# Patient Record
Sex: MALE | Race: WHITE | NOT HISPANIC OR LATINO | Employment: OTHER | URBAN - NONMETROPOLITAN AREA
[De-identification: names, ages, dates, MRNs, and addresses within clinical notes are randomized per-mention and may not be internally consistent; named-entity substitution may affect disease eponyms.]

---

## 2017-05-25 ENCOUNTER — HOSPITAL ENCOUNTER (OUTPATIENT)
Dept: FAMILY MEDICINE CLINIC | Facility: CLINIC | Age: 82
Setting detail: SPECIMEN
Discharge: HOME | End: 2017-05-25
Attending: INTERNAL MEDICINE | Admitting: INTERNAL MEDICINE

## 2017-05-25 LAB
ANION GAP SERPL CALC-SCNC: 11.8 MMOL/L (ref 10–20)
BASOPHILS # BLD AUTO: 0.1 10*3/UL (ref 0–0.2)
BASOPHILS NFR BLD AUTO: 1 % (ref 0–2)
BUN SERPL-MCNC: 16 MG/DL (ref 8–20)
BUN/CREAT SERPL: 16 (ref 6.2–20.3)
CALCIUM SERPL-MCNC: 9.3 MG/DL (ref 8.9–10.3)
CHLORIDE SERPL-SCNC: 104 MMOL/L (ref 101–111)
CONV CO2: 26 MMOL/L (ref 22–32)
CREAT UR-MCNC: 1 MG/DL (ref 0.7–1.2)
DIFFERENTIAL METHOD BLD: (no result)
EOSINOPHIL # BLD AUTO: 0.2 10*3/UL (ref 0–0.3)
EOSINOPHIL # BLD AUTO: 3 % (ref 0–3)
ERYTHROCYTE [DISTWIDTH] IN BLOOD BY AUTOMATED COUNT: 15.2 % (ref 11.5–14.5)
GLUCOSE SERPL-MCNC: 124 MG/DL (ref 65–99)
HCT VFR BLD AUTO: 41.7 % (ref 40–54)
HGB BLD-MCNC: 14.1 G/DL (ref 14–18)
LYMPHOCYTES # BLD AUTO: 1 10*3/UL (ref 0.8–4.8)
LYMPHOCYTES NFR BLD AUTO: 17 % (ref 18–42)
MCH RBC QN AUTO: 31.8 PG (ref 26–32)
MCHC RBC AUTO-ENTMCNC: 33.8 G/DL (ref 32–36)
MCV RBC AUTO: 94.2 FL (ref 80–94)
MONOCYTES # BLD AUTO: 0.6 10*3/UL (ref 0.1–1.3)
MONOCYTES NFR BLD AUTO: 10 % (ref 2–11)
NEUTROPHILS # BLD AUTO: 4.1 10*3/UL (ref 2.3–8.6)
NEUTROPHILS NFR BLD AUTO: 69 % (ref 50–75)
NRBC BLD AUTO-RTO: 0 /100{WBCS}
NRBC/RBC NFR BLD MANUAL: 0 10*3/UL
PLATELET # BLD AUTO: 192 10*3/UL (ref 150–450)
PMV BLD AUTO: 8.3 FL (ref 7.4–10.4)
POTASSIUM SERPL-SCNC: 3.8 MMOL/L (ref 3.6–5.1)
RBC # BLD AUTO: 4.42 10*6/UL (ref 4.6–6)
SODIUM SERPL-SCNC: 138 MMOL/L (ref 136–144)
WBC # BLD AUTO: 5.9 10*3/UL (ref 4.5–11.5)

## 2019-08-20 RX ORDER — WARFARIN SODIUM 2 MG/1
TABLET ORAL TAKE AS DIRECTED
Status: ON HOLD | COMMUNITY
Start: 2019-07-09 | End: 2020-08-17 | Stop reason: SDUPTHER

## 2019-08-20 RX ORDER — ROSUVASTATIN CALCIUM 10 MG/1
1 TABLET, COATED ORAL
COMMUNITY
Start: 2019-06-27

## 2019-08-20 RX ORDER — GABAPENTIN 300 MG/1
600 CAPSULE ORAL
COMMUNITY
Start: 2019-06-26 | End: 2021-06-15 | Stop reason: HOSPADM

## 2019-08-20 RX ORDER — METOLAZONE 2.5 MG/1
1 TABLET ORAL EVERY OTHER DAY
COMMUNITY
Start: 2019-01-04 | End: 2020-05-29 | Stop reason: HOSPADM

## 2019-08-20 RX ORDER — CITALOPRAM 10 MG/1
1 TABLET ORAL EVERY 24 HOURS
COMMUNITY
Start: 2019-01-04 | End: 2020-01-20

## 2019-08-20 RX ORDER — CLONAZEPAM 0.5 MG/1
1 TABLET ORAL NIGHTLY PRN
COMMUNITY
Start: 2019-07-05 | End: 2020-08-17 | Stop reason: HOSPADM

## 2019-08-20 RX ORDER — FERROUS SULFATE 325(65) MG
1 TABLET ORAL
COMMUNITY
Start: 2019-01-04

## 2019-08-20 RX ORDER — LORATADINE 10 MG/1
1 TABLET ORAL DAILY
COMMUNITY
End: 2020-01-20

## 2019-08-20 RX ORDER — ROPINIROLE 0.25 MG/1
1 TABLET, FILM COATED ORAL EVERY 12 HOURS
COMMUNITY
Start: 2019-01-04 | End: 2020-01-20

## 2019-08-20 RX ORDER — OMEPRAZOLE 20 MG/1
1 CAPSULE, DELAYED RELEASE ORAL DAILY
COMMUNITY

## 2019-08-20 RX ORDER — POTASSIUM CHLORIDE 20 MEQ/1
1 TABLET, EXTENDED RELEASE ORAL 3 TIMES DAILY
COMMUNITY
Start: 2017-05-22 | End: 2020-03-09 | Stop reason: SDUPTHER

## 2019-08-20 RX ORDER — PRAMIPEXOLE DIHYDROCHLORIDE 0.25 MG/1
1 TABLET ORAL EVERY 24 HOURS
COMMUNITY
Start: 2019-01-04 | End: 2021-06-11

## 2019-08-20 RX ORDER — FUROSEMIDE 40 MG/1
40 TABLET ORAL DAILY
Status: ON HOLD | COMMUNITY
Start: 2019-07-12 | End: 2020-06-16 | Stop reason: SDUPTHER

## 2019-08-20 NOTE — PROGRESS NOTES
Subjective:     Encounter Date:08/21/2019      Patient ID: Rafael Ivey is a 83 y.o. male.    Chief Complaint:  History of Present Illness  83-year-old white male patient with known history of Parkinson's disease symptomatic bradycardia status post single-chamber permanent pacemaker placement chronic atrial fibrillation on long-term anti coagulation hypertension and dyslipidemia comes back for   follow-up      patient had problems with recurrent falls previously and was given the option of Watchman device but patient refused   patient is currently on anti coagulation   his last echocardiogram 2018 normal LV systolic function mild mitral regurgitation moderate tricuspid insufficiency moderate to severe pulmonary hypertension      will continue conservative treatment patient will get device checks regularly and also he gets PT INR checked with the PCP regularly  EKG today showed atrial fibrillation ventricular pacing noted       Past Medical History:   Diagnosis Date   • Atrial fibrillation (CMS/HCC)    • Cardiomegaly    • Hyperlipidemia    • Hypertension    • Mitral regurgitation    • Parkinson disease (CMS/HCC)    • Sick sinus syndrome (CMS/HCC)    • Valvular heart disease      Past Surgical History:   Procedure Laterality Date   • CHOLECYSTECTOMY     • PACEMAKER IMPLANTATION  06/22/2017    Levin's ([employer])   • TOTAL HIP ARTHROPLASTY Left 05/20/2014     /78 (BP Location: Left arm, Patient Position: Sitting, Cuff Size: Large Adult)   Pulse 63   Wt 131 kg (288 lb)   SpO2 99%   BMI 39.06 kg/m²   Family History   Problem Relation Age of Onset   • Heart failure Mother    • Stroke Maternal Grandfather        Current Outpatient Medications:   •  carbidopa-levodopa (SINEMET)  MG per tablet, Take 1 tablet by mouth Every 8 (Eight) Hours., Disp: , Rfl:   •  citalopram (CeleXA) 10 MG tablet, Take 1 tablet by mouth Daily., Disp: , Rfl:   •  escitalopram (LEXAPRO) 10 MG tablet, Take 10 mg by mouth Daily.,  Disp: , Rfl:   •  famotidine (PEPCID) 20 MG tablet, Take 20 mg by mouth 2 (Two) Times a Day., Disp: , Rfl:   •  ferrous sulfate 325 (65 FE) MG tablet, Take 1 tablet by mouth Every 12 (Twelve) Hours., Disp: , Rfl:   •  furosemide (LASIX) 40 MG tablet, Take 1 tablet by mouth Daily. Alternate every other day 2 tablets, Disp: , Rfl:   •  gabapentin (NEURONTIN) 300 MG capsule, Take 1 capsule by mouth every night at bedtime., Disp: , Rfl:   •  metOLazone (ZAROXOLYN) 2.5 MG tablet, Take 1 tablet by mouth Daily., Disp: , Rfl:   •  MULTIPLE VITAMIN-FOLIC ACID PO, Take 1 tablet by mouth Daily., Disp: , Rfl:   •  Omega-3 Fatty Acids (FISH OIL PO), Take 2 capsules by mouth Daily., Disp: , Rfl:   •  potassium chloride (K-DUR,KLOR-CON) 20 MEQ CR tablet, Take 1 tablet by mouth Every 12 (Twelve) Hours., Disp: , Rfl:   •  pramipexole (MIRAPEX) 0.25 MG tablet, Take 1 tablet by mouth Daily., Disp: , Rfl:   •  rOPINIRole (REQUIP) 0.25 MG tablet, Take 1 tablet by mouth Every 12 (Twelve) Hours., Disp: , Rfl:   •  warfarin (COUMADIN) 2 MG tablet, Take As Directed., Disp: , Rfl:   •  clonazePAM (KlonoPIN) 0.5 MG tablet, Take 1 tablet by mouth 3 (Three) Times a Day As Needed., Disp: , Rfl:   •  loratadine (CLARITIN) 10 MG tablet, Take 1 tablet by mouth Daily., Disp: , Rfl:   •  omeprazole (PRILOSEC) 20 MG capsule, Take 1 capsule by mouth Daily., Disp: , Rfl:   •  rosuvastatin (CRESTOR) 10 MG tablet, Take 1 tablet by mouth every night at bedtime., Disp: , Rfl:   Social History     Socioeconomic History   • Marital status:      Spouse name: Not on file   • Number of children: Not on file   • Years of education: Not on file   • Highest education level: Not on file   Tobacco Use   • Smoking status: Former Smoker     Years: 40.00     Types: Cigarettes   • Smokeless tobacco: Never Used     Allergies   Allergen Reactions   • Amoxicillin Diarrhea   • Cephalexin Diarrhea and Nausea And Vomiting   • Penicillins Other (See Comments)   •  Amoxicillin-Pot Clavulanate Nausea And Vomiting   • Clarithromycin Nausea And Vomiting     Review of Systems   Constitution: Positive for malaise/fatigue. Negative for fever.   HENT: Negative for congestion and hearing loss.    Eyes: Negative for double vision and visual disturbance.   Cardiovascular: Positive for leg swelling. Negative for chest pain, claudication, dyspnea on exertion, palpitations and syncope.   Respiratory: Negative for cough and shortness of breath.    Endocrine: Negative for cold intolerance.   Skin: Positive for rash. Negative for color change.   Musculoskeletal: Negative for arthritis and joint pain.   Gastrointestinal: Negative for abdominal pain, heartburn, nausea and vomiting.   Genitourinary: Negative for hematuria.   Neurological: Positive for dizziness, light-headedness and numbness. Negative for excessive daytime sleepiness.   Psychiatric/Behavioral: Negative for depression. The patient is not nervous/anxious.    All other systems reviewed and are negative.             Objective:     Physical Exam   Constitutional: He is oriented to person, place, and time. He appears well-developed and well-nourished. He is cooperative.   HENT:   Head: Normocephalic and atraumatic.   Mouth/Throat: Uvula is midline and oropharynx is clear and moist. No oral lesions.   Eyes: Conjunctivae are normal. No scleral icterus.   Neck: Trachea normal. Neck supple. No JVD present. Carotid bruit is not present. No thyromegaly present.   Cardiovascular: Normal rate, regular rhythm, S1 normal, S2 normal, normal heart sounds, intact distal pulses and normal pulses. PMI is not displaced. Exam reveals no gallop and no friction rub.   No murmur heard.  Pulmonary/Chest: Effort normal and breath sounds normal.   Abdominal: Soft. Bowel sounds are normal.   Musculoskeletal: Normal range of motion.   Neurological: He is alert and oriented to person, place, and time. He has normal strength.   No focal deficits   Skin: Skin is  warm. No cyanosis.   Psychiatric: He has a normal mood and affect.         ECG 12 Lead  Date/Time: 8/21/2019 4:56 PM  Performed by: Zach Segal MD  Authorized by: Zach Segal MD   Comments: EKG done today showed atrial fibrillation with ventricular pacing noted compared to the old EKG ventricular pacing is now present atrial fibrillation unchanged            Lab Review:       Assessment:          Diagnosis Plan   1. Sick sinus syndrome (CMS/HCC)     2. Atrial fibrillation, chronic (CMS/HCC)     3. Pacemaker            Plan:     Status post permanent pacemaker placement stable  Continue anticoagulation fall precautions

## 2019-08-21 ENCOUNTER — CLINICAL SUPPORT NO REQUIREMENTS (OUTPATIENT)
Dept: CARDIOLOGY | Facility: CLINIC | Age: 84
End: 2019-08-21

## 2019-08-21 ENCOUNTER — OFFICE VISIT (OUTPATIENT)
Dept: CARDIOLOGY | Facility: CLINIC | Age: 84
End: 2019-08-21

## 2019-08-21 DIAGNOSIS — Z95.0 PACEMAKER: ICD-10-CM

## 2019-08-21 DIAGNOSIS — I49.5 SICK SINUS SYNDROME (HCC): Primary | ICD-10-CM

## 2019-08-21 DIAGNOSIS — I48.20 ATRIAL FIBRILLATION, CHRONIC (HCC): ICD-10-CM

## 2019-08-21 PROCEDURE — 93000 ELECTROCARDIOGRAM COMPLETE: CPT | Performed by: INTERNAL MEDICINE

## 2019-08-21 PROCEDURE — 93279 PRGRMG DEV EVAL PM/LDLS PM: CPT | Performed by: INTERNAL MEDICINE

## 2019-08-21 PROCEDURE — 99213 OFFICE O/P EST LOW 20 MIN: CPT | Performed by: INTERNAL MEDICINE

## 2019-08-21 RX ORDER — ESCITALOPRAM OXALATE 10 MG/1
10 TABLET ORAL DAILY
COMMUNITY

## 2019-08-21 RX ORDER — FAMOTIDINE 20 MG/1
20 TABLET, FILM COATED ORAL 2 TIMES DAILY
COMMUNITY
End: 2020-01-20

## 2019-08-22 VITALS
BODY MASS INDEX: 39.06 KG/M2 | OXYGEN SATURATION: 99 % | HEART RATE: 63 BPM | DIASTOLIC BLOOD PRESSURE: 78 MMHG | SYSTOLIC BLOOD PRESSURE: 125 MMHG | WEIGHT: 288 LBS

## 2020-01-18 ENCOUNTER — LAB REQUISITION (OUTPATIENT)
Dept: LAB | Facility: HOSPITAL | Age: 85
End: 2020-01-18

## 2020-01-18 DIAGNOSIS — Z00.00 ROUTINE GENERAL MEDICAL EXAMINATION AT A HEALTH CARE FACILITY: ICD-10-CM

## 2020-01-18 LAB
BASOPHILS # BLD AUTO: 0.06 10*3/MM3 (ref 0–0.2)
BASOPHILS NFR BLD AUTO: 1.5 % (ref 0–1.5)
DEPRECATED RDW RBC AUTO: 58.1 FL (ref 37–54)
EOSINOPHIL # BLD AUTO: 0.19 10*3/MM3 (ref 0–0.4)
EOSINOPHIL NFR BLD AUTO: 4.7 % (ref 0.3–6.2)
ERYTHROCYTE [DISTWIDTH] IN BLOOD BY AUTOMATED COUNT: 18.4 % (ref 12.3–15.4)
HCT VFR BLD AUTO: 28 % (ref 37.5–51)
HGB BLD-MCNC: 8.8 G/DL (ref 13–17.7)
IMM GRANULOCYTES # BLD AUTO: 0.01 10*3/MM3 (ref 0–0.05)
IMM GRANULOCYTES NFR BLD AUTO: 0.2 % (ref 0–0.5)
LYMPHOCYTES # BLD AUTO: 1.09 10*3/MM3 (ref 0.7–3.1)
LYMPHOCYTES NFR BLD AUTO: 27 % (ref 19.6–45.3)
MCH RBC QN AUTO: 27.2 PG (ref 26.6–33)
MCHC RBC AUTO-ENTMCNC: 31.4 G/DL (ref 31.5–35.7)
MCV RBC AUTO: 86.7 FL (ref 79–97)
MONOCYTES # BLD AUTO: 0.56 10*3/MM3 (ref 0.1–0.9)
MONOCYTES NFR BLD AUTO: 13.9 % (ref 5–12)
NEUTROPHILS # BLD AUTO: 2.13 10*3/MM3 (ref 1.7–7)
NEUTROPHILS NFR BLD AUTO: 52.7 % (ref 42.7–76)
NRBC BLD AUTO-RTO: 0 /100 WBC (ref 0–0.2)
PLATELET # BLD AUTO: 202 10*3/MM3 (ref 140–450)
PMV BLD AUTO: 8.9 FL (ref 6–12)
PSA SERPL-MCNC: 0.7 NG/ML (ref 0–4)
RBC # BLD AUTO: 3.23 10*6/MM3 (ref 4.14–5.8)
WBC NRBC COR # BLD: 4.04 10*3/MM3 (ref 3.4–10.8)

## 2020-01-18 PROCEDURE — 84153 ASSAY OF PSA TOTAL: CPT

## 2020-01-18 PROCEDURE — 85025 COMPLETE CBC W/AUTO DIFF WBC: CPT

## 2020-01-20 ENCOUNTER — OFFICE VISIT (OUTPATIENT)
Dept: CARDIOLOGY | Facility: CLINIC | Age: 85
End: 2020-01-20

## 2020-01-20 VITALS
SYSTOLIC BLOOD PRESSURE: 103 MMHG | BODY MASS INDEX: 30.92 KG/M2 | DIASTOLIC BLOOD PRESSURE: 73 MMHG | WEIGHT: 228 LBS | HEART RATE: 62 BPM | RESPIRATION RATE: 18 BRPM

## 2020-01-20 DIAGNOSIS — R60.0 BILATERAL LEG EDEMA: ICD-10-CM

## 2020-01-20 DIAGNOSIS — I49.5 SICK SINUS SYNDROME (HCC): ICD-10-CM

## 2020-01-20 DIAGNOSIS — T45.515A WARFARIN-INDUCED COAGULOPATHY (HCC): ICD-10-CM

## 2020-01-20 DIAGNOSIS — D50.0 IRON DEFICIENCY ANEMIA DUE TO CHRONIC BLOOD LOSS: ICD-10-CM

## 2020-01-20 DIAGNOSIS — I48.20 ATRIAL FIBRILLATION, CHRONIC (HCC): Primary | ICD-10-CM

## 2020-01-20 DIAGNOSIS — G20 PARKINSON'S DISEASE (HCC): ICD-10-CM

## 2020-01-20 DIAGNOSIS — D68.32 WARFARIN-INDUCED COAGULOPATHY (HCC): ICD-10-CM

## 2020-01-20 DIAGNOSIS — Z95.0 PACEMAKER: ICD-10-CM

## 2020-01-20 PROCEDURE — 99214 OFFICE O/P EST MOD 30 MIN: CPT | Performed by: INTERNAL MEDICINE

## 2020-01-20 PROCEDURE — 93000 ELECTROCARDIOGRAM COMPLETE: CPT | Performed by: INTERNAL MEDICINE

## 2020-01-20 RX ORDER — BACLOFEN 10 MG/1
15 TABLET ORAL NIGHTLY
COMMUNITY
End: 2021-06-15 | Stop reason: HOSPADM

## 2020-01-20 RX ORDER — ACETAMINOPHEN 325 MG/1
650 TABLET ORAL EVERY 6 HOURS PRN
COMMUNITY
End: 2021-06-15 | Stop reason: HOSPADM

## 2020-01-20 NOTE — PATIENT INSTRUCTIONS
Get echo and hospitalization reports from [facility]   Continue diuresis as renal function allows  EP Evaluation for watchman  F/U 3 months

## 2020-01-20 NOTE — PROGRESS NOTES
Cardiology Office Visit      Encounter Date:  01/20/2020    Patient ID:   Rafael Ivey is a 84 y.o. male.    Reason For Followup:  Edema  Coagulopathy secondary to warfarin  Anemia  Hyperlipidemia    Brief Clinical History:  Dear Dr. Ivey, Blake Doherty MD    I had the pleasure of seeing Rafael Ivey today. As you are well aware, this is a 84 y.o. male with no established history of ischemic heart disease.  He does however have a history of sick sinus syndrome and is status post permanent pacemaker placement, chronic atrial fibrillation, coagulopathy secondary to warfarin, Parkinson's disease, hypertension, hyperlipidemia chronic pain, and profound lower extremity edema.  He presents today to establish care with another cardiologist.    Interval History:  He denies any chest pain pressure heaviness or tightness.  He does report shortness of breath with activity.  He denies any PND orthopnea.  He does report syncopal episodes and falling episodes secondary to his Parkinson's disease.  He additionally reports a profound amount of lower extremity edema.    He additionally was recently hospitalized at [facility] following a syncopal episode.  Although I do not have the records of the admission, during the course of his work-up he was found to be profoundly anemic.  He was reportedly transfused 5 units of blood and according to his daughter who accompanies them today, his most recent hemoglobin was only 8 something.  They were unable to find a source for the bleeding.  He was hypotensive during the admission and he was taken off all of his antihypertensives.  He continues on warfarin.  He has a profound amount of lower extremity edema as well.  He was placed on metolazone in addition to his regular diuretics during his rehab.  He currently remains in rehab.  He has lost about 25 pounds with the addition of metolazone and has quite a bit more fluid to remove.  They are obtaining periodic basic metabolic  panels to evaluate renal function and electrolytes.  The patient does report some lower extremity cramping.  He is reporting a significant issue with urinary incontinence and does not want to increase his diuretic dosage.    We had an extensive conversation regarding the watchman device as the patient is at extremely high risk for bleeding complications particularly with his most recent bout with anemia requiring massive transfusion.  He also has a significant risk of stroke as his chads score is markedly elevated.  We discussed options and he will see cardiac electrophysiology for evaluation for watchman device.    Assessment & Plan    Impressions:  Chronic atrial fibrillation with elevated chads score  Coagulopathy secondary to warfarin  Anemia requiring massive blood transfusion likely GI etiology  Profound lower extremity edema  History of hypertension currently normotensive  Parkinson's disease  Hyperlipidemia  Sick sinus syndrome status post permanent pacemaker  Chronic pain    Recommendations:  Continuation of his current cardiovascular regimen at the present time.  Continue diuretic dosing at current levels and closely monitor renal function and electrolyte levels  Cardiac electrophysiology evaluation for appropriateness for watchman device  Obtain echo and hospitalization records from [facility]  Follow-up in 3 months time sooner should there be difficulties.    Objective:    Vitals:  Vitals:    01/20/20 1330   BP: 103/73   BP Location: Left arm   Pulse: 62   Resp: 18   Weight: 103 kg (228 lb)       Physical Exam:    General: Alert, cooperative, no distress, appears stated age  Head:  Normocephalic, atraumatic, mucous membranes moist  Eyes:  Conjunctiva/corneas clear, EOM's intact     Neck:  Supple,  no bruit  Lungs: Coarse and diminished at the bases.  No rhonchi  Chest wall: No tenderness  Heart::  Regular rate and rhythm, S1 and S2 normal, 2/6 holosystolic murmur.  No rub or gallop  Abdomen: Soft,  non-tender, nondistended bowel sounds active  Extremities: No cyanosis, clubbing, profound lower extremity edema  Pulses: 2+ and symmetric all extremities  Skin:  Bandages noted to lower extremities at areas of tense fluid retention.  Compression hose are in place.  Neuro/psych: A&O x3. CN II through XII are grossly intact with appropriate affect      Allergies:  Allergies   Allergen Reactions   • Amoxicillin Diarrhea   • Cephalexin Diarrhea and Nausea And Vomiting   • Penicillins Other (See Comments)   • Amoxicillin-Pot Clavulanate Nausea And Vomiting   • Clarithromycin Nausea And Vomiting       Medication Review:     Current Outpatient Medications:   •  acetaminophen (TYLENOL) 325 MG tablet, Take 650 mg by mouth Every 6 (Six) Hours As Needed for Mild Pain ., Disp: , Rfl:   •  baclofen (LIORESAL) 10 MG tablet, Take 10 mg by mouth Daily., Disp: , Rfl:   •  carbidopa-levodopa (SINEMET)  MG per tablet, Take 1 tablet by mouth Every 8 (Eight) Hours., Disp: , Rfl:   •  clonazePAM (KlonoPIN) 0.5 MG tablet, Take 1 tablet by mouth At Night As Needed., Disp: , Rfl:   •  escitalopram (LEXAPRO) 10 MG tablet, Take 10 mg by mouth Daily., Disp: , Rfl:   •  ferrous sulfate 325 (65 FE) MG tablet, Take 1 tablet by mouth Every 12 (Twelve) Hours., Disp: , Rfl:   •  furosemide (LASIX) 40 MG tablet, Take 40 mg by mouth Daily. Once a day on Sun, Mon, Wed, and Fri- 40mg  Once a day Tue, Thur, and Sat- 80mg, Disp: , Rfl:   •  gabapentin (NEURONTIN) 300 MG capsule, Take 600 mg by mouth every night at bedtime., Disp: , Rfl:   •  metOLazone (ZAROXOLYN) 2.5 MG tablet, Take 1 tablet by mouth Daily., Disp: , Rfl:   •  multivitamin-minerals (CENTRUM) tablet, Take 1 tablet by mouth Daily., Disp: , Rfl:   •  Omega-3 Fatty Acids (FISH OIL PO), Take 2 capsules by mouth Daily., Disp: , Rfl:   •  omeprazole (PRILOSEC) 20 MG capsule, Take 1 capsule by mouth Daily., Disp: , Rfl:   •  potassium chloride (K-DUR,KLOR-CON) 20 MEQ CR tablet, Take 1  tablet by mouth 3 (Three) Times a Day., Disp: , Rfl:   •  pramipexole (MIRAPEX) 0.25 MG tablet, Take 1 tablet by mouth Daily., Disp: , Rfl:   •  rosuvastatin (CRESTOR) 10 MG tablet, Take 1 tablet by mouth every night at bedtime., Disp: , Rfl:   •  warfarin (COUMADIN) 2 MG tablet, Take As Directed., Disp: , Rfl:     Family History:  Family History   Problem Relation Age of Onset   • Heart failure Mother    • Stroke Maternal Grandfather        Past Medical History:  Past Medical History:   Diagnosis Date   • Anemia    • Arthritis     left hip    • Atrial fibrillation (CMS/HCC)    • Atrial fibrillation (CMS/HCC)     chronic   • Cardiomegaly     mild   • Chronic diastolic heart failure (CMS/HCC)    • GERD (gastroesophageal reflux disease)    • Hyperlipidemia    • Hypertension    • Hypotension    • Lower extremity edema    • Mitral regurgitation    • Obesity    • Pacemaker    • Parkinson disease (CMS/HCC)    • Recurrent falls     with injury    • Sick sinus syndrome (CMS/HCC)     s/p pacemaker implant    • Valvular heart disease     MR       Past surgical History:  Past Surgical History:   Procedure Laterality Date   • APPENDECTOMY     • CATARACT EXTRACTION     • CHOLECYSTECTOMY     • HIP SURGERY Right 08/2017   • OTHER SURGICAL HISTORY      skin mole excisions    • PACEMAKER IMPLANTATION  06/22/2017    Levin's ([employer])   • TONSILLECTOMY     • TOTAL HIP ARTHROPLASTY Left 05/20/2014       Social History:  Social History     Socioeconomic History   • Marital status:      Spouse name: Not on file   • Number of children: Not on file   • Years of education: Not on file   • Highest education level: Not on file   Tobacco Use   • Smoking status: Former Smoker     Packs/day: 1.00     Years: 40.00     Pack years: 40.00     Types: Cigarettes   • Smokeless tobacco: Never Used   • Tobacco comment: quit 1970's    Substance and Sexual Activity   • Alcohol use: Never     Frequency: Never   • Drug use: Never   • Sexual  activity: Defer       Review of Systems:  The following systems were reviewed as they relate to the cardiovascular system: Constitutional, Eyes, ENT, Cardiovascular, Respiratory, Gastrointestinal, Integumentary, Neurological, Psychiatric, Hematologic, Endocrine, Musculoskeletal, and Genitourinary. The pertinent cardiovascular findings are reported above with all other cardiovascular points within those systems being negative.    Diagnostic Study Review:     Current Electrocardiogram:    ECG 12 Lead  Date/Time: 1/20/2020 3:36 PM  Performed by: Jose Gonzalez DO  Authorized by: Jose Gonzalez DO   Comparison: not compared with previous ECG   Previous ECG: no previous ECG available  Comments: Ventricular paced complexes with a rate of 62 bpm.  Atrial fibrillation.  No further analysis attempted.              NOTE: The following portions of the patient's history were reviewed and updated this visit as appropriate: allergies, current medications, past family history, past medical history, past social history, past surgical history and problem list.

## 2020-01-24 DIAGNOSIS — R60.0 BILATERAL LEG EDEMA: Primary | ICD-10-CM

## 2020-03-09 RX ORDER — POTASSIUM CHLORIDE 20 MEQ/1
20 TABLET, EXTENDED RELEASE ORAL 3 TIMES DAILY
Qty: 60 TABLET | Refills: 2 | Status: SHIPPED | OUTPATIENT
Start: 2020-03-09 | End: 2021-06-11

## 2020-03-13 ENCOUNTER — PREP FOR SURGERY (OUTPATIENT)
Dept: OTHER | Facility: HOSPITAL | Age: 85
End: 2020-03-13

## 2020-03-13 ENCOUNTER — CLINICAL SUPPORT NO REQUIREMENTS (OUTPATIENT)
Dept: CARDIOLOGY | Facility: CLINIC | Age: 85
End: 2020-03-13

## 2020-03-13 ENCOUNTER — OFFICE VISIT (OUTPATIENT)
Dept: CARDIOLOGY | Facility: CLINIC | Age: 85
End: 2020-03-13

## 2020-03-13 VITALS
WEIGHT: 224 LBS | DIASTOLIC BLOOD PRESSURE: 64 MMHG | SYSTOLIC BLOOD PRESSURE: 136 MMHG | BODY MASS INDEX: 30.38 KG/M2 | HEART RATE: 59 BPM | OXYGEN SATURATION: 94 %

## 2020-03-13 DIAGNOSIS — T45.515A WARFARIN-INDUCED COAGULOPATHY (HCC): ICD-10-CM

## 2020-03-13 DIAGNOSIS — D68.32 WARFARIN-INDUCED COAGULOPATHY (HCC): ICD-10-CM

## 2020-03-13 DIAGNOSIS — G20 PARKINSON'S DISEASE (HCC): Primary | ICD-10-CM

## 2020-03-13 DIAGNOSIS — D50.0 IRON DEFICIENCY ANEMIA DUE TO CHRONIC BLOOD LOSS: Primary | ICD-10-CM

## 2020-03-13 DIAGNOSIS — I49.5 SICK SINUS SYNDROME (HCC): Primary | ICD-10-CM

## 2020-03-13 DIAGNOSIS — D50.0 IRON DEFICIENCY ANEMIA DUE TO CHRONIC BLOOD LOSS: ICD-10-CM

## 2020-03-13 DIAGNOSIS — Z95.0 PACEMAKER: ICD-10-CM

## 2020-03-13 DIAGNOSIS — I49.5 SICK SINUS SYNDROME (HCC): ICD-10-CM

## 2020-03-13 DIAGNOSIS — I48.20 ATRIAL FIBRILLATION, CHRONIC (HCC): ICD-10-CM

## 2020-03-13 DIAGNOSIS — R60.0 BILATERAL LEG EDEMA: ICD-10-CM

## 2020-03-13 PROCEDURE — 93279 PRGRMG DEV EVAL PM/LDLS PM: CPT | Performed by: INTERNAL MEDICINE

## 2020-03-13 PROCEDURE — 99214 OFFICE O/P EST MOD 30 MIN: CPT | Performed by: INTERNAL MEDICINE

## 2020-03-13 RX ORDER — SODIUM CHLORIDE 9 MG/ML
80 INJECTION, SOLUTION INTRAVENOUS CONTINUOUS
Status: CANCELLED | OUTPATIENT
Start: 2020-03-13

## 2020-03-13 NOTE — PROGRESS NOTES
CC--chronic atrial fibrillation, chronic leg edema, Parkinson's disease with multiple falls, anemia needing multiple transfusions    Subject-- 84-year-old male patient of symptomatic bradycardia needing a single-chamber pacemaker manufactured by [employer] placed in [facility] on comes in for evaluation.   past medical history of  chronic atrial fibrillation, hypertension, dyslipidemia, and use of long-term anticoagulation to prevent cardioembolic episodes. His last echocardiogram showed normal LV systolic function,  mild mitral regurgitation,  moderate tricuspid regurgitation and pulmonary hypertension with RVSP of 58 mm.   chronic medical problems include persistent atrial fibrillation, hypertension, hyperlipidemia and prior history of symptomatic bradycardia needing pacemaker implantation   patient denies any stroke, or any active angina or dyspnea  Does have chronic edema of his feet   he also has worsening Parkinson's with recurrent falls   Recently also had profound anemia needing multiple transfusions and came back for evaluation of watchman device implantation to avoid chronic anticoagulation  He is compensated and class III     assessment plan     single-chamber pacemaker in situ  [employer] company appropriately functioning   hypertension controlled   hyperlipidemia   permanent atrial fibrillation   recurrent falls with injury  Recent profound anemia needing transfusions and negative work-up   chads Vasc score--4  HASBLED--3   Parkinson's   chronic diastolic heart failure   patient educated about options for left atrial appendage occlusion device to avoid long-term anticoagulation because of recurrent falls--schedule for pulmonary PRESTON           Vital Signs: Blood pressure 136/64 pulse rate 59 patient afebrile respiration 12 times a minute    Current Allergies (reviewed today):  PCN (Critical)  KEFLEX (Critical)  AMOXICILLIN (Critical)      Past Medical History:     Reviewed  history from 05/31/2018 and no changes required:        Atrial Fibrillation        Mitral Regurgitation        Mild cardiomegaly        Hyperlipidemia        Hypertension        Low extremity edema        Arthritis of left hip         Parkinson's Disease         Valvular Heart Disease        Hx: Anemia         SSS: s/p pacemaker implant     Past Surgical History:     Reviewed history from 11/30/2017 and no changes required:        Appendectomy        Cholecystectomy        Skin moles removed        Tonsillectomy        Total Hip Arthroplasty: Left - 5-        Cataract Extraction        Pacemaker implant : Levin's June 22, 2017         Right hip surgery : August 2017     Review of Systems   General: No fatigue or tiredness, No change in weight   Eyes: No redness  Cardiovascular: No chest pain, no palpitations  Respiratory: No shortness of breath  Gastrointestinal: No nausea or vomiting, bleeding  Genitourinary: no hematuria or dysuria  Musculoskeletal: No arthralgia or myalgia  Skin: No rash  Neurologic: No numbness, tingling, syncope  Hematologic/Lymphatic:  positive for skin bruising      Physical Exam    General:      well developed, well nourished, in no acute distress.    Head:      normocephalic and atraumatic.    Eyes:      PERRL/EOM intact, conjunctiva and sclera clear with out nystagmus.    Neck:      no masses, thyromegaly,  trachea central with normal respiratory effort  Lungs:      clear bilaterally to auscultation.    Heart:      non-displaced PMI, chest non-tender; irregular rate and rhythm, S1, S2 without murmurs, rubs, or gallops  Msk:      kyphosis and unsteady gait.    Pulses:       distal pulses both lower extremities are not palpable because of significant edema  Extremities:       2+ bilateral lower leg and ankle/ pedal edema   no cyanosis or clubbing  Neurologic:      no focal deficits,  alert and oriented x3  Skin:       positive for skin bruising  Psych:      alert and cooperative;  normal mood and affect; normal attention span and concentration.      Electronically signed by iJm Walker MD, 03/13/20, 4:12 PM.

## 2020-03-18 ENCOUNTER — APPOINTMENT (OUTPATIENT)
Dept: CARDIOLOGY | Facility: HOSPITAL | Age: 85
End: 2020-03-18

## 2020-04-10 ENCOUNTER — OFFICE VISIT (OUTPATIENT)
Dept: CARDIOLOGY | Facility: CLINIC | Age: 85
End: 2020-04-10

## 2020-04-10 VITALS — WEIGHT: 228 LBS | BODY MASS INDEX: 30.92 KG/M2

## 2020-04-10 DIAGNOSIS — I48.20 ATRIAL FIBRILLATION, CHRONIC (HCC): Primary | ICD-10-CM

## 2020-04-10 DIAGNOSIS — D50.0 IRON DEFICIENCY ANEMIA DUE TO CHRONIC BLOOD LOSS: ICD-10-CM

## 2020-04-10 DIAGNOSIS — Z95.0 PACEMAKER: ICD-10-CM

## 2020-04-10 DIAGNOSIS — G20 PARKINSON'S DISEASE (HCC): ICD-10-CM

## 2020-04-10 PROCEDURE — 99422 OL DIG E/M SVC 11-20 MIN: CPT | Performed by: INTERNAL MEDICINE

## 2020-04-10 RX ORDER — OXYBUTYNIN CHLORIDE 5 MG/1
5 TABLET ORAL NIGHTLY
COMMUNITY
End: 2022-01-01

## 2020-04-10 NOTE — PROGRESS NOTES
CC--chronic atrial fibrillation, chronic leg edema, Parkinson's disease with multiple falls, anemia needing multiple transfusions    This is an electronic telephone visit--consent obtained for the telephone visit and time spent on telephone visit is 11 minutes      Subject-- 84-year-old male patient of symptomatic bradycardia needing a single-chamber pacemaker manufactured by [employer] placed in [facility]    past medical history of  chronic atrial fibrillation, hypertension, dyslipidemia, and use of long-term anticoagulation to prevent cardioembolic episodes. His last echocardiogram showed normal LV systolic function,  mild mitral regurgitation,  moderate tricuspid regurgitation and pulmonary hypertension with RVSP of 58 mm.   chronic medical problems include persistent atrial fibrillation, hypertension, hyperlipidemia and prior history of symptomatic bradycardia needing pacemaker implantation   patient denies any stroke, or any active angina or dyspnea  Does have chronic edema of his feet--his edema was worse 2 days ago which is currently better and currently takes 2 diuretics including Lasix and metolazone--he denies any shortness of breath and did not have any falls last 2 weeks--no injury in the last 2 weeks--he had multiple falls with injuries in the past   he also has worsening Parkinson's with recurrent falls   Recently also had profound anemia needing multiple transfusions and came back for evaluation of watchman device implantation to avoid chronic anticoagulation  He is compensated and class III  Patient wanted to avoid long-term anticoagulation and was scheduled for PRETSON to evaluate left atrial appendage anatomy which could not be performed because of ongoing pandemic       assessment plan     single-chamber pacemaker in situ  [employer]  hypertension controlled   hyperlipidemia   permanent atrial fibrillation   recurrent falls with injury  Recent profound anemia  needing transfusions and negative work-up   chads Vasc score--4  HASBLED--3   Parkinson's   chronic diastolic heart failure   patient educated about options for left atrial appendage occlusion device to avoid long-term anticoagulation because of recurrent falls--PRESTON to be scheduled once the pandemic is controlled and elective procedures can be done and patient was educated  Medications reviewed  Patient was educated about the correct usage of diuretics for optimization of therapy of leg edema           Vital Signs: Blood pressure monitored by visiting nurse twice a week which is well controlled as per the patient     Current Allergies (reviewed today):  PCN (Critical)  KEFLEX (Critical)  AMOXICILLIN (Critical)      Past Medical History:     Reviewed history from 05/31/2018 and no changes required:        Atrial Fibrillation        Mitral Regurgitation        Mild cardiomegaly        Hyperlipidemia        Hypertension        Low extremity edema        Arthritis of left hip         Parkinson's Disease         Valvular Heart Disease        Hx: Anemia         SSS: s/p pacemaker implant     Past Surgical History:     Reviewed history from 11/30/2017 and no changes required:        Appendectomy        Cholecystectomy        Skin moles removed        Tonsillectomy        Total Hip Arthroplasty: Left - 5-        Cataract Extraction        Pacemaker implant : Levin's June 22, 2017         Right hip surgery : August 2017     Review of Systems   General: No fatigue or tiredness, No change in weight   Eyes: No redness  Cardiovascular: No chest pain, no palpitations  Respiratory: No shortness of breath  Gastrointestinal: No nausea or vomiting, bleeding  Genitourinary: no hematuria or dysuria  Musculoskeletal: No arthralgia or myalgia  Skin: No rash  Neurologic: No numbness, tingling, syncope  Hematologic/Lymphatic:  positive for skin bruising      Electronically signed by Jim Walker MD, 04/10/20, 10:20 AM.

## 2020-05-06 DIAGNOSIS — Z01.818 PREOP TESTING: Primary | ICD-10-CM

## 2020-05-18 ENCOUNTER — LAB (OUTPATIENT)
Dept: LAB | Facility: HOSPITAL | Age: 85
End: 2020-05-18

## 2020-05-18 DIAGNOSIS — Z01.818 PREOP TESTING: ICD-10-CM

## 2020-05-18 PROCEDURE — U0004 COV-19 TEST NON-CDC HGH THRU: HCPCS

## 2020-05-19 ENCOUNTER — ANESTHESIA EVENT (OUTPATIENT)
Dept: GASTROENTEROLOGY | Facility: HOSPITAL | Age: 85
End: 2020-05-19

## 2020-05-19 LAB
REF LAB TEST METHOD: NORMAL
SARS-COV-2 RNA RESP QL NAA+PROBE: NOT DETECTED

## 2020-05-20 ENCOUNTER — HOSPITAL ENCOUNTER (INPATIENT)
Dept: CARDIOLOGY | Facility: HOSPITAL | Age: 85
LOS: 5 days | Discharge: NURSING, REHAB OR LONG-TERM CARE | End: 2020-05-29
Attending: INTERNAL MEDICINE | Admitting: INTERNAL MEDICINE

## 2020-05-20 ENCOUNTER — ANESTHESIA (OUTPATIENT)
Dept: GASTROENTEROLOGY | Facility: HOSPITAL | Age: 85
End: 2020-05-20

## 2020-05-20 ENCOUNTER — INPATIENT HOSPITAL (OUTPATIENT)
Dept: URBAN - METROPOLITAN AREA HOSPITAL 84 | Facility: HOSPITAL | Age: 85
End: 2020-05-20
Payer: COMMERCIAL

## 2020-05-20 DIAGNOSIS — I48.20 ATRIAL FIBRILLATION, CHRONIC (HCC): Primary | ICD-10-CM

## 2020-05-20 DIAGNOSIS — I50.9 HEART FAILURE, UNSPECIFIED: ICD-10-CM

## 2020-05-20 DIAGNOSIS — R22.43 LOCALIZED SWELLING, MASS AND LUMP, LOWER LIMB, BILATERAL: ICD-10-CM

## 2020-05-20 DIAGNOSIS — G20 PARKINSON'S DISEASE: ICD-10-CM

## 2020-05-20 DIAGNOSIS — I48.20 CHRONIC ATRIAL FIBRILLATION, UNSPECIFIED: ICD-10-CM

## 2020-05-20 DIAGNOSIS — D50.9 IRON DEFICIENCY ANEMIA, UNSPECIFIED: ICD-10-CM

## 2020-05-20 DIAGNOSIS — R29.6 REPEATED FALLS: ICD-10-CM

## 2020-05-20 DIAGNOSIS — D50.0 IRON DEFICIENCY ANEMIA DUE TO CHRONIC BLOOD LOSS: ICD-10-CM

## 2020-05-20 LAB
ABO GROUP BLD: NORMAL
ALBUMIN SERPL-MCNC: 4 G/DL (ref 3.5–5.2)
ALBUMIN/GLOB SERPL: 1.4 G/DL
ALP SERPL-CCNC: 154 U/L (ref 39–117)
ALT SERPL W P-5'-P-CCNC: 12 U/L (ref 1–41)
ANION GAP SERPL CALCULATED.3IONS-SCNC: 14 MMOL/L (ref 5–15)
ANISOCYTOSIS BLD QL: NORMAL
AST SERPL-CCNC: 30 U/L (ref 1–40)
BASOPHILS # BLD AUTO: 0.1 10*3/MM3 (ref 0–0.2)
BASOPHILS NFR BLD AUTO: 1.6 % (ref 0–1.5)
BILIRUB SERPL-MCNC: 1.5 MG/DL (ref 0.2–1.2)
BLD GP AB SCN SERPL QL: NEGATIVE
BUN BLD-MCNC: 29 MG/DL (ref 8–23)
BUN/CREAT SERPL: 25 (ref 7–25)
CALCIUM SPEC-SCNC: 9.2 MG/DL (ref 8.6–10.5)
CHLORIDE SERPL-SCNC: 101 MMOL/L (ref 98–107)
CO2 SERPL-SCNC: 27 MMOL/L (ref 22–29)
CREAT BLD-MCNC: 1.16 MG/DL (ref 0.76–1.27)
DEPRECATED RDW RBC AUTO: 46.8 FL (ref 37–54)
EOSINOPHIL # BLD AUTO: 0.1 10*3/MM3 (ref 0–0.4)
EOSINOPHIL NFR BLD AUTO: 1.5 % (ref 0.3–6.2)
ERYTHROCYTE [DISTWIDTH] IN BLOOD BY AUTOMATED COUNT: 20.4 % (ref 12.3–15.4)
FERRITIN SERPL-MCNC: 27.35 NG/ML (ref 30–400)
FOLATE SERPL-MCNC: >20 NG/ML (ref 4.78–24.2)
GFR SERPL CREATININE-BSD FRML MDRD: 60 ML/MIN/1.73
GLOBULIN UR ELPH-MCNC: 2.8 GM/DL
GLUCOSE BLD-MCNC: 88 MG/DL (ref 65–99)
HCT VFR BLD AUTO: 22.3 % (ref 37.5–51)
HCT VFR BLD AUTO: 25.5 % (ref 37.5–51)
HGB BLD-MCNC: 6.3 G/DL (ref 13–17.7)
HGB BLD-MCNC: 7.9 G/DL (ref 13–17.7)
HYPOCHROMIA BLD QL: NORMAL
INR PPP: 2 (ref 2–3)
IRON 24H UR-MRATE: 18 MCG/DL (ref 59–158)
IRON 24H UR-MRATE: 19 MCG/DL (ref 59–158)
IRON SATN MFR SERPL: 4 % (ref 20–50)
LDH SERPL-CCNC: 326 U/L (ref 135–225)
LYMPHOCYTES # BLD AUTO: 0.6 10*3/MM3 (ref 0.7–3.1)
LYMPHOCYTES NFR BLD AUTO: 14.7 % (ref 19.6–45.3)
MCH RBC QN AUTO: 18.4 PG (ref 26.6–33)
MCHC RBC AUTO-ENTMCNC: 28.5 G/DL (ref 31.5–35.7)
MCV RBC AUTO: 64.5 FL (ref 79–97)
MICROCYTES BLD QL: NORMAL
MONOCYTES # BLD AUTO: 0.5 10*3/MM3 (ref 0.1–0.9)
MONOCYTES NFR BLD AUTO: 13.1 % (ref 5–12)
NEUTROPHILS # BLD AUTO: 2.8 10*3/MM3 (ref 1.7–7)
NEUTROPHILS NFR BLD AUTO: 69.1 % (ref 42.7–76)
NRBC BLD AUTO-RTO: 0.2 /100 WBC (ref 0–0.2)
PLAT MORPH BLD: NORMAL
PLATELET # BLD AUTO: 218 10*3/MM3 (ref 140–450)
PMV BLD AUTO: 6.5 FL (ref 6–12)
POIKILOCYTOSIS BLD QL SMEAR: NORMAL
POTASSIUM BLD-SCNC: 4.1 MMOL/L (ref 3.5–5.2)
PROT SERPL-MCNC: 6.8 G/DL (ref 6–8.5)
PROTHROMBIN TIME: 19.2 SECONDS (ref 19.4–28.5)
RBC # BLD AUTO: 3.45 10*6/MM3 (ref 4.14–5.8)
RETICS # AUTO: 0.07 10*6/MM3 (ref 0.02–0.13)
RETICS/RBC NFR AUTO: 2.14 % (ref 0.7–1.9)
RH BLD: POSITIVE
SODIUM BLD-SCNC: 142 MMOL/L (ref 136–145)
T&S EXPIRATION DATE: NORMAL
TIBC SERPL-MCNC: 490 MCG/DL (ref 298–536)
TRANSFERRIN SERPL-MCNC: 329 MG/DL (ref 200–360)
VIT B12 BLD-MCNC: 919 PG/ML (ref 211–946)
WBC MORPH BLD: NORMAL
WBC NRBC COR # BLD: 4 10*3/MM3 (ref 3.4–10.8)

## 2020-05-20 PROCEDURE — 83540 ASSAY OF IRON: CPT | Performed by: NURSE PRACTITIONER

## 2020-05-20 PROCEDURE — 86900 BLOOD TYPING SEROLOGIC ABO: CPT

## 2020-05-20 PROCEDURE — 99222 1ST HOSP IP/OBS MODERATE 55: CPT | Performed by: NURSE PRACTITIONER

## 2020-05-20 PROCEDURE — 85014 HEMATOCRIT: CPT | Performed by: INTERNAL MEDICINE

## 2020-05-20 PROCEDURE — 99220 PR INITIAL OBSERVATION CARE/DAY 70 MINUTES: CPT | Performed by: INTERNAL MEDICINE

## 2020-05-20 PROCEDURE — 86901 BLOOD TYPING SEROLOGIC RH(D): CPT | Performed by: INTERNAL MEDICINE

## 2020-05-20 PROCEDURE — 25010000002 IRON SUCROSE PER 1 MG: Performed by: NURSE PRACTITIONER

## 2020-05-20 PROCEDURE — 86923 COMPATIBILITY TEST ELECTRIC: CPT

## 2020-05-20 PROCEDURE — 80053 COMPREHEN METABOLIC PANEL: CPT | Performed by: INTERNAL MEDICINE

## 2020-05-20 PROCEDURE — 84466 ASSAY OF TRANSFERRIN: CPT | Performed by: NURSE PRACTITIONER

## 2020-05-20 PROCEDURE — 85018 HEMOGLOBIN: CPT | Performed by: INTERNAL MEDICINE

## 2020-05-20 PROCEDURE — 82607 VITAMIN B-12: CPT | Performed by: INTERNAL MEDICINE

## 2020-05-20 PROCEDURE — 82728 ASSAY OF FERRITIN: CPT | Performed by: INTERNAL MEDICINE

## 2020-05-20 PROCEDURE — G0378 HOSPITAL OBSERVATION PER HR: HCPCS

## 2020-05-20 PROCEDURE — 83540 ASSAY OF IRON: CPT | Performed by: INTERNAL MEDICINE

## 2020-05-20 PROCEDURE — 36430 TRANSFUSION BLD/BLD COMPNT: CPT

## 2020-05-20 PROCEDURE — 85025 COMPLETE CBC W/AUTO DIFF WBC: CPT | Performed by: INTERNAL MEDICINE

## 2020-05-20 PROCEDURE — P9016 RBC LEUKOCYTES REDUCED: HCPCS

## 2020-05-20 PROCEDURE — 85610 PROTHROMBIN TIME: CPT | Performed by: NURSE PRACTITIONER

## 2020-05-20 PROCEDURE — 85007 BL SMEAR W/DIFF WBC COUNT: CPT | Performed by: INTERNAL MEDICINE

## 2020-05-20 PROCEDURE — 99213 OFFICE O/P EST LOW 20 MIN: CPT | Performed by: INTERNAL MEDICINE

## 2020-05-20 PROCEDURE — 83615 LACTATE (LD) (LDH) ENZYME: CPT | Performed by: NURSE PRACTITIONER

## 2020-05-20 PROCEDURE — 86901 BLOOD TYPING SEROLOGIC RH(D): CPT

## 2020-05-20 PROCEDURE — 85045 AUTOMATED RETICULOCYTE COUNT: CPT | Performed by: NURSE PRACTITIONER

## 2020-05-20 PROCEDURE — 86900 BLOOD TYPING SEROLOGIC ABO: CPT | Performed by: INTERNAL MEDICINE

## 2020-05-20 PROCEDURE — 82746 ASSAY OF FOLIC ACID SERUM: CPT | Performed by: INTERNAL MEDICINE

## 2020-05-20 PROCEDURE — 25010000002 FUROSEMIDE PER 20 MG: Performed by: INTERNAL MEDICINE

## 2020-05-20 PROCEDURE — 86850 RBC ANTIBODY SCREEN: CPT | Performed by: INTERNAL MEDICINE

## 2020-05-20 PROCEDURE — 83010 ASSAY OF HAPTOGLOBIN QUANT: CPT | Performed by: NURSE PRACTITIONER

## 2020-05-20 PROCEDURE — 99222 1ST HOSP IP/OBS MODERATE 55: CPT | Performed by: INTERNAL MEDICINE

## 2020-05-20 PROCEDURE — 84165 PROTEIN E-PHORESIS SERUM: CPT | Performed by: NURSE PRACTITIONER

## 2020-05-20 RX ORDER — BACLOFEN 10 MG/1
10 TABLET ORAL DAILY
Status: DISCONTINUED | OUTPATIENT
Start: 2020-05-21 | End: 2020-05-29 | Stop reason: HOSPADM

## 2020-05-20 RX ORDER — FUROSEMIDE 10 MG/ML
40 INJECTION INTRAMUSCULAR; INTRAVENOUS EVERY 12 HOURS
Status: COMPLETED | OUTPATIENT
Start: 2020-05-20 | End: 2020-05-21

## 2020-05-20 RX ORDER — ACETAMINOPHEN 325 MG/1
650 TABLET ORAL EVERY 6 HOURS PRN
Status: DISCONTINUED | OUTPATIENT
Start: 2020-05-20 | End: 2020-05-29 | Stop reason: HOSPADM

## 2020-05-20 RX ORDER — FERROUS SULFATE TAB EC 324 MG (65 MG FE EQUIVALENT) 324 (65 FE) MG
324 TABLET DELAYED RESPONSE ORAL 2 TIMES DAILY WITH MEALS
Status: DISCONTINUED | OUTPATIENT
Start: 2020-05-20 | End: 2020-05-20

## 2020-05-20 RX ORDER — ESCITALOPRAM OXALATE 10 MG/1
10 TABLET ORAL DAILY
Status: DISCONTINUED | OUTPATIENT
Start: 2020-05-20 | End: 2020-05-29 | Stop reason: HOSPADM

## 2020-05-20 RX ORDER — OXYBUTYNIN CHLORIDE 5 MG/1
5 TABLET ORAL DAILY
Status: DISCONTINUED | OUTPATIENT
Start: 2020-05-21 | End: 2020-05-29 | Stop reason: HOSPADM

## 2020-05-20 RX ORDER — SODIUM CHLORIDE 9 MG/ML
80 INJECTION, SOLUTION INTRAVENOUS CONTINUOUS
Status: DISCONTINUED | OUTPATIENT
Start: 2020-05-20 | End: 2020-05-22

## 2020-05-20 RX ORDER — SODIUM CHLORIDE 0.9 % (FLUSH) 0.9 %
10 SYRINGE (ML) INJECTION EVERY 12 HOURS SCHEDULED
Status: DISCONTINUED | OUTPATIENT
Start: 2020-05-20 | End: 2020-05-29 | Stop reason: HOSPADM

## 2020-05-20 RX ORDER — ROSUVASTATIN CALCIUM 10 MG/1
10 TABLET, COATED ORAL NIGHTLY
Status: DISCONTINUED | OUTPATIENT
Start: 2020-05-20 | End: 2020-05-29 | Stop reason: HOSPADM

## 2020-05-20 RX ORDER — PEG-3350, SODIUM SULFATE, SODIUM CHLORIDE, POTASSIUM CHLORIDE, SODIUM ASCORBATE AND ASCORBIC ACID 7.5-2.691G
1000 KIT ORAL 2 TIMES DAILY
Status: COMPLETED | OUTPATIENT
Start: 2020-05-20 | End: 2020-05-21

## 2020-05-20 RX ORDER — CLONAZEPAM 0.5 MG/1
0.5 TABLET ORAL NIGHTLY PRN
Status: DISCONTINUED | OUTPATIENT
Start: 2020-05-20 | End: 2020-05-29 | Stop reason: HOSPADM

## 2020-05-20 RX ORDER — POTASSIUM CHLORIDE 20 MEQ/1
20 TABLET, EXTENDED RELEASE ORAL 3 TIMES DAILY
Status: DISCONTINUED | OUTPATIENT
Start: 2020-05-20 | End: 2020-05-29 | Stop reason: HOSPADM

## 2020-05-20 RX ORDER — PRAMIPEXOLE DIHYDROCHLORIDE 0.25 MG/1
0.25 TABLET ORAL DAILY
Status: DISCONTINUED | OUTPATIENT
Start: 2020-05-21 | End: 2020-05-29 | Stop reason: HOSPADM

## 2020-05-20 RX ORDER — GABAPENTIN 300 MG/1
300 CAPSULE ORAL NIGHTLY
Status: DISCONTINUED | OUTPATIENT
Start: 2020-05-20 | End: 2020-05-29 | Stop reason: HOSPADM

## 2020-05-20 RX ORDER — SODIUM CHLORIDE 0.9 % (FLUSH) 0.9 %
10 SYRINGE (ML) INJECTION AS NEEDED
Status: DISCONTINUED | OUTPATIENT
Start: 2020-05-20 | End: 2020-05-29 | Stop reason: HOSPADM

## 2020-05-20 RX ORDER — SODIUM CHLORIDE 9 MG/ML
9 INJECTION, SOLUTION INTRAVENOUS CONTINUOUS PRN
Status: DISCONTINUED | OUTPATIENT
Start: 2020-05-20 | End: 2020-05-22

## 2020-05-20 RX ADMIN — ESCITALOPRAM OXALATE 10 MG: 10 TABLET ORAL at 21:06

## 2020-05-20 RX ADMIN — ROSUVASTATIN CALCIUM 10 MG: 10 TABLET, FILM COATED ORAL at 21:01

## 2020-05-20 RX ADMIN — GABAPENTIN 300 MG: 300 CAPSULE ORAL at 21:01

## 2020-05-20 RX ADMIN — Medication 10 ML: at 21:08

## 2020-05-20 RX ADMIN — SODIUM CHLORIDE 80 ML/HR: 0.9 INJECTION, SOLUTION INTRAVENOUS at 13:00

## 2020-05-20 RX ADMIN — POTASSIUM CHLORIDE 20 MEQ: 1500 TABLET, EXTENDED RELEASE ORAL at 21:01

## 2020-05-20 RX ADMIN — POLYETHYLENE GLYCOL 3350, SODIUM SULFATE, SODIUM CHLORIDE, POTASSIUM CHLORIDE, ASCORBIC ACID, SODIUM ASCORBATE 1000 ML: KIT at 20:59

## 2020-05-20 RX ADMIN — CARBIDOPA AND LEVODOPA 1.5 TABLET: 25; 100 TABLET ORAL at 21:02

## 2020-05-20 RX ADMIN — FUROSEMIDE 40 MG: 10 INJECTION, SOLUTION INTRAMUSCULAR; INTRAVENOUS at 14:17

## 2020-05-20 RX ADMIN — SODIUM CHLORIDE 300 MG: 9 INJECTION, SOLUTION INTRAVENOUS at 21:15

## 2020-05-21 ENCOUNTER — INPATIENT HOSPITAL (OUTPATIENT)
Dept: URBAN - METROPOLITAN AREA HOSPITAL 84 | Facility: HOSPITAL | Age: 85
End: 2020-05-21

## 2020-05-21 ENCOUNTER — HOSPITAL ENCOUNTER (OUTPATIENT)
Dept: CARDIOLOGY | Facility: HOSPITAL | Age: 85
Discharge: HOME | End: 2020-05-21
Attending: INTERNAL MEDICINE

## 2020-05-21 ENCOUNTER — ANESTHESIA (OUTPATIENT)
Dept: CARDIOLOGY | Facility: HOSPITAL | Age: 85
End: 2020-05-21

## 2020-05-21 ENCOUNTER — ANESTHESIA EVENT (OUTPATIENT)
Dept: CARDIOLOGY | Facility: HOSPITAL | Age: 85
End: 2020-05-21

## 2020-05-21 VITALS — DIASTOLIC BLOOD PRESSURE: 67 MMHG | OXYGEN SATURATION: 92 % | SYSTOLIC BLOOD PRESSURE: 100 MMHG

## 2020-05-21 VITALS
OXYGEN SATURATION: 94 % | RESPIRATION RATE: 27 BRPM | HEART RATE: 109 BPM | DIASTOLIC BLOOD PRESSURE: 73 MMHG | SYSTOLIC BLOOD PRESSURE: 137 MMHG

## 2020-05-21 DIAGNOSIS — D50.0 IRON DEFICIENCY ANEMIA SECONDARY TO BLOOD LOSS (CHRONIC): ICD-10-CM

## 2020-05-21 DIAGNOSIS — K92.1 MELENA: ICD-10-CM

## 2020-05-21 DIAGNOSIS — K57.30 DIVERTICULOSIS OF LARGE INTESTINE WITHOUT PERFORATION OR ABS: ICD-10-CM

## 2020-05-21 DIAGNOSIS — K25.4 CHRONIC OR UNSPECIFIED GASTRIC ULCER WITH HEMORRHAGE: ICD-10-CM

## 2020-05-21 DIAGNOSIS — K64.1 SECOND DEGREE HEMORRHOIDS: ICD-10-CM

## 2020-05-21 DIAGNOSIS — Q27.33 ARTERIOVENOUS MALFORMATION OF DIGESTIVE SYSTEM VESSEL: ICD-10-CM

## 2020-05-21 LAB
ALBUMIN SERPL-MCNC: 3.5 G/DL (ref 3.5–5.2)
ALBUMIN/GLOB SERPL: 1.3 G/DL
ALP SERPL-CCNC: 139 U/L (ref 39–117)
ALT SERPL W P-5'-P-CCNC: 5 U/L (ref 1–41)
ANION GAP SERPL CALCULATED.3IONS-SCNC: 14 MMOL/L (ref 5–15)
AST SERPL-CCNC: 33 U/L (ref 1–40)
BACTERIA UR QL AUTO: ABNORMAL /HPF
BASOPHILS # BLD AUTO: 0.1 10*3/MM3 (ref 0–0.2)
BASOPHILS NFR BLD AUTO: 2 % (ref 0–1.5)
BH CV ECHO MEAS - AVA(I,D): 1.4 CM2
BH CV ECHO MEAS - EF(MOD-BP): 60 %
BILIRUB SERPL-MCNC: 2.2 MG/DL (ref 0.2–1.2)
BILIRUB UR QL STRIP: NEGATIVE
BUN BLD-MCNC: 22 MG/DL (ref 8–23)
BUN/CREAT SERPL: 20.6 (ref 7–25)
CALCIUM SPEC-SCNC: 8.7 MG/DL (ref 8.6–10.5)
CHLORIDE SERPL-SCNC: 102 MMOL/L (ref 98–107)
CLARITY UR: ABNORMAL
CO2 SERPL-SCNC: 27 MMOL/L (ref 22–29)
COLOR UR: ABNORMAL
CREAT BLD-MCNC: 1.07 MG/DL (ref 0.76–1.27)
DEPRECATED RDW RBC AUTO: 55.1 FL (ref 37–54)
EOSINOPHIL # BLD AUTO: 0.1 10*3/MM3 (ref 0–0.4)
EOSINOPHIL NFR BLD AUTO: 3.2 % (ref 0.3–6.2)
ERYTHROCYTE [DISTWIDTH] IN BLOOD BY AUTOMATED COUNT: 23.6 % (ref 12.3–15.4)
GFR SERPL CREATININE-BSD FRML MDRD: 66 ML/MIN/1.73
GLOBULIN UR ELPH-MCNC: 2.7 GM/DL
GLUCOSE BLD-MCNC: 98 MG/DL (ref 65–99)
GLUCOSE UR STRIP-MCNC: NEGATIVE MG/DL
HAPTOGLOB SERPL-MCNC: 155 MG/DL (ref 30–200)
HCT VFR BLD AUTO: 23.9 % (ref 37.5–51)
HCT VFR BLD AUTO: 26.6 % (ref 37.5–51)
HGB BLD-MCNC: 7.2 G/DL (ref 13–17.7)
HGB BLD-MCNC: 8.4 G/DL (ref 13–17.7)
HGB UR QL STRIP.AUTO: ABNORMAL
HYALINE CASTS UR QL AUTO: ABNORMAL /LPF
INR PPP: 1.93 (ref 2–3)
KETONES UR QL STRIP: NEGATIVE
LEUKOCYTE ESTERASE UR QL STRIP.AUTO: ABNORMAL
LV EF 2D ECHO EST: 60 %
LYMPHOCYTES # BLD AUTO: 0.6 10*3/MM3 (ref 0.7–3.1)
LYMPHOCYTES NFR BLD AUTO: 16.6 % (ref 19.6–45.3)
MCH RBC QN AUTO: 20.2 PG (ref 26.6–33)
MCHC RBC AUTO-ENTMCNC: 30.1 G/DL (ref 31.5–35.7)
MCV RBC AUTO: 67.2 FL (ref 79–97)
MONOCYTES # BLD AUTO: 0.5 10*3/MM3 (ref 0.1–0.9)
MONOCYTES NFR BLD AUTO: 14.9 % (ref 5–12)
NEUTROPHILS # BLD AUTO: 2.3 10*3/MM3 (ref 1.7–7)
NEUTROPHILS NFR BLD AUTO: 63.3 % (ref 42.7–76)
NITRITE UR QL STRIP: NEGATIVE
NRBC BLD AUTO-RTO: 0.3 /100 WBC (ref 0–0.2)
PH UR STRIP.AUTO: 6 [PH] (ref 5–8)
PLATELET # BLD AUTO: 198 10*3/MM3 (ref 140–450)
PMV BLD AUTO: 6.6 FL (ref 6–12)
POTASSIUM BLD-SCNC: 3.3 MMOL/L (ref 3.5–5.2)
PROT SERPL-MCNC: 6.2 G/DL (ref 6–8.5)
PROT UR QL STRIP: NEGATIVE
PROTHROMBIN TIME: 18.5 SECONDS (ref 19.4–28.5)
RBC # BLD AUTO: 3.56 10*6/MM3 (ref 4.14–5.8)
RBC # UR: ABNORMAL /HPF
REF LAB TEST METHOD: ABNORMAL
SODIUM BLD-SCNC: 143 MMOL/L (ref 136–145)
SP GR UR STRIP: 1.02 (ref 1–1.03)
SQUAMOUS #/AREA URNS HPF: ABNORMAL /HPF
UROBILINOGEN UR QL STRIP: ABNORMAL
WBC NRBC COR # BLD: 3.6 10*3/MM3 (ref 3.4–10.8)
WBC UR QL AUTO: ABNORMAL /HPF

## 2020-05-21 PROCEDURE — 80053 COMPREHEN METABOLIC PANEL: CPT | Performed by: INTERNAL MEDICINE

## 2020-05-21 PROCEDURE — 85610 PROTHROMBIN TIME: CPT | Performed by: NURSE PRACTITIONER

## 2020-05-21 PROCEDURE — 81001 URINALYSIS AUTO W/SCOPE: CPT | Performed by: INTERNAL MEDICINE

## 2020-05-21 PROCEDURE — 86900 BLOOD TYPING SEROLOGIC ABO: CPT

## 2020-05-21 PROCEDURE — 87086 URINE CULTURE/COLONY COUNT: CPT | Performed by: INTERNAL MEDICINE

## 2020-05-21 PROCEDURE — 25010000002 PROPOFOL 10 MG/ML EMULSION: Performed by: ANESTHESIOLOGY

## 2020-05-21 PROCEDURE — P9016 RBC LEUKOCYTES REDUCED: HCPCS

## 2020-05-21 PROCEDURE — G0378 HOSPITAL OBSERVATION PER HR: HCPCS

## 2020-05-21 PROCEDURE — 85014 HEMATOCRIT: CPT | Performed by: INTERNAL MEDICINE

## 2020-05-21 PROCEDURE — 93320 DOPPLER ECHO COMPLETE: CPT | Performed by: INTERNAL MEDICINE

## 2020-05-21 PROCEDURE — 45380 COLONOSCOPY AND BIOPSY: CPT | Performed by: INTERNAL MEDICINE

## 2020-05-21 PROCEDURE — 93325 DOPPLER ECHO COLOR FLOW MAPG: CPT

## 2020-05-21 PROCEDURE — 25010000002 FUROSEMIDE PER 20 MG: Performed by: INTERNAL MEDICINE

## 2020-05-21 PROCEDURE — 43255 EGD CONTROL BLEEDING ANY: CPT | Performed by: INTERNAL MEDICINE

## 2020-05-21 PROCEDURE — 99226 PR SBSQ OBSERVATION CARE/DAY 35 MINUTES: CPT | Performed by: INTERNAL MEDICINE

## 2020-05-21 PROCEDURE — 93312 ECHO TRANSESOPHAGEAL: CPT | Performed by: INTERNAL MEDICINE

## 2020-05-21 PROCEDURE — 87077 CULTURE AEROBIC IDENTIFY: CPT | Performed by: INTERNAL MEDICINE

## 2020-05-21 PROCEDURE — 88305 TISSUE EXAM BY PATHOLOGIST: CPT | Performed by: INTERNAL MEDICINE

## 2020-05-21 PROCEDURE — 0DDP8ZX EXTRACTION OF RECTUM, VIA NATURAL OR ARTIFICIAL OPENING ENDOSCOPIC, DIAGNOSTIC: ICD-10-PCS | Performed by: INTERNAL MEDICINE

## 2020-05-21 PROCEDURE — 94640 AIRWAY INHALATION TREATMENT: CPT

## 2020-05-21 PROCEDURE — 85025 COMPLETE CBC W/AUTO DIFF WBC: CPT | Performed by: NURSE PRACTITIONER

## 2020-05-21 PROCEDURE — 0W3P8ZZ CONTROL BLEEDING IN GASTROINTESTINAL TRACT, VIA NATURAL OR ARTIFICIAL OPENING ENDOSCOPIC: ICD-10-PCS | Performed by: INTERNAL MEDICINE

## 2020-05-21 PROCEDURE — 36430 TRANSFUSION BLD/BLD COMPNT: CPT

## 2020-05-21 PROCEDURE — 25010000002 IRON SUCROSE PER 1 MG: Performed by: NURSE PRACTITIONER

## 2020-05-21 PROCEDURE — 93320 DOPPLER ECHO COMPLETE: CPT

## 2020-05-21 PROCEDURE — 94799 UNLISTED PULMONARY SVC/PX: CPT

## 2020-05-21 PROCEDURE — 93325 DOPPLER ECHO COLOR FLOW MAPG: CPT | Performed by: INTERNAL MEDICINE

## 2020-05-21 PROCEDURE — 85018 HEMOGLOBIN: CPT | Performed by: INTERNAL MEDICINE

## 2020-05-21 PROCEDURE — 87186 SC STD MICRODIL/AGAR DIL: CPT | Performed by: INTERNAL MEDICINE

## 2020-05-21 PROCEDURE — 99233 SBSQ HOSP IP/OBS HIGH 50: CPT | Performed by: INTERNAL MEDICINE

## 2020-05-21 PROCEDURE — 93312 ECHO TRANSESOPHAGEAL: CPT

## 2020-05-21 DEVICE — DEV CLIP HEMO DURACLIP REPOSTNG COLONOSCOPE 16MM 235CM: Type: IMPLANTABLE DEVICE | Status: FUNCTIONAL

## 2020-05-21 RX ORDER — ONDANSETRON 2 MG/ML
4 INJECTION INTRAMUSCULAR; INTRAVENOUS EVERY 6 HOURS PRN
Status: DISCONTINUED | OUTPATIENT
Start: 2020-05-21 | End: 2020-05-29 | Stop reason: HOSPADM

## 2020-05-21 RX ORDER — IPRATROPIUM BROMIDE AND ALBUTEROL SULFATE 2.5; .5 MG/3ML; MG/3ML
SOLUTION RESPIRATORY (INHALATION)
Status: COMPLETED
Start: 2020-05-21 | End: 2020-05-21

## 2020-05-21 RX ORDER — POTASSIUM CHLORIDE 20 MEQ/1
60 TABLET, EXTENDED RELEASE ORAL ONCE
Status: COMPLETED | OUTPATIENT
Start: 2020-05-21 | End: 2020-05-21

## 2020-05-21 RX ORDER — FUROSEMIDE 10 MG/ML
40 INJECTION INTRAMUSCULAR; INTRAVENOUS ONCE
Status: COMPLETED | OUTPATIENT
Start: 2020-05-21 | End: 2020-05-21

## 2020-05-21 RX ORDER — SODIUM CHLORIDE 0.9 % (FLUSH) 0.9 %
3 SYRINGE (ML) INJECTION EVERY 12 HOURS SCHEDULED
Status: DISCONTINUED | OUTPATIENT
Start: 2020-05-21 | End: 2020-05-21

## 2020-05-21 RX ORDER — ONDANSETRON 4 MG/1
4 TABLET, FILM COATED ORAL EVERY 6 HOURS PRN
Status: DISCONTINUED | OUTPATIENT
Start: 2020-05-21 | End: 2020-05-29 | Stop reason: HOSPADM

## 2020-05-21 RX ORDER — PROPOFOL 10 MG/ML
VIAL (ML) INTRAVENOUS AS NEEDED
Status: DISCONTINUED | OUTPATIENT
Start: 2020-05-21 | End: 2020-05-21 | Stop reason: SURG

## 2020-05-21 RX ORDER — POLYETHYLENE GLYCOL 3350 17 G/17G
17 POWDER, FOR SOLUTION ORAL DAILY
Status: DISCONTINUED | OUTPATIENT
Start: 2020-05-21 | End: 2020-05-29 | Stop reason: HOSPADM

## 2020-05-21 RX ORDER — LIDOCAINE HYDROCHLORIDE 10 MG/ML
INJECTION, SOLUTION EPIDURAL; INFILTRATION; INTRACAUDAL; PERINEURAL AS NEEDED
Status: DISCONTINUED | OUTPATIENT
Start: 2020-05-21 | End: 2020-05-21 | Stop reason: SURG

## 2020-05-21 RX ORDER — GUAR GUM
1 PACKET (EA) ORAL DAILY
Status: DISCONTINUED | OUTPATIENT
Start: 2020-05-21 | End: 2020-05-29 | Stop reason: HOSPADM

## 2020-05-21 RX ORDER — PANTOPRAZOLE SODIUM 40 MG/1
40 TABLET, DELAYED RELEASE ORAL
Status: DISCONTINUED | OUTPATIENT
Start: 2020-05-22 | End: 2020-05-29 | Stop reason: HOSPADM

## 2020-05-21 RX ORDER — SODIUM CHLORIDE 0.9 % (FLUSH) 0.9 %
10 SYRINGE (ML) INJECTION AS NEEDED
Status: DISCONTINUED | OUTPATIENT
Start: 2020-05-21 | End: 2020-05-21

## 2020-05-21 RX ADMIN — POTASSIUM CHLORIDE 60 MEQ: 1500 TABLET, EXTENDED RELEASE ORAL at 08:03

## 2020-05-21 RX ADMIN — SODIUM CHLORIDE 9 ML/HR: 900 INJECTION, SOLUTION INTRAVENOUS at 12:58

## 2020-05-21 RX ADMIN — LIDOCAINE HYDROCHLORIDE 50 MG: 10 INJECTION, SOLUTION EPIDURAL; INFILTRATION; INTRACAUDAL; PERINEURAL at 14:01

## 2020-05-21 RX ADMIN — PROPOFOL 100 MG: 10 INJECTION, EMULSION INTRAVENOUS at 14:02

## 2020-05-21 RX ADMIN — POTASSIUM CHLORIDE 20 MEQ: 1500 TABLET, EXTENDED RELEASE ORAL at 21:28

## 2020-05-21 RX ADMIN — CARBIDOPA AND LEVODOPA 1.5 TABLET: 25; 100 TABLET ORAL at 07:58

## 2020-05-21 RX ADMIN — PROPOFOL 50 MG: 10 INJECTION, EMULSION INTRAVENOUS at 14:06

## 2020-05-21 RX ADMIN — GABAPENTIN 300 MG: 300 CAPSULE ORAL at 21:28

## 2020-05-21 RX ADMIN — SODIUM CHLORIDE 300 MG: 9 INJECTION, SOLUTION INTRAVENOUS at 08:04

## 2020-05-21 RX ADMIN — IPRATROPIUM BROMIDE AND ALBUTEROL SULFATE 3 ML: .5; 3 SOLUTION RESPIRATORY (INHALATION) at 15:11

## 2020-05-21 RX ADMIN — PROPOFOL 20 MG: 10 INJECTION, EMULSION INTRAVENOUS at 14:17

## 2020-05-21 RX ADMIN — CARBIDOPA AND LEVODOPA 1.5 TABLET: 25; 100 TABLET ORAL at 21:28

## 2020-05-21 RX ADMIN — FUROSEMIDE 40 MG: 10 INJECTION, SOLUTION INTRAMUSCULAR; INTRAVENOUS at 06:12

## 2020-05-21 RX ADMIN — PROPOFOL 130 MG: 10 INJECTION, EMULSION INTRAVENOUS at 15:59

## 2020-05-21 RX ADMIN — PROPOFOL 20 MG: 10 INJECTION, EMULSION INTRAVENOUS at 14:12

## 2020-05-21 RX ADMIN — POLYETHYLENE GLYCOL 3350, SODIUM SULFATE, SODIUM CHLORIDE, POTASSIUM CHLORIDE, ASCORBIC ACID, SODIUM ASCORBATE 1000 ML: KIT at 06:57

## 2020-05-21 RX ADMIN — Medication 10 ML: at 21:28

## 2020-05-21 RX ADMIN — PROPOFOL 20 MG: 10 INJECTION, EMULSION INTRAVENOUS at 14:26

## 2020-05-21 RX ADMIN — FUROSEMIDE 40 MG: 10 INJECTION, SOLUTION INTRAMUSCULAR; INTRAVENOUS at 16:15

## 2020-05-21 RX ADMIN — ROSUVASTATIN CALCIUM 10 MG: 10 TABLET, FILM COATED ORAL at 21:28

## 2020-05-21 NOTE — ANESTHESIA PREPROCEDURE EVALUATION
Anesthesia Evaluation     Patient summary reviewed and Nursing notes reviewed   NPO Solid Status: > 8 hours  NPO Liquid Status: > 8 hours           Airway   Mallampati: I  TM distance: >3 FB  Neck ROM: full  No difficulty expected  Dental - normal exam     Pulmonary - negative pulmonary ROS and normal exam   Cardiovascular - normal exam    (+) hypertension, valvular problems/murmurs AS and MR, dysrhythmias Atrial Fib, hyperlipidemia,       Neuro/Psych- negative ROS  GI/Hepatic/Renal/Endo    (+) obesity,       Musculoskeletal (-) negative ROS    Abdominal  - normal exam    Bowel sounds: normal.   Substance History - negative use     OB/GYN negative ob/gyn ROS         Other                        Anesthesia Plan    ASA 4     MAC     intravenous induction     Anesthetic plan, all risks, benefits, and alternatives have been provided, discussed and informed consent has been obtained with: patient.

## 2020-05-21 NOTE — ANESTHESIA POSTPROCEDURE EVALUATION
Patient: Rafael Ivey    Procedure Summary     Date:  05/21/20 Room / Location:  [facility]    Anesthesia Start:  1555 Anesthesia Stop:  1613    Procedure:  ADULT TRANSESOPHAGEAL ECHO (PRESTON) W/ CONT IF NECESSARY PER PROTOCOL Diagnosis:       Iron deficiency anemia due to chronic blood loss      Atrial fibrillation, chronic      Iron deficiency anemia due to chronic blood loss      Atrial fibrillation, chronic      Iron deficiency anemia due to chronic blood loss      (Arrhythmia)      (AFib)    Scheduled Providers:  Jim Walker MD Provider:  Noam Mae MD    Anesthesia Type:  MAC ASA Status:  4          Anesthesia Type: MAC    Vitals  Vitals Value Taken Time   /73 5/21/2020  4:25 PM   Temp     Pulse 109 5/21/2020  4:25 PM   Resp 27 5/21/2020  4:25 PM   SpO2 94 % 5/21/2020  4:25 PM           Post Anesthesia Care and Evaluation    Patient location during evaluation: PACU  Patient participation: complete - patient participated  Level of consciousness: awake  Pain scale: See nurse's notes for pain score.  Pain management: adequate  Airway patency: patent  Anesthetic complications: No anesthetic complications  PONV Status: none  Cardiovascular status: acceptable  Respiratory status: acceptable  Hydration status: acceptable    Comments: Patient seen and examined postoperatively; vital signs stable; SpO2 greater than or equal to 90%; cardiopulmonary status stable; nausea/vomiting adequately controlled; pain adequately controlled; no apparent anesthesia complications; patient discharged from anesthesia care when discharge criteria were met

## 2020-05-21 NOTE — ADDENDUM NOTE
Addendum  created 05/21/20 3208 by Noam Mae MD    Intraprocedure Devices edited, Patient device added

## 2020-05-21 NOTE — ANESTHESIA POSTPROCEDURE EVALUATION
Patient: Rafael Ivey    Procedure Summary     Date:  05/21/20 Room / Location:  [facility] ENDOSCOPY 1 / [facility] ENDOSCOPY; [facility]    Anesthesia Start:  1356 Anesthesia Stop:  1447    Procedures:       ADULT TRANSESOPHAGEAL ECHO (PRESTON) W/ CONT IF NECESSARY PER PROTOCOL      COLONOSCOPY WITH BIOPSY X1 AREA (N/A )      ESOPHAGOGASTRODUODENOSCOPY with clipping x 1 of bleeding gastric polyp (N/A ) Diagnosis:       Iron deficiency anemia due to chronic blood loss      Atrial fibrillation, chronic      Iron deficiency anemia due to chronic blood loss      Atrial fibrillation, chronic      Iron deficiency anemia due to chronic blood loss      (Arrhythmia)      (AFib)      (Iron deficiency anemia due to chronic blood loss [D50.0])    Scheduled Providers:  Jim Walker MD; Sim Collins MD Provider:  Justin Barnes MD    Anesthesia Type:  MAC ASA Status:  4          Anesthesia Type: MAC    Vitals  No vitals data found for the desired time range.          Post Anesthesia Care and Evaluation    Patient location during evaluation: PACU  Patient participation: complete - patient participated  Level of consciousness: awake  Pain score: 0 (See nurse's notes for pain score)  Pain management: adequate  Airway patency: patent  Anesthetic complications: No anesthetic complications  PONV Status: none  Cardiovascular status: acceptable  Respiratory status: acceptable  Hydration status: acceptable    Comments: Patient seen and examined postoperatively; vital signs stable; SpO2 greater than or equal to 90%; cardiopulmonary status stable; nausea/vomiting adequately controlled; pain adequately controlled; no apparent anesthesia complications; patient discharged from anesthesia care when discharge criteria were met

## 2020-05-21 NOTE — ANESTHESIA PREPROCEDURE EVALUATION
Anesthesia Evaluation     Patient summary reviewed and Nursing notes reviewed   no history of anesthetic complications:  NPO Solid Status: > 8 hours  NPO Liquid Status: > 8 hours           Airway   Mallampati: I  TM distance: >3 FB  Neck ROM: full  No difficulty expected  Dental - normal exam     Pulmonary - negative pulmonary ROS and normal exam   Cardiovascular - normal exam    (+) hypertension, valvular problems/murmurs AS and MR, dysrhythmias Atrial Fib, hyperlipidemia,       Neuro/Psych- negative ROS  GI/Hepatic/Renal/Endo    (+) obesity,       Musculoskeletal     Abdominal  - normal exam    Bowel sounds: normal.   Substance History - negative use     OB/GYN negative ob/gyn ROS         Other   arthritis,                        Anesthesia Plan    ASA 4     MAC     intravenous induction     Anesthetic plan, all risks, benefits, and alternatives have been provided, discussed and informed consent has been obtained with: patient.

## 2020-05-22 PROBLEM — G20 PARKINSON'S DISEASE (HCC): Chronic | Status: ACTIVE | Noted: 2020-01-20

## 2020-05-22 PROBLEM — N32.81 OVERACTIVE BLADDER: Chronic | Status: ACTIVE | Noted: 2020-05-22

## 2020-05-22 PROBLEM — I49.5 SICK SINUS SYNDROME (HCC): Chronic | Status: ACTIVE | Noted: 2019-08-21

## 2020-05-22 PROBLEM — K21.9 GERD (GASTROESOPHAGEAL REFLUX DISEASE): Chronic | Status: ACTIVE | Noted: 2020-05-22

## 2020-05-22 PROBLEM — I10 HYPERTENSION: Status: ACTIVE | Noted: 2020-05-22

## 2020-05-22 PROBLEM — K21.9 GERD (GASTROESOPHAGEAL REFLUX DISEASE): Status: ACTIVE | Noted: 2020-05-22

## 2020-05-22 PROBLEM — G89.29 CHRONIC PAIN: Chronic | Status: ACTIVE | Noted: 2020-05-22

## 2020-05-22 PROBLEM — I50.32 CHRONIC DIASTOLIC CHF (CONGESTIVE HEART FAILURE) (HCC): Chronic | Status: ACTIVE | Noted: 2020-05-22

## 2020-05-22 PROBLEM — N39.0 ACUTE UTI (URINARY TRACT INFECTION): Status: ACTIVE | Noted: 2020-05-22

## 2020-05-22 PROBLEM — G47.30 SLEEP APNEA: Status: ACTIVE | Noted: 2020-05-22

## 2020-05-22 PROBLEM — I95.9 HYPOTENSION: Status: ACTIVE | Noted: 2020-05-22

## 2020-05-22 PROBLEM — D68.32 WARFARIN-INDUCED COAGULOPATHY (HCC): Chronic | Status: ACTIVE | Noted: 2020-01-20

## 2020-05-22 PROBLEM — E87.6 HYPOKALEMIA: Status: ACTIVE | Noted: 2020-05-22

## 2020-05-22 PROBLEM — E78.5 HYPERLIPIDEMIA: Chronic | Status: ACTIVE | Noted: 2020-05-22

## 2020-05-22 PROBLEM — T45.515A WARFARIN-INDUCED COAGULOPATHY (HCC): Chronic | Status: ACTIVE | Noted: 2020-01-20

## 2020-05-22 PROBLEM — F41.9 ANXIETY DISORDER: Chronic | Status: ACTIVE | Noted: 2020-05-22

## 2020-05-22 PROBLEM — E66.9 OBESITY (BMI 30-39.9): Chronic | Status: ACTIVE | Noted: 2020-05-22

## 2020-05-22 PROBLEM — I38 VALVULAR HEART DISEASE: Status: ACTIVE | Noted: 2020-05-22

## 2020-05-22 PROBLEM — E78.5 HYPERLIPIDEMIA: Status: ACTIVE | Noted: 2020-05-22

## 2020-05-22 PROBLEM — I48.20 ATRIAL FIBRILLATION, CHRONIC (HCC): Chronic | Status: ACTIVE | Noted: 2019-08-21

## 2020-05-22 PROBLEM — Z95.0 PACEMAKER: Chronic | Status: ACTIVE | Noted: 2019-08-21

## 2020-05-22 PROBLEM — R60.1 ANASARCA: Status: ACTIVE | Noted: 2020-05-22

## 2020-05-22 PROBLEM — G25.81 RLS (RESTLESS LEGS SYNDROME): Chronic | Status: ACTIVE | Noted: 2020-05-22

## 2020-05-22 LAB
ALBUMIN SERPL-MCNC: 3.3 G/DL (ref 3.5–5.2)
ALBUMIN SERPL-MCNC: 3.5 G/DL (ref 2.9–4.4)
ALBUMIN/GLOB SERPL: 1.1 {RATIO} (ref 0.7–1.7)
ALBUMIN/GLOB SERPL: 1.2 G/DL
ALP SERPL-CCNC: 147 U/L (ref 39–117)
ALPHA1 GLOB FLD ELPH-MCNC: 0.3 G/DL (ref 0–0.4)
ALPHA2 GLOB SERPL ELPH-MCNC: 0.8 G/DL (ref 0.4–1)
ALT SERPL W P-5'-P-CCNC: 5 U/L (ref 1–41)
ANION GAP SERPL CALCULATED.3IONS-SCNC: 15 MMOL/L (ref 5–15)
ANISOCYTOSIS BLD QL: ABNORMAL
AST SERPL-CCNC: 37 U/L (ref 1–40)
B-GLOBULIN SERPL ELPH-MCNC: 1 G/DL (ref 0.7–1.3)
BH BB BLOOD EXPIRATION DATE: NORMAL
BH BB BLOOD TYPE BARCODE: 6200
BH BB DISPENSE STATUS: NORMAL
BH BB PRODUCT CODE: NORMAL
BH BB UNIT NUMBER: NORMAL
BILIRUB SERPL-MCNC: 2 MG/DL (ref 0.2–1.2)
BUN BLD-MCNC: 24 MG/DL (ref 8–23)
BUN/CREAT SERPL: 19.5 (ref 7–25)
C3 FRG RBC-MCNC: ABNORMAL
CALCIUM SPEC-SCNC: 8.8 MG/DL (ref 8.6–10.5)
CHLORIDE SERPL-SCNC: 104 MMOL/L (ref 98–107)
CO2 SERPL-SCNC: 25 MMOL/L (ref 22–29)
CREAT BLD-MCNC: 1.23 MG/DL (ref 0.76–1.27)
CROSSMATCH INTERPRETATION: NORMAL
D-LACTATE SERPL-SCNC: 2.3 MMOL/L (ref 0.5–2)
D-LACTATE SERPL-SCNC: 2.5 MMOL/L (ref 0.5–2)
DEPRECATED RDW RBC AUTO: 57.3 FL (ref 37–54)
ERYTHROCYTE [DISTWIDTH] IN BLOOD BY AUTOMATED COUNT: 24.7 % (ref 12.3–15.4)
GAMMA GLOB SERPL ELPH-MCNC: 1 G/DL (ref 0.4–1.8)
GFR SERPL CREATININE-BSD FRML MDRD: 56 ML/MIN/1.73
GLOBULIN SER CALC-MCNC: 3.1 G/DL (ref 2.2–3.9)
GLOBULIN UR ELPH-MCNC: 2.8 GM/DL
GLUCOSE BLD-MCNC: 85 MG/DL (ref 65–99)
HCT VFR BLD AUTO: 25.3 % (ref 37.5–51)
HGB BLD-MCNC: 7.7 G/DL (ref 13–17.7)
INR PPP: 1.93 (ref 2–3)
LAB AP CASE REPORT: NORMAL
LAB AP DIAGNOSIS COMMENT: NORMAL
LACTATE HOLD SPECIMEN: NORMAL
LYMPHOCYTES # BLD MANUAL: 0.13 10*3/MM3 (ref 0.7–3.1)
LYMPHOCYTES NFR BLD MANUAL: 1 % (ref 19.6–45.3)
LYMPHOCYTES NFR BLD MANUAL: 4 % (ref 5–12)
Lab: NORMAL
M-SPIKE: NORMAL G/DL
MCH RBC QN AUTO: 20.5 PG (ref 26.6–33)
MCHC RBC AUTO-ENTMCNC: 30.2 G/DL (ref 31.5–35.7)
MCV RBC AUTO: 67.8 FL (ref 79–97)
MICROCYTES BLD QL: ABNORMAL
MONOCYTES # BLD AUTO: 0.5 10*3/MM3 (ref 0.1–0.9)
NEUTROPHILS # BLD AUTO: 11.88 10*3/MM3 (ref 1.7–7)
NEUTROPHILS NFR BLD MANUAL: 83 % (ref 42.7–76)
NEUTS BAND NFR BLD MANUAL: 12 % (ref 0–5)
OVALOCYTES BLD QL SMEAR: ABNORMAL
PATH REPORT.FINAL DX SPEC: NORMAL
PATH REPORT.GROSS SPEC: NORMAL
PLAT MORPH BLD: NORMAL
PLATELET # BLD AUTO: 181 10*3/MM3 (ref 140–450)
PMV BLD AUTO: 8 FL (ref 6–12)
POIKILOCYTOSIS BLD QL SMEAR: ABNORMAL
POTASSIUM BLD-SCNC: 2.7 MMOL/L (ref 3.5–5.2)
POTASSIUM BLD-SCNC: 3.3 MMOL/L (ref 3.5–5.2)
PROT SERPL-MCNC: 6.1 G/DL (ref 6–8.5)
PROT SERPL-MCNC: 6.6 G/DL (ref 6–8.5)
PROTHROMBIN TIME: 18.6 SECONDS (ref 19.4–28.5)
RBC # BLD AUTO: 3.73 10*6/MM3 (ref 4.14–5.8)
SCAN SLIDE: NORMAL
SODIUM BLD-SCNC: 144 MMOL/L (ref 136–145)
UNIT  ABO: NORMAL
UNIT  RH: NORMAL
WBC MORPH BLD: NORMAL
WBC NRBC COR # BLD: 12.5 10*3/MM3 (ref 3.4–10.8)

## 2020-05-22 PROCEDURE — 85610 PROTHROMBIN TIME: CPT | Performed by: INTERNAL MEDICINE

## 2020-05-22 PROCEDURE — 99233 SBSQ HOSP IP/OBS HIGH 50: CPT | Performed by: INTERNAL MEDICINE

## 2020-05-22 PROCEDURE — 80053 COMPREHEN METABOLIC PANEL: CPT | Performed by: INTERNAL MEDICINE

## 2020-05-22 PROCEDURE — 25010000002 FUROSEMIDE PER 20 MG: Performed by: HOSPITALIST

## 2020-05-22 PROCEDURE — P9016 RBC LEUKOCYTES REDUCED: HCPCS

## 2020-05-22 PROCEDURE — G0378 HOSPITAL OBSERVATION PER HR: HCPCS

## 2020-05-22 PROCEDURE — 84132 ASSAY OF SERUM POTASSIUM: CPT | Performed by: HOSPITALIST

## 2020-05-22 PROCEDURE — 25010000002 AMPICILLIN PER 500 MG: Performed by: HOSPITALIST

## 2020-05-22 PROCEDURE — 99233 SBSQ HOSP IP/OBS HIGH 50: CPT | Performed by: HOSPITALIST

## 2020-05-22 PROCEDURE — 85007 BL SMEAR W/DIFF WBC COUNT: CPT | Performed by: INTERNAL MEDICINE

## 2020-05-22 PROCEDURE — 99225 PR SBSQ OBSERVATION CARE/DAY 25 MINUTES: CPT | Performed by: NURSE PRACTITIONER

## 2020-05-22 PROCEDURE — 83605 ASSAY OF LACTIC ACID: CPT | Performed by: NURSE PRACTITIONER

## 2020-05-22 PROCEDURE — 25010000002 ONDANSETRON PER 1 MG: Performed by: INTERNAL MEDICINE

## 2020-05-22 PROCEDURE — 25010000002 CEFTRIAXONE PER 250 MG: Performed by: NURSE PRACTITIONER

## 2020-05-22 PROCEDURE — 36430 TRANSFUSION BLD/BLD COMPNT: CPT

## 2020-05-22 PROCEDURE — 25010000002 IRON SUCROSE PER 1 MG: Performed by: NURSE PRACTITIONER

## 2020-05-22 PROCEDURE — 97112 NEUROMUSCULAR REEDUCATION: CPT

## 2020-05-22 PROCEDURE — 97162 PT EVAL MOD COMPLEX 30 MIN: CPT

## 2020-05-22 PROCEDURE — 86900 BLOOD TYPING SEROLOGIC ABO: CPT

## 2020-05-22 PROCEDURE — 85025 COMPLETE CBC W/AUTO DIFF WBC: CPT | Performed by: INTERNAL MEDICINE

## 2020-05-22 RX ORDER — POTASSIUM CHLORIDE 20 MEQ/1
40 TABLET, EXTENDED RELEASE ORAL AS NEEDED
Status: DISCONTINUED | OUTPATIENT
Start: 2020-05-22 | End: 2020-05-29 | Stop reason: HOSPADM

## 2020-05-22 RX ORDER — FUROSEMIDE 10 MG/ML
20 INJECTION INTRAMUSCULAR; INTRAVENOUS 3 TIMES DAILY
Status: DISCONTINUED | OUTPATIENT
Start: 2020-05-22 | End: 2020-05-23

## 2020-05-22 RX ORDER — POTASSIUM CHLORIDE 1.5 G/1.77G
40 POWDER, FOR SOLUTION ORAL AS NEEDED
Status: DISCONTINUED | OUTPATIENT
Start: 2020-05-22 | End: 2020-05-29 | Stop reason: HOSPADM

## 2020-05-22 RX ADMIN — POTASSIUM CHLORIDE 40 MEQ: 1500 TABLET, EXTENDED RELEASE ORAL at 22:54

## 2020-05-22 RX ADMIN — POTASSIUM CHLORIDE 40 MEQ: 1500 TABLET, EXTENDED RELEASE ORAL at 18:30

## 2020-05-22 RX ADMIN — POTASSIUM CHLORIDE 40 MEQ: 1500 TABLET, EXTENDED RELEASE ORAL at 10:32

## 2020-05-22 RX ADMIN — POTASSIUM CHLORIDE 40 MEQ: 1500 TABLET, EXTENDED RELEASE ORAL at 14:38

## 2020-05-22 RX ADMIN — CARBIDOPA AND LEVODOPA 1.5 TABLET: 25; 100 TABLET ORAL at 14:38

## 2020-05-22 RX ADMIN — AMPICILLIN 1 G: 1 INJECTION, POWDER, FOR SOLUTION INTRAMUSCULAR; INTRAVENOUS at 16:29

## 2020-05-22 RX ADMIN — Medication 10 ML: at 08:52

## 2020-05-22 RX ADMIN — ROSUVASTATIN CALCIUM 10 MG: 10 TABLET, FILM COATED ORAL at 21:04

## 2020-05-22 RX ADMIN — ONDANSETRON 4 MG: 2 INJECTION INTRAMUSCULAR; INTRAVENOUS at 19:53

## 2020-05-22 RX ADMIN — BACLOFEN 10 MG: 10 TABLET ORAL at 08:52

## 2020-05-22 RX ADMIN — CEFTRIAXONE SODIUM 1 G: 1 INJECTION, POWDER, FOR SOLUTION INTRAMUSCULAR; INTRAVENOUS at 10:32

## 2020-05-22 RX ADMIN — PANTOPRAZOLE SODIUM 40 MG: 40 TABLET, DELAYED RELEASE ORAL at 05:54

## 2020-05-22 RX ADMIN — FUROSEMIDE 20 MG: 10 INJECTION, SOLUTION INTRAMUSCULAR; INTRAVENOUS at 10:32

## 2020-05-22 RX ADMIN — PRAMIPEXOLE DIHYDROCHLORIDE 0.25 MG: 0.25 TABLET ORAL at 08:52

## 2020-05-22 RX ADMIN — POTASSIUM CHLORIDE 20 MEQ: 1500 TABLET, EXTENDED RELEASE ORAL at 08:52

## 2020-05-22 RX ADMIN — Medication 10 ML: at 22:18

## 2020-05-22 RX ADMIN — POLYETHYLENE GLYCOL 3350 17 G: 17 POWDER, FOR SOLUTION ORAL at 08:52

## 2020-05-22 RX ADMIN — Medication 1 PACKET: at 08:52

## 2020-05-22 RX ADMIN — ESCITALOPRAM OXALATE 10 MG: 10 TABLET ORAL at 08:52

## 2020-05-22 RX ADMIN — FUROSEMIDE 20 MG: 10 INJECTION, SOLUTION INTRAMUSCULAR; INTRAVENOUS at 21:04

## 2020-05-22 RX ADMIN — FUROSEMIDE 20 MG: 10 INJECTION, SOLUTION INTRAMUSCULAR; INTRAVENOUS at 17:10

## 2020-05-22 RX ADMIN — CARBIDOPA AND LEVODOPA 1.5 TABLET: 25; 100 TABLET ORAL at 05:54

## 2020-05-22 RX ADMIN — AMPICILLIN 1 G: 1 INJECTION, POWDER, FOR SOLUTION INTRAMUSCULAR; INTRAVENOUS at 21:08

## 2020-05-22 RX ADMIN — POTASSIUM CHLORIDE 20 MEQ: 1500 TABLET, EXTENDED RELEASE ORAL at 21:04

## 2020-05-22 RX ADMIN — OXYBUTYNIN CHLORIDE 5 MG: 5 TABLET ORAL at 08:52

## 2020-05-22 RX ADMIN — CARBIDOPA AND LEVODOPA 2 TABLET: 25; 100 TABLET ORAL at 21:09

## 2020-05-22 RX ADMIN — SODIUM CHLORIDE 300 MG: 9 INJECTION, SOLUTION INTRAVENOUS at 08:52

## 2020-05-22 RX ADMIN — GABAPENTIN 300 MG: 300 CAPSULE ORAL at 21:04

## 2020-05-23 ENCOUNTER — APPOINTMENT (OUTPATIENT)
Dept: GENERAL RADIOLOGY | Facility: HOSPITAL | Age: 85
End: 2020-05-23

## 2020-05-23 LAB
ALBUMIN SERPL-MCNC: 3.6 G/DL (ref 3.5–5.2)
ALBUMIN/GLOB SERPL: 1.4 G/DL
ALP SERPL-CCNC: 135 U/L (ref 39–117)
ALT SERPL W P-5'-P-CCNC: 9 U/L (ref 1–41)
ANION GAP SERPL CALCULATED.3IONS-SCNC: 15 MMOL/L (ref 5–15)
ANISOCYTOSIS BLD QL: ABNORMAL
AST SERPL-CCNC: 52 U/L (ref 1–40)
BACTERIA SPEC AEROBE CULT: ABNORMAL
BILIRUB SERPL-MCNC: 2 MG/DL (ref 0.2–1.2)
BUN BLD-MCNC: 26 MG/DL (ref 8–23)
BUN/CREAT SERPL: 20.6 (ref 7–25)
CALCIUM SPEC-SCNC: 8.6 MG/DL (ref 8.6–10.5)
CHLORIDE SERPL-SCNC: 102 MMOL/L (ref 98–107)
CO2 SERPL-SCNC: 23 MMOL/L (ref 22–29)
CREAT BLD-MCNC: 1.26 MG/DL (ref 0.76–1.27)
DACRYOCYTES BLD QL SMEAR: ABNORMAL
DEPRECATED RDW RBC AUTO: 59.9 FL (ref 37–54)
EOSINOPHIL # BLD MANUAL: 0.15 10*3/MM3 (ref 0–0.4)
EOSINOPHIL NFR BLD MANUAL: 3 % (ref 0.3–6.2)
ERYTHROCYTE [DISTWIDTH] IN BLOOD BY AUTOMATED COUNT: 24.9 % (ref 12.3–15.4)
GFR SERPL CREATININE-BSD FRML MDRD: 55 ML/MIN/1.73
GLOBULIN UR ELPH-MCNC: 2.5 GM/DL
GLUCOSE BLD-MCNC: 123 MG/DL (ref 65–99)
HCT VFR BLD AUTO: 28.2 % (ref 37.5–51)
HGB BLD-MCNC: 8.5 G/DL (ref 13–17.7)
INR PPP: 1.78 (ref 2–3)
LARGE PLATELETS: ABNORMAL
LYMPHOCYTES # BLD MANUAL: 0.2 10*3/MM3 (ref 0.7–3.1)
LYMPHOCYTES NFR BLD MANUAL: 4 % (ref 19.6–45.3)
LYMPHOCYTES NFR BLD MANUAL: 4 % (ref 5–12)
MCH RBC QN AUTO: 21.4 PG (ref 26.6–33)
MCHC RBC AUTO-ENTMCNC: 30.2 G/DL (ref 31.5–35.7)
MCV RBC AUTO: 70.8 FL (ref 79–97)
METAMYELOCYTES NFR BLD MANUAL: 1 % (ref 0–0)
MICROCYTES BLD QL: ABNORMAL
MONOCYTES # BLD AUTO: 0.2 10*3/MM3 (ref 0.1–0.9)
NEUTROPHILS # BLD AUTO: 4.3 10*3/MM3 (ref 1.7–7)
NEUTROPHILS NFR BLD MANUAL: 79 % (ref 42.7–76)
NEUTS BAND NFR BLD MANUAL: 7 % (ref 0–5)
NEUTS VAC BLD QL SMEAR: ABNORMAL
NT-PROBNP SERPL-MCNC: 2761 PG/ML (ref 5–1800)
OVALOCYTES BLD QL SMEAR: ABNORMAL
PLATELET # BLD AUTO: 158 10*3/MM3 (ref 140–450)
PMV BLD AUTO: 8 FL (ref 6–12)
POIKILOCYTOSIS BLD QL SMEAR: ABNORMAL
POLYCHROMASIA BLD QL SMEAR: ABNORMAL
POTASSIUM BLD-SCNC: 3.8 MMOL/L (ref 3.5–5.2)
PROT SERPL-MCNC: 6.1 G/DL (ref 6–8.5)
PROTHROMBIN TIME: 17.2 SECONDS (ref 19.4–28.5)
RBC # BLD AUTO: 3.98 10*6/MM3 (ref 4.14–5.8)
SMALL PLATELETS BLD QL SMEAR: ADEQUATE
SODIUM BLD-SCNC: 140 MMOL/L (ref 136–145)
TARGETS BLD QL SMEAR: ABNORMAL
VARIANT LYMPHS NFR BLD MANUAL: 2 % (ref 0–5)
WBC NRBC COR # BLD: 5 10*3/MM3 (ref 3.4–10.8)

## 2020-05-23 PROCEDURE — 99233 SBSQ HOSP IP/OBS HIGH 50: CPT | Performed by: INTERNAL MEDICINE

## 2020-05-23 PROCEDURE — 83880 ASSAY OF NATRIURETIC PEPTIDE: CPT | Performed by: NURSE PRACTITIONER

## 2020-05-23 PROCEDURE — 25010000002 FUROSEMIDE PER 20 MG: Performed by: HOSPITALIST

## 2020-05-23 PROCEDURE — G0378 HOSPITAL OBSERVATION PER HR: HCPCS

## 2020-05-23 PROCEDURE — 71045 X-RAY EXAM CHEST 1 VIEW: CPT

## 2020-05-23 PROCEDURE — 85025 COMPLETE CBC W/AUTO DIFF WBC: CPT | Performed by: INTERNAL MEDICINE

## 2020-05-23 PROCEDURE — 25010000002 FUROSEMIDE PER 20 MG: Performed by: NURSE PRACTITIONER

## 2020-05-23 PROCEDURE — 99225 PR SBSQ OBSERVATION CARE/DAY 25 MINUTES: CPT | Performed by: INTERNAL MEDICINE

## 2020-05-23 PROCEDURE — 85007 BL SMEAR W/DIFF WBC COUNT: CPT | Performed by: INTERNAL MEDICINE

## 2020-05-23 PROCEDURE — 25010000002 AMPICILLIN PER 500 MG: Performed by: HOSPITALIST

## 2020-05-23 PROCEDURE — 85610 PROTHROMBIN TIME: CPT | Performed by: INTERNAL MEDICINE

## 2020-05-23 PROCEDURE — 99232 SBSQ HOSP IP/OBS MODERATE 35: CPT | Performed by: HOSPITALIST

## 2020-05-23 PROCEDURE — 80053 COMPREHEN METABOLIC PANEL: CPT | Performed by: NURSE PRACTITIONER

## 2020-05-23 RX ORDER — CALCIUM CARBONATE 200(500)MG
2 TABLET,CHEWABLE ORAL 4 TIMES DAILY PRN
Status: DISCONTINUED | OUTPATIENT
Start: 2020-05-23 | End: 2020-05-29 | Stop reason: HOSPADM

## 2020-05-23 RX ORDER — FUROSEMIDE 10 MG/ML
40 INJECTION INTRAMUSCULAR; INTRAVENOUS EVERY 12 HOURS
Status: DISCONTINUED | OUTPATIENT
Start: 2020-05-23 | End: 2020-05-25

## 2020-05-23 RX ORDER — WARFARIN SODIUM 2 MG/1
2 TABLET ORAL
Status: DISCONTINUED | OUTPATIENT
Start: 2020-05-23 | End: 2020-05-24

## 2020-05-23 RX ORDER — ALUMINA, MAGNESIA, AND SIMETHICONE 2400; 2400; 240 MG/30ML; MG/30ML; MG/30ML
15 SUSPENSION ORAL 4 TIMES DAILY PRN
Status: DISCONTINUED | OUTPATIENT
Start: 2020-05-23 | End: 2020-05-29 | Stop reason: HOSPADM

## 2020-05-23 RX ADMIN — POTASSIUM CHLORIDE 20 MEQ: 1500 TABLET, EXTENDED RELEASE ORAL at 09:28

## 2020-05-23 RX ADMIN — CARBIDOPA AND LEVODOPA 1.5 TABLET: 25; 100 TABLET ORAL at 06:12

## 2020-05-23 RX ADMIN — ROSUVASTATIN CALCIUM 10 MG: 10 TABLET, FILM COATED ORAL at 21:05

## 2020-05-23 RX ADMIN — AMPICILLIN 1 G: 1 INJECTION, POWDER, FOR SOLUTION INTRAMUSCULAR; INTRAVENOUS at 09:29

## 2020-05-23 RX ADMIN — BACLOFEN 10 MG: 10 TABLET ORAL at 09:27

## 2020-05-23 RX ADMIN — FUROSEMIDE 20 MG: 10 INJECTION, SOLUTION INTRAMUSCULAR; INTRAVENOUS at 09:28

## 2020-05-23 RX ADMIN — AMPICILLIN 1 G: 1 INJECTION, POWDER, FOR SOLUTION INTRAMUSCULAR; INTRAVENOUS at 21:04

## 2020-05-23 RX ADMIN — PANTOPRAZOLE SODIUM 40 MG: 40 TABLET, DELAYED RELEASE ORAL at 06:12

## 2020-05-23 RX ADMIN — CARBIDOPA AND LEVODOPA 1.5 TABLET: 25; 100 TABLET ORAL at 21:00

## 2020-05-23 RX ADMIN — POTASSIUM CHLORIDE 40 MEQ: 1500 TABLET, EXTENDED RELEASE ORAL at 02:44

## 2020-05-23 RX ADMIN — CARBIDOPA AND LEVODOPA 1.5 TABLET: 25; 100 TABLET ORAL at 16:21

## 2020-05-23 RX ADMIN — ESCITALOPRAM OXALATE 10 MG: 10 TABLET ORAL at 09:27

## 2020-05-23 RX ADMIN — GABAPENTIN 300 MG: 300 CAPSULE ORAL at 21:01

## 2020-05-23 RX ADMIN — CARBIDOPA AND LEVODOPA 1.5 TABLET: 25; 100 TABLET ORAL at 20:59

## 2020-05-23 RX ADMIN — WARFARIN 2 MG: 2 TABLET ORAL at 18:34

## 2020-05-23 RX ADMIN — Medication 10 ML: at 09:29

## 2020-05-23 RX ADMIN — Medication 10 ML: at 21:06

## 2020-05-23 RX ADMIN — Medication 1 PACKET: at 10:12

## 2020-05-23 RX ADMIN — POTASSIUM CHLORIDE 20 MEQ: 1500 TABLET, EXTENDED RELEASE ORAL at 16:21

## 2020-05-23 RX ADMIN — POTASSIUM CHLORIDE 20 MEQ: 1500 TABLET, EXTENDED RELEASE ORAL at 21:05

## 2020-05-23 RX ADMIN — CALCIUM CARBONATE (ANTACID) CHEW TAB 500 MG 2 TABLET: 500 CHEW TAB at 11:22

## 2020-05-23 RX ADMIN — FUROSEMIDE 40 MG: 10 INJECTION, SOLUTION INTRAMUSCULAR; INTRAVENOUS at 18:34

## 2020-05-23 RX ADMIN — PRAMIPEXOLE DIHYDROCHLORIDE 0.25 MG: 0.25 TABLET ORAL at 09:27

## 2020-05-23 RX ADMIN — AMPICILLIN 1 G: 1 INJECTION, POWDER, FOR SOLUTION INTRAMUSCULAR; INTRAVENOUS at 04:37

## 2020-05-23 RX ADMIN — OXYBUTYNIN CHLORIDE 5 MG: 5 TABLET ORAL at 09:28

## 2020-05-23 RX ADMIN — AMPICILLIN 1 G: 1 INJECTION, POWDER, FOR SOLUTION INTRAMUSCULAR; INTRAVENOUS at 16:19

## 2020-05-24 LAB
BASOPHILS # BLD AUTO: 0.1 10*3/MM3 (ref 0–0.2)
BASOPHILS NFR BLD AUTO: 1.4 % (ref 0–1.5)
BH BB BLOOD EXPIRATION DATE: NORMAL
BH BB BLOOD TYPE BARCODE: 5100
BH BB DISPENSE STATUS: NORMAL
BH BB PRODUCT CODE: NORMAL
BH BB UNIT NUMBER: NORMAL
CROSSMATCH INTERPRETATION: NORMAL
DEPRECATED RDW RBC AUTO: 61.3 FL (ref 37–54)
EOSINOPHIL # BLD AUTO: 0.3 10*3/MM3 (ref 0–0.4)
EOSINOPHIL NFR BLD AUTO: 5.9 % (ref 0.3–6.2)
ERYTHROCYTE [DISTWIDTH] IN BLOOD BY AUTOMATED COUNT: 25.6 % (ref 12.3–15.4)
HCT VFR BLD AUTO: 27.6 % (ref 37.5–51)
HGB BLD-MCNC: 8.4 G/DL (ref 13–17.7)
INR PPP: 1.72 (ref 2–3)
LYMPHOCYTES # BLD AUTO: 0.5 10*3/MM3 (ref 0.7–3.1)
LYMPHOCYTES NFR BLD AUTO: 10.8 % (ref 19.6–45.3)
MCH RBC QN AUTO: 21.6 PG (ref 26.6–33)
MCHC RBC AUTO-ENTMCNC: 30.6 G/DL (ref 31.5–35.7)
MCV RBC AUTO: 70.5 FL (ref 79–97)
MONOCYTES # BLD AUTO: 0.5 10*3/MM3 (ref 0.1–0.9)
MONOCYTES NFR BLD AUTO: 10.6 % (ref 5–12)
NEUTROPHILS # BLD AUTO: 3.1 10*3/MM3 (ref 1.7–7)
NEUTROPHILS NFR BLD AUTO: 71.3 % (ref 42.7–76)
NRBC BLD AUTO-RTO: 1 /100 WBC (ref 0–0.2)
PLATELET # BLD AUTO: 193 10*3/MM3 (ref 140–450)
PMV BLD AUTO: 7 FL (ref 6–12)
PROTHROMBIN TIME: 16.7 SECONDS (ref 19.4–28.5)
RBC # BLD AUTO: 3.91 10*6/MM3 (ref 4.14–5.8)
UNIT  ABO: NORMAL
UNIT  RH: NORMAL
WBC NRBC COR # BLD: 4.4 10*3/MM3 (ref 3.4–10.8)

## 2020-05-24 PROCEDURE — 97110 THERAPEUTIC EXERCISES: CPT

## 2020-05-24 PROCEDURE — 25010000002 FUROSEMIDE PER 20 MG: Performed by: NURSE PRACTITIONER

## 2020-05-24 PROCEDURE — 97116 GAIT TRAINING THERAPY: CPT

## 2020-05-24 PROCEDURE — 25010000002 AMPICILLIN PER 500 MG: Performed by: HOSPITALIST

## 2020-05-24 PROCEDURE — 99233 SBSQ HOSP IP/OBS HIGH 50: CPT | Performed by: INTERNAL MEDICINE

## 2020-05-24 PROCEDURE — 85025 COMPLETE CBC W/AUTO DIFF WBC: CPT | Performed by: INTERNAL MEDICINE

## 2020-05-24 PROCEDURE — 97530 THERAPEUTIC ACTIVITIES: CPT

## 2020-05-24 PROCEDURE — 85610 PROTHROMBIN TIME: CPT | Performed by: HOSPITALIST

## 2020-05-24 PROCEDURE — 97535 SELF CARE MNGMENT TRAINING: CPT

## 2020-05-24 PROCEDURE — 99232 SBSQ HOSP IP/OBS MODERATE 35: CPT | Performed by: HOSPITALIST

## 2020-05-24 PROCEDURE — 99232 SBSQ HOSP IP/OBS MODERATE 35: CPT | Performed by: INTERNAL MEDICINE

## 2020-05-24 RX ORDER — WARFARIN SODIUM 2 MG/1
2 TABLET ORAL
Status: DISCONTINUED | OUTPATIENT
Start: 2020-05-25 | End: 2020-05-27

## 2020-05-24 RX ORDER — WARFARIN SODIUM 2.5 MG/1
2.5 TABLET ORAL
Status: COMPLETED | OUTPATIENT
Start: 2020-05-24 | End: 2020-05-24

## 2020-05-24 RX ADMIN — PRAMIPEXOLE DIHYDROCHLORIDE 0.25 MG: 0.25 TABLET ORAL at 09:30

## 2020-05-24 RX ADMIN — AMPICILLIN 1 G: 1 INJECTION, POWDER, FOR SOLUTION INTRAMUSCULAR; INTRAVENOUS at 17:10

## 2020-05-24 RX ADMIN — WARFARIN 2.5 MG: 2.5 TABLET ORAL at 18:31

## 2020-05-24 RX ADMIN — OXYBUTYNIN CHLORIDE 5 MG: 5 TABLET ORAL at 09:27

## 2020-05-24 RX ADMIN — Medication 10 ML: at 09:32

## 2020-05-24 RX ADMIN — PANTOPRAZOLE SODIUM 40 MG: 40 TABLET, DELAYED RELEASE ORAL at 06:26

## 2020-05-24 RX ADMIN — CARBIDOPA AND LEVODOPA 1.5 TABLET: 25; 100 TABLET ORAL at 06:25

## 2020-05-24 RX ADMIN — GABAPENTIN 300 MG: 300 CAPSULE ORAL at 22:04

## 2020-05-24 RX ADMIN — POTASSIUM CHLORIDE 20 MEQ: 1500 TABLET, EXTENDED RELEASE ORAL at 22:04

## 2020-05-24 RX ADMIN — Medication 10 ML: at 22:05

## 2020-05-24 RX ADMIN — AMPICILLIN 1 G: 1 INJECTION, POWDER, FOR SOLUTION INTRAMUSCULAR; INTRAVENOUS at 09:28

## 2020-05-24 RX ADMIN — AMPICILLIN 1 G: 1 INJECTION, POWDER, FOR SOLUTION INTRAMUSCULAR; INTRAVENOUS at 22:01

## 2020-05-24 RX ADMIN — CARBIDOPA AND LEVODOPA 1.5 TABLET: 25; 100 TABLET ORAL at 14:24

## 2020-05-24 RX ADMIN — POTASSIUM CHLORIDE 20 MEQ: 1500 TABLET, EXTENDED RELEASE ORAL at 17:10

## 2020-05-24 RX ADMIN — POTASSIUM CHLORIDE 20 MEQ: 1500 TABLET, EXTENDED RELEASE ORAL at 09:28

## 2020-05-24 RX ADMIN — ROSUVASTATIN CALCIUM 10 MG: 10 TABLET, FILM COATED ORAL at 22:04

## 2020-05-24 RX ADMIN — FUROSEMIDE 40 MG: 10 INJECTION, SOLUTION INTRAMUSCULAR; INTRAVENOUS at 18:31

## 2020-05-24 RX ADMIN — FUROSEMIDE 40 MG: 10 INJECTION, SOLUTION INTRAMUSCULAR; INTRAVENOUS at 06:26

## 2020-05-24 RX ADMIN — ESCITALOPRAM OXALATE 10 MG: 10 TABLET ORAL at 09:39

## 2020-05-24 RX ADMIN — BACLOFEN 10 MG: 10 TABLET ORAL at 09:27

## 2020-05-24 RX ADMIN — AMPICILLIN 1 G: 1 INJECTION, POWDER, FOR SOLUTION INTRAMUSCULAR; INTRAVENOUS at 04:28

## 2020-05-24 RX ADMIN — CARBIDOPA AND LEVODOPA 1 TABLET: 25; 100 TABLET ORAL at 22:03

## 2020-05-24 RX ADMIN — Medication 1 PACKET: at 09:39

## 2020-05-25 LAB
BASOPHILS # BLD AUTO: 0 10*3/MM3 (ref 0–0.2)
BASOPHILS NFR BLD AUTO: 1.2 % (ref 0–1.5)
DEPRECATED RDW RBC AUTO: 61.3 FL (ref 37–54)
EOSINOPHIL # BLD AUTO: 0.2 10*3/MM3 (ref 0–0.4)
EOSINOPHIL NFR BLD AUTO: 5.7 % (ref 0.3–6.2)
ERYTHROCYTE [DISTWIDTH] IN BLOOD BY AUTOMATED COUNT: 25.7 % (ref 12.3–15.4)
HCT VFR BLD AUTO: 28.3 % (ref 37.5–51)
HGB BLD-MCNC: 8.8 G/DL (ref 13–17.7)
INR PPP: 1.85 (ref 2–3)
LYMPHOCYTES # BLD AUTO: 0.5 10*3/MM3 (ref 0.7–3.1)
LYMPHOCYTES NFR BLD AUTO: 13.4 % (ref 19.6–45.3)
MAGNESIUM SERPL-MCNC: 1.8 MG/DL (ref 1.6–2.4)
MCH RBC QN AUTO: 22.1 PG (ref 26.6–33)
MCHC RBC AUTO-ENTMCNC: 31.1 G/DL (ref 31.5–35.7)
MCV RBC AUTO: 70.9 FL (ref 79–97)
MONOCYTES # BLD AUTO: 0.5 10*3/MM3 (ref 0.1–0.9)
MONOCYTES NFR BLD AUTO: 12.9 % (ref 5–12)
NEUTROPHILS # BLD AUTO: 2.6 10*3/MM3 (ref 1.7–7)
NEUTROPHILS NFR BLD AUTO: 66.8 % (ref 42.7–76)
NRBC BLD AUTO-RTO: 0.5 /100 WBC (ref 0–0.2)
PLATELET # BLD AUTO: 169 10*3/MM3 (ref 140–450)
PMV BLD AUTO: 6.8 FL (ref 6–12)
POTASSIUM BLD-SCNC: 3.9 MMOL/L (ref 3.5–5.2)
PROTHROMBIN TIME: 17.9 SECONDS (ref 19.4–28.5)
RBC # BLD AUTO: 3.99 10*6/MM3 (ref 4.14–5.8)
WBC NRBC COR # BLD: 3.8 10*3/MM3 (ref 3.4–10.8)

## 2020-05-25 PROCEDURE — 99233 SBSQ HOSP IP/OBS HIGH 50: CPT | Performed by: INTERNAL MEDICINE

## 2020-05-25 PROCEDURE — 83735 ASSAY OF MAGNESIUM: CPT | Performed by: NURSE PRACTITIONER

## 2020-05-25 PROCEDURE — 25010000002 FUROSEMIDE PER 20 MG: Performed by: NURSE PRACTITIONER

## 2020-05-25 PROCEDURE — 84132 ASSAY OF SERUM POTASSIUM: CPT | Performed by: HOSPITALIST

## 2020-05-25 PROCEDURE — 85025 COMPLETE CBC W/AUTO DIFF WBC: CPT | Performed by: INTERNAL MEDICINE

## 2020-05-25 PROCEDURE — 99232 SBSQ HOSP IP/OBS MODERATE 35: CPT | Performed by: INTERNAL MEDICINE

## 2020-05-25 PROCEDURE — 25010000002 AMPICILLIN PER 500 MG: Performed by: HOSPITALIST

## 2020-05-25 PROCEDURE — 85610 PROTHROMBIN TIME: CPT | Performed by: HOSPITALIST

## 2020-05-25 RX ORDER — FUROSEMIDE 40 MG/1
40 TABLET ORAL DAILY
Status: DISCONTINUED | OUTPATIENT
Start: 2020-05-26 | End: 2020-05-29 | Stop reason: HOSPADM

## 2020-05-25 RX ADMIN — PANTOPRAZOLE SODIUM 40 MG: 40 TABLET, DELAYED RELEASE ORAL at 06:34

## 2020-05-25 RX ADMIN — OXYBUTYNIN CHLORIDE 5 MG: 5 TABLET ORAL at 08:53

## 2020-05-25 RX ADMIN — AMPICILLIN 1 G: 1 INJECTION, POWDER, FOR SOLUTION INTRAMUSCULAR; INTRAVENOUS at 15:43

## 2020-05-25 RX ADMIN — ESCITALOPRAM OXALATE 10 MG: 10 TABLET ORAL at 08:52

## 2020-05-25 RX ADMIN — POLYETHYLENE GLYCOL 3350 17 G: 17 POWDER, FOR SOLUTION ORAL at 08:49

## 2020-05-25 RX ADMIN — FUROSEMIDE 40 MG: 10 INJECTION, SOLUTION INTRAMUSCULAR; INTRAVENOUS at 06:34

## 2020-05-25 RX ADMIN — ROSUVASTATIN CALCIUM 10 MG: 10 TABLET, FILM COATED ORAL at 21:10

## 2020-05-25 RX ADMIN — POTASSIUM CHLORIDE 20 MEQ: 1500 TABLET, EXTENDED RELEASE ORAL at 15:46

## 2020-05-25 RX ADMIN — Medication 10 ML: at 08:52

## 2020-05-25 RX ADMIN — POTASSIUM CHLORIDE 20 MEQ: 1500 TABLET, EXTENDED RELEASE ORAL at 08:49

## 2020-05-25 RX ADMIN — Medication 10 ML: at 21:12

## 2020-05-25 RX ADMIN — GABAPENTIN 300 MG: 300 CAPSULE ORAL at 21:10

## 2020-05-25 RX ADMIN — AMPICILLIN 1 G: 1 INJECTION, POWDER, FOR SOLUTION INTRAMUSCULAR; INTRAVENOUS at 10:39

## 2020-05-25 RX ADMIN — CARBIDOPA AND LEVODOPA 1.5 TABLET: 25; 100 TABLET ORAL at 14:45

## 2020-05-25 RX ADMIN — PRAMIPEXOLE DIHYDROCHLORIDE 0.25 MG: 0.25 TABLET ORAL at 08:51

## 2020-05-25 RX ADMIN — Medication 1 PACKET: at 08:51

## 2020-05-25 RX ADMIN — POTASSIUM CHLORIDE 20 MEQ: 1500 TABLET, EXTENDED RELEASE ORAL at 21:10

## 2020-05-25 RX ADMIN — CARBIDOPA AND LEVODOPA 1.5 TABLET: 25; 100 TABLET ORAL at 21:10

## 2020-05-25 RX ADMIN — AMPICILLIN 1 G: 1 INJECTION, POWDER, FOR SOLUTION INTRAMUSCULAR; INTRAVENOUS at 21:10

## 2020-05-25 RX ADMIN — CARBIDOPA AND LEVODOPA 1.5 TABLET: 25; 100 TABLET ORAL at 06:33

## 2020-05-25 RX ADMIN — BACLOFEN 10 MG: 10 TABLET ORAL at 08:52

## 2020-05-25 RX ADMIN — AMPICILLIN 1 G: 1 INJECTION, POWDER, FOR SOLUTION INTRAMUSCULAR; INTRAVENOUS at 03:45

## 2020-05-26 LAB
ACANTHOCYTES BLD QL SMEAR: NORMAL
ANION GAP SERPL CALCULATED.3IONS-SCNC: 11 MMOL/L (ref 5–15)
ANISOCYTOSIS BLD QL: NORMAL
BASOPHILS # BLD AUTO: 0.1 10*3/MM3 (ref 0–0.2)
BASOPHILS # BLD AUTO: 0.1 10*3/MM3 (ref 0–0.2)
BASOPHILS NFR BLD AUTO: 1.3 % (ref 0–1.5)
BASOPHILS NFR BLD AUTO: 1.8 % (ref 0–1.5)
BUN BLD-MCNC: 21 MG/DL (ref 8–23)
BUN/CREAT SERPL: 21 (ref 7–25)
CALCIUM SPEC-SCNC: 8.4 MG/DL (ref 8.6–10.5)
CHLORIDE SERPL-SCNC: 103 MMOL/L (ref 98–107)
CO2 SERPL-SCNC: 26 MMOL/L (ref 22–29)
CREAT BLD-MCNC: 1 MG/DL (ref 0.76–1.27)
DEPRECATED RDW RBC AUTO: 65.6 FL (ref 37–54)
DEPRECATED RDW RBC AUTO: 66.9 FL (ref 37–54)
EOSINOPHIL # BLD AUTO: 0.2 10*3/MM3 (ref 0–0.4)
EOSINOPHIL # BLD AUTO: 0.3 10*3/MM3 (ref 0–0.4)
EOSINOPHIL NFR BLD AUTO: 5.4 % (ref 0.3–6.2)
EOSINOPHIL NFR BLD AUTO: 7 % (ref 0.3–6.2)
ERYTHROCYTE [DISTWIDTH] IN BLOOD BY AUTOMATED COUNT: 26.9 % (ref 12.3–15.4)
ERYTHROCYTE [DISTWIDTH] IN BLOOD BY AUTOMATED COUNT: 27.2 % (ref 12.3–15.4)
GFR SERPL CREATININE-BSD FRML MDRD: 71 ML/MIN/1.73
GLUCOSE BLD-MCNC: 95 MG/DL (ref 65–99)
HCT VFR BLD AUTO: 27.4 % (ref 37.5–51)
HCT VFR BLD AUTO: 29.4 % (ref 37.5–51)
HGB BLD-MCNC: 8.7 G/DL (ref 13–17.7)
HGB BLD-MCNC: 9 G/DL (ref 13–17.7)
HYPOCHROMIA BLD QL: NORMAL
INR PPP: 1.95 (ref 2–3)
LYMPHOCYTES # BLD AUTO: 0.7 10*3/MM3 (ref 0.7–3.1)
LYMPHOCYTES # BLD AUTO: 0.7 10*3/MM3 (ref 0.7–3.1)
LYMPHOCYTES NFR BLD AUTO: 16.1 % (ref 19.6–45.3)
LYMPHOCYTES NFR BLD AUTO: 17.5 % (ref 19.6–45.3)
MCH RBC QN AUTO: 22.2 PG (ref 26.6–33)
MCH RBC QN AUTO: 22.9 PG (ref 26.6–33)
MCHC RBC AUTO-ENTMCNC: 30.8 G/DL (ref 31.5–35.7)
MCHC RBC AUTO-ENTMCNC: 31.9 G/DL (ref 31.5–35.7)
MCV RBC AUTO: 71.6 FL (ref 79–97)
MCV RBC AUTO: 72.1 FL (ref 79–97)
MICROCYTES BLD QL: NORMAL
MONOCYTES # BLD AUTO: 0.6 10*3/MM3 (ref 0.1–0.9)
MONOCYTES # BLD AUTO: 0.6 10*3/MM3 (ref 0.1–0.9)
MONOCYTES NFR BLD AUTO: 13.9 % (ref 5–12)
MONOCYTES NFR BLD AUTO: 14.7 % (ref 5–12)
NEUTROPHILS # BLD AUTO: 2.5 10*3/MM3 (ref 1.7–7)
NEUTROPHILS # BLD AUTO: 2.5 10*3/MM3 (ref 1.7–7)
NEUTROPHILS NFR BLD AUTO: 60.4 % (ref 42.7–76)
NEUTROPHILS NFR BLD AUTO: 61.9 % (ref 42.7–76)
NRBC BLD AUTO-RTO: 0.1 /100 WBC (ref 0–0.2)
NRBC BLD AUTO-RTO: 0.2 /100 WBC (ref 0–0.2)
PLAT MORPH BLD: NORMAL
PLATELET # BLD AUTO: 173 10*3/MM3 (ref 140–450)
PLATELET # BLD AUTO: 177 10*3/MM3 (ref 140–450)
PMV BLD AUTO: 7.8 FL (ref 6–12)
PMV BLD AUTO: 8.3 FL (ref 6–12)
POTASSIUM BLD-SCNC: 3.7 MMOL/L (ref 3.5–5.2)
PROTHROMBIN TIME: 18.8 SECONDS (ref 19.4–28.5)
RBC # BLD AUTO: 3.82 10*6/MM3 (ref 4.14–5.8)
RBC # BLD AUTO: 4.07 10*6/MM3 (ref 4.14–5.8)
SCHISTOCYTES BLD QL SMEAR: NORMAL
SODIUM BLD-SCNC: 140 MMOL/L (ref 136–145)
WBC MORPH BLD: NORMAL
WBC NRBC COR # BLD: 4.1 10*3/MM3 (ref 3.4–10.8)
WBC NRBC COR # BLD: 4.1 10*3/MM3 (ref 3.4–10.8)

## 2020-05-26 PROCEDURE — 85007 BL SMEAR W/DIFF WBC COUNT: CPT | Performed by: INTERNAL MEDICINE

## 2020-05-26 PROCEDURE — 99233 SBSQ HOSP IP/OBS HIGH 50: CPT | Performed by: INTERNAL MEDICINE

## 2020-05-26 PROCEDURE — 25010000002 AMPICILLIN PER 500 MG: Performed by: HOSPITALIST

## 2020-05-26 PROCEDURE — 85025 COMPLETE CBC W/AUTO DIFF WBC: CPT | Performed by: INTERNAL MEDICINE

## 2020-05-26 PROCEDURE — 97535 SELF CARE MNGMENT TRAINING: CPT

## 2020-05-26 PROCEDURE — 97116 GAIT TRAINING THERAPY: CPT

## 2020-05-26 PROCEDURE — 85610 PROTHROMBIN TIME: CPT | Performed by: HOSPITALIST

## 2020-05-26 PROCEDURE — 97530 THERAPEUTIC ACTIVITIES: CPT

## 2020-05-26 PROCEDURE — 80048 BASIC METABOLIC PNL TOTAL CA: CPT | Performed by: NURSE PRACTITIONER

## 2020-05-26 PROCEDURE — 99232 SBSQ HOSP IP/OBS MODERATE 35: CPT | Performed by: INTERNAL MEDICINE

## 2020-05-26 RX ORDER — NITROFURANTOIN 25; 75 MG/1; MG/1
100 CAPSULE ORAL EVERY 12 HOURS SCHEDULED
Status: DISPENSED | OUTPATIENT
Start: 2020-05-26 | End: 2020-05-29

## 2020-05-26 RX ADMIN — PRAMIPEXOLE DIHYDROCHLORIDE 0.25 MG: 0.25 TABLET ORAL at 08:28

## 2020-05-26 RX ADMIN — ROSUVASTATIN CALCIUM 10 MG: 10 TABLET, FILM COATED ORAL at 20:07

## 2020-05-26 RX ADMIN — PANTOPRAZOLE SODIUM 40 MG: 40 TABLET, DELAYED RELEASE ORAL at 05:21

## 2020-05-26 RX ADMIN — POTASSIUM CHLORIDE 20 MEQ: 1500 TABLET, EXTENDED RELEASE ORAL at 20:07

## 2020-05-26 RX ADMIN — Medication 1 PACKET: at 08:31

## 2020-05-26 RX ADMIN — CARBIDOPA AND LEVODOPA 1.5 TABLET: 25; 100 TABLET ORAL at 05:21

## 2020-05-26 RX ADMIN — POTASSIUM CHLORIDE 20 MEQ: 1500 TABLET, EXTENDED RELEASE ORAL at 08:29

## 2020-05-26 RX ADMIN — POLYETHYLENE GLYCOL 3350 17 G: 17 POWDER, FOR SOLUTION ORAL at 08:28

## 2020-05-26 RX ADMIN — Medication 10 ML: at 08:29

## 2020-05-26 RX ADMIN — Medication 10 ML: at 20:10

## 2020-05-26 RX ADMIN — ESCITALOPRAM OXALATE 10 MG: 10 TABLET ORAL at 08:28

## 2020-05-26 RX ADMIN — AMPICILLIN 1 G: 1 INJECTION, POWDER, FOR SOLUTION INTRAMUSCULAR; INTRAVENOUS at 04:54

## 2020-05-26 RX ADMIN — AMPICILLIN 1 G: 1 INJECTION, POWDER, FOR SOLUTION INTRAMUSCULAR; INTRAVENOUS at 09:57

## 2020-05-26 RX ADMIN — BACLOFEN 10 MG: 10 TABLET ORAL at 08:28

## 2020-05-26 RX ADMIN — POTASSIUM CHLORIDE 20 MEQ: 1500 TABLET, EXTENDED RELEASE ORAL at 17:21

## 2020-05-26 RX ADMIN — GABAPENTIN 300 MG: 300 CAPSULE ORAL at 20:07

## 2020-05-26 RX ADMIN — CARBIDOPA AND LEVODOPA 1.5 TABLET: 25; 100 TABLET ORAL at 20:07

## 2020-05-26 RX ADMIN — FUROSEMIDE 40 MG: 40 TABLET ORAL at 08:29

## 2020-05-26 RX ADMIN — NITROFURANTOIN (MONOHYDRATE/MACROCRYSTALS) 100 MG: 75; 25 CAPSULE ORAL at 20:07

## 2020-05-26 RX ADMIN — WARFARIN 2 MG: 2 TABLET ORAL at 17:21

## 2020-05-26 RX ADMIN — CARBIDOPA AND LEVODOPA 1.5 TABLET: 25; 100 TABLET ORAL at 14:15

## 2020-05-26 RX ADMIN — OXYBUTYNIN CHLORIDE 5 MG: 5 TABLET ORAL at 08:28

## 2020-05-27 ENCOUNTER — ANESTHESIA EVENT (OUTPATIENT)
Dept: CARDIOLOGY | Facility: HOSPITAL | Age: 85
End: 2020-05-27

## 2020-05-27 LAB
ACANTHOCYTES BLD QL SMEAR: NORMAL
ANISOCYTOSIS BLD QL: NORMAL
BASOPHILS # BLD AUTO: 0 10*3/MM3 (ref 0–0.2)
BASOPHILS # BLD AUTO: 0.1 10*3/MM3 (ref 0–0.2)
BASOPHILS NFR BLD AUTO: 1 % (ref 0–1.5)
BASOPHILS NFR BLD AUTO: 1.2 % (ref 0–1.5)
DEPRECATED RDW RBC AUTO: 64.3 FL (ref 37–54)
DEPRECATED RDW RBC AUTO: 66.1 FL (ref 37–54)
EOSINOPHIL # BLD AUTO: 0.2 10*3/MM3 (ref 0–0.4)
EOSINOPHIL # BLD AUTO: 0.2 10*3/MM3 (ref 0–0.4)
EOSINOPHIL NFR BLD AUTO: 4.8 % (ref 0.3–6.2)
EOSINOPHIL NFR BLD AUTO: 5.8 % (ref 0.3–6.2)
ERYTHROCYTE [DISTWIDTH] IN BLOOD BY AUTOMATED COUNT: 27 % (ref 12.3–15.4)
ERYTHROCYTE [DISTWIDTH] IN BLOOD BY AUTOMATED COUNT: 27.5 % (ref 12.3–15.4)
HCT VFR BLD AUTO: 30 % (ref 37.5–51)
HCT VFR BLD AUTO: 30.6 % (ref 37.5–51)
HGB BLD-MCNC: 9.2 G/DL (ref 13–17.7)
HGB BLD-MCNC: 9.3 G/DL (ref 13–17.7)
INR PPP: 1.89 (ref 2–3)
LYMPHOCYTES # BLD AUTO: 0.7 10*3/MM3 (ref 0.7–3.1)
LYMPHOCYTES # BLD AUTO: 0.8 10*3/MM3 (ref 0.7–3.1)
LYMPHOCYTES NFR BLD AUTO: 16 % (ref 19.6–45.3)
LYMPHOCYTES NFR BLD AUTO: 17.8 % (ref 19.6–45.3)
MCH RBC QN AUTO: 22.1 PG (ref 26.6–33)
MCH RBC QN AUTO: 22.3 PG (ref 26.6–33)
MCHC RBC AUTO-ENTMCNC: 30.5 G/DL (ref 31.5–35.7)
MCHC RBC AUTO-ENTMCNC: 30.6 G/DL (ref 31.5–35.7)
MCV RBC AUTO: 72.2 FL (ref 79–97)
MCV RBC AUTO: 73 FL (ref 79–97)
MICROCYTES BLD QL: NORMAL
MONOCYTES # BLD AUTO: 0.4 10*3/MM3 (ref 0.1–0.9)
MONOCYTES # BLD AUTO: 0.6 10*3/MM3 (ref 0.1–0.9)
MONOCYTES NFR BLD AUTO: 10.3 % (ref 5–12)
MONOCYTES NFR BLD AUTO: 13.4 % (ref 5–12)
NEUTROPHILS # BLD AUTO: 2.7 10*3/MM3 (ref 1.7–7)
NEUTROPHILS # BLD AUTO: 2.9 10*3/MM3 (ref 1.7–7)
NEUTROPHILS NFR BLD AUTO: 63 % (ref 42.7–76)
NEUTROPHILS NFR BLD AUTO: 66.7 % (ref 42.7–76)
NRBC BLD AUTO-RTO: 0 /100 WBC (ref 0–0.2)
NRBC BLD AUTO-RTO: 0.2 /100 WBC (ref 0–0.2)
PLAT MORPH BLD: NORMAL
PLATELET # BLD AUTO: 182 10*3/MM3 (ref 140–450)
PLATELET # BLD AUTO: 184 10*3/MM3 (ref 140–450)
PMV BLD AUTO: 7.8 FL (ref 6–12)
PMV BLD AUTO: 8 FL (ref 6–12)
PROTHROMBIN TIME: 18.2 SECONDS (ref 19.4–28.5)
RBC # BLD AUTO: 4.16 10*6/MM3 (ref 4.14–5.8)
RBC # BLD AUTO: 4.2 10*6/MM3 (ref 4.14–5.8)
SARS-COV-2 RNA RESP QL NAA+PROBE: NOT DETECTED
WBC MORPH BLD: NORMAL
WBC NRBC COR # BLD: 4.3 10*3/MM3 (ref 3.4–10.8)
WBC NRBC COR # BLD: 4.4 10*3/MM3 (ref 3.4–10.8)

## 2020-05-27 PROCEDURE — 99232 SBSQ HOSP IP/OBS MODERATE 35: CPT | Performed by: INTERNAL MEDICINE

## 2020-05-27 PROCEDURE — 85610 PROTHROMBIN TIME: CPT | Performed by: HOSPITALIST

## 2020-05-27 PROCEDURE — 99233 SBSQ HOSP IP/OBS HIGH 50: CPT | Performed by: INTERNAL MEDICINE

## 2020-05-27 PROCEDURE — 97530 THERAPEUTIC ACTIVITIES: CPT

## 2020-05-27 PROCEDURE — 85025 COMPLETE CBC W/AUTO DIFF WBC: CPT | Performed by: INTERNAL MEDICINE

## 2020-05-27 PROCEDURE — 87635 SARS-COV-2 COVID-19 AMP PRB: CPT | Performed by: INTERNAL MEDICINE

## 2020-05-27 PROCEDURE — 85007 BL SMEAR W/DIFF WBC COUNT: CPT | Performed by: INTERNAL MEDICINE

## 2020-05-27 PROCEDURE — 97116 GAIT TRAINING THERAPY: CPT

## 2020-05-27 RX ADMIN — OXYBUTYNIN CHLORIDE 5 MG: 5 TABLET ORAL at 08:26

## 2020-05-27 RX ADMIN — Medication 1 PACKET: at 08:31

## 2020-05-27 RX ADMIN — ESCITALOPRAM OXALATE 10 MG: 10 TABLET ORAL at 08:26

## 2020-05-27 RX ADMIN — NITROFURANTOIN (MONOHYDRATE/MACROCRYSTALS) 100 MG: 75; 25 CAPSULE ORAL at 20:52

## 2020-05-27 RX ADMIN — POLYETHYLENE GLYCOL 3350 17 G: 17 POWDER, FOR SOLUTION ORAL at 08:26

## 2020-05-27 RX ADMIN — BACLOFEN 10 MG: 10 TABLET ORAL at 08:26

## 2020-05-27 RX ADMIN — Medication 10 ML: at 08:26

## 2020-05-27 RX ADMIN — POTASSIUM CHLORIDE 20 MEQ: 1500 TABLET, EXTENDED RELEASE ORAL at 21:58

## 2020-05-27 RX ADMIN — PRAMIPEXOLE DIHYDROCHLORIDE 0.25 MG: 0.25 TABLET ORAL at 08:25

## 2020-05-27 RX ADMIN — ROSUVASTATIN CALCIUM 10 MG: 10 TABLET, FILM COATED ORAL at 20:53

## 2020-05-27 RX ADMIN — GABAPENTIN 300 MG: 300 CAPSULE ORAL at 20:52

## 2020-05-27 RX ADMIN — CARBIDOPA AND LEVODOPA 1.5 TABLET: 25; 100 TABLET ORAL at 15:05

## 2020-05-27 RX ADMIN — PANTOPRAZOLE SODIUM 40 MG: 40 TABLET, DELAYED RELEASE ORAL at 06:03

## 2020-05-27 RX ADMIN — CARBIDOPA AND LEVODOPA 1.5 TABLET: 25; 100 TABLET ORAL at 20:52

## 2020-05-27 RX ADMIN — POTASSIUM CHLORIDE 20 MEQ: 1500 TABLET, EXTENDED RELEASE ORAL at 15:05

## 2020-05-27 RX ADMIN — Medication 10 ML: at 21:57

## 2020-05-27 RX ADMIN — FUROSEMIDE 40 MG: 40 TABLET ORAL at 08:25

## 2020-05-27 RX ADMIN — NITROFURANTOIN (MONOHYDRATE/MACROCRYSTALS) 100 MG: 75; 25 CAPSULE ORAL at 08:25

## 2020-05-27 RX ADMIN — POTASSIUM CHLORIDE 20 MEQ: 1500 TABLET, EXTENDED RELEASE ORAL at 08:26

## 2020-05-27 RX ADMIN — CARBIDOPA AND LEVODOPA 1.5 TABLET: 25; 100 TABLET ORAL at 06:03

## 2020-05-28 ENCOUNTER — APPOINTMENT (OUTPATIENT)
Dept: CARDIOLOGY | Facility: HOSPITAL | Age: 85
End: 2020-05-28

## 2020-05-28 ENCOUNTER — ANESTHESIA (OUTPATIENT)
Dept: CARDIOLOGY | Facility: HOSPITAL | Age: 85
End: 2020-05-28

## 2020-05-28 LAB
ACT BLD: 180 SECONDS (ref 89–137)
ALBUMIN SERPL-MCNC: 3.5 G/DL (ref 3.5–5.2)
ALBUMIN/GLOB SERPL: 1.2 G/DL
ALP SERPL-CCNC: 145 U/L (ref 39–117)
ALT SERPL W P-5'-P-CCNC: 11 U/L (ref 1–41)
ANION GAP SERPL CALCULATED.3IONS-SCNC: 11 MMOL/L (ref 5–15)
AST SERPL-CCNC: 38 U/L (ref 1–40)
BASOPHILS # BLD AUTO: 0.1 10*3/MM3 (ref 0–0.2)
BASOPHILS NFR BLD AUTO: 1.2 % (ref 0–1.5)
BILIRUB SERPL-MCNC: 0.9 MG/DL (ref 0.2–1.2)
BUN BLD-MCNC: 17 MG/DL (ref 8–23)
BUN/CREAT SERPL: 19.3 (ref 7–25)
CALCIUM SPEC-SCNC: 8.7 MG/DL (ref 8.6–10.5)
CHLORIDE SERPL-SCNC: 103 MMOL/L (ref 98–107)
CO2 SERPL-SCNC: 25 MMOL/L (ref 22–29)
CREAT BLD-MCNC: 0.88 MG/DL (ref 0.76–1.27)
DEPRECATED RDW RBC AUTO: 70 FL (ref 37–54)
EOSINOPHIL # BLD AUTO: 0.2 10*3/MM3 (ref 0–0.4)
EOSINOPHIL NFR BLD AUTO: 4.7 % (ref 0.3–6.2)
ERYTHROCYTE [DISTWIDTH] IN BLOOD BY AUTOMATED COUNT: 28.7 % (ref 12.3–15.4)
GFR SERPL CREATININE-BSD FRML MDRD: 83 ML/MIN/1.73
GLOBULIN UR ELPH-MCNC: 2.9 GM/DL
GLUCOSE BLD-MCNC: 115 MG/DL (ref 65–99)
HCT VFR BLD AUTO: 29.5 % (ref 37.5–51)
HGB BLD-MCNC: 9 G/DL (ref 13–17.7)
INR PPP: 1.83 (ref 2–3)
LYMPHOCYTES # BLD AUTO: 0.7 10*3/MM3 (ref 0.7–3.1)
LYMPHOCYTES NFR BLD AUTO: 16.9 % (ref 19.6–45.3)
MCH RBC QN AUTO: 22.3 PG (ref 26.6–33)
MCHC RBC AUTO-ENTMCNC: 30.6 G/DL (ref 31.5–35.7)
MCV RBC AUTO: 72.9 FL (ref 79–97)
MONOCYTES # BLD AUTO: 0.4 10*3/MM3 (ref 0.1–0.9)
MONOCYTES NFR BLD AUTO: 10.2 % (ref 5–12)
NEUTROPHILS # BLD AUTO: 2.9 10*3/MM3 (ref 1.7–7)
NEUTROPHILS NFR BLD AUTO: 67 % (ref 42.7–76)
PLATELET # BLD AUTO: 173 10*3/MM3 (ref 140–450)
PMV BLD AUTO: 8.1 FL (ref 6–12)
POTASSIUM BLD-SCNC: 4.2 MMOL/L (ref 3.5–5.2)
PROT SERPL-MCNC: 6.4 G/DL (ref 6–8.5)
PROTHROMBIN TIME: 17.7 SECONDS (ref 19.4–28.5)
RBC # BLD AUTO: 4.04 10*6/MM3 (ref 4.14–5.8)
SODIUM BLD-SCNC: 139 MMOL/L (ref 136–145)
WBC NRBC COR # BLD: 4.4 10*3/MM3 (ref 3.4–10.8)

## 2020-05-28 PROCEDURE — 25010000002 FENTANYL CITRATE (PF) 100 MCG/2ML SOLUTION: Performed by: NURSE ANESTHETIST, CERTIFIED REGISTERED

## 2020-05-28 PROCEDURE — 99233 SBSQ HOSP IP/OBS HIGH 50: CPT | Performed by: INTERNAL MEDICINE

## 2020-05-28 PROCEDURE — 93320 DOPPLER ECHO COMPLETE: CPT

## 2020-05-28 PROCEDURE — 25010000002 HEPARIN (PORCINE) PER 1000 UNITS: Performed by: NURSE ANESTHETIST, CERTIFIED REGISTERED

## 2020-05-28 PROCEDURE — 99232 SBSQ HOSP IP/OBS MODERATE 35: CPT | Performed by: INTERNAL MEDICINE

## 2020-05-28 PROCEDURE — 85610 PROTHROMBIN TIME: CPT | Performed by: HOSPITALIST

## 2020-05-28 PROCEDURE — 93452 LEFT HRT CATH W/VENTRCLGRPHY: CPT | Performed by: INTERNAL MEDICINE

## 2020-05-28 PROCEDURE — 85025 COMPLETE CBC W/AUTO DIFF WBC: CPT | Performed by: INTERNAL MEDICINE

## 2020-05-28 PROCEDURE — 80053 COMPREHEN METABOLIC PANEL: CPT | Performed by: INTERNAL MEDICINE

## 2020-05-28 PROCEDURE — 93320 DOPPLER ECHO COMPLETE: CPT | Performed by: INTERNAL MEDICINE

## 2020-05-28 PROCEDURE — 25010000002 DEXAMETHASONE PER 1 MG: Performed by: NURSE ANESTHETIST, CERTIFIED REGISTERED

## 2020-05-28 PROCEDURE — 93462 L HRT CATH TRNSPTL PUNCTURE: CPT

## 2020-05-28 PROCEDURE — 85347 COAGULATION TIME ACTIVATED: CPT

## 2020-05-28 PROCEDURE — 93312 ECHO TRANSESOPHAGEAL: CPT | Performed by: INTERNAL MEDICINE

## 2020-05-28 PROCEDURE — C1894 INTRO/SHEATH, NON-LASER: HCPCS | Performed by: INTERNAL MEDICINE

## 2020-05-28 PROCEDURE — 0 IOPAMIDOL PER 1 ML: Performed by: INTERNAL MEDICINE

## 2020-05-28 PROCEDURE — 25010000002 PROTAMINE SULFATE PER 10 MG: Performed by: NURSE ANESTHETIST, CERTIFIED REGISTERED

## 2020-05-28 PROCEDURE — 25010000002 PROPOFOL 10 MG/ML EMULSION: Performed by: NURSE ANESTHETIST, CERTIFIED REGISTERED

## 2020-05-28 PROCEDURE — 93325 DOPPLER ECHO COLOR FLOW MAPG: CPT

## 2020-05-28 PROCEDURE — C1893 INTRO/SHEATH, FIXED,NON-PEEL: HCPCS | Performed by: INTERNAL MEDICINE

## 2020-05-28 PROCEDURE — 25010000002 ONDANSETRON PER 1 MG: Performed by: NURSE ANESTHETIST, CERTIFIED REGISTERED

## 2020-05-28 PROCEDURE — 93325 DOPPLER ECHO COLOR FLOW MAPG: CPT | Performed by: INTERNAL MEDICINE

## 2020-05-28 PROCEDURE — 93312 ECHO TRANSESOPHAGEAL: CPT

## 2020-05-28 RX ORDER — HYDROCODONE BITARTRATE AND ACETAMINOPHEN 5; 325 MG/1; MG/1
1 TABLET ORAL EVERY 4 HOURS PRN
Status: DISCONTINUED | OUTPATIENT
Start: 2020-05-28 | End: 2020-05-29 | Stop reason: HOSPADM

## 2020-05-28 RX ORDER — SODIUM CHLORIDE 0.9 % (FLUSH) 0.9 %
3 SYRINGE (ML) INJECTION EVERY 12 HOURS SCHEDULED
Status: DISCONTINUED | OUTPATIENT
Start: 2020-05-28 | End: 2020-05-29 | Stop reason: HOSPADM

## 2020-05-28 RX ORDER — IPRATROPIUM BROMIDE AND ALBUTEROL SULFATE 2.5; .5 MG/3ML; MG/3ML
3 SOLUTION RESPIRATORY (INHALATION) ONCE AS NEEDED
Status: DISCONTINUED | OUTPATIENT
Start: 2020-05-28 | End: 2020-05-28

## 2020-05-28 RX ORDER — HYDRALAZINE HYDROCHLORIDE 20 MG/ML
5 INJECTION INTRAMUSCULAR; INTRAVENOUS
Status: DISCONTINUED | OUTPATIENT
Start: 2020-05-28 | End: 2020-05-28

## 2020-05-28 RX ORDER — DEXAMETHASONE SODIUM PHOSPHATE 4 MG/ML
INJECTION, SOLUTION INTRA-ARTICULAR; INTRALESIONAL; INTRAMUSCULAR; INTRAVENOUS; SOFT TISSUE AS NEEDED
Status: DISCONTINUED | OUTPATIENT
Start: 2020-05-28 | End: 2020-05-28 | Stop reason: SURG

## 2020-05-28 RX ORDER — ACETAMINOPHEN 650 MG/1
650 SUPPOSITORY RECTAL ONCE AS NEEDED
Status: DISCONTINUED | OUTPATIENT
Start: 2020-05-28 | End: 2020-05-28

## 2020-05-28 RX ORDER — ONDANSETRON 2 MG/ML
4 INJECTION INTRAMUSCULAR; INTRAVENOUS EVERY 6 HOURS PRN
Status: DISCONTINUED | OUTPATIENT
Start: 2020-05-28 | End: 2020-05-29 | Stop reason: HOSPADM

## 2020-05-28 RX ORDER — MORPHINE SULFATE 4 MG/ML
1 INJECTION, SOLUTION INTRAMUSCULAR; INTRAVENOUS EVERY 4 HOURS PRN
Status: ACTIVE | OUTPATIENT
Start: 2020-05-28 | End: 2020-05-29

## 2020-05-28 RX ORDER — NEOSTIGMINE METHYLSULFATE 5 MG/5 ML
SYRINGE (ML) INTRAVENOUS AS NEEDED
Status: DISCONTINUED | OUTPATIENT
Start: 2020-05-28 | End: 2020-05-28 | Stop reason: SURG

## 2020-05-28 RX ORDER — PHENYLEPHRINE HCL IN 0.9% NACL 0.5 MG/5ML
SYRINGE (ML) INTRAVENOUS AS NEEDED
Status: DISCONTINUED | OUTPATIENT
Start: 2020-05-28 | End: 2020-05-28 | Stop reason: SURG

## 2020-05-28 RX ORDER — SODIUM CHLORIDE 0.9 % (FLUSH) 0.9 %
10 SYRINGE (ML) INJECTION AS NEEDED
Status: DISCONTINUED | OUTPATIENT
Start: 2020-05-28 | End: 2020-05-29 | Stop reason: HOSPADM

## 2020-05-28 RX ORDER — EPHEDRINE SULFATE/0.9% NACL/PF 25 MG/5 ML
SYRINGE (ML) INTRAVENOUS AS NEEDED
Status: DISCONTINUED | OUTPATIENT
Start: 2020-05-28 | End: 2020-05-28 | Stop reason: SURG

## 2020-05-28 RX ORDER — NALOXONE HCL 0.4 MG/ML
0.4 VIAL (ML) INJECTION
Status: DISCONTINUED | OUTPATIENT
Start: 2020-05-28 | End: 2020-05-29 | Stop reason: HOSPADM

## 2020-05-28 RX ORDER — MORPHINE SULFATE 4 MG/ML
2 INJECTION, SOLUTION INTRAMUSCULAR; INTRAVENOUS
Status: DISCONTINUED | OUTPATIENT
Start: 2020-05-28 | End: 2020-05-28

## 2020-05-28 RX ORDER — SODIUM CHLORIDE 9 MG/ML
INJECTION, SOLUTION INTRAVENOUS CONTINUOUS PRN
Status: DISCONTINUED | OUTPATIENT
Start: 2020-05-28 | End: 2020-05-28 | Stop reason: SURG

## 2020-05-28 RX ORDER — GLYCOPYRROLATE 1 MG/5 ML
SYRINGE (ML) INTRAVENOUS AS NEEDED
Status: DISCONTINUED | OUTPATIENT
Start: 2020-05-28 | End: 2020-05-28 | Stop reason: SURG

## 2020-05-28 RX ORDER — PROTAMINE SULFATE 10 MG/ML
INJECTION, SOLUTION INTRAVENOUS AS NEEDED
Status: DISCONTINUED | OUTPATIENT
Start: 2020-05-28 | End: 2020-05-28 | Stop reason: SURG

## 2020-05-28 RX ORDER — LABETALOL HYDROCHLORIDE 5 MG/ML
5 INJECTION, SOLUTION INTRAVENOUS
Status: DISCONTINUED | OUTPATIENT
Start: 2020-05-28 | End: 2020-05-28

## 2020-05-28 RX ORDER — EPHEDRINE SULFATE 50 MG/ML
5 INJECTION, SOLUTION INTRAVENOUS ONCE AS NEEDED
Status: DISCONTINUED | OUTPATIENT
Start: 2020-05-28 | End: 2020-05-28

## 2020-05-28 RX ORDER — PHENYLEPHRINE HCL IN 0.9% NACL 0.5 MG/5ML
.5-3 SYRINGE (ML) INTRAVENOUS
Status: DISCONTINUED | OUTPATIENT
Start: 2020-05-28 | End: 2020-05-28

## 2020-05-28 RX ORDER — PROMETHAZINE HYDROCHLORIDE 25 MG/ML
6.25 INJECTION, SOLUTION INTRAMUSCULAR; INTRAVENOUS ONCE AS NEEDED
Status: DISCONTINUED | OUTPATIENT
Start: 2020-05-28 | End: 2020-05-28

## 2020-05-28 RX ORDER — ONDANSETRON 2 MG/ML
4 INJECTION INTRAMUSCULAR; INTRAVENOUS ONCE AS NEEDED
Status: DISCONTINUED | OUTPATIENT
Start: 2020-05-28 | End: 2020-05-28

## 2020-05-28 RX ORDER — LIDOCAINE HYDROCHLORIDE 20 MG/ML
INJECTION, SOLUTION EPIDURAL; INFILTRATION; INTRACAUDAL; PERINEURAL AS NEEDED
Status: DISCONTINUED | OUTPATIENT
Start: 2020-05-28 | End: 2020-05-28 | Stop reason: SURG

## 2020-05-28 RX ORDER — PROMETHAZINE HYDROCHLORIDE 25 MG/1
25 SUPPOSITORY RECTAL ONCE AS NEEDED
Status: DISCONTINUED | OUTPATIENT
Start: 2020-05-28 | End: 2020-05-28

## 2020-05-28 RX ORDER — PROPOFOL 10 MG/ML
VIAL (ML) INTRAVENOUS AS NEEDED
Status: DISCONTINUED | OUTPATIENT
Start: 2020-05-28 | End: 2020-05-28 | Stop reason: SURG

## 2020-05-28 RX ORDER — LIDOCAINE HYDROCHLORIDE 20 MG/ML
INJECTION, SOLUTION INFILTRATION; PERINEURAL AS NEEDED
Status: DISCONTINUED | OUTPATIENT
Start: 2020-05-28 | End: 2020-05-28 | Stop reason: HOSPADM

## 2020-05-28 RX ORDER — PROMETHAZINE HYDROCHLORIDE 25 MG/1
25 TABLET ORAL ONCE AS NEEDED
Status: DISCONTINUED | OUTPATIENT
Start: 2020-05-28 | End: 2020-05-28

## 2020-05-28 RX ORDER — ROCURONIUM BROMIDE 10 MG/ML
INJECTION, SOLUTION INTRAVENOUS AS NEEDED
Status: DISCONTINUED | OUTPATIENT
Start: 2020-05-28 | End: 2020-05-28 | Stop reason: SURG

## 2020-05-28 RX ORDER — FENTANYL CITRATE 50 UG/ML
INJECTION, SOLUTION INTRAMUSCULAR; INTRAVENOUS AS NEEDED
Status: DISCONTINUED | OUTPATIENT
Start: 2020-05-28 | End: 2020-05-28 | Stop reason: SURG

## 2020-05-28 RX ORDER — ONDANSETRON 2 MG/ML
INJECTION INTRAMUSCULAR; INTRAVENOUS AS NEEDED
Status: DISCONTINUED | OUTPATIENT
Start: 2020-05-28 | End: 2020-05-28 | Stop reason: SURG

## 2020-05-28 RX ORDER — HEPARIN SODIUM 1000 [USP'U]/ML
INJECTION, SOLUTION INTRAVENOUS; SUBCUTANEOUS AS NEEDED
Status: DISCONTINUED | OUTPATIENT
Start: 2020-05-28 | End: 2020-05-28 | Stop reason: SURG

## 2020-05-28 RX ORDER — WARFARIN SODIUM 2 MG/1
2 TABLET ORAL
Status: DISCONTINUED | OUTPATIENT
Start: 2020-05-28 | End: 2020-05-29 | Stop reason: HOSPADM

## 2020-05-28 RX ORDER — ACETAMINOPHEN 325 MG/1
650 TABLET ORAL ONCE AS NEEDED
Status: DISCONTINUED | OUTPATIENT
Start: 2020-05-28 | End: 2020-05-28

## 2020-05-28 RX ADMIN — PROPOFOL 40 MG: 10 INJECTION, EMULSION INTRAVENOUS at 09:49

## 2020-05-28 RX ADMIN — SODIUM CHLORIDE: 0.9 INJECTION, SOLUTION INTRAVENOUS at 09:46

## 2020-05-28 RX ADMIN — ROSUVASTATIN CALCIUM 10 MG: 10 TABLET, FILM COATED ORAL at 20:50

## 2020-05-28 RX ADMIN — Medication 5 MG: at 10:11

## 2020-05-28 RX ADMIN — DEXAMETHASONE SODIUM PHOSPHATE 4 MG: 4 INJECTION, SOLUTION INTRAMUSCULAR; INTRAVENOUS at 10:03

## 2020-05-28 RX ADMIN — ONDANSETRON 4 MG: 2 INJECTION INTRAMUSCULAR; INTRAVENOUS at 11:01

## 2020-05-28 RX ADMIN — POTASSIUM CHLORIDE 20 MEQ: 1500 TABLET, EXTENDED RELEASE ORAL at 12:25

## 2020-05-28 RX ADMIN — ESCITALOPRAM OXALATE 10 MG: 10 TABLET ORAL at 12:25

## 2020-05-28 RX ADMIN — FAMOTIDINE 20 MG: 10 INJECTION INTRAVENOUS at 10:03

## 2020-05-28 RX ADMIN — LIDOCAINE HYDROCHLORIDE 25 MG: 20 INJECTION, SOLUTION EPIDURAL; INFILTRATION; INTRACAUDAL; PERINEURAL at 09:49

## 2020-05-28 RX ADMIN — ROCURONIUM BROMIDE 10 MG: 10 INJECTION, SOLUTION INTRAVENOUS at 10:34

## 2020-05-28 RX ADMIN — Medication 3 ML: at 12:27

## 2020-05-28 RX ADMIN — Medication 0.6 MG: at 11:00

## 2020-05-28 RX ADMIN — Medication 4 MG: at 11:00

## 2020-05-28 RX ADMIN — PRAMIPEXOLE DIHYDROCHLORIDE 0.25 MG: 0.25 TABLET ORAL at 12:25

## 2020-05-28 RX ADMIN — POTASSIUM CHLORIDE 20 MEQ: 1500 TABLET, EXTENDED RELEASE ORAL at 17:09

## 2020-05-28 RX ADMIN — PHENYLEPHRINE HYDROCHLORIDE 100 MCG: 10 INJECTION INTRAVENOUS at 10:55

## 2020-05-28 RX ADMIN — GABAPENTIN 300 MG: 300 CAPSULE ORAL at 20:50

## 2020-05-28 RX ADMIN — POTASSIUM CHLORIDE 20 MEQ: 1500 TABLET, EXTENDED RELEASE ORAL at 20:51

## 2020-05-28 RX ADMIN — PANTOPRAZOLE SODIUM 40 MG: 40 TABLET, DELAYED RELEASE ORAL at 06:07

## 2020-05-28 RX ADMIN — CARBIDOPA AND LEVODOPA 1.5 TABLET: 25; 100 TABLET ORAL at 17:08

## 2020-05-28 RX ADMIN — PHENYLEPHRINE HYDROCHLORIDE 100 MCG: 10 INJECTION INTRAVENOUS at 10:11

## 2020-05-28 RX ADMIN — ROCURONIUM BROMIDE 40 MG: 10 INJECTION, SOLUTION INTRAVENOUS at 09:50

## 2020-05-28 RX ADMIN — FUROSEMIDE 40 MG: 40 TABLET ORAL at 12:25

## 2020-05-28 RX ADMIN — NITROFURANTOIN (MONOHYDRATE/MACROCRYSTALS) 100 MG: 75; 25 CAPSULE ORAL at 12:26

## 2020-05-28 RX ADMIN — OXYBUTYNIN CHLORIDE 5 MG: 5 TABLET ORAL at 12:25

## 2020-05-28 RX ADMIN — PROTAMINE SULFATE 20 MG: 10 INJECTION, SOLUTION INTRAVENOUS at 11:00

## 2020-05-28 RX ADMIN — HEPARIN SODIUM 5000 UNITS: 1000 INJECTION INTRAVENOUS; SUBCUTANEOUS at 10:51

## 2020-05-28 RX ADMIN — CARBIDOPA AND LEVODOPA 1.5 TABLET: 25; 100 TABLET ORAL at 06:07

## 2020-05-28 RX ADMIN — Medication 10 ML: at 20:52

## 2020-05-28 RX ADMIN — Medication 5 MG: at 10:55

## 2020-05-28 RX ADMIN — CARBIDOPA AND LEVODOPA 1.5 TABLET: 25; 100 TABLET ORAL at 21:02

## 2020-05-28 RX ADMIN — FENTANYL CITRATE 25 MCG: 50 INJECTION, SOLUTION INTRAMUSCULAR; INTRAVENOUS at 10:00

## 2020-05-28 RX ADMIN — BACLOFEN 10 MG: 10 TABLET ORAL at 12:25

## 2020-05-28 RX ADMIN — Medication 1 PACKET: at 20:51

## 2020-05-28 RX ADMIN — WARFARIN 2 MG: 2 TABLET ORAL at 17:08

## 2020-05-28 NOTE — ANESTHESIA POSTPROCEDURE EVALUATION
Patient: Rafael Ivey    Procedure Summary     Date:  05/28/20 Room / Location:  [place] CATH LAB 3 [facility] CATH INVASIVE LOCATION    Anesthesia Start:  0943 Anesthesia Stop:  1112    Procedures:       Atrial Appendage Occlusion (N/A )      Atrial Appendage Occlusion (N/A ) Diagnosis:       Iron deficiency anemia due to chronic blood loss      Atrial fibrillation, chronic      (chronic af  GI bleed)    Provider:  iJm Walker MD; Lashaun So MD Provider:  Justin Barnes MD    Anesthesia Type:  general ASA Status:  4          Anesthesia Type: general    Vitals  Vitals Value Taken Time   /68 5/28/2020 12:06 PM   Temp 97.2 °F (36.2 °C) 5/28/2020 12:06 PM   Pulse 60 5/28/2020 12:09 PM   Resp 20 5/28/2020 12:06 PM   SpO2 93 % 5/28/2020 12:09 PM   Vitals shown include unvalidated device data.        Post Anesthesia Care and Evaluation    Patient location during evaluation: PACU  Patient participation: complete - patient participated  Level of consciousness: awake  Pain score: 0 (See nurse's notes for pain score)  Pain management: adequate  Airway patency: patent  Anesthetic complications: No anesthetic complications  PONV Status: none  Cardiovascular status: acceptable  Respiratory status: acceptable  Hydration status: acceptable    Comments: Patient seen and examined postoperatively; vital signs stable; SpO2 greater than or equal to 90%; cardiopulmonary status stable; nausea/vomiting adequately controlled; pain adequately controlled; no apparent anesthesia complications; patient discharged from anesthesia care when discharge criteria were met

## 2020-05-28 NOTE — ANESTHESIA PROCEDURE NOTES
Airway  Urgency: elective    Date/Time: 5/28/2020 9:51 AM  Airway not difficult    General Information and Staff    Patient location during procedure: OR    Indications and Patient Condition  Indications for airway management: airway protection    Preoxygenated: yes  MILS maintained throughout  Mask difficulty assessment: 1 - vent by mask    Final Airway Details  Final airway type: endotracheal airway      Successful airway: ETT  Cuffed: yes   Successful intubation technique: direct laryngoscopy  Endotracheal tube insertion site: oral  Blade: Krystle  Blade size: 3  ETT size (mm): 7.0  Cormack-Lehane Classification: grade I - full view of glottis  Placement verified by: chest auscultation and capnometry   Measured from: lips  ETT/EBT  to lips (cm): 22  Number of attempts at approach: 1  Assessment: lips, teeth, and gum same as pre-op and atraumatic intubation

## 2020-05-28 NOTE — ANESTHESIA PREPROCEDURE EVALUATION
Anesthesia Evaluation     Patient summary reviewed and Nursing notes reviewed   NPO Solid Status: > 8 hours  NPO Liquid Status: > 8 hours           Airway   Mallampati: I  TM distance: >3 FB  Neck ROM: full  No difficulty expected  Dental - normal exam     Pulmonary - normal exam   (+) sleep apnea,   Cardiovascular - normal exam    (+) hypertension, valvular problems/murmurs AS, dysrhythmias Atrial Fib, CHF , hyperlipidemia,       Neuro/Psych  (+) psychiatric history,     GI/Hepatic/Renal/Endo    (+) obesity,       Musculoskeletal (-) negative ROS    Abdominal  - normal exam    Bowel sounds: normal.   Substance History - negative use     OB/GYN negative ob/gyn ROS         Other                        Anesthesia Plan    ASA 4     general     intravenous induction     Anesthetic plan, all risks, benefits, and alternatives have been provided, discussed and informed consent has been obtained with: patient.    Plan discussed with CRNA.

## 2020-05-29 VITALS
HEIGHT: 72 IN | OXYGEN SATURATION: 95 % | DIASTOLIC BLOOD PRESSURE: 47 MMHG | WEIGHT: 246.47 LBS | SYSTOLIC BLOOD PRESSURE: 102 MMHG | HEART RATE: 55 BPM | TEMPERATURE: 97.5 F | RESPIRATION RATE: 16 BRPM | BODY MASS INDEX: 33.38 KG/M2

## 2020-05-29 LAB
ANION GAP SERPL CALCULATED.3IONS-SCNC: 8 MMOL/L (ref 5–15)
BASOPHILS # BLD AUTO: 0.1 10*3/MM3 (ref 0–0.2)
BASOPHILS NFR BLD AUTO: 1.2 % (ref 0–1.5)
BUN BLD-MCNC: 16 MG/DL (ref 8–23)
BUN/CREAT SERPL: 18 (ref 7–25)
CALCIUM SPEC-SCNC: 8.7 MG/DL (ref 8.6–10.5)
CHLORIDE SERPL-SCNC: 107 MMOL/L (ref 98–107)
CO2 SERPL-SCNC: 26 MMOL/L (ref 22–29)
CREAT BLD-MCNC: 0.89 MG/DL (ref 0.76–1.27)
DEPRECATED RDW RBC AUTO: 70.4 FL (ref 37–54)
EOSINOPHIL # BLD AUTO: 0 10*3/MM3 (ref 0–0.4)
EOSINOPHIL NFR BLD AUTO: 0.3 % (ref 0.3–6.2)
ERYTHROCYTE [DISTWIDTH] IN BLOOD BY AUTOMATED COUNT: 29 % (ref 12.3–15.4)
GFR SERPL CREATININE-BSD FRML MDRD: 81 ML/MIN/1.73
GLUCOSE BLD-MCNC: 114 MG/DL (ref 65–99)
HCT VFR BLD AUTO: 29.1 % (ref 37.5–51)
HGB BLD-MCNC: 9.2 G/DL (ref 13–17.7)
INR PPP: 1.7 (ref 2–3)
LYMPHOCYTES # BLD AUTO: 0.6 10*3/MM3 (ref 0.7–3.1)
LYMPHOCYTES NFR BLD AUTO: 12.2 % (ref 19.6–45.3)
MCH RBC QN AUTO: 23 PG (ref 26.6–33)
MCHC RBC AUTO-ENTMCNC: 31.7 G/DL (ref 31.5–35.7)
MCV RBC AUTO: 72.7 FL (ref 79–97)
MONOCYTES # BLD AUTO: 0.4 10*3/MM3 (ref 0.1–0.9)
MONOCYTES NFR BLD AUTO: 7.9 % (ref 5–12)
NEUTROPHILS # BLD AUTO: 3.7 10*3/MM3 (ref 1.7–7)
NEUTROPHILS NFR BLD AUTO: 78.4 % (ref 42.7–76)
NRBC BLD AUTO-RTO: 0.1 /100 WBC (ref 0–0.2)
PLATELET # BLD AUTO: 190 10*3/MM3 (ref 140–450)
PMV BLD AUTO: 8.2 FL (ref 6–12)
POTASSIUM BLD-SCNC: 4.9 MMOL/L (ref 3.5–5.2)
PROTHROMBIN TIME: 16.5 SECONDS (ref 19.4–28.5)
RBC # BLD AUTO: 4.01 10*6/MM3 (ref 4.14–5.8)
SODIUM BLD-SCNC: 141 MMOL/L (ref 136–145)
WBC NRBC COR # BLD: 4.7 10*3/MM3 (ref 3.4–10.8)

## 2020-05-29 PROCEDURE — 80048 BASIC METABOLIC PNL TOTAL CA: CPT | Performed by: INTERNAL MEDICINE

## 2020-05-29 PROCEDURE — 99232 SBSQ HOSP IP/OBS MODERATE 35: CPT | Performed by: INTERNAL MEDICINE

## 2020-05-29 PROCEDURE — 85610 PROTHROMBIN TIME: CPT | Performed by: INTERNAL MEDICINE

## 2020-05-29 PROCEDURE — 99239 HOSP IP/OBS DSCHRG MGMT >30: CPT | Performed by: INTERNAL MEDICINE

## 2020-05-29 PROCEDURE — 99233 SBSQ HOSP IP/OBS HIGH 50: CPT | Performed by: INTERNAL MEDICINE

## 2020-05-29 PROCEDURE — 85025 COMPLETE CBC W/AUTO DIFF WBC: CPT | Performed by: INTERNAL MEDICINE

## 2020-05-29 RX ADMIN — CARBIDOPA AND LEVODOPA 1.5 TABLET: 25; 100 TABLET ORAL at 14:10

## 2020-05-29 RX ADMIN — Medication 3 ML: at 08:11

## 2020-05-29 RX ADMIN — Medication 10 ML: at 08:10

## 2020-05-29 RX ADMIN — CARBIDOPA AND LEVODOPA 1.5 TABLET: 25; 100 TABLET ORAL at 05:48

## 2020-05-29 RX ADMIN — PANTOPRAZOLE SODIUM 40 MG: 40 TABLET, DELAYED RELEASE ORAL at 05:48

## 2020-05-29 RX ADMIN — POTASSIUM CHLORIDE 20 MEQ: 1500 TABLET, EXTENDED RELEASE ORAL at 08:10

## 2020-05-29 RX ADMIN — BACLOFEN 10 MG: 10 TABLET ORAL at 08:10

## 2020-05-29 RX ADMIN — FUROSEMIDE 40 MG: 40 TABLET ORAL at 08:10

## 2020-05-29 RX ADMIN — Medication 1 PACKET: at 08:15

## 2020-05-29 RX ADMIN — PRAMIPEXOLE DIHYDROCHLORIDE 0.25 MG: 0.25 TABLET ORAL at 08:10

## 2020-05-29 RX ADMIN — OXYBUTYNIN CHLORIDE 5 MG: 5 TABLET ORAL at 08:10

## 2020-05-29 RX ADMIN — ESCITALOPRAM OXALATE 10 MG: 10 TABLET ORAL at 08:10

## 2020-05-29 RX ADMIN — POLYETHYLENE GLYCOL 3350 17 G: 17 POWDER, FOR SOLUTION ORAL at 08:10

## 2020-06-12 ENCOUNTER — CLINICAL SUPPORT NO REQUIREMENTS (OUTPATIENT)
Dept: CARDIOLOGY | Facility: CLINIC | Age: 85
End: 2020-06-12

## 2020-06-12 ENCOUNTER — HOSPITAL ENCOUNTER (INPATIENT)
Facility: HOSPITAL | Age: 85
LOS: 1 days | Discharge: NURSING, REHAB OR LONG-TERM CARE | End: 2020-06-16
Attending: HOSPITALIST | Admitting: INTERNAL MEDICINE

## 2020-06-12 ENCOUNTER — OFFICE VISIT (OUTPATIENT)
Dept: CARDIOLOGY | Facility: CLINIC | Age: 85
End: 2020-06-12

## 2020-06-12 ENCOUNTER — APPOINTMENT (OUTPATIENT)
Dept: GENERAL RADIOLOGY | Facility: HOSPITAL | Age: 85
End: 2020-06-12

## 2020-06-12 VITALS
WEIGHT: 250 LBS | SYSTOLIC BLOOD PRESSURE: 137 MMHG | HEART RATE: 71 BPM | DIASTOLIC BLOOD PRESSURE: 78 MMHG | BODY MASS INDEX: 33.91 KG/M2

## 2020-06-12 DIAGNOSIS — I49.5 SICK SINUS SYNDROME (HCC): Primary | Chronic | ICD-10-CM

## 2020-06-12 DIAGNOSIS — R60.0 BILATERAL LEG EDEMA: ICD-10-CM

## 2020-06-12 DIAGNOSIS — I49.5 SICK SINUS SYNDROME (HCC): ICD-10-CM

## 2020-06-12 DIAGNOSIS — G20 PARKINSON'S DISEASE (HCC): ICD-10-CM

## 2020-06-12 DIAGNOSIS — T45.515A WARFARIN-INDUCED COAGULOPATHY (HCC): Primary | ICD-10-CM

## 2020-06-12 DIAGNOSIS — Z95.0 PACEMAKER: ICD-10-CM

## 2020-06-12 DIAGNOSIS — D68.32 WARFARIN-INDUCED COAGULOPATHY (HCC): Primary | ICD-10-CM

## 2020-06-12 DIAGNOSIS — D50.0 IRON DEFICIENCY ANEMIA DUE TO CHRONIC BLOOD LOSS: ICD-10-CM

## 2020-06-12 DIAGNOSIS — I48.20 ATRIAL FIBRILLATION, CHRONIC (HCC): Chronic | ICD-10-CM

## 2020-06-12 PROBLEM — I50.43 ACUTE ON CHRONIC COMBINED SYSTOLIC AND DIASTOLIC CHF (CONGESTIVE HEART FAILURE): Status: ACTIVE | Noted: 2020-06-12

## 2020-06-12 PROBLEM — F32.A DEPRESSION: Chronic | Status: ACTIVE | Noted: 2020-06-12

## 2020-06-12 PROBLEM — I50.9 CHF EXACERBATION (HCC): Status: ACTIVE | Noted: 2020-06-12

## 2020-06-12 PROBLEM — D64.9 ANEMIA: Status: ACTIVE | Noted: 2020-06-12

## 2020-06-12 LAB
ALBUMIN SERPL-MCNC: 3.8 G/DL (ref 3.5–5.2)
ALBUMIN/GLOB SERPL: 1.2 G/DL
ALP SERPL-CCNC: 181 U/L (ref 39–117)
ALT SERPL W P-5'-P-CCNC: 6 U/L (ref 1–41)
ANION GAP SERPL CALCULATED.3IONS-SCNC: 9 MMOL/L (ref 5–15)
AST SERPL-CCNC: 40 U/L (ref 1–40)
BASOPHILS # BLD AUTO: 0.1 10*3/MM3 (ref 0–0.2)
BASOPHILS NFR BLD AUTO: 2.1 % (ref 0–1.5)
BILIRUB SERPL-MCNC: 1 MG/DL (ref 0.2–1.2)
BILIRUB UR QL STRIP: NEGATIVE
BUN BLD-MCNC: 17 MG/DL (ref 8–23)
BUN BLD-MCNC: ABNORMAL MG/DL
BUN/CREAT SERPL: ABNORMAL
CALCIUM SPEC-SCNC: 9 MG/DL (ref 8.6–10.5)
CHLORIDE SERPL-SCNC: 103 MMOL/L (ref 98–107)
CLARITY UR: CLEAR
CO2 SERPL-SCNC: 27 MMOL/L (ref 22–29)
COLOR UR: YELLOW
CREAT BLD-MCNC: 1 MG/DL (ref 0.76–1.27)
DEPRECATED RDW RBC AUTO: 84.9 FL (ref 37–54)
EOSINOPHIL # BLD AUTO: 0.2 10*3/MM3 (ref 0–0.4)
EOSINOPHIL NFR BLD AUTO: 5.7 % (ref 0.3–6.2)
ERYTHROCYTE [DISTWIDTH] IN BLOOD BY AUTOMATED COUNT: 34.4 % (ref 12.3–15.4)
GFR SERPL CREATININE-BSD FRML MDRD: 71 ML/MIN/1.73
GLOBULIN UR ELPH-MCNC: 3.3 GM/DL
GLUCOSE BLD-MCNC: 101 MG/DL (ref 65–99)
GLUCOSE UR STRIP-MCNC: NEGATIVE MG/DL
HCT VFR BLD AUTO: 33.7 % (ref 37.5–51)
HGB BLD-MCNC: 10.5 G/DL (ref 13–17.7)
HGB UR QL STRIP.AUTO: NEGATIVE
INR PPP: 1.77 (ref 2–3)
KETONES UR QL STRIP: NEGATIVE
LEUKOCYTE ESTERASE UR QL STRIP.AUTO: NEGATIVE
LYMPHOCYTES # BLD AUTO: 0.7 10*3/MM3 (ref 0.7–3.1)
LYMPHOCYTES NFR BLD AUTO: 16.2 % (ref 19.6–45.3)
MAGNESIUM SERPL-MCNC: 2 MG/DL (ref 1.6–2.4)
MCH RBC QN AUTO: 24.1 PG (ref 26.6–33)
MCHC RBC AUTO-ENTMCNC: 31.2 G/DL (ref 31.5–35.7)
MCV RBC AUTO: 77.4 FL (ref 79–97)
MONOCYTES # BLD AUTO: 0.3 10*3/MM3 (ref 0.1–0.9)
MONOCYTES NFR BLD AUTO: 8.4 % (ref 5–12)
NEUTROPHILS # BLD AUTO: 2.7 10*3/MM3 (ref 1.7–7)
NEUTROPHILS NFR BLD AUTO: 67.6 % (ref 42.7–76)
NITRITE UR QL STRIP: NEGATIVE
NRBC BLD AUTO-RTO: 0.2 /100 WBC (ref 0–0.2)
NT-PROBNP SERPL-MCNC: 1642 PG/ML (ref 5–1800)
PH UR STRIP.AUTO: 5.5 [PH] (ref 5–8)
PHOSPHATE SERPL-MCNC: 3.2 MG/DL (ref 2.5–4.5)
PLATELET # BLD AUTO: 246 10*3/MM3 (ref 140–450)
PMV BLD AUTO: 7.9 FL (ref 6–12)
POTASSIUM BLD-SCNC: 4.5 MMOL/L (ref 3.5–5.2)
PROT SERPL-MCNC: 7.1 G/DL (ref 6–8.5)
PROT UR QL STRIP: NEGATIVE
PROTHROMBIN TIME: 17.2 SECONDS (ref 19.4–28.5)
RBC # BLD AUTO: 4.35 10*6/MM3 (ref 4.14–5.8)
SODIUM BLD-SCNC: 139 MMOL/L (ref 136–145)
SP GR UR STRIP: 1.01 (ref 1–1.03)
TROPONIN T SERPL-MCNC: 0.01 NG/ML (ref 0–0.03)
UROBILINOGEN UR QL STRIP: NORMAL
WBC NRBC COR # BLD: 4.1 10*3/MM3 (ref 3.4–10.8)

## 2020-06-12 PROCEDURE — 83880 ASSAY OF NATRIURETIC PEPTIDE: CPT | Performed by: PHYSICIAN ASSISTANT

## 2020-06-12 PROCEDURE — 99214 OFFICE O/P EST MOD 30 MIN: CPT | Performed by: INTERNAL MEDICINE

## 2020-06-12 PROCEDURE — 25010000002 FUROSEMIDE PER 20 MG: Performed by: PHYSICIAN ASSISTANT

## 2020-06-12 PROCEDURE — 84484 ASSAY OF TROPONIN QUANT: CPT | Performed by: PHYSICIAN ASSISTANT

## 2020-06-12 PROCEDURE — 71045 X-RAY EXAM CHEST 1 VIEW: CPT

## 2020-06-12 PROCEDURE — 80053 COMPREHEN METABOLIC PANEL: CPT | Performed by: PHYSICIAN ASSISTANT

## 2020-06-12 PROCEDURE — 85610 PROTHROMBIN TIME: CPT | Performed by: PHYSICIAN ASSISTANT

## 2020-06-12 PROCEDURE — 93279 PRGRMG DEV EVAL PM/LDLS PM: CPT | Performed by: INTERNAL MEDICINE

## 2020-06-12 PROCEDURE — 99220 PR INITIAL OBSERVATION CARE/DAY 70 MINUTES: CPT | Performed by: HOSPITALIST

## 2020-06-12 PROCEDURE — 81003 URINALYSIS AUTO W/O SCOPE: CPT | Performed by: PHYSICIAN ASSISTANT

## 2020-06-12 PROCEDURE — 83735 ASSAY OF MAGNESIUM: CPT | Performed by: PHYSICIAN ASSISTANT

## 2020-06-12 PROCEDURE — G0378 HOSPITAL OBSERVATION PER HR: HCPCS

## 2020-06-12 PROCEDURE — 93000 ELECTROCARDIOGRAM COMPLETE: CPT | Performed by: INTERNAL MEDICINE

## 2020-06-12 PROCEDURE — 85025 COMPLETE CBC W/AUTO DIFF WBC: CPT | Performed by: PHYSICIAN ASSISTANT

## 2020-06-12 PROCEDURE — 93005 ELECTROCARDIOGRAM TRACING: CPT | Performed by: PHYSICIAN ASSISTANT

## 2020-06-12 PROCEDURE — 84100 ASSAY OF PHOSPHORUS: CPT | Performed by: PHYSICIAN ASSISTANT

## 2020-06-12 RX ORDER — ONDANSETRON 2 MG/ML
4 INJECTION INTRAMUSCULAR; INTRAVENOUS EVERY 6 HOURS PRN
Status: DISCONTINUED | OUTPATIENT
Start: 2020-06-12 | End: 2020-06-16 | Stop reason: HOSPADM

## 2020-06-12 RX ORDER — CALCIUM GLUCONATE 20 MG/ML
1 INJECTION, SOLUTION INTRAVENOUS AS NEEDED
Status: DISCONTINUED | OUTPATIENT
Start: 2020-06-12 | End: 2020-06-16 | Stop reason: HOSPADM

## 2020-06-12 RX ORDER — MAGNESIUM SULFATE HEPTAHYDRATE 40 MG/ML
4 INJECTION, SOLUTION INTRAVENOUS AS NEEDED
Status: DISCONTINUED | OUTPATIENT
Start: 2020-06-12 | End: 2020-06-16 | Stop reason: HOSPADM

## 2020-06-12 RX ORDER — POTASSIUM CHLORIDE 1.5 G/1.77G
40 POWDER, FOR SOLUTION ORAL AS NEEDED
Status: DISCONTINUED | OUTPATIENT
Start: 2020-06-12 | End: 2020-06-16 | Stop reason: HOSPADM

## 2020-06-12 RX ORDER — ACETAMINOPHEN 325 MG/1
650 TABLET ORAL EVERY 4 HOURS PRN
Status: DISCONTINUED | OUTPATIENT
Start: 2020-06-12 | End: 2020-06-16 | Stop reason: HOSPADM

## 2020-06-12 RX ORDER — CALCIUM GLUCONATE 20 MG/ML
2 INJECTION, SOLUTION INTRAVENOUS AS NEEDED
Status: DISCONTINUED | OUTPATIENT
Start: 2020-06-12 | End: 2020-06-16 | Stop reason: HOSPADM

## 2020-06-12 RX ORDER — CHOLECALCIFEROL (VITAMIN D3) 125 MCG
5 CAPSULE ORAL NIGHTLY PRN
Status: DISCONTINUED | OUTPATIENT
Start: 2020-06-12 | End: 2020-06-16 | Stop reason: HOSPADM

## 2020-06-12 RX ORDER — DOCUSATE SODIUM 100 MG/1
100 CAPSULE, LIQUID FILLED ORAL 2 TIMES DAILY PRN
Status: DISCONTINUED | OUTPATIENT
Start: 2020-06-12 | End: 2020-06-16 | Stop reason: HOSPADM

## 2020-06-12 RX ORDER — SODIUM CHLORIDE 0.9 % (FLUSH) 0.9 %
10 SYRINGE (ML) INJECTION EVERY 12 HOURS SCHEDULED
Status: DISCONTINUED | OUTPATIENT
Start: 2020-06-12 | End: 2020-06-16 | Stop reason: HOSPADM

## 2020-06-12 RX ORDER — ACETAMINOPHEN 160 MG/5ML
650 SOLUTION ORAL EVERY 4 HOURS PRN
Status: DISCONTINUED | OUTPATIENT
Start: 2020-06-12 | End: 2020-06-16 | Stop reason: HOSPADM

## 2020-06-12 RX ORDER — MAGNESIUM SULFATE HEPTAHYDRATE 40 MG/ML
2 INJECTION, SOLUTION INTRAVENOUS AS NEEDED
Status: DISCONTINUED | OUTPATIENT
Start: 2020-06-12 | End: 2020-06-16 | Stop reason: HOSPADM

## 2020-06-12 RX ORDER — ONDANSETRON 4 MG/1
4 TABLET, FILM COATED ORAL EVERY 6 HOURS PRN
Status: DISCONTINUED | OUTPATIENT
Start: 2020-06-12 | End: 2020-06-16 | Stop reason: HOSPADM

## 2020-06-12 RX ORDER — FUROSEMIDE 10 MG/ML
40 INJECTION INTRAMUSCULAR; INTRAVENOUS EVERY 12 HOURS
Status: DISCONTINUED | OUTPATIENT
Start: 2020-06-12 | End: 2020-06-16

## 2020-06-12 RX ORDER — NYSTATIN 100000 [USP'U]/G
POWDER TOPICAL EVERY 12 HOURS SCHEDULED
Status: DISCONTINUED | OUTPATIENT
Start: 2020-06-13 | End: 2020-06-16 | Stop reason: HOSPADM

## 2020-06-12 RX ORDER — ESCITALOPRAM OXALATE 10 MG/1
10 TABLET ORAL DAILY
Status: DISCONTINUED | OUTPATIENT
Start: 2020-06-13 | End: 2020-06-16 | Stop reason: HOSPADM

## 2020-06-12 RX ORDER — SODIUM CHLORIDE 0.9 % (FLUSH) 0.9 %
10 SYRINGE (ML) INJECTION AS NEEDED
Status: DISCONTINUED | OUTPATIENT
Start: 2020-06-12 | End: 2020-06-16 | Stop reason: HOSPADM

## 2020-06-12 RX ORDER — PANTOPRAZOLE SODIUM 40 MG/1
40 TABLET, DELAYED RELEASE ORAL EVERY MORNING
Status: DISCONTINUED | OUTPATIENT
Start: 2020-06-13 | End: 2020-06-16 | Stop reason: HOSPADM

## 2020-06-12 RX ORDER — POTASSIUM CHLORIDE 20 MEQ/1
40 TABLET, EXTENDED RELEASE ORAL AS NEEDED
Status: DISCONTINUED | OUTPATIENT
Start: 2020-06-12 | End: 2020-06-16 | Stop reason: HOSPADM

## 2020-06-12 RX ORDER — GABAPENTIN 300 MG/1
300 CAPSULE ORAL NIGHTLY
Status: DISCONTINUED | OUTPATIENT
Start: 2020-06-12 | End: 2020-06-16 | Stop reason: HOSPADM

## 2020-06-12 RX ORDER — NITROGLYCERIN 0.4 MG/1
0.4 TABLET SUBLINGUAL
Status: DISCONTINUED | OUTPATIENT
Start: 2020-06-12 | End: 2020-06-16 | Stop reason: HOSPADM

## 2020-06-12 RX ORDER — FERROUS SULFATE TAB EC 324 MG (65 MG FE EQUIVALENT) 324 (65 FE) MG
324 TABLET DELAYED RESPONSE ORAL
Status: DISCONTINUED | OUTPATIENT
Start: 2020-06-13 | End: 2020-06-16 | Stop reason: HOSPADM

## 2020-06-12 RX ORDER — CLONAZEPAM 0.5 MG/1
0.5 TABLET ORAL NIGHTLY PRN
Status: DISCONTINUED | OUTPATIENT
Start: 2020-06-12 | End: 2020-06-16 | Stop reason: HOSPADM

## 2020-06-12 RX ORDER — PRAMIPEXOLE DIHYDROCHLORIDE 0.25 MG/1
0.25 TABLET ORAL NIGHTLY
Status: DISCONTINUED | OUTPATIENT
Start: 2020-06-12 | End: 2020-06-16 | Stop reason: HOSPADM

## 2020-06-12 RX ORDER — ACETAMINOPHEN 650 MG/1
650 SUPPOSITORY RECTAL EVERY 4 HOURS PRN
Status: DISCONTINUED | OUTPATIENT
Start: 2020-06-12 | End: 2020-06-16 | Stop reason: HOSPADM

## 2020-06-12 RX ORDER — BACLOFEN 10 MG/1
10 TABLET ORAL DAILY
Status: DISCONTINUED | OUTPATIENT
Start: 2020-06-13 | End: 2020-06-16 | Stop reason: HOSPADM

## 2020-06-12 RX ORDER — WARFARIN SODIUM 2 MG/1
2 TABLET ORAL
Status: COMPLETED | OUTPATIENT
Start: 2020-06-12 | End: 2020-06-13

## 2020-06-12 RX ORDER — POTASSIUM CHLORIDE 20 MEQ/1
20 TABLET, EXTENDED RELEASE ORAL 3 TIMES DAILY
Status: DISCONTINUED | OUTPATIENT
Start: 2020-06-12 | End: 2020-06-16 | Stop reason: HOSPADM

## 2020-06-12 RX ORDER — FUROSEMIDE 40 MG/1
40 TABLET ORAL DAILY
Status: DISCONTINUED | OUTPATIENT
Start: 2020-06-13 | End: 2020-06-15

## 2020-06-12 RX ORDER — MULTIPLE VITAMINS W/ MINERALS TAB 2148-113
1 TAB ORAL DAILY
Status: DISCONTINUED | OUTPATIENT
Start: 2020-06-13 | End: 2020-06-16 | Stop reason: HOSPADM

## 2020-06-12 RX ORDER — ROSUVASTATIN CALCIUM 10 MG/1
10 TABLET, COATED ORAL NIGHTLY
Status: DISCONTINUED | OUTPATIENT
Start: 2020-06-12 | End: 2020-06-16 | Stop reason: HOSPADM

## 2020-06-12 RX ADMIN — ROSUVASTATIN CALCIUM 10 MG: 10 TABLET, FILM COATED ORAL at 20:56

## 2020-06-12 RX ADMIN — Medication 10 ML: at 20:56

## 2020-06-12 RX ADMIN — CARBIDOPA AND LEVODOPA 1.5 TABLET: 25; 100 TABLET ORAL at 20:56

## 2020-06-12 RX ADMIN — PRAMIPEXOLE DIHYDROCHLORIDE 0.25 MG: 0.25 TABLET ORAL at 20:56

## 2020-06-12 RX ADMIN — GABAPENTIN 300 MG: 300 CAPSULE ORAL at 20:56

## 2020-06-12 RX ADMIN — POTASSIUM CHLORIDE 20 MEQ: 1500 TABLET, EXTENDED RELEASE ORAL at 20:56

## 2020-06-12 RX ADMIN — FUROSEMIDE 40 MG: 10 INJECTION, SOLUTION INTRAMUSCULAR; INTRAVENOUS at 20:55

## 2020-06-12 NOTE — PROGRESS NOTES
CC--leg edema, persistent atrial fibrillation, recurrent falls, iron deficiency anemia, single-chamber pacemaker in situ    Subject-- 84-year-old male patient of symptomatic bradycardia needing a single-chamber pacemaker manufactured by [employer] placed in [facility]    past medical history of  chronic atrial fibrillation, hypertension, dyslipidemia, and use of long-term anticoagulation to prevent cardioembolic episodes. His last echocardiogram showed normal LV systolic function,  mild mitral regurgitation,  moderate tricuspid regurgitation and pulmonary hypertension with RVSP of 58 mm.   chronic medical problems include persistent atrial fibrillation, hypertension, hyperlipidemia and prior history of symptomatic bradycardia needing pacemaker implantation   patient denies any stroke, or any active angina or dyspnea  Does have chronic edema of his feet--his edema is been treated with 2 diuretics including Lasix and metolazone in the past--he had multiple falls with injuries in the past   he also has worsening Parkinson's with recurrent falls --Recently also had profound anemia needing multiple transfusions and came for preliminary PRESTON prior to watchman implantation  to avoid chronic anticoagulation patient was markedly anemic and received blood transfusion and further underwent colonoscopy with cauterization of his AVM and attempted watchman unsuccessful because of large appendage unable to accommodate the largest size of watchman device and the procedure had to be aborted  He comes for office evaluation and has significant weight gain in the last few days about 10 pounds with severely increased leg edema        Past Medical History:   Diagnosis Date   • Anemia    • Arthritis     left hip    • Atrial fibrillation (CMS/HCC)    • Atrial fibrillation (CMS/HCC)     chronic   • Cardiomegaly     mild   • Chronic diastolic heart failure (CMS/HCC)    • GERD (gastroesophageal reflux disease)    •  Hyperlipidemia    • Hypertension    • Hypotension    • Lower extremity edema    • Mitral regurgitation    • Obesity    • Pacemaker    • Parkinson disease (CMS/HCC)    • Recurrent falls     with injury    • Sick sinus syndrome (CMS/HCC)     s/p pacemaker implant    • Valvular heart disease     MR     Past Surgical History:   Procedure Laterality Date   • APPENDECTOMY     • ATRIAL APPENDAGE EXCLUSION LEFT WITH TRANSESOPHAGEAL ECHOCARDIOGRAM N/A 5/28/2020    Procedure: Atrial Appendage Occlusion;  Surgeon: Jim Walker MD;  Location: [facility] CATH INVASIVE LOCATION;  Service: Cardiovascular;  Laterality: N/A;   • ATRIAL APPENDAGE EXCLUSION LEFT WITH TRANSESOPHAGEAL ECHOCARDIOGRAM N/A 5/28/2020    Procedure: Atrial Appendage Occlusion;  Surgeon: Lashaun So MD;  Location: [facility] CATH INVASIVE LOCATION;  Service: Cardiovascular;  Laterality: N/A;   • CATARACT EXTRACTION     • CHOLECYSTECTOMY     • COLONOSCOPY N/A 5/21/2020    Procedure: COLONOSCOPY WITH BIOPSY X1 AREA;  Surgeon: Sim Collins MD;  Location: [facility] ENDOSCOPY;  Service: Gastroenterology;  Laterality: N/A;  Post: poor prep, impacted stool in rectum, and internal hemorrhoids, ascending colon arteriovenous malformation   • ENDOSCOPY N/A 5/21/2020    Procedure: ESOPHAGOGASTRODUODENOSCOPY with clipping x 1 of bleeding gastric polyp;  Surgeon: Sim Collins MD;  Location: [facility] ENDOSCOPY;  Service: Gastroenterology;  Laterality: N/A;  Post: bleeding gastric polyp   • HIP SURGERY Right 08/2017   • OTHER SURGICAL HISTORY      skin mole excisions    • PACEMAKER IMPLANTATION  06/22/2017    [employer] ([employer])   • TONSILLECTOMY     • TOTAL HIP ARTHROPLASTY Left 05/20/2014     Family History   Problem Relation Age of Onset   • Heart failure Mother    • Stroke Maternal Grandfather      Social History     Tobacco Use   • Smoking status: Former Smoker     Packs/day: 1.00     Years: 40.00     Pack years: 40.00     Types: Cigarettes   • Smokeless  tobacco: Never Used   • Tobacco comment: quit 1970's    Substance Use Topics   • Alcohol use: Never     Frequency: Never   • Drug use: Never     Allergies:  Amoxicillin; Cephalexin; Penicillins; Amoxicillin-pot clavulanate; and Clarithromycin    Review of Systems   General:  positive for fatigue and tiredness  Eyes: No redness  Cardiovascular: No chest pain, no palpitations  Respiratory:   positive for class 3 shortness of breath  Gastrointestinal: No nausea or vomiting, bleeding  Genitourinary: no hematuria or dysuria  Musculoskeletal: No arthralgia or myalgia  Skin: No rash  Neurologic: No numbness, tingling  Hematologic/Lymphatic: No abnormal bleeding      Physical Exam  VITALS REVIEWED--blood pressure 137/78 pulse in this patient was 59 patient was afebrile respiration 12 times a minute  EKG shows underlying atrial fibrillation with intermittent ventricular pacing with diffuse nonspecific ST-T wave changes with a heart rate of 59 MA interval 176 QRS of 110 QT of 468 and no significant EKG changes compared to last EKG and EKG indication includes atrial arrhythmias and congestive heart failure    General:      well developed, well nourished, in no acute distress.  Pallor positive  Head:      normocephalic and atraumatic.    Eyes:      PERRL/EOM intact, conjunctiva and sclera clear with out nystagmus.    Neck:      no masses, thyromegaly,  trachea central with normal respiratory effort   Lungs:      Reduced breath sounds in bases     Heart:       underlying atrial fibrillation with irregularly irregular rhythm and  without any murmurs gallops or rubs    Extremities:      Severe edema with concomitant lymphedema noted and edema much worse since  last seen  Neurologic:      no focal deficits.   alert oriented x3  Skin:      intact without lesions or rashes.    Psych:      alert and cooperative; normal mood and affect; normal attention span and concentration.        Assessment and plan     Increasing  severe leg edema  with lymphedema--admit to hospital for intravenous diuresis for symptomatic relief   Single -chamber pacemaker in situ which was interrogated and pacemaker rate increased to 70 bpm  severe and profound iron deficiency anemia --post endoscopy and colonoscopy with cauterization of AVM   Post transfusion and iron infusions   multiple falls with Parkinson's disease  Chronic and permanent atrial fibrillation  Moderate aortic stenosis and moderate to severe tricuspid regurgitation  Bilateral lymphedema with ongoing class III diastolic chronic heart failure symptoms  History of anxiety and depression  Essential hypertension      Electronically signed by Jim Walker MD, 06/12/20, 3:02 PM.

## 2020-06-12 NOTE — PLAN OF CARE
Problem: Patient Care Overview  Goal: Plan of Care Review  Flowsheets (Taken 6/12/2020 9766)  Progress: no change  Plan of Care Reviewed With: patient  Outcome Summary: Pt direct admit from Dr. Walker's office with Shortness of breath and lower leg swelling. Patient is resident at [facility]. Vitals [facility], on room air. Pt recieving IV lasix. Fall percautions maintained. Will continue to monitor.

## 2020-06-12 NOTE — H&P
[facility] Medicine Services      Patient Name: Rafael Ivey  : 1935  MRN: [id number]  Primary Care Physician: Blake Ivey MD  Date of admission: 2020    Patient Care Team:  Blake Ivey MD as PCP - General (Family Medicine)          Subjective   History Present Illness     Chief Complaint: No chief complaint on file.      Mr. Ivey is a 84 y.o. male with past medical history of heart failure with reduced ejection fraction, anxiety, A. fib, depression, GERD, hyperlipidemia and Parkinson's who presents to [facility] complaining of increasing fatigue, lower extremity edema and shortness of air.  Patient states over the past several weeks he is noted worsening edema as well as some difficulty participating in rehab secondary to shortness of air and mild fatigue.  He states his legs have continued to increase in size and he has noticed some decrease in his urinary frequency and volume.  In addition to his KIDD he notes some diarrhea but denies any specific pain including chest pain, nausea, vomiting, fever or known exposure to Covid19.    Time of my exam patient CMP is pending, PT: 17.2, INR: 1.77, hemoglobin: 10.5 with a decreased MCV and MCHC, BNP is pending.  Chest x-ray shows prominent pulmonary vascularity with interstitial edema and small effusions as well as stable cardiomegaly.  EKG is pending    History of Present Illness    Review of Systems   Constitution: Positive for malaise/fatigue.   HENT: Negative.    Eyes: Negative.    Cardiovascular: Positive for leg swelling. Negative for chest pain and dyspnea on exertion.   Respiratory: Positive for shortness of breath.    Endocrine: Negative.    Skin: Negative.    Musculoskeletal: Negative.    Gastrointestinal: Negative.    Genitourinary:        Recently decreased urinary frequency and volume   Neurological: Negative.    Psychiatric/Behavioral: Negative.            Personal History     Past  Medical History:   Past Medical History:   Diagnosis Date   • Anemia    • Arthritis     left hip    • Atrial fibrillation (CMS/HCC)    • Atrial fibrillation (CMS/HCC)     chronic   • Cardiomegaly     mild   • Chronic diastolic heart failure (CMS/HCC)    • GERD (gastroesophageal reflux disease)    • Hyperlipidemia    • Hypertension    • Hypotension    • Lower extremity edema    • Mitral regurgitation    • Obesity    • Pacemaker    • Parkinson disease (CMS/HCC)    • Recurrent falls     with injury    • Sick sinus syndrome (CMS/HCC)     s/p pacemaker implant    • Valvular heart disease     MR       Surgical History:      Past Surgical History:   Procedure Laterality Date   • APPENDECTOMY     • ATRIAL APPENDAGE EXCLUSION LEFT WITH TRANSESOPHAGEAL ECHOCARDIOGRAM N/A 5/28/2020    Procedure: Atrial Appendage Occlusion;  Surgeon: Jim Walker MD;  Location: [facility] CATH INVASIVE LOCATION;  Service: Cardiovascular;  Laterality: N/A;   • ATRIAL APPENDAGE EXCLUSION LEFT WITH TRANSESOPHAGEAL ECHOCARDIOGRAM N/A 5/28/2020    Procedure: Atrial Appendage Occlusion;  Surgeon: Lashaun So MD;  Location: [facility] CATH INVASIVE LOCATION;  Service: Cardiovascular;  Laterality: N/A;   • CATARACT EXTRACTION     • CHOLECYSTECTOMY     • COLONOSCOPY N/A 5/21/2020    Procedure: COLONOSCOPY WITH BIOPSY X1 AREA;  Surgeon: Sim Collins MD;  Location: [facility] ENDOSCOPY;  Service: Gastroenterology;  Laterality: N/A;  Post: poor prep, impacted stool in rectum, and internal hemorrhoids, ascending colon arteriovenous malformation   • ENDOSCOPY N/A 5/21/2020    Procedure: ESOPHAGOGASTRODUODENOSCOPY with clipping x 1 of bleeding gastric polyp;  Surgeon: Sim Collins MD;  Location: [facility] ENDOSCOPY;  Service: Gastroenterology;  Laterality: N/A;  Post: bleeding gastric polyp   • HIP SURGERY Right 08/2017   • OTHER SURGICAL HISTORY      skin mole excisions    • PACEMAKER IMPLANTATION  06/22/2017    [employer] ([employer])   •  TONSILLECTOMY     • TOTAL HIP ARTHROPLASTY Left 05/20/2014           Family History: family history includes Heart failure in his mother; Stroke in his maternal grandfather. Otherwise pertinent FHx was reviewed and unremarkable.     Social History:  reports that he has quit smoking. His smoking use included cigarettes. He has a 40.00 pack-year smoking history. He has never used smokeless tobacco. He reports that he drinks alcohol. He reports that he does not use drugs.      Medications:  Prior to Admission medications    Medication Sig Start Date End Date Taking? Authorizing Provider   acetaminophen (TYLENOL) 325 MG tablet Take 650 mg by mouth Every 6 (Six) Hours As Needed for Mild Pain .    Lisa Gunter MD   baclofen (LIORESAL) 10 MG tablet Take 10 mg by mouth Daily.    Lisa Gunter MD   carbidopa-levodopa (SINEMET)  MG per tablet Take 1.5 tablets by mouth Every 8 (Eight) Hours. 1/4/19   Lisa Gunter MD   clonazePAM (KlonoPIN) 0.5 MG tablet Take 1 tablet by mouth At Night As Needed. 7/5/19   Lisa Gunter MD   escitalopram (LEXAPRO) 10 MG tablet Take 10 mg by mouth Daily.    Lisa Gunter MD   ferrous sulfate 325 (65 FE) MG tablet Take 1 tablet by mouth Every 12 (Twelve) Hours. 1/4/19   Lisa Gunter MD   furosemide (LASIX) 40 MG tablet Take 40 mg by mouth Daily. 7/12/19   Lisa Gunter MD   gabapentin (NEURONTIN) 300 MG capsule Take 300 mg by mouth every night at bedtime. 6/26/19   Lisa Gunter MD   multivitamin-minerals (CENTRUM) tablet Take 1 tablet by mouth Daily.    Lisa Gunter MD   Omega-3 Fatty Acids (FISH OIL PO) Take 2 capsules by mouth Daily.    Lisa Gunter MD   omeprazole (PRILOSEC) 20 MG capsule Take 1 capsule by mouth Daily.    Lisa Gunter MD   oxybutynin (DITROPAN) 5 MG tablet Take 5 mg by mouth Daily.    Lisa Gunter MD   potassium chloride (K-DUR,KLOR-CON) 20 MEQ CR tablet Take 1 tablet  by mouth 3 (Three) Times a Day.  Patient taking differently: Take 20 mEq by mouth 3 (Three) Times a Day. Pt takes this 5 times a day 3/9/20   Jose Gonzalez DO   pramipexole (MIRAPEX) 0.25 MG tablet Take 1 tablet by mouth Daily. 1/4/19   Lisa Gunter MD   rosuvastatin (CRESTOR) 10 MG tablet Take 1 tablet by mouth every night at bedtime. 6/27/19   Lisa Gunter MD   warfarin (COUMADIN) 2 MG tablet Take As Directed. Taking every day but Sunday 7/9/19   Lisa Gunter MD       Allergies:    Allergies   Allergen Reactions   • Amoxicillin Diarrhea   • Cephalexin Diarrhea and Nausea And Vomiting   • Penicillins Other (See Comments)   • Amoxicillin-Pot Clavulanate Nausea And Vomiting   • Clarithromycin Nausea And Vomiting       Objective   Objective     Vital Signs  Temp:  [97.3 °F (36.3 °C)] 97.3 °F (36.3 °C)  Heart Rate:  [71-72] 72  Resp:  [20] 20  BP: (137-159)/(78-91) 159/91  SpO2:  [96 %] 96 %  on   ;   Device (Oxygen Therapy): room air  There is no height or weight on file to calculate BMI.    Physical Exam   Constitutional: He is oriented to person, place, and time. He appears well-developed and well-nourished. No distress.   HENT:   Head: Normocephalic and atraumatic.   Right Ear: External ear normal.   Left Ear: External ear normal.   Nose: Nose normal.   Eyes: Conjunctivae and EOM are normal.   Neck: Normal range of motion. No JVD present.   Cardiovascular: Normal rate and intact distal pulses.   Murmur heard.  Pulmonary/Chest: Effort normal.   Diminished in bases   Abdominal: Soft. Bowel sounds are normal.   Musculoskeletal: Normal range of motion. He exhibits edema.   Bilateral pedal edema   Neurological: He is alert and oriented to person, place, and time.   Skin: Skin is warm and dry. There is erythema.   Erythema bilateral lower extremities consistent with venous stasis   Psychiatric: He has a normal mood and affect. His behavior is normal. Judgment and thought content  normal.   Vitals reviewed.      Results Review:  I have personally reviewed most recent lab results and radiology images and interpretations and agree with findings, most notably: CBC and chest x-ray.    Results from last 7 days   Lab Units 06/12/20  1753   WBC 10*3/mm3 4.10   HEMOGLOBIN g/dL 10.5*   HEMATOCRIT % 33.7*   PLATELETS 10*3/mm3 246   INR  1.77*           Invalid input(s):  ALKPHOS  Estimated Creatinine Clearance: 80.2 mL/min (by C-G formula based on SCr of 0.89 mg/dL).  Brief Urine Lab Results  (Last result in the past 365 days)      Color   Clarity   Blood   Leuk Est   Nitrite   Protein   CREAT   Urine HCG        05/21/20 0440 Dark Yellow  Comment:  Result checked  Hazy  Comment:  Result checked  Small (1+) Moderate (2+) Negative Negative               Microbiology Results (last 10 days)     ** No results found for the last 240 hours. **          ECG/EMG Results (most recent)     None               Results for orders placed during the hospital encounter of 05/20/20   Adult Transesophageal Echo (PRESTON) W/ Cont if Necessary Per Protocol    Narrative · Left ventricular systolic function is normal.  · Left atrial cavity size is mild-to-moderately dilated.  · Moderate mitral valve regurgitation is present  · Left atrial appendage diameter was 33 mm at 135 degree which was very   large and not conducive to implantation of watchman device. Depth was 28   mm. After discussion, it was decided that anatomy of left atrial appendage   is not conducive and appropriate for watchman device implantation.   Procedure was aborted          Xr Chest 1 View    Result Date: 6/12/2020  Prominent pulmonary vascularity with interstitial edema and small effusions. Stable cardiomegaly.   Electronically Signed By-Rolando Fofana DO. On:6/12/2020 5:43 PM This report was finalized on [id number] by  Rolando Fofana DO..        Estimated Creatinine Clearance: 80.2 mL/min (by C-G formula based on SCr of 0.89 mg/dL).    Assessment/Plan    Assessment/Plan       Active Hospital Problems    Diagnosis  POA   • **Acute on chronic combined systolic and diastolic CHF (congestive heart failure) (CMS/[facility]) [I50.43]  Yes     Priority: High   • Anemia [D64.9]  Yes     Priority: High   • Atrial fibrillation, chronic [I48.20]  Yes     Priority: Medium   • Depression [F32.9]  Yes     Priority: Low   • Anxiety disorder [F41.9]  Yes     Priority: Low   • GERD (gastroesophageal reflux disease) [K21.9]  Yes     Priority: Low   • Hyperlipidemia [E78.5]  Yes     Priority: Low   • Obesity (BMI 30-39.9) [E66.9]  Yes     Priority: Low   • Parkinson's disease (CMS/[facility]) [G20]  Yes     Priority: Low      Resolved Hospital Problems   No resolved problems to display.     Acute on chronic heart failure with reduced ejection fraction  -Patient sent directly from cardiologist office with recent history of increasing pedal edema as well as some dyspnea on exertion  -Check proBNP, troponin and chest x-ray  -Chest x-ray shows prominent pulmonary vascularity with interstitial edema and small effusions as well as stable cardiomegaly.  -Lasix 40 mg twice daily  -Monitor I's and O's and daily weight  -2 g sodium diet  -Cardiology consulted    Anemia  -Hemoglobin: 10.5 with a decreased MCV and MCHC (somewhat improved from previous studies  -Check iron studies  -Monitor CBC while admitted    A. Fib  -Check EKG  -Continue warfarin  -Continue cardiac monitoring    Overactive bladder  -Patient does report a recent history of decreased frequency and volume of urine  -Hold oxybutynin  -Check UA  -Bladder scan    Depression/anxiety  -Continue Lexapro and Klonopin    GERD  -Continue PPI    Hyperlipidemia  -Continue statin    Obesity (BMI: 33.91)  -Encouraged on lifestyle modifications once acute symptoms resolve    Parkinson's  -Continue Sinemet            VTE Prophylaxis -warfarin  Mechanical Order History:     None      Pharmalogical Order History:     None          CODE STATUS: Full  Code  Status and Medical Interventions:   Ordered at: 06/12/20 1706     Code Status:    CPR     Medical Interventions (Level of Support Prior to Arrest):    Full           I discussed the patient's findings and my recommendations with patient.        Electronically signed by Jose Barnes PA-C, 06/12/20, 6:02 PM.  [facility] Hospitalist Team

## 2020-06-13 ENCOUNTER — APPOINTMENT (OUTPATIENT)
Dept: CARDIOLOGY | Facility: HOSPITAL | Age: 85
End: 2020-06-13

## 2020-06-13 PROBLEM — I10 HYPERTENSION: Status: RESOLVED | Noted: 2020-05-22 | Resolved: 2020-06-13

## 2020-06-13 PROBLEM — K62.6 RECTAL ULCERATION: Status: ACTIVE | Noted: 2020-05-21

## 2020-06-13 PROBLEM — I50.43 ACUTE ON CHRONIC COMBINED SYSTOLIC AND DIASTOLIC CHF (CONGESTIVE HEART FAILURE) (HCC): Status: ACTIVE | Noted: 2020-06-13

## 2020-06-13 LAB
ANION GAP SERPL CALCULATED.3IONS-SCNC: 6 MMOL/L (ref 5–15)
BASOPHILS # BLD AUTO: 0.1 10*3/MM3 (ref 0–0.2)
BASOPHILS NFR BLD AUTO: 2 % (ref 0–1.5)
BH CV LOW VAS LEFT POPLITEAL SPONT: 1
BH CV LOWER VASCULAR LEFT COMMON FEMORAL AUGMENT: NORMAL
BH CV LOWER VASCULAR LEFT COMMON FEMORAL COMPETENT: NORMAL
BH CV LOWER VASCULAR LEFT COMMON FEMORAL COMPRESS: NORMAL
BH CV LOWER VASCULAR LEFT COMMON FEMORAL PHASIC: NORMAL
BH CV LOWER VASCULAR LEFT COMMON FEMORAL SPONT: NORMAL
BH CV LOWER VASCULAR LEFT DISTAL FEMORAL COMPRESS: NORMAL
BH CV LOWER VASCULAR LEFT GASTRONEMIUS COMPRESS: NORMAL
BH CV LOWER VASCULAR LEFT GREATER SAPH AK COMPRESS: NORMAL
BH CV LOWER VASCULAR LEFT GREATER SAPH BK COMPRESS: NORMAL
BH CV LOWER VASCULAR LEFT LESSER SAPH COMPRESS: NORMAL
BH CV LOWER VASCULAR LEFT MID FEMORAL AUGMENT: NORMAL
BH CV LOWER VASCULAR LEFT MID FEMORAL COMPETENT: NORMAL
BH CV LOWER VASCULAR LEFT MID FEMORAL COMPRESS: NORMAL
BH CV LOWER VASCULAR LEFT MID FEMORAL PHASIC: NORMAL
BH CV LOWER VASCULAR LEFT MID FEMORAL SPONT: NORMAL
BH CV LOWER VASCULAR LEFT PERONEAL COMPRESS: NORMAL
BH CV LOWER VASCULAR LEFT POPLITEAL AUGMENT: NORMAL
BH CV LOWER VASCULAR LEFT POPLITEAL COMPETENT: NORMAL
BH CV LOWER VASCULAR LEFT POPLITEAL COMPRESS: NORMAL
BH CV LOWER VASCULAR LEFT POPLITEAL PHASIC: NORMAL
BH CV LOWER VASCULAR LEFT POPLITEAL SPONT: NORMAL
BH CV LOWER VASCULAR LEFT POSTERIOR TIBIAL COMPRESS: NORMAL
BH CV LOWER VASCULAR LEFT PROXIMAL FEMORAL COMPRESS: NORMAL
BH CV LOWER VASCULAR LEFT SAPHENOFEMORAL JUNCTION AUGMENT: NORMAL
BH CV LOWER VASCULAR LEFT SAPHENOFEMORAL JUNCTION COMPETENT: NORMAL
BH CV LOWER VASCULAR LEFT SAPHENOFEMORAL JUNCTION COMPRESS: NORMAL
BH CV LOWER VASCULAR LEFT SAPHENOFEMORAL JUNCTION PHASIC: NORMAL
BH CV LOWER VASCULAR LEFT SAPHENOFEMORAL JUNCTION SPONT: NORMAL
BH CV LOWER VASCULAR RIGHT COMMON FEMORAL AUGMENT: NORMAL
BH CV LOWER VASCULAR RIGHT COMMON FEMORAL COMPETENT: NORMAL
BH CV LOWER VASCULAR RIGHT COMMON FEMORAL COMPRESS: NORMAL
BH CV LOWER VASCULAR RIGHT COMMON FEMORAL PHASIC: NORMAL
BH CV LOWER VASCULAR RIGHT COMMON FEMORAL SPONT: NORMAL
BH CV LOWER VASCULAR RIGHT DISTAL FEMORAL COMPRESS: NORMAL
BH CV LOWER VASCULAR RIGHT GREATER SAPH AK COMPRESS: NORMAL
BH CV LOWER VASCULAR RIGHT GREATER SAPH BK COMPRESS: NORMAL
BH CV LOWER VASCULAR RIGHT MID FEMORAL AUGMENT: NORMAL
BH CV LOWER VASCULAR RIGHT MID FEMORAL COMPETENT: NORMAL
BH CV LOWER VASCULAR RIGHT MID FEMORAL COMPRESS: NORMAL
BH CV LOWER VASCULAR RIGHT MID FEMORAL PHASIC: NORMAL
BH CV LOWER VASCULAR RIGHT MID FEMORAL SPONT: NORMAL
BH CV LOWER VASCULAR RIGHT PERONEAL COMPRESS: NORMAL
BH CV LOWER VASCULAR RIGHT POPLITEAL AUGMENT: NORMAL
BH CV LOWER VASCULAR RIGHT POPLITEAL COMPETENT: NORMAL
BH CV LOWER VASCULAR RIGHT POPLITEAL COMPRESS: NORMAL
BH CV LOWER VASCULAR RIGHT POPLITEAL PHASIC: NORMAL
BH CV LOWER VASCULAR RIGHT POPLITEAL SPONT: NORMAL
BH CV LOWER VASCULAR RIGHT POSTERIOR TIBIAL COMPRESS: NORMAL
BH CV LOWER VASCULAR RIGHT PROXIMAL FEMORAL COMPRESS: NORMAL
BH CV LOWER VASCULAR RIGHT SAPHENOFEMORAL JUNCTION AUGMENT: NORMAL
BH CV LOWER VASCULAR RIGHT SAPHENOFEMORAL JUNCTION COMPETENT: NORMAL
BH CV LOWER VASCULAR RIGHT SAPHENOFEMORAL JUNCTION COMPRESS: NORMAL
BH CV LOWER VASCULAR RIGHT SAPHENOFEMORAL JUNCTION PHASIC: NORMAL
BH CV LOWER VASCULAR RIGHT SAPHENOFEMORAL JUNCTION SPONT: NORMAL
BH CV POP FLUID COLLECT LEFT: 1
BUN BLD-MCNC: 15 MG/DL (ref 8–23)
BUN BLD-MCNC: NORMAL MG/DL
BUN/CREAT SERPL: NORMAL
CALCIUM SPEC-SCNC: 9 MG/DL (ref 8.6–10.5)
CHLORIDE SERPL-SCNC: 106 MMOL/L (ref 98–107)
CO2 SERPL-SCNC: 28 MMOL/L (ref 22–29)
CREAT BLD-MCNC: 0.97 MG/DL (ref 0.76–1.27)
DEPRECATED RDW RBC AUTO: 84.4 FL (ref 37–54)
EOSINOPHIL # BLD AUTO: 0.3 10*3/MM3 (ref 0–0.4)
EOSINOPHIL NFR BLD AUTO: 6.8 % (ref 0.3–6.2)
ERYTHROCYTE [DISTWIDTH] IN BLOOD BY AUTOMATED COUNT: 34.3 % (ref 12.3–15.4)
GFR SERPL CREATININE-BSD FRML MDRD: 74 ML/MIN/1.73
GLUCOSE BLD-MCNC: 83 MG/DL (ref 65–99)
HCT VFR BLD AUTO: 30 % (ref 37.5–51)
HGB BLD-MCNC: 9.4 G/DL (ref 13–17.7)
INR PPP: 1.71 (ref 2–3)
INR PPP: 1.83 (ref 2–3)
IRON 24H UR-MRATE: 37 MCG/DL (ref 59–158)
IRON SATN MFR SERPL: 10 % (ref 20–50)
LYMPHOCYTES # BLD AUTO: 0.7 10*3/MM3 (ref 0.7–3.1)
LYMPHOCYTES NFR BLD AUTO: 19.1 % (ref 19.6–45.3)
MAGNESIUM SERPL-MCNC: 1.9 MG/DL (ref 1.6–2.4)
MCH RBC QN AUTO: 24.5 PG (ref 26.6–33)
MCHC RBC AUTO-ENTMCNC: 31.5 G/DL (ref 31.5–35.7)
MCV RBC AUTO: 77.8 FL (ref 79–97)
MONOCYTES # BLD AUTO: 0.4 10*3/MM3 (ref 0.1–0.9)
MONOCYTES NFR BLD AUTO: 10.9 % (ref 5–12)
NEUTROPHILS # BLD AUTO: 2.3 10*3/MM3 (ref 1.7–7)
NEUTROPHILS NFR BLD AUTO: 61.2 % (ref 42.7–76)
NRBC BLD AUTO-RTO: 0.1 /100 WBC (ref 0–0.2)
NT-PROBNP SERPL-MCNC: 2173 PG/ML (ref 5–1800)
PHOSPHATE SERPL-MCNC: 3.3 MG/DL (ref 2.5–4.5)
PLATELET # BLD AUTO: 209 10*3/MM3 (ref 140–450)
PMV BLD AUTO: 6.2 FL (ref 6–12)
POTASSIUM BLD-SCNC: 3.8 MMOL/L (ref 3.5–5.2)
PROTHROMBIN TIME: 16.6 SECONDS (ref 19.4–28.5)
PROTHROMBIN TIME: 17.7 SECONDS (ref 19.4–28.5)
RBC # BLD AUTO: 3.85 10*6/MM3 (ref 4.14–5.8)
SODIUM BLD-SCNC: 140 MMOL/L (ref 136–145)
TIBC SERPL-MCNC: 356 MCG/DL (ref 298–536)
TRANSFERRIN SERPL-MCNC: 239 MG/DL (ref 200–360)
TROPONIN T SERPL-MCNC: 0.01 NG/ML (ref 0–0.03)
TROPONIN T SERPL-MCNC: <0.01 NG/ML (ref 0–0.03)
WBC NRBC COR # BLD: 3.8 10*3/MM3 (ref 3.4–10.8)

## 2020-06-13 PROCEDURE — 84466 ASSAY OF TRANSFERRIN: CPT | Performed by: PHYSICIAN ASSISTANT

## 2020-06-13 PROCEDURE — 83880 ASSAY OF NATRIURETIC PEPTIDE: CPT | Performed by: PHYSICIAN ASSISTANT

## 2020-06-13 PROCEDURE — 84100 ASSAY OF PHOSPHORUS: CPT | Performed by: PHYSICIAN ASSISTANT

## 2020-06-13 PROCEDURE — 85610 PROTHROMBIN TIME: CPT | Performed by: PHYSICIAN ASSISTANT

## 2020-06-13 PROCEDURE — 99225 PR SBSQ OBSERVATION CARE/DAY 25 MINUTES: CPT | Performed by: HOSPITALIST

## 2020-06-13 PROCEDURE — G0378 HOSPITAL OBSERVATION PER HR: HCPCS

## 2020-06-13 PROCEDURE — 83735 ASSAY OF MAGNESIUM: CPT | Performed by: PHYSICIAN ASSISTANT

## 2020-06-13 PROCEDURE — 25010000002 FUROSEMIDE PER 20 MG: Performed by: PHYSICIAN ASSISTANT

## 2020-06-13 PROCEDURE — 97530 THERAPEUTIC ACTIVITIES: CPT

## 2020-06-13 PROCEDURE — 97163 PT EVAL HIGH COMPLEX 45 MIN: CPT

## 2020-06-13 PROCEDURE — 82274 ASSAY TEST FOR BLOOD FECAL: CPT | Performed by: HOSPITALIST

## 2020-06-13 PROCEDURE — 83540 ASSAY OF IRON: CPT | Performed by: PHYSICIAN ASSISTANT

## 2020-06-13 PROCEDURE — 99223 1ST HOSP IP/OBS HIGH 75: CPT | Performed by: INTERNAL MEDICINE

## 2020-06-13 PROCEDURE — 84484 ASSAY OF TROPONIN QUANT: CPT | Performed by: PHYSICIAN ASSISTANT

## 2020-06-13 PROCEDURE — 93970 EXTREMITY STUDY: CPT

## 2020-06-13 PROCEDURE — 85025 COMPLETE CBC W/AUTO DIFF WBC: CPT | Performed by: PHYSICIAN ASSISTANT

## 2020-06-13 PROCEDURE — 97165 OT EVAL LOW COMPLEX 30 MIN: CPT

## 2020-06-13 PROCEDURE — 80048 BASIC METABOLIC PNL TOTAL CA: CPT | Performed by: PHYSICIAN ASSISTANT

## 2020-06-13 RX ORDER — OXYBUTYNIN CHLORIDE 5 MG/1
5 TABLET ORAL DAILY
Status: DISCONTINUED | OUTPATIENT
Start: 2020-06-13 | End: 2020-06-16 | Stop reason: HOSPADM

## 2020-06-13 RX ORDER — WARFARIN SODIUM 2 MG/1
2 TABLET ORAL
Status: DISCONTINUED | OUTPATIENT
Start: 2020-06-13 | End: 2020-06-16 | Stop reason: HOSPADM

## 2020-06-13 RX ADMIN — BACLOFEN 10 MG: 10 TABLET ORAL at 08:39

## 2020-06-13 RX ADMIN — Medication 10 ML: at 08:39

## 2020-06-13 RX ADMIN — MULTIPLE VITAMINS W/ MINERALS TAB 1 TABLET: TAB at 08:39

## 2020-06-13 RX ADMIN — POTASSIUM CHLORIDE 20 MEQ: 1500 TABLET, EXTENDED RELEASE ORAL at 08:39

## 2020-06-13 RX ADMIN — PANTOPRAZOLE SODIUM 40 MG: 40 TABLET, DELAYED RELEASE ORAL at 08:39

## 2020-06-13 RX ADMIN — FUROSEMIDE 40 MG: 10 INJECTION, SOLUTION INTRAMUSCULAR; INTRAVENOUS at 20:50

## 2020-06-13 RX ADMIN — NYSTATIN: 100000 POWDER TOPICAL at 01:09

## 2020-06-13 RX ADMIN — ESCITALOPRAM OXALATE 10 MG: 10 TABLET ORAL at 08:39

## 2020-06-13 RX ADMIN — NYSTATIN: 100000 POWDER TOPICAL at 20:50

## 2020-06-13 RX ADMIN — CARBIDOPA AND LEVODOPA 1.5 TABLET: 25; 100 TABLET ORAL at 17:09

## 2020-06-13 RX ADMIN — CARBIDOPA AND LEVODOPA 1.5 TABLET: 25; 100 TABLET ORAL at 08:39

## 2020-06-13 RX ADMIN — FUROSEMIDE 40 MG: 40 TABLET ORAL at 08:39

## 2020-06-13 RX ADMIN — ROSUVASTATIN CALCIUM 10 MG: 10 TABLET, FILM COATED ORAL at 20:50

## 2020-06-13 RX ADMIN — FERROUS SULFATE TAB EC 324 MG (65 MG FE EQUIVALENT) 324 MG: 324 (65 FE) TABLET DELAYED RESPONSE at 08:39

## 2020-06-13 RX ADMIN — PRAMIPEXOLE DIHYDROCHLORIDE 0.25 MG: 0.25 TABLET ORAL at 20:50

## 2020-06-13 RX ADMIN — CARBIDOPA AND LEVODOPA 1.5 TABLET: 25; 100 TABLET ORAL at 20:50

## 2020-06-13 RX ADMIN — WARFARIN 2 MG: 2 TABLET ORAL at 17:09

## 2020-06-13 RX ADMIN — OXYBUTYNIN CHLORIDE 5 MG: 5 TABLET ORAL at 12:27

## 2020-06-13 RX ADMIN — POTASSIUM CHLORIDE 20 MEQ: 1500 TABLET, EXTENDED RELEASE ORAL at 17:09

## 2020-06-13 RX ADMIN — Medication 10 ML: at 20:51

## 2020-06-13 RX ADMIN — POTASSIUM CHLORIDE 20 MEQ: 1500 TABLET, EXTENDED RELEASE ORAL at 20:50

## 2020-06-13 RX ADMIN — NYSTATIN: 100000 POWDER TOPICAL at 08:46

## 2020-06-13 RX ADMIN — FUROSEMIDE 40 MG: 10 INJECTION, SOLUTION INTRAMUSCULAR; INTRAVENOUS at 08:38

## 2020-06-13 RX ADMIN — WARFARIN SODIUM 2 MG: 2 TABLET ORAL at 01:09

## 2020-06-13 RX ADMIN — GABAPENTIN 300 MG: 300 CAPSULE ORAL at 20:50

## 2020-06-13 NOTE — THERAPY EVALUATION
Acute Care - Occupational Therapy Initial Evaluation   Gian     Patient Name: Rafael Ivey  : 1935  MRN: [id number]  Today's Date: 2020             Admit Date: 2020     No diagnosis found.  Patient Active Problem List   Diagnosis   • Sick sinus syndrome (CMS/HCC)   • Atrial fibrillation, chronic   • Pacemaker   • Bilateral leg edema   • Iron deficiency anemia due to chronic blood loss   • Warfarin-induced coagulopathy (CMS/HCC)   • Parkinson's disease (CMS/HCC)   • Aortic stenosis   • GERD (gastroesophageal reflux disease)   • Hyperlipidemia   • Essential hypertension   • Mitral regurgitation   • Nonrheumatic tricuspid valve disorder   • Pulmonary hypertension (CMS/HCC)   • Sleep apnea   • Valvular heart disease   • Hypotension   • Chronic diastolic CHF (congestive heart failure) (CMS/HCC)   • Anxiety disorder   • Overactive bladder   • Chronic pain   • RLS (restless legs syndrome)   • Obesity (BMI 30-39.9)   • Acute UTI (urinary tract infection)   • Hypokalemia   • Anasarca   • Acute on chronic combined systolic and diastolic CHF (congestive heart failure) (CMS/HCC)   • Depression   • Anemia   • Rectal ulceration     Past Medical History:   Diagnosis Date   • Anemia    • Arthritis     left hip    • Atrial fibrillation (CMS/HCC)    • Atrial fibrillation (CMS/HCC)     chronic   • Cardiomegaly     mild   • Chronic diastolic heart failure (CMS/HCC)    • GERD (gastroesophageal reflux disease)    • Hyperlipidemia    • Hypertension    • Hypotension    • Lower extremity edema    • Mitral regurgitation    • Obesity    • Pacemaker    • Parkinson disease (CMS/HCC)    • Recurrent falls     with injury    • Sick sinus syndrome (CMS/HCC)     s/p pacemaker implant    • Valvular heart disease     MR     Past Surgical History:   Procedure Laterality Date   • APPENDECTOMY     • ATRIAL APPENDAGE EXCLUSION LEFT WITH TRANSESOPHAGEAL ECHOCARDIOGRAM N/A 2020    Procedure: Atrial Appendage Occlusion;  Surgeon:  Jim Walker MD;  Location: [facility] CATH INVASIVE LOCATION;  Service: Cardiovascular;  Laterality: N/A;   • ATRIAL APPENDAGE EXCLUSION LEFT WITH TRANSESOPHAGEAL ECHOCARDIOGRAM N/A 5/28/2020    Procedure: Atrial Appendage Occlusion;  Surgeon: Lashaun So MD;  Location: [facility] CATH INVASIVE LOCATION;  Service: Cardiovascular;  Laterality: N/A;   • CATARACT EXTRACTION     • CHOLECYSTECTOMY     • COLONOSCOPY N/A 5/21/2020    Procedure: COLONOSCOPY WITH BIOPSY X1 AREA;  Surgeon: Sim Collins MD;  Location: [facility] ENDOSCOPY;  Service: Gastroenterology;  Laterality: N/A;  Post: poor prep, impacted stool in rectum, and internal hemorrhoids, ascending colon arteriovenous malformation   • ENDOSCOPY N/A 5/21/2020    Procedure: ESOPHAGOGASTRODUODENOSCOPY with clipping x 1 of bleeding gastric polyp;  Surgeon: Sim Collins MD;  Location: [facility] ENDOSCOPY;  Service: Gastroenterology;  Laterality: N/A;  Post: bleeding gastric polyp   • HIP SURGERY Right 08/2017   • OTHER SURGICAL HISTORY      skin mole excisions    • PACEMAKER IMPLANTATION  06/22/2017    [employer] ([employer])   • TONSILLECTOMY     • TOTAL HIP ARTHROPLASTY Left 05/20/2014          OT ASSESSMENT FLOWSHEET (last 12 hours)      Occupational Therapy Evaluation     Row Name 06/13/20 9214                   OT Evaluation Time/Intention    Document Type  evaluation  -POLI        Mode of Treatment  individual therapy  -POLI           General Information    Patient Profile Reviewed?  yes  -POLI        Prior Level of Function  min assist:;ADL's;all household mobility  -POLI        Equipment Currently Used at Home  walker, rolling;wheelchair  -POLI        Pertinent History of Current Functional Problem  h/o A.Fib  -POLI        Existing Precautions/Restrictions  cardiac;fall;oxygen therapy device and L/min  -POLI        Barriers to Rehab  none identified  -POLI           Relationship/Environment    Primary Source of Support/Comfort  child(justino);spouse  -POLI        Lives  With  other (see comments) from [facility], previously was home with spouse   -POLI           Resource/Environmental Concerns    Current Living Arrangements  other (see comments) [facility] prior to this admit   -        Resource/Environmental Concerns  none  -POLI           Cognitive Assessment/Intervention- PT/OT    Orientation Status (Cognition)  oriented x 4  -POLI        Follows Commands (Cognition)  WNL  -POLI        Safety Deficit (Cognitive)  mild deficit  -POLI           Bed Mobility Assessment/Treatment    Bed Mobility Assessment/Treatment  bed mobility (all) activities;rolling right;supine-sit  -POLI        Rolling Right [place] (Bed Mobility)  moderate assist (50% patient effort)  -POLI        Supine-Sit [place] (Bed Mobility)  moderate assist (50% patient effort)  -        Assistive Device (Bed Mobility)  bed rails;head of bed elevated;draw sheet  -POLI           Transfer Assessment/Treatment    Transfer Assessment/Treatment  sit-stand transfer;bed-chair transfer  -POLI           Bed-Chair Transfer    Bed-Chair [place] (Transfers)  minimum assist (75% patient effort);1 person assist;other (see comments) via stand pivot   -PLOI        Assistive Device (Bed-Chair Transfers)  walker, front-wheeled  -POLI           Sit-Stand Transfer    Sit-Stand [place] (Transfers)  minimum assist (75% patient effort);1 person assist;verbal cues;other (see comments) elevated bed height, blocking feet to prevent slide   -        Assistive Device (Sit-Stand Transfers)  walker, front-wheeled  -POLI           ADL Assessment/Intervention    BADL Assessment/Intervention  toileting;lower body dressing  -           Lower Body Dressing Assessment/Training    Lower Body Dressing [place] Level  lower body dressing skills;maximum assist (25% patient effort)  -        Lower Body Dressing Position  edge of bed sitting  -           Toileting Assessment/Training    [place] Level (Toileting)  toileting skills;maximum  assist (25% patient effort)  -POLI        Assistive Devices (Toileting)  urinal  -POLI        Toileting Position  supine;supported sitting  -POLI        Comment (Toileting)  Pt incontinent of urine x2. Set pt up with urinal in chair upon exit, nursing assistant made aware.  -POLI           BADL Safety/Performance    Impairments, BADL Safety/Performance  balance;endurance/activity tolerance;pain;strength;shortness of breath  -POLI           General ROM    GENERAL ROM COMMENTS  BUE AROM WFL  -POLI           MMT (Manual Muscle Testing)    General MMT Comments  BUE 4/5  -POLI           Positioning and Restraints    Pre-Treatment Position  in bed  -POLI        Post Treatment Position  chair  -POLI        In Chair  notified nsg;sitting;call light within reach;encouraged to call for assist;exit alarm on;with family/caregiver;waffle cushion;legs elevated  -           Pain Assessment    Additional Documentation  Pain Scale: FACES Pre/Post-Treatment (Group)  -POLI           Pain Scale: FACES Pre/Post-Treatment    Pain: FACES Scale, Pretreatment  0-->no hurt  -POLI        Pain: FACES Scale, Post-Treatment  0-->no hurt  -POLI           Wound 06/12/20 1700 Right lower;posterior;proximal arm Skin Tear    Wound - Properties Group Date first assessed: 06/12/20  -CG Time first assessed: 1700  -CG Side: Right  -CG Orientation: lower;posterior;proximal  -CG Location: arm  -CG Primary Wound Type: Skin tear  -CG       Wound 06/12/20 1901 scrotum MASD (Moisture associated skin damage)    Wound - Properties Group Date first assessed: 06/12/20  -KK Time first assessed: 1901 -KK Location: scrotum  -KK Primary Wound Type: MASD  -KK       Wound 06/12/20 1901 gluteal MASD (Moisture associated skin damage)    Wound - Properties Group Date first assessed: 06/12/20  -KK Time first assessed: 1901 -KK Location: gluteal  -KK Primary Wound Type: MASD  -KK       Plan of Care Review    Plan of Care Reviewed With  patient;daughter  -POLI           Clinical Impression (OT)     Criteria for Skilled Therapeutic Interventions Met (OT Eval)  yes  -POLI        Rehab Potential (OT Eval)  good, to achieve stated therapy goals  -POLI        Therapy Frequency (OT Eval)  5 times/wk  -POLI        Care Plan Review (OT)  evaluation/treatment results reviewed  -POLI        Care Plan Review, Other Participant (OT Eval)  daughter  -        Anticipated Discharge Disposition (OT)  inpatient rehabilitation facility  -POLI           OT Goals    Transfer Goal Selection (OT)  transfer, OT goal 1  -POLI        Dressing Goal Selection (OT)  dressing, OT goal 1  -POLI        Toileting Goal Selection (OT)  toileting, OT goal 1  -POLI           Transfer Goal 1 (OT)    Activity/Assistive Device (Transfer Goal 1, OT)  transfers, all  -POLI        [place] Level/Cues Needed (Transfer Goal 1, OT)  supervision required  -POLI        Time Frame (Transfer Goal 1, OT)  2 weeks  -POLI           Dressing Goal 1 (OT)    Activity/Assistive Device (Dressing Goal 1, OT)  lower body dressing  -POLI        [place]/Cues Needed (Dressing Goal 1, OT)  conditional independence  -POLI        Time Frame (Dressing Goal 1, OT)  2 weeks  -POLI           Toileting Goal 1 (OT)    Activity/Device (Toileting Goal 1, OT)  toileting skills, all  -POLI        [place] Level/Cues Needed (Toileting Goal 1, OT)  independent  -POLI        Time Frame (Toileting Goal 1, OT)  2 weeks  -           Living Environment    Home Accessibility  wheelchair accessible  -          User Key  (r) = Recorded By, (t) = Taken By, (c) = Cosigned By    Initials Name Effective Dates    KK Janis Rocha, YAEL 03/01/19 -     Anna Gastelum RN 03/01/19 -     Renetta Schmidt, ROB 07/25/19 -          Occupational Therapy Education                 Title: PT OT SLP Therapies (Done)     Topic: Occupational Therapy (Done)     Point: ADL training (Done)     Description:   Instruct learner(s) on proper safety adaptation and remediation techniques during self care or transfers.    Instruct in proper use of assistive devices.              Learning Progress Summary           Patient Acceptance, E,TB, VU by  at 6/13/2020 1414   Family Acceptance, E,TB, VU by  at 6/13/2020 1414                   Point: Home exercise program (Done)     Description:   Instruct learner(s) on appropriate technique for monitoring, assisting and/or progressing therapeutic exercises/activities.              Learning Progress Summary           Patient Acceptance, E,TB, VU by  at 6/13/2020 1414   Family Acceptance, E,TB, VU by  at 6/13/2020 1414                   Point: Precautions (Done)     Description:   Instruct learner(s) on prescribed precautions during self-care and functional transfers.              Learning Progress Summary           Patient Acceptance, E,TB, VU by  at 6/13/2020 1414   Family Acceptance, E,TB, VU by  at 6/13/2020 1414                   Point: Body mechanics (Done)     Description:   Instruct learner(s) on proper positioning and spine alignment during self-care, functional mobility activities and/or exercises.              Learning Progress Summary           Patient Acceptance, E,TB, VU by  at 6/13/2020 1414   Family Acceptance, E,TB, VU by  at 6/13/2020 1414                               User Key     Initials Effective Dates Name Provider Type Discipline     07/25/19 -  Renetta Tobias, ROB Occupational Therapist OT                  OT Recommendation and Plan  Outcome Summary/Treatment Plan (OT)  Anticipated Discharge Disposition (OT): inpatient rehabilitation facility  Therapy Frequency (OT Eval): 5 times/wk  Plan of Care Review  Plan of Care Reviewed With: patient, daughter  Plan of Care Reviewed With: patient, daughter  Outcome Summary: 83 yo M adm from [facility] for acute on chronic heart failure. His legs are severely edematous and has h/o A.Fib. Pt reporting at facility he required assist of staff for BADL, and was progressing his mobility in room distances with RW  and Ax1. No O2 requirements at baseline. Rec IP rehab. PPE mask w/ shield, gloves.        Time Calculation:   Time Calculation- OT     Row Name 06/13/20 1421             Time Calculation- OT    OT Start Time  1020  -      OT Stop Time  1102  -      OT Time Calculation (min)  42 min  -      Total Timed Code Minutes- OT  20 minute(s)  -      OT Received On  06/13/20  -POLI      OT - Next Appointment  06/16/20  -POLI      OT Goal Re-Cert Due Date  06/27/20  -        User Key  (r) = Recorded By, (t) = Taken By, (c) = Cosigned By    Initials Name Provider Type    Renetta Schmidt OT Occupational Therapist        Therapy Charges for Today     Code Description Service Date Service Provider Modifiers Qty    [id number]  OT EVAL LOW COMPLEXITY 3 6/13/2020 Renetta Tobias OT GO 1    [id number]  OT THERAPEUTIC ACT EA 15 MIN 6/13/2020 Renetta Tobias OT GO 1               Renetta Tobias OT  6/13/2020

## 2020-06-13 NOTE — PLAN OF CARE
Problem: Patient Care Overview  Goal: Plan of Care Review  Outcome: Ongoing (interventions implemented as appropriate)  Flowsheets (Taken 6/13/2020 7385)  Plan of Care Reviewed With: patient  Outcome Summary: 83 yo male admitted from rehab with CHF exacerbation.  Pt has limited activity tolerance and requires mod A 1-2 to transfer OOB and stand with walker.  Still with significant edema and SOA.  Will cont to follow, recommend return to IP rehab at d/c. PPE worn:  gloves and mask with face shield.

## 2020-06-13 NOTE — PLAN OF CARE
Problem: Patient Care Overview  Goal: Plan of Care Review  Flowsheets (Taken 6/13/2020 1413)  Plan of Care Reviewed With: patient; daughter  Outcome Summary: 85 yo M adm from [facility] for acute on chronic heart failure. His legs are severely edematous and has h/o A.Fib. Pt reporting at facility he required assist of staff for BADL, and was progressing his mobility short distances with RW and Ax1. No O2 requirements at baseline. Rec IP rehab. PPE mask w/ shield, gloves.

## 2020-06-13 NOTE — THERAPY EVALUATION
Patient Name: Rafael Ivey  : 1935    MRN: [id number]                              Today's Date: 2020       Admit Date: 2020    Visit Dx: No diagnosis found.  Patient Active Problem List   Diagnosis   • Sick sinus syndrome (CMS/HCC)   • Atrial fibrillation, chronic   • Pacemaker   • Bilateral leg edema   • Iron deficiency anemia due to chronic blood loss   • Warfarin-induced coagulopathy (CMS/HCC)   • Parkinson's disease (CMS/HCC)   • Aortic stenosis   • GERD (gastroesophageal reflux disease)   • Hyperlipidemia   • Essential hypertension   • Mitral regurgitation   • Nonrheumatic tricuspid valve disorder   • Pulmonary hypertension (CMS/HCC)   • Sleep apnea   • Valvular heart disease   • Hypotension   • Chronic diastolic CHF (congestive heart failure) (CMS/HCC)   • Anxiety disorder   • Overactive bladder   • Chronic pain   • RLS (restless legs syndrome)   • Obesity (BMI 30-39.9)   • Acute UTI (urinary tract infection)   • Hypokalemia   • Anasarca   • Acute on chronic combined systolic and diastolic CHF (congestive heart failure) (CMS/HCC)   • Depression   • Anemia   • Rectal ulceration     Past Medical History:   Diagnosis Date   • Anemia    • Arthritis     left hip    • Atrial fibrillation (CMS/HCC)    • Atrial fibrillation (CMS/HCC)     chronic   • Cardiomegaly     mild   • Chronic diastolic heart failure (CMS/HCC)    • GERD (gastroesophageal reflux disease)    • Hyperlipidemia    • Hypertension    • Hypotension    • Lower extremity edema    • Mitral regurgitation    • Obesity    • Pacemaker    • Parkinson disease (CMS/HCC)    • Recurrent falls     with injury    • Sick sinus syndrome (CMS/HCC)     s/p pacemaker implant    • Valvular heart disease     MR     Past Surgical History:   Procedure Laterality Date   • APPENDECTOMY     • ATRIAL APPENDAGE EXCLUSION LEFT WITH TRANSESOPHAGEAL ECHOCARDIOGRAM N/A 2020    Procedure: Atrial Appendage Occlusion;  Surgeon: Jim Walker MD;  Location:  [facility] CATH INVASIVE LOCATION;  Service: Cardiovascular;  Laterality: N/A;   • ATRIAL APPENDAGE EXCLUSION LEFT WITH TRANSESOPHAGEAL ECHOCARDIOGRAM N/A 5/28/2020    Procedure: Atrial Appendage Occlusion;  Surgeon: Lashaun So MD;  Location: [facility] CATH INVASIVE LOCATION;  Service: Cardiovascular;  Laterality: N/A;   • CATARACT EXTRACTION     • CHOLECYSTECTOMY     • COLONOSCOPY N/A 5/21/2020    Procedure: COLONOSCOPY WITH BIOPSY X1 AREA;  Surgeon: Sim Collins MD;  Location: [facility] ENDOSCOPY;  Service: Gastroenterology;  Laterality: N/A;  Post: poor prep, impacted stool in rectum, and internal hemorrhoids, ascending colon arteriovenous malformation   • ENDOSCOPY N/A 5/21/2020    Procedure: ESOPHAGOGASTRODUODENOSCOPY with clipping x 1 of bleeding gastric polyp;  Surgeon: Sim Collins MD;  Location: [facility] ENDOSCOPY;  Service: Gastroenterology;  Laterality: N/A;  Post: bleeding gastric polyp   • HIP SURGERY Right 08/2017   • OTHER SURGICAL HISTORY      skin mole excisions    • PACEMAKER IMPLANTATION  06/22/2017    [employer] ([employer])   • TONSILLECTOMY     • TOTAL HIP ARTHROPLASTY Left 05/20/2014     General Information     [facility] Name 06/13/20 1720          PT Evaluation Time/Intention    Document Type  evaluation  -     Mode of Treatment  physical therapy  -     Row Name 06/13/20 1720          General Information    Prior Level of Function  -- currently in rehab, walking short distance with rollator and assist  -     Existing Precautions/Restrictions  fall;oxygen therapy device and L/min  -     Row Name 06/13/20 1720          Relationship/Environment    Lives With  spouse pt currently in rehab at [facility]  -[facility] Name 06/13/20 1720          Cognitive Assessment/Intervention- PT/OT    Orientation Status (Cognition)  oriented x 4  -[facility] Name 06/13/20 1720          Safety Issues, Functional Mobility    Impairments Affecting Function (Mobility)  balance;endurance/activity  tolerance;shortness of breath;strength;postural/trunk control  -       User Key  (r) = Recorded By, (t) = Taken By, (c) = Cosigned By    Initials Name Provider Type    Caitlin Sky, PT Physical Therapist        Mobility     Row Name 06/13/20 1729          Bed Mobility Assessment/Treatment    Bed Mobility Assessment/Treatment  supine-sit;sit-supine  -     Supine-Sit [place] (Bed Mobility)  moderate assist (50% patient effort);1 person assist  -     Sit-Supine [place] (Bed Mobility)  moderate assist (50% patient effort);2 person assist unable to scoot self up in bed, does not tolerate bed flat position  -     Assistive Device (Bed Mobility)  bed rails;draw sheet;head of bed elevated  -     Row Name 06/13/20 1729          Sit-Stand Transfer    Sit-Stand [place] (Transfers)  moderate assist (50% patient effort);1 person assist bed height elevated  -       User Key  (r) = Recorded By, (t) = Taken By, (c) = Cosigned By    Initials Name Provider Type     Caitlin Logan, PT Physical Therapist        Obj/Interventions     Row Name 06/13/20 1732          General ROM    GENERAL ROM COMMENTS  AROM WFLs BLEs, limited by edema  -[facility] Name 06/13/20 1732          MMT (Manual Muscle Testing)    General MMT Comments  LEs 4-/5  -[facility] Name 06/13/20 1732          Static Sitting Balance    Level of [place] (Unsupported Sitting, Static Balance)  supervision  -[facility] Name 06/13/20 1732          Dynamic Sitting Balance    Level of [place], Reaches Outside Midline (Sitting, Dynamic Balance)  contact guard assist  -[facility] Name 06/13/20 1732          Static Standing Balance    Level of [place] (Supported Standing, Static Balance)  moderate assist, 50 to 74% patient effort;1 person assist  -     Assistive Device Utilized (Supported Standing, Static Balance)  walker, rolling  -     Comment (Supported Standing, Static Balance)  posterior lean, struggles to get feet positioned  , relies heavily on walker  -[facility]     Row Name 06/13/20 1732          Dynamic Standing Balance    Level of [place], Reaches Outside Midline (Standing, Dynamic Balance)  moderate assist, 50 to 74% patient effort;1 person assist  -     Assistive Device Utilized (Supported Standing, Dynamic Balance)  walker, rolling  -       User Key  (r) = Recorded By, (t) = Taken By, (c) = Cosigned By    Initials Name Provider Type     Caitlin Logan, PT Physical Therapist        Goals/Plan     Row Name 06/13/20 1739          Bed Mobility Goal 1 (PT)    Activity/Assistive Device (Bed Mobility Goal 1, PT)  sit to supine/supine to sit  -     [place] Level/Cues Needed (Bed Mobility Goal 1, PT)  minimum assist (75% or more patient effort);1 person assist  -     Time Frame (Bed Mobility Goal 1, PT)  2 weeks  -[facility]     Row Name 06/13/20 1739          Transfer Goal 1 (PT)    Activity/Assistive Device (Transfer Goal 1, PT)  sit-to-stand/stand-to-sit;bed-to-chair/chair-to-bed  -     [place] Level/Cues Needed (Transfer Goal 1, PT)  minimum assist (75% or more patient effort);1 person assist  -     Time Frame (Transfer Goal 1, PT)  2 weeks  -[facility]     Row Name 06/13/20 1739          Gait Training Goal 1 (PT)    Activity/Assistive Device (Gait Training Goal 1, PT)  gait (walking locomotion);assistive device use;walker, rolling  -     [place] Level (Gait Training Goal 1, PT)  minimum assist (75% or more patient effort);2 person assist  -     Distance (Gait Goal 1, PT)  30'  -     Time Frame (Gait Training Goal 1, PT)  2 weeks  -       User Key  (r) = Recorded By, (t) = Taken By, (c) = Cosigned By    Initials Name Provider Type     Caitlin Logan, PT Physical Therapist        Clinical Impression     Row Name 06/13/20 1737          Pain Assessment    Additional Documentation  Pain Scale: FACES Pre/Post-Treatment (Group)  -[facility]     Row Name 06/13/20 1737          Pain Scale: FACES Pre/Post-Treatment    Pain: FACES  Scale, Pretreatment  0-->no hurt  -     Pain: FACES Scale, Post-Treatment  0-->no hurt  -[facility] Name 06/13/20 1737          Plan of Care Review    Plan of Care Reviewed With  patient  -     Outcome Summary  83 yo male admitted from rehab with CHF exacerbation.  Pt has limited activity tolerance and requires mod A 1-2 to transfer OOB and stand with walker.  Still with significant edema and SOA.  Will cont to follow, recommend return to  rehab at d/c.   -[facility] Name 06/13/20 1737          Physical Therapy Clinical Impression    Criteria for Skilled Interventions Met (PT Clinical Impression)  yes;treatment indicated  -     Rehab Potential (PT Clinical Summary)  good, to achieve stated therapy goals  -[facility] Name 06/13/20 1737          Vital Signs    O2 Delivery Pre Treatment  supplemental O2  -     O2 Delivery Intra Treatment  room air  -     O2 Delivery Post Treatment  supplemental O2  -[facility] Name 06/13/20 1737          Positioning and Restraints    Pre-Treatment Position  in bed  -     Post Treatment Position  bed  -     In Bed  notified nsg;call light within reach;encouraged to call for assist;with ns  -       User Key  (r) = Recorded By, (t) = Taken By, (c) = Cosigned By    Initials Name Provider Type     Caitlin Logan, WILBERT Physical Therapist        Outcome Measures    No documentation.       Physical Therapy Education                 Title: PT OT SLP Therapies (In Progress)     Topic: Physical Therapy (In Progress)     Point: Mobility training (Done)     Description:   Instruct learner(s) on safety and technique for assisting patient out of bed, chair or wheelchair.  Instruct in the proper use of assistive devices, such as walker, crutches, cane or brace.              Patient Friendly Description:   It's important to get you on your feet again, but we need to do so in a way that is safe for you. Falling has serious consequences, and your personal safety is the most important  thing of all.        When it's time to get out of bed, one of us or a family member will sit next to you on the bed to give you support.     If your doctor or nurse tells you to use a walker, crutches, a cane, or a brace, be sure you use it every time you get out of bed, even if you think you don't need it.    Learning Progress Summary           Patient Acceptance, E,TB, VU by  at 6/13/2020 5730                   Point: Home exercise program (Not Started)     Description:   Instruct learner(s) on appropriate technique for monitoring, assisting and/or progressing patient with therapeutic exercises and activities.              Learner Progress:   Not documented in this visit.          Point: Body mechanics (Not Started)     Description:   Instruct learner(s) on proper positioning and spine alignment for patient and/or caregiver during mobility tasks and/or exercises.              Learner Progress:   Not documented in this visit.          Point: Precautions (Not Started)     Description:   Instruct learner(s) on prescribed precautions during mobility and gait tasks              Learner Progress:   Not documented in this visit.                      User Key     Initials Effective Dates Name Provider Type Discipline     03/01/19 -  Caitlin Logan, PT Physical Therapist PT              PT Recommendation and Plan  Planned Therapy Interventions (PT Eval): balance training, bed mobility training, gait training, transfer training, postural re-education, strengthening, patient/family education  Outcome Summary/Treatment Plan (PT)  Anticipated Discharge Disposition (PT): inpatient rehabilitation facility  Plan of Care Reviewed With: patient  Outcome Summary: 85 yo male admitted from rehab with CHF exacerbation.  Pt has limited activity tolerance and requires mod A 1-2 to transfer OOB and stand with walker.  Still with significant edema and SOA.  Will cont to follow, recommend return to IP rehab at d/c.      Time Calculation:    PT Charges     Row Name 06/13/20 1741             Time Calculation    Start Time  1655  -      Stop Time  1725  -      Time Calculation (min)  30 min  -      PT Received On  06/13/20  -      PT - Next Appointment  06/15/20  -      PT Goal Re-Cert Due Date  06/27/20  -         Time Calculation- PT    Total Timed Code Minutes- PT  10 minute(s)  -        User Key  (r) = Recorded By, (t) = Taken By, (c) = Cosigned By    Initials Name Provider Type     Caitlin Logan, PT Physical Therapist        Therapy Charges for Today     Code Description Service Date Service Provider Modifiers Qty    56122638538 HC PT EVAL HIGH COMPLEXITY 3 6/13/2020 Caitlin Logan, PT GP 1    59328573335 HC PT THERAPEUTIC ACT EA 15 MIN 6/13/2020 Caitlin Logan PT GP 1               Caitlin Logan PT  6/13/2020

## 2020-06-13 NOTE — PLAN OF CARE
Problem: Patient Care Overview  Goal: Plan of Care Review  Outcome: Ongoing (interventions implemented as appropriate)  Flowsheets  Taken 6/12/2020 1857 by Anna Tang RN  Progress: no change  Taken 6/13/2020 1452 by Georgina Bauer RN  Plan of Care Reviewed With: patient  Outcome Summary: no complaints at this time. Continues with BLE edema, giving IV lasix as ordered.  Goal: Discharge Needs Assessment  Outcome: Ongoing (interventions implemented as appropriate)  Flowsheets  Taken 6/12/2020 1726 by Anna Tang RN  Equipment Currently Used at Home: walker, rolling  Taken 6/12/2020 1729 by Anna Tang RN  Transportation Anticipated: family or friend will provide  Patient/Family Anticipated Services at Transition:   Patient/Family Anticipates Transition to: inpatient rehabilitation facility;long term care facility  Taken 6/13/2020 1452 by Georgina Bauer RN  Concerns to be Addressed: no discharge needs identified  Readmission Within the Last 30 Days: no previous admission in last 30 days  Goal: Interprofessional Rounds/Family Conf  Outcome: Ongoing (interventions implemented as appropriate)  Flowsheets (Taken 6/13/2020 1452)  Participants: family; ; nursing; patient; physician; physical therapy     Problem: Fall Risk (Adult)  Goal: Identify Related Risk Factors and Signs and Symptoms  Outcome: Ongoing (interventions implemented as appropriate)  Flowsheets (Taken 6/13/2020 1452)  Related Risk Factors (Fall Risk): age-related changes; bladder function altered; gait/mobility problems  Signs and Symptoms (Fall Risk): presence of risk factors  Goal: Absence of Fall  Outcome: Ongoing (interventions implemented as appropriate)  Flowsheets (Taken 6/13/2020 1452)  Absence of Fall: making progress toward outcome     Problem: Skin Injury Risk (Adult)  Goal: Identify Related Risk Factors and Signs and Symptoms  Outcome: Ongoing (interventions implemented as appropriate)  Flowsheets (Taken  6/13/2020 5232)  Related Risk Factors (Skin Injury Risk): advanced age; body weight extremes; edema; mobility impaired; moisture  Goal: Skin Health and Integrity  Outcome: Ongoing (interventions implemented as appropriate)  Flowsheets (Taken 6/13/2020 1452)  Skin Health and Integrity: making progress toward outcome

## 2020-06-13 NOTE — PROGRESS NOTES
"Pharmacy dosing service  Anticoagulant  Warfarin     Subjective:    Rafael Ivey is a 84 y.o.male being continued on warfarin for atrial fibrillation.    INR Goal: 2 - 3  Home medication?: Yes, warfarin 2 mg daily except no dose on Sundays (Pt is \"unsure\" if dose was taken on 6/12 prior to admission)    Bridge Therapy Present?:  No  Interacting Medications Evaluation (New/Present/Discontinued): None   Additional Contributing Factors: N/A      Assessment/Plan:    INR subtherapeutic on admission (1.77). Pt unsure if took dose prior to admission on 6/12 therefore dose was not increased and home dose of 2 mg ordered.  INR slightly increased today but still subtherapeutic. Pt did not receive yesterday's dose until ~0100 this morning. Will continue home regimen today. May need dose tomorrow instead of skip Sunday dose home regimen if INR remains subtherapeutic.     Continue to monitor and adjust based on INR.         Date 6/12 6/13          INR 1.77 1.83          Dose 2 mg @0100 on 6/13               Objective:  [Ht: 182.9 cm (72.01\"); Wt: 111 kg (245 lb 4.8 oz); BMI: Body mass index is 33.26 kg/m².]    Lab Results   Component Value Date    ALBUMIN 3.80 06/12/2020     Lab Results   Component Value Date    INR 1.83 (L) 06/13/2020    INR 1.71 (L) 06/13/2020    INR 1.77 (L) 06/12/2020    PROTIME 17.7 (L) 06/13/2020    PROTIME 16.6 (L) 06/13/2020    PROTIME 17.2 (L) 06/12/2020     Lab Results   Component Value Date    HGB 9.4 (L) 06/13/2020    HGB 10.5 (L) 06/12/2020    HGB 9.2 (L) 05/29/2020     Lab Results   Component Value Date    HCT 30.0 (L) 06/13/2020    HCT 33.7 (L) 06/12/2020    HCT 29.1 (L) 05/29/2020       Alee Preston, PharmD  06/13/20 11:10     "

## 2020-06-13 NOTE — PLAN OF CARE
Problem: Patient Care Overview  Goal: Plan of Care Review  Outcome: Ongoing (interventions implemented as appropriate)  Flowsheets (Taken 6/13/2020 0403)  Outcome Summary: Pt admitted for increased SOA & +3 edema. pt has had no complaints overnight. will continue to monitor pt

## 2020-06-13 NOTE — PROGRESS NOTES
[facility] Medicine Services Daily Progress Note      Hospitalist Team  LOS 1 days      Patient Care Team:  Blake Ivey MD as PCP - General (Family Medicine)    Patient Location: 223/1      Subjective   Subjective     Chief Complaint / Subjective  No chief complaint on file.  Lower extremity swelling and dyspnea on exertion      Brief Synopsis of Hospital Course/HPI  Patient is an 84-year-old gentleman with CHF, A. fib, HLD, Parkinson's, anxiety, depression and GERD who presented to the emergency room complaining of increasing fatigue with increased swelling, dyspnea on exertion and a 25 pound weight gain over the last couple of weeks.  Patient reports that he sleeps propped up and he does not know if he feels more short of breath if he lies flat.  He denies any chest pain or palpitations; no syncope or near syncope.  He still lives at home with his wife of 65 years [place].       BNP was 2173.  Renal function is normal.  INR was subtherapeutic at 1.77.  Hemoglobin was low at 10.5 with MCV 77.4.  Patient's most recent echocardiogram was a PRESTON performed 5/28/2020 and showed moderate MVR, normal LVEF of 56 to 60%.  It was performed in preparation for watchman device placement however the atrial appendage too large and was not appropriate for a watchman.  There was no atrial appendage thrombus.    On record review the patient was noted to have a recent evaluation with EGD and colonoscopy last month for iron deficiency anemia requiring transfusions and was found to have an AVM and rectal ulcer.  It was at that time that a watchman procedure was going to be performed so the patient could get off long-term anticoagulation.      Date: 6/13/2020: The patient reports feeling better after being diuresed since admission.  He did notice significant increase in urination with the IV Lasix.  The nursing staff reports that he is incontinent of urine so  "they are unable to keep an accurate output measurement..   I spoke with and examined the patient while also speaking with the patient's wife on speaker phone.  All questions were answered.        ROS  12 point review of systems was reviewed and was negative except as above.    Objective   Objective      Vital Signs  Temp:  [97.3 °F (36.3 °C)-98.7 °F (37.1 °C)] 98.7 °F (37.1 °C)  Heart Rate:  [71-76] 76  Resp:  [18-20] 18  BP: (121-159)/(68-91) 121/68  Oxygen Therapy  SpO2: 95 %  Pulse Oximetry Type: Intermittent  Device (Oxygen Therapy): nasal cannula  Flow (L/min): 2  Flowsheet Rows      First Filed Value   Admission Height  182.9 cm (72.01\") Documented at 06/12/2020 1927   Admission Weight  111 kg (245 lb 4.8 oz) Documented at 06/13/2020 0239        Intake & Output (last 3 days)       06/10 0701 - 06/11 0700 06/11 0701 - 06/12 0700 06/12 0701 - 06/13 0700 06/13 0701 - 06/14 0700    P.O.   360     Total Intake(mL/kg)   360 (3.2)     Net   +360             Urine Unmeasured Occurrence   6 x 1 x    Stool Unmeasured Occurrence   2 x         Lines, Drains & Airways    Active LDAs     Name:   Placement date:   Placement time:   Site:   Days:    Peripheral IV 06/12/20 1700 Right Antecubital   06/12/20    1700    Antecubital   less than 1                  Physical Exam:    Physical Exam Well-developed well-nourished elderly gentleman in no acute distress sitting up in bed awake and alert comfortable on nasal cannula O2; mucous membranes moist; sclerae anicteric; lungs clear to auscultation bilaterally; CV regular rate and rhythm; abdomen soft nontender nondistended with active bowel sounds; extremities with bilateral lower extremity anasarca with chronic appearing erythema of the lower legs bilaterally; skin is slightly less tight since yesterday; no cyanosis or calf tenderness; palpable pedal pulses bilaterally; no Lee catheter.       Wounds (last 24 hours)      LDA Wound     Row Name 06/13/20 0758 06/12/20 1901 " 06/12/20 1630       [REMOVED] Wound 05/20/20 1702 Left lower leg Venous Ulcer    Wound - Properties Group Date first assessed: 05/20/20  -SK Time first assessed: 1702  -SK Present on Hospital Admission: Y  -SK Side: Left  -SK Orientation: lower  -SK Location: leg  -SK Primary Wound Type: Venous ulcer  -SK Resolution Date: 06/12/20  -KK Resolution Time: 1901 -KK Wound Outcome: Unknown  -KK    Dressing Appearance  --  --  -KK  --    Base  --  --  -KK  --       Wound 06/12/20 1700 Right lower;posterior;proximal arm Skin Tear    Wound - Properties Group Date first assessed: 06/12/20  -CG Time first assessed: 1700  -CG Side: Right  -CG Orientation: lower;posterior;proximal  -CG Location: arm  -CG Primary Wound Type: Skin tear  -CG    Dressing Appearance  dry;intact  -VB  dry;intact  -KK  dry;intact;no drainage  -CG    Base  dressing in place, unable to visualize  -VB  dressing in place, unable to visualize  -KK  dressing in place, unable to visualize  -CG    Dressing Care, Wound  --  foam;border dressing  -KK  border dressing  -CG       Wound 06/12/20 1901 scrotum MASD (Moisture associated skin damage)    Wound - Properties Group Date first assessed: 06/12/20  -KK Time first assessed: 1901 -KK Location: scrotum  -KK Primary Wound Type: MASD  -KK    Dressing Appearance  open to air  -VB  open to air  -KK  --    Base  blanchable;moist;red  -VB  blanchable;moist;red  -KK  --    Periwound  intact;excoriated;blanchable  -VB  intact;excoriated;blanchable  -KK  --    Periwound Care, Wound  --  barrier ointment applied  -KK  --       Wound 06/12/20 1901 gluteal MASD (Moisture associated skin damage)    Wound - Properties Group Date first assessed: 06/12/20  -KK Time first assessed: 1901 -KK Location: gluteal  -KK Primary Wound Type: MASD  -KK    Dressing Appearance  open to air  -VB  open to air  -KK  --    Base  blanchable;red;moist  -VB  blanchable;red;moist  -KK  --    Periwound  intact;excoriated;blanchable  -VB   intact;excoriated;blanchable  -KK  --    Periwound Care, Wound  --  barrier ointment applied  -KK  --      User Key  (r) = Recorded By, (t) = Taken By, (c) = Cosigned By    Initials Name Provider Type    Pamela De Oliveira, RN Registered Nurse    Janis Botello RN Registered Nurse    Anna Gastelum RN Registered Nurse    Georgina Michele RN Registered Nurse          Procedures:              Results Review:     I reviewed the patient's new clinical results.      Lab Results (last 24 hours)     Procedure Component Value Units Date/Time    Basic Metabolic Panel [[id number]] Collected:  06/13/20 0607    Specimen:  Blood Updated:  06/13/20 0705     Glucose 83 mg/dL      BUN --     Comment: Testing performed by alternate method        Creatinine 0.97 mg/dL      Sodium 140 mmol/L      Potassium 3.8 mmol/L      Chloride 106 mmol/L      CO2 28.0 mmol/L      Calcium 9.0 mg/dL      eGFR Non African Amer 74 mL/min/1.73      BUN/Creatinine Ratio --     Comment: Testing not performed.        Anion Gap 6.0 mmol/L     Narrative:       GFR Normal >60  Chronic Kidney Disease <60  Kidney Failure <15      Troponin [[id number]]  (Normal) Collected:  06/13/20 0607    Specimen:  Blood Updated:  06/13/20 0705     Troponin T <0.010 ng/mL     Narrative:       Troponin T Reference Range:  <= 0.03 ng/mL-   Negative for AMI  >0.03 ng/mL-     Abnormal for myocardial necrosis.  Clinicians would have to utilize clinical acumen, EKG, Troponin and serial changes to determine if it is an Acute Myocardial Infarction or myocardial injury due to an underlying chronic condition.       Results may be falsely decreased if patient taking Biotin.      Magnesium [[id number]]  (Normal) Collected:  06/13/20 0607    Specimen:  Blood Updated:  06/13/20 0705     Magnesium 1.9 mg/dL     Phosphorus [[id number]]  (Normal) Collected:  06/13/20 0607    Specimen:  Blood Updated:  06/13/20 0705     Phosphorus 3.3 mg/dL     Iron Profile [[id number]]  (Abnormal)  Collected:  06/13/20 0607    Specimen:  Blood Updated:  06/13/20 0705     Iron 37 mcg/dL      Iron Saturation 10 %      Transferrin 239 mg/dL      TIBC 356 mcg/dL     BNP [[id number]]  (Abnormal) Collected:  06/13/20 0607    Specimen:  Blood Updated:  06/13/20 0658     proBNP 2,173.0 pg/mL     Narrative:       Among patients with dyspnea, NT-proBNP is highly sensitive for the detection of acute congestive heart failure. In addition NT-proBNP of <300 pg/ml effectively rules out acute congestive heart failure with 99% negative predictive value.    Results may be falsely decreased if patient taking Biotin.      BUN [[id number]] Collected:  06/13/20 0607    Specimen:  Blood Updated:  06/13/20 0658    CBC & Differential [[id number]] Collected:  06/13/20 0607    Specimen:  Blood Updated:  06/13/20 0654    Narrative:       The following orders were created for panel order CBC & Differential.  Procedure                               Abnormality         Status                     ---------                               -----------         ------                     CBC Auto Differential[[id number]]        Abnormal            Final result                 Please view results for these tests on the individual orders.    CBC Auto Differential [[id number]]  (Abnormal) Collected:  06/13/20 0607    Specimen:  Blood Updated:  06/13/20 0654     WBC 3.80 10*3/mm3      RBC 3.85 10*6/mm3      Hemoglobin 9.4 g/dL      Hematocrit 30.0 %      MCV 77.8 fL      MCH 24.5 pg      MCHC 31.5 g/dL      RDW 34.3 %      RDW-SD 84.4 fl      MPV 6.2 fL      Platelets 209 10*3/mm3      Neutrophil % 61.2 %      Lymphocyte % 19.1 %      Monocyte % 10.9 %      Eosinophil % 6.8 %      Basophil % 2.0 %      Neutrophils, Absolute 2.30 10*3/mm3      Lymphocytes, Absolute 0.70 10*3/mm3      Monocytes, Absolute 0.40 10*3/mm3      Eosinophils, Absolute 0.30 10*3/mm3      Basophils, Absolute 0.10 10*3/mm3      nRBC 0.1 /100 WBC     Protime-INR [[id number]]  (Abnormal)  Collected:  06/13/20 0607    Specimen:  Blood Updated:  06/13/20 0633     Protime 17.7 Seconds      INR 1.83    Troponin [[id number]]  (Normal) Collected:  06/13/20 0011    Specimen:  Blood Updated:  06/13/20 0045     Troponin T 0.015 ng/mL     Narrative:       Troponin T Reference Range:  <= 0.03 ng/mL-   Negative for AMI  >0.03 ng/mL-     Abnormal for myocardial necrosis.  Clinicians would have to utilize clinical acumen, EKG, Troponin and serial changes to determine if it is an Acute Myocardial Infarction or myocardial injury due to an underlying chronic condition.       Results may be falsely decreased if patient taking Biotin.      Protime-INR [[id number]]  (Abnormal) Collected:  06/13/20 0011    Specimen:  Blood Updated:  06/13/20 0043     Protime 16.6 Seconds      INR 1.71    Urinalysis With Microscopic If Indicated (No Culture) - Urine, Clean Catch [[id number]]  (Normal) Collected:  06/12/20 2055    Specimen:  Urine, Clean Catch Updated:  06/12/20 2146     Color, UA Yellow     Appearance, UA Clear     pH, UA 5.5     Specific Gravity, UA 1.015     Glucose, UA Negative     Ketones, UA Negative     Bilirubin, UA Negative     Blood, UA Negative     Protein, UA Negative     Leuk Esterase, UA Negative     Nitrite, UA Negative     Urobilinogen, UA 1.0 E.U./dL    Narrative:       Urine microscopic not indicated.    BUN [[id number]]  (Normal) Collected:  06/12/20 1753    Specimen:  Blood Updated:  06/12/20 1845     BUN 17 mg/dL     Comprehensive Metabolic Panel [[id number]]  (Abnormal) Collected:  06/12/20 1753    Specimen:  Blood Updated:  06/12/20 1842     Glucose 101 mg/dL      BUN --     Comment: Testing performed by alternate method        Creatinine 1.00 mg/dL      Sodium 139 mmol/L      Potassium 4.5 mmol/L      Chloride 103 mmol/L      CO2 27.0 mmol/L      Calcium 9.0 mg/dL      Total Protein 7.1 g/dL      Albumin 3.80 g/dL      ALT (SGPT) 6 U/L      AST (SGOT) 40 U/L      Comment: Specimen hemolyzed.  Results may  be affected.        Alkaline Phosphatase 181 U/L      Total Bilirubin 1.0 mg/dL      eGFR Non African Amer 71 mL/min/1.73      Globulin 3.3 gm/dL      A/G Ratio 1.2 g/dL      BUN/Creatinine Ratio --     Comment: Testing not performed.        Anion Gap 9.0 mmol/L     Narrative:       GFR Normal >60  Chronic Kidney Disease <60  Kidney Failure <15      Magnesium [[id number]]  (Normal) Collected:  06/12/20 1753    Specimen:  Blood Updated:  06/12/20 1840     Magnesium 2.0 mg/dL     Phosphorus [[id number]]  (Normal) Collected:  06/12/20 1753    Specimen:  Blood Updated:  06/12/20 1840     Phosphorus 3.2 mg/dL     Troponin [[id number]]  (Normal) Collected:  06/12/20 1753    Specimen:  Blood Updated:  06/12/20 1840     Troponin T 0.011 ng/mL     Narrative:       Troponin T Reference Range:  <= 0.03 ng/mL-   Negative for AMI  >0.03 ng/mL-     Abnormal for myocardial necrosis.  Clinicians would have to utilize clinical acumen, EKG, Troponin and serial changes to determine if it is an Acute Myocardial Infarction or myocardial injury due to an underlying chronic condition.       Results may be falsely decreased if patient taking Biotin.      BNP [[id number]]  (Normal) Collected:  06/12/20 1753    Specimen:  Blood Updated:  06/12/20 1839     proBNP 1,642.0 pg/mL     Narrative:       Among patients with dyspnea, NT-proBNP is highly sensitive for the detection of acute congestive heart failure. In addition NT-proBNP of <300 pg/ml effectively rules out acute congestive heart failure with 99% negative predictive value.    Results may be falsely decreased if patient taking Biotin.      CBC Auto Differential [[id number]]  (Abnormal) Collected:  06/12/20 1753    Specimen:  Blood Updated:  06/12/20 1821     WBC 4.10 10*3/mm3      RBC 4.35 10*6/mm3      Hemoglobin 10.5 g/dL      Hematocrit 33.7 %      MCV 77.4 fL      MCH 24.1 pg      MCHC 31.2 g/dL      RDW 34.4 %      RDW-SD 84.9 fl      MPV 7.9 fL      Platelets 246 10*3/mm3       Neutrophil % 67.6 %      Lymphocyte % 16.2 %      Monocyte % 8.4 %      Eosinophil % 5.7 %      Basophil % 2.1 %      Neutrophils, Absolute 2.70 10*3/mm3      Lymphocytes, Absolute 0.70 10*3/mm3      Monocytes, Absolute 0.30 10*3/mm3      Eosinophils, Absolute 0.20 10*3/mm3      Basophils, Absolute 0.10 10*3/mm3      nRBC 0.2 /100 WBC     Protime-INR [[id number]]  (Abnormal) Collected:  06/12/20 1753    Specimen:  Blood Updated:  06/12/20 1821     Protime 17.2 Seconds      INR 1.77        No results found for: HGBA1C  Results from last 7 days   Lab Units 06/13/20  0607 06/13/20  0011 06/12/20  1753   INR  1.83* 1.71* 1.77*           No results found for: LIPASE  No results found for: CHOL, CHLPL, TRIG, HDL, LDL, LDLDIRECT    Lab Results   Lab Value Date/Time    FINALDX  05/21/2020 1443     Ulcer, rectum, biopsy:    Scant fragments of colonic mucosa with mild chronic inflammation and focal hyperplastic changes    No violette ulceration is identified    No chronic features or malignant changes identified    See comment    MICHAEL/sms       COMDX  05/21/2020 1443     Deeper levels were examined through the block but were not further contributory.     MICHAEL/sms          Microbiology Results (last 10 days)     ** No results found for the last 240 hours. **          ECG/EMG Results (most recent)     Procedure Component Value Units Date/Time    ECG 12 Lead [[id number]] Collected:  06/13/20 0945     Updated:  06/13/20 0948    Narrative:       HEART RATE= 70  bpm  RR Interval= 860  ms  CT Interval=   ms  P Horizontal Axis= 87  deg  P Front Axis= 0  deg  QRSD Interval= 164  ms  QT Interval= 477  ms  QRS Axis= -79  deg  T Wave Axis= 84  deg  - ABNORMAL ECG -  AV dissociation  Multiple ventricular premature complexes  IVCD, consider RBBB  ST elevation secondary to IVCD  Electronically Signed By:   Date and Time of Study: 2020-06-13 09:45:36               Results for orders placed during the hospital encounter of 05/20/20   Adult  Transesophageal Echo (PRESTON) W/ Cont if Necessary Per Protocol    Narrative · Left ventricular systolic function is normal.  · Left atrial cavity size is mild-to-moderately dilated.  · Moderate mitral valve regurgitation is present  · Left atrial appendage diameter was 33 mm at 135 degree which was very   large and not conducive to implantation of watchman device. Depth was 28   mm. After discussion, it was decided that anatomy of left atrial appendage   is not conducive and appropriate for watchman device implantation.   Procedure was aborted          Xr Chest 1 View    Result Date: 6/12/2020  Prominent pulmonary vascularity with interstitial edema and small effusions. Stable cardiomegaly.   Electronically Signed By-Rolando Fofana DO. On:6/12/2020 5:43 PM This report was finalized on [id number] by  Rolando Fofana DO..          Xrays, labs reviewed personally by physician.    Medication Review:   I have reviewed the patient's current medication list      Scheduled Meds    baclofen 10 mg Oral Daily   carbidopa-levodopa 1.5 tablet Oral TID   escitalopram 10 mg Oral Daily   ferrous sulfate 324 mg Oral Daily With Breakfast   furosemide 40 mg Intravenous Q12H   furosemide 40 mg Oral Daily   gabapentin 300 mg Oral Nightly   multivitamin (eye vitamin) 1 tablet Oral Daily   nystatin  Topical Q12H   pantoprazole 40 mg Oral QAM   potassium chloride 20 mEq Oral TID   pramipexole 0.25 mg Oral Nightly   rosuvastatin 10 mg Oral Nightly   sodium chloride 10 mL Intravenous Q12H       Meds Infusions    Pharmacy to dose warfarin    Pharmacy to dose warfarin        Meds PRN  •  acetaminophen **OR** acetaminophen **OR** acetaminophen  •  Calcium Gluconate-NaCl **AND** calcium gluconate **AND** Calcium, Ionized  •  clonazePAM  •  docusate sodium  •  magnesium sulfate **OR** magnesium sulfate **OR** magnesium sulfate  •  melatonin  •  nitroglycerin  •  ondansetron **OR** ondansetron  •  Pharmacy to dose warfarin  •  Pharmacy to dose  warfarin  •  potassium & sodium phosphates **OR** potassium & sodium phosphates  •  potassium chloride  •  potassium chloride  •  sodium chloride    I personally reviewed patient's x-ray films and EKG.    Assessment/Plan   Assessment/Plan     Active Hospital Problems    Diagnosis  POA   • **Acute on chronic combined systolic and diastolic CHF (congestive heart failure) (CMS/HCC) [I50.43]  Yes     Priority: High   • Hyperlipidemia [E78.5]  Yes     Priority: High   • Anasarca [R60.1]  Yes     Priority: High   • Valvular heart disease [I38]  Yes     Priority: High   • Sleep apnea [G47.30]  Yes     Priority: High   • Bilateral leg edema [R60.0]  Yes     Priority: High   • Pulmonary hypertension (CMS/HCC) [I27.20]  Yes     Priority: High   • Rectal ulceration [K62.6]  Yes     Priority: Medium   • Iron deficiency anemia due to chronic blood loss [D50.0]  Yes     Priority: Medium   • Depression [F32.9]  Yes   • Anemia [D64.9]  Yes   • Anxiety disorder [F41.9]  Yes   • GERD (gastroesophageal reflux disease) [K21.9]  Yes   • Obesity (BMI 30-39.9) [E66.9]  Yes   • Hypertension [I10]  Yes   • RLS (restless legs syndrome) [G25.81]  Yes   • Chronic pain [G89.29]  Yes   • Chronic diastolic CHF (congestive heart failure) (CMS/HCC) [I50.32]  Yes   • Parkinson's disease (CMS/HCC) [G20]  Yes   • Warfarin-induced coagulopathy (CMS/HCC) [D68.32, T45.515A]  Yes   • Atrial fibrillation, chronic [I48.20]  Yes   • Sick sinus syndrome (CMS/HCC) [I49.5]  Yes   • Pacemaker [Z95.0]  Yes   • Aortic stenosis [I35.0]  Yes   • 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       MEDICAL DECISION MAKING COMPLEXITY BY PROBLEM:     Acute on chronic combined systolic and diastolic congestive heart failure secondary to hypertensive heart disease, valvular heart disease (AS, MR and TVR) and pulmonary hypertension from obstructive sleep  apnea  -Continue IV Lasix  -Monitor renal function closely    Bilateral lower extremity anasarca multifactorial from venous stasis, lymphedema and CHF  -Venous duplex to rule out DVT  -Wound consult for compression wraps  -Patient counseled on elevation    Chronic and permanent atrial fibrillation  -Patient is on Coumadin anticoagulation which is subtherapeutic with an INR of 1.77  -Pharmacy to dose Coumadin for INR 2-3     Iron deficiency anemia  -Patient recently required transfusions for severe anemia felt to be secondary to GI blood loss status post EGD and colonoscopy 5/21/2020 or a rectal ulcer and AVM were identified    Sick sinus syndrome status post PPM    Essential hypertension  -Hold oral Lasix while on IV Lasix    Hyperlipidemia  -Continue Crestor  -Hold omega-3 fatty acids while in the hospital    Parkinson's disease  -Continue Sinemet and Mirapex and PRN Klonopin    GERD  -Continue omeprazole    Depression and anxiety  -Continue escitalopram and PRN Klonopin (inspect verified)    Chronic pain  -Continue gabapentin (inspect verified) and baclofen    Restless leg syndrome  -Continue Mirapex    OAB  -Continue oxybutynin    Obesity  -Lifestyle modification counseling          VTE Prophylaxis -   Mechanical Order History:     None      Pharmalogical Order History:     Ordered     Dose Route Frequency Stop    06/12/20 2235  warfarin (COUMADIN) tablet 2 mg     Question:  Target INR  Answer:  2 - 3    2 mg PO Once (Warfarin) 06/13/20 0109    06/12/20 2022  Pharmacy to dose warfarin     Question:  Target INR  Answer:  2 - 3    -- XX Continuous PRN --    06/12/20 2022  Pharmacy to dose warfarin     Question:  Target INR  Answer:  2 - 3    -- XX Continuous PRN --            Code Status -   Code Status and Medical Interventions:   Ordered at: 06/12/20 1706     Code Status:    CPR     Medical Interventions (Level of Support Prior to Arrest):    Full       This patient has been examined wearing appropriate Personal  Protective Equipment  06/13/20        Discharge Planning    PT, OT and  consult for discharge planning.      Destination      Coordination has not been started for this encounter.      Durable Medical Equipment      Coordination has not been started for this encounter.      Dialysis/Infusion      Coordination has not been started for this encounter.      Home Medical Care      Coordination has not been started for this encounter.      Therapy      Coordination has not been started for this encounter.      Community Resources      Coordination has not been started for this encounter.            Electronically signed by Sylvia Rowan MD, 06/13/20, 10:31.  [facility] Hospitalist Team

## 2020-06-13 NOTE — CONSULTS
Cardiology Consult Note    Patient Identification:  Name: Rafael Ivey  Age: 84 y.o.  Sex: male  :  1935  MRN: [id number]             Requesting Physician : Dr. Jose Barnes    Reason for Consultation / Chief Complaint : CHF    History of Present Illness:      This is an 84-year-old with PMH of    -Symptomatic bradycardia, single-chamber pacemaker  -Chronic atrial fibrillation on long-term anticoagulation, unsuccessful watchman due to large appendage  -Hypertension  -Dyslipidemia  -Mild MR, moderate TR, pulmonary hypertension PA pressures of 58  -Parkinson's and multiple falls  -Anemia and needing blood transfusion, AVM cauterization  -History of anxiety and depression  -Cholecystectomy, cataracts, hip surgery, tonsillectomy  -Positive family history of heart failure in mother and stroke in grandmother  -Former smoker  -Allergy/intolerance to amoxicillin penicillin, clarithromycin, cephalexin    Here with complaint of progressively worsening edema fatigue and shortness of breath.  Patient is on chronic diuretics for edema including Lasix and metolazone and recently has been having multiple falls.  Patient has been having swelling both in the legs and belly and weight gain and shortness of breath therefore was admitted by primary cardiologist to the hospital.  Work-up revealed a negative troponin x2.  proBNP elevated at 2173.  BUN/creatinine are normal at 0.97.  Hemoglobin is 9.4.  UA is negative for protein.    Assessment:      -Acute on chronic CHF due to preserved EF, diastolic heart failure, bilateral lymphedema  -Symptomatic bradycardia status post pacemaker  -Chronic atrial fibrillation poor candidate for long-term anticoagulation due to blood loss anemia  -Moderate mitral regurgitation by PRESTON 2020 and moderate tricuspid regurgitation  -Severe iron deficiency anemia history of AVM cauterization requiring transfusions  -Parkinson's and multiple falls  -Hypertension      Recommendations /  Plan:        Telemetry  Diuresis as tolerated  Monitor renal function and urine output  Daily weights  Monitor anemia and transfuse as needed  Patient has pulmonary hypertension and moderate MR will continue to monitor  We will follow  Patient's transesophageal echo from 5/28/2020 reveals normal LV systolic function with moderate MR          No diagnosis found.           Past Medical History:  Past Medical History:   Diagnosis Date   • Anemia    • Arthritis     left hip    • Atrial fibrillation (CMS/HCC)    • Atrial fibrillation (CMS/HCC)     chronic   • Cardiomegaly     mild   • Chronic diastolic heart failure (CMS/HCC)    • GERD (gastroesophageal reflux disease)    • Hyperlipidemia    • Hypertension    • Hypotension    • Lower extremity edema    • Mitral regurgitation    • Obesity    • Pacemaker    • Parkinson disease (CMS/HCC)    • Recurrent falls     with injury    • Sick sinus syndrome (CMS/[facility])     s/p pacemaker implant    • Valvular heart disease     MR     Past Surgical History:  Past Surgical History:   Procedure Laterality Date   • APPENDECTOMY     • ATRIAL APPENDAGE EXCLUSION LEFT WITH TRANSESOPHAGEAL ECHOCARDIOGRAM N/A 5/28/2020    Procedure: Atrial Appendage Occlusion;  Surgeon: Jim Walker MD;  Location: [facility] CATH INVASIVE LOCATION;  Service: Cardiovascular;  Laterality: N/A;   • ATRIAL APPENDAGE EXCLUSION LEFT WITH TRANSESOPHAGEAL ECHOCARDIOGRAM N/A 5/28/2020    Procedure: Atrial Appendage Occlusion;  Surgeon: Lashaun So MD;  Location: [facility] CATH INVASIVE LOCATION;  Service: Cardiovascular;  Laterality: N/A;   • CATARACT EXTRACTION     • CHOLECYSTECTOMY     • COLONOSCOPY N/A 5/21/2020    Procedure: COLONOSCOPY WITH BIOPSY X1 AREA;  Surgeon: Sim Collins MD;  Location: [facility] ENDOSCOPY;  Service: Gastroenterology;  Laterality: N/A;  Post: poor prep, impacted stool in rectum, and internal hemorrhoids, ascending colon arteriovenous malformation   • ENDOSCOPY N/A 5/21/2020     Procedure: ESOPHAGOGASTRODUODENOSCOPY with clipping x 1 of bleeding gastric polyp;  Surgeon: Sim Collins MD;  Location: [facility] ENDOSCOPY;  Service: Gastroenterology;  Laterality: N/A;  Post: bleeding gastric polyp   • HIP SURGERY Right 08/2017   • OTHER SURGICAL HISTORY      skin mole excisions    • PACEMAKER IMPLANTATION  06/22/2017    [employer] ([employer])   • TONSILLECTOMY     • TOTAL HIP ARTHROPLASTY Left 05/20/2014      Allergies:  Allergies   Allergen Reactions   • Amoxicillin Diarrhea   • Cephalexin Diarrhea and Nausea And Vomiting   • Penicillins Other (See Comments)   • Amoxicillin-Pot Clavulanate Nausea And Vomiting   • Clarithromycin Nausea And Vomiting     Home Meds:  Medications Prior to Admission   Medication Sig Dispense Refill Last Dose   • acetaminophen (TYLENOL) 325 MG tablet Take 650 mg by mouth Every 6 (Six) Hours As Needed for Mild Pain .   Taking   • baclofen (LIORESAL) 10 MG tablet Take 10 mg by mouth Daily.   Taking   • carbidopa-levodopa (SINEMET)  MG per tablet Take 1.5 tablets by mouth Every 8 (Eight) Hours.   Taking   • clonazePAM (KlonoPIN) 0.5 MG tablet Take 1 tablet by mouth At Night As Needed.   Taking   • escitalopram (LEXAPRO) 10 MG tablet Take 10 mg by mouth Daily.   Taking   • ferrous sulfate 325 (65 FE) MG tablet Take 1 tablet by mouth Every 12 (Twelve) Hours.   Taking   • furosemide (LASIX) 40 MG tablet Take 40 mg by mouth Daily.   Taking   • gabapentin (NEURONTIN) 300 MG capsule Take 300 mg by mouth every night at bedtime.   Taking   • multivitamin-minerals (CENTRUM) tablet Take 1 tablet by mouth Daily.   Taking   • Omega-3 Fatty Acids (FISH OIL PO) Take 2 capsules by mouth Daily.   Taking   • omeprazole (PRILOSEC) 20 MG capsule Take 1 capsule by mouth Daily.   Taking   • oxybutynin (DITROPAN) 5 MG tablet Take 5 mg by mouth Daily.   Taking   • potassium chloride (K-DUR,KLOR-CON) 20 MEQ CR tablet Take 1 tablet by mouth 3 (Three) Times a Day. (Patient taking  differently: Take 20 mEq by mouth 3 (Three) Times a Day. Pt takes this 5 times a day) 60 tablet 2 Taking   • pramipexole (MIRAPEX) 0.25 MG tablet Take 1 tablet by mouth Daily.   Taking   • rosuvastatin (CRESTOR) 10 MG tablet Take 1 tablet by mouth every night at bedtime.   Taking   • warfarin (COUMADIN) 2 MG tablet Take As Directed. Taking every day but Sunday   Taking     Current Meds:     Current Facility-Administered Medications:   •  acetaminophen (TYLENOL) tablet 650 mg, 650 mg, Oral, Q4H PRN **OR** acetaminophen (TYLENOL) 160 MG/5ML solution 650 mg, 650 mg, Oral, Q4H PRN **OR** acetaminophen (TYLENOL) suppository 650 mg, 650 mg, Rectal, Q4H PRN, Jose Barnes PA-C  •  baclofen (LIORESAL) tablet 10 mg, 10 mg, Oral, Daily, Jose Barnes PA-C  •  calcium gluconate 1g/50ml 0.675% NaCl IV SOLN, 1 g, Intravenous, PRN **AND** calcium gluconate 2-0.675 GM/100ML NACL IVPB, 2 g, Intravenous, PRN **AND** Calcium, Ionized, , , PRN, Jose Barnes PA-C  •  carbidopa-levodopa (SINEMET)  MG per tablet 1.5 tablet, 1.5 tablet, Oral, TID, Jose Barnes PA-C, 1.5 tablet at 06/12/20 2056  •  clonazePAM (KlonoPIN) tablet 0.5 mg, 0.5 mg, Oral, Nightly PRN, Jose Barnes PA-C  •  docusate sodium (COLACE) capsule 100 mg, 100 mg, Oral, BID PRN, Jose Barnes PA-C  •  escitalopram (LEXAPRO) tablet 10 mg, 10 mg, Oral, Daily, Jose Barnes PA-C  •  ferrous sulfate EC tablet 324 mg, 324 mg, Oral, Daily With Breakfast, Jose Barnes PA-C  •  furosemide (LASIX) injection 40 mg, 40 mg, Intravenous, Q12H, Jose Barnes PA-C, 40 mg at 06/12/20 2055  •  furosemide (LASIX) tablet 40 mg, 40 mg, Oral, Daily, Jose Barnes PA-C  •  gabapentin (NEURONTIN) capsule 300 mg, 300 mg, Oral, Nightly, Jose Barnes PA-C, 300 mg at 06/12/20 2056  •  Magnesium Sulfate 2 gram Bolus, followed by 8 gram infusion (total Mg dose 10 grams)- Mg less than or equal to 1mg/dL, 2 g,  Intravenous, PRN **OR** Magnesium Sulfate 2 gram / 50mL Infusion (GIVE X 3 BAGS TO EQUAL 6GM TOTAL DOSE) - Mg 1.1 - 1.5 mg/dl, 2 g, Intravenous, PRN **OR** Magnesium Sulfate 4 gram infusion- Mg 1.6-1.9 mg/dL, 4 g, Intravenous, PRN, Jose Barnes PA-C  •  melatonin tablet 5 mg, 5 mg, Oral, Nightly PRN, Jose Barnes PA-C  •  multivitamin (eye vitamin) tablet 1 tablet, 1 tablet, Oral, Daily, Jose Barnes PA-C  •  nitroglycerin (NITROSTAT) SL tablet 0.4 mg, 0.4 mg, Sublingual, Q5 Min PRN, Jose Barnes PA-C  •  nystatin (MYCOSTATIN) powder, , Topical, Q12H, Jeanie Franks APRN  •  ondansetron (ZOFRAN) tablet 4 mg, 4 mg, Oral, Q6H PRN **OR** ondansetron (ZOFRAN) injection 4 mg, 4 mg, Intravenous, Q6H PRN, Jose Barnes PA-C  •  pantoprazole (PROTONIX) EC tablet 40 mg, 40 mg, Oral, QAM, Jose Barnes PA-C  •  Pharmacy to dose warfarin, , Does not apply, Continuous PRN, Jose Barnes PA-C  •  Pharmacy to dose warfarin, , Does not apply, Continuous PRN, Jose Barnes PA-C  •  potassium & sodium phosphates (PHOS-NAK) 280-160-250 MG packet - for Phosphorus less than 1.25 mg/dL, 2 packet, Oral, Q6H PRN **OR** potassium & sodium phosphates (PHOS-NAK) 280-160-250 MG packet - for Phosphorus 1.25 - 2.5 mg/dL, 2 packet, Oral, Q6H PRN, Jose Barnes PA-C  •  potassium chloride (K-DUR,KLOR-CON) CR tablet 20 mEq, 20 mEq, Oral, TID, Jose Barnes PA-C, 20 mEq at 06/12/20 2056  •  potassium chloride (K-DUR,KLOR-CON) CR tablet 40 mEq, 40 mEq, Oral, PRN, Jose Barnes PA-C  •  potassium chloride (KLOR-CON) packet 40 mEq, 40 mEq, Oral, PRN, Jose Barnes PA-C  •  pramipexole (MIRAPEX) tablet 0.25 mg, 0.25 mg, Oral, Nightly, Jose Barnes PA-C, 0.25 mg at 06/12/20 2056  •  rosuvastatin (CRESTOR) tablet 10 mg, 10 mg, Oral, Nightly, Jose Barnes PA-C, 10 mg at 06/12/20 2056  •  sodium chloride 0.9 % flush 10 mL, 10 mL, Intravenous,  "Q12H, Jose Barnes PA-C, 10 mL at 06/12/20 2056  •  sodium chloride 0.9 % flush 10 mL, 10 mL, Intravenous, PRN, Jose Barnes PA-C  Social History:   Social History     Tobacco Use   • Smoking status: Former Smoker     Packs/day: 1.00     Years: 40.00     Pack years: 40.00     Types: Cigarettes   • Smokeless tobacco: Never Used   • Tobacco comment: quit 1970's    Substance Use Topics   • Alcohol use: Yes     Frequency: Never      Family History:  Family History   Problem Relation Age of Onset   • Heart failure Mother    • Stroke Maternal Grandfather         Review of Systems : Review of Systems   Constitution: Positive for weight gain. Negative for weight loss.   HENT: Negative for nosebleeds.    Cardiovascular: Positive for leg swelling. Negative for chest pain, dyspnea on exertion, near-syncope, palpitations and syncope.   Respiratory: Positive for shortness of breath. Negative for cough and hemoptysis.    Endocrine: Negative for cold intolerance, heat intolerance, polydipsia and polyphagia.   Hematologic/Lymphatic: Does not bruise/bleed easily.   Skin: Negative for rash.   Musculoskeletal: Negative for arthritis and back pain.   Gastrointestinal: Negative for diarrhea, hematochezia, melena, nausea and vomiting.   Genitourinary: Negative for dysuria and hematuria.   Neurological: Negative for dizziness and seizures.   Psychiatric/Behavioral: Negative for altered mental status.               Constitutional:  Temp:  [97.3 °F (36.3 °C)-98.7 °F (37.1 °C)] 98.7 °F (37.1 °C)  Heart Rate:  [71-76] 76  Resp:  [18-20] 18  BP: (121-159)/(68-91) 121/68    Physical Exam   /68 (BP Location: Left arm, Patient Position: Lying)   Pulse 76   Temp 98.7 °F (37.1 °C) (Axillary)   Resp 18   Ht 182.9 cm (72.01\")   Wt 111 kg (245 lb 4.8 oz)   SpO2 95%   BMI 33.26 kg/m²   Physical Exam  General:  Appears in no acute distress  Eyes: Sclera is anicteric,  conjunctiva is clear   HEENT:  No JVD. Thyroid not " visibly enlarged. No mucosal pallor or cyanosis  Respiratory: Respirations regular and unlabored at rest.  Bilaterally good breath sounds, with good air entry in all fields. No crackles, rubs or wheezes auscultated  Cardiovascular: S1,S2 irregular rate and rhythm.  2/6 holosystolic murmur, no   rub or gallop auscultated.  Severe pretibial pitting edema  Gastrointestinal: Abdomen soft, flat, non tender. Bowel sounds present.   Musculoskeletal:  No abnormal movements  Extremities: No digital clubbing or cyanosis  Skin: Color pink. Skin warm and dry to touch. No rashes  No xanthoma  Neuro: Alert and awake, no lateralizing deficits appreciated    Cardiographics  ECG: EKG tracing was  personally reviewed by me  ECG 12 Lead    (Results Pending)       Telemetry: FABIAN adair    Echocardiogram:   Results for orders placed during the hospital encounter of 05/20/20   Adult Transesophageal Echo (PRESTON) W/ Cont if Necessary Per Protocol    Narrative · Left ventricular systolic function is normal.  · Left atrial cavity size is mild-to-moderately dilated.  · Moderate mitral valve regurgitation is present  · Left atrial appendage diameter was 33 mm at 135 degree which was very   large and not conducive to implantation of watchman device. Depth was 28   mm. After discussion, it was decided that anatomy of left atrial appendage   is not conducive and appropriate for watchman device implantation.   Procedure was aborted          Imaging  Chest X-ray:   Imaging Results (Last 24 Hours)     Procedure Component Value Units Date/Time    XR Chest 1 View [[id number]] Collected:  06/12/20 1736     Updated:  06/12/20 1745    Narrative:       XR CHEST 1 VW-     Date of Exam: 6/12/2020 5:00 PM     Indication: dyspnea  84-year-old     Comparison: Chest radiograph dated 05/23/2020     Technique: 1 view(s) of the chest were obtained.     FINDINGS: The lungs are poorly inflated. Small pleural effusions are  present. There is interstitial thickening and  prominent pulmonary  vascularity. Lung apices are obscured by the patient's chin. No  definitive airspace consolidations. There is stable cardiomegaly with a  single lead pacemaker. No pneumothorax. The trachea is midline. Bony  structures are intact.       Impression:       Prominent pulmonary vascularity with interstitial edema and small  effusions.  Stable cardiomegaly.        Electronically Signed By-Rolando Fofana DO. On:6/12/2020 5:43 PM  This report was finalized on [id number] by  Rolando Fofana DO..          Lab Review: I have reviewed the labs  Results from last 7 days   Lab Units 06/13/20  0607 06/13/20  0011 06/12/20  1753   TROPONIN T ng/mL <0.010 0.015 0.011     Results from last 7 days   Lab Units 06/13/20  0607   MAGNESIUM mg/dL 1.9     Results from last 7 days   Lab Units 06/13/20  0607 06/12/20  1753   SODIUM mmol/L 140 139   POTASSIUM mmol/L 3.8 4.5   BUN   --  17   CREATININE mg/dL 0.97 1.00   CALCIUM mg/dL 9.0 9.0     @[id number]@  Results from last 7 days   Lab Units 06/13/20  0607 06/12/20  1753   WBC 10*3/mm3 3.80 4.10   HEMOGLOBIN g/dL 9.4* 10.5*   HEMATOCRIT % 30.0* 33.7*   PLATELETS 10*3/mm3 209 246     Results from last 7 days   Lab Units 06/13/20  0607 06/13/20  0011 06/12/20  1753   INR  1.83* 1.71* 1.77*                       Jaylen Brito MD  6/13/2020, 08:06      EMR Dragon/Transcription:   Dictated utilizing Dragon dictation

## 2020-06-13 NOTE — PROGRESS NOTES
"Pharmacy dosing service  Anticoagulant  Warfarin     Subjective:    Rafael Ivey is a 84 y.o.male being continued on warfarin for atrial fibrillation.    PMH: pacemaker, CHF, HTN, HLD, Parkinson's, recurrent falls, GERD  Chadsvasc = 4 (age, sex, CHF, HTN)   HAS-BLED= 2 (age, HTN)     INR Goal: 2 - 3  Home medication?: Yes, warfarin 2 mg PO every day, except no dose on Sundays (pt is \"unsure\" if dose was taken today or not prior to admit).   Bridge Therapy Present?:  No  Interacting Medications Evaluation (New/Present/Discontinued): rosuvastatin (home med)   Additional Contributing Factors: n/a      Assessment/Plan:    INR = 1.77 (subtherapeutic). Since pt is unsure if home dose was taken or not, I will not increase dose yet, but rather just give him one time dose of 2 mg today (that way even if he did take, this will still only total a 4 mg dose for today). Daily INRs ordered.     Continue to monitor and adjust based on INR.         Date 6/12           INR 1.77           Dose 2 mg                 Objective:  [Ht: 182.9 cm (72.01\"); Wt:  ; BMI: Body mass index is 33.9 kg/m².]    Lab Results   Component Value Date    ALBUMIN 3.80 06/12/2020     Lab Results   Component Value Date    INR 1.77 (L) 06/12/2020    INR 1.70 (L) 05/29/2020    INR 1.83 (L) 05/28/2020    PROTIME 17.2 (L) 06/12/2020    PROTIME 16.5 (L) 05/29/2020    PROTIME 17.7 (L) 05/28/2020     Lab Results   Component Value Date    HGB 10.5 (L) 06/12/2020    HGB 9.2 (L) 05/29/2020    HGB 9.0 (L) 05/28/2020     Lab Results   Component Value Date    HCT 33.7 (L) 06/12/2020    HCT 29.1 (L) 05/29/2020    HCT 29.5 (L) 05/28/2020       Jordana Parker, PharmD  06/12/20 22:36     "

## 2020-06-14 LAB
ANION GAP SERPL CALCULATED.3IONS-SCNC: 10 MMOL/L (ref 5–15)
BASOPHILS # BLD AUTO: 0.1 10*3/MM3 (ref 0–0.2)
BASOPHILS NFR BLD AUTO: 2.3 % (ref 0–1.5)
BUN BLD-MCNC: 17 MG/DL (ref 8–23)
BUN BLD-MCNC: ABNORMAL MG/DL
BUN/CREAT SERPL: ABNORMAL
CALCIUM SPEC-SCNC: 8.7 MG/DL (ref 8.6–10.5)
CHLORIDE SERPL-SCNC: 102 MMOL/L (ref 98–107)
CO2 SERPL-SCNC: 29 MMOL/L (ref 22–29)
CREAT BLD-MCNC: 1.04 MG/DL (ref 0.76–1.27)
DEPRECATED RDW RBC AUTO: 82.7 FL (ref 37–54)
EOSINOPHIL # BLD AUTO: 0.3 10*3/MM3 (ref 0–0.4)
EOSINOPHIL NFR BLD AUTO: 6.1 % (ref 0.3–6.2)
ERYTHROCYTE [DISTWIDTH] IN BLOOD BY AUTOMATED COUNT: 34.5 % (ref 12.3–15.4)
GFR SERPL CREATININE-BSD FRML MDRD: 68 ML/MIN/1.73
GLUCOSE BLD-MCNC: 102 MG/DL (ref 65–99)
HCT VFR BLD AUTO: 30.2 % (ref 37.5–51)
HEMOCCULT STL QL IA: POSITIVE
HGB BLD-MCNC: 9.5 G/DL (ref 13–17.7)
INR PPP: 1.73 (ref 2–3)
LYMPHOCYTES # BLD AUTO: 0.7 10*3/MM3 (ref 0.7–3.1)
LYMPHOCYTES NFR BLD AUTO: 16.2 % (ref 19.6–45.3)
MAGNESIUM SERPL-MCNC: 1.8 MG/DL (ref 1.6–2.4)
MCH RBC QN AUTO: 24.2 PG (ref 26.6–33)
MCHC RBC AUTO-ENTMCNC: 31.6 G/DL (ref 31.5–35.7)
MCV RBC AUTO: 76.5 FL (ref 79–97)
MONOCYTES # BLD AUTO: 0.5 10*3/MM3 (ref 0.1–0.9)
MONOCYTES NFR BLD AUTO: 11.4 % (ref 5–12)
NEUTROPHILS # BLD AUTO: 2.7 10*3/MM3 (ref 1.7–7)
NEUTROPHILS NFR BLD AUTO: 64 % (ref 42.7–76)
NRBC BLD AUTO-RTO: 0 /100 WBC (ref 0–0.2)
PHOSPHATE SERPL-MCNC: 3.7 MG/DL (ref 2.5–4.5)
PLATELET # BLD AUTO: 211 10*3/MM3 (ref 140–450)
PMV BLD AUTO: 6 FL (ref 6–12)
POTASSIUM BLD-SCNC: 3.8 MMOL/L (ref 3.5–5.2)
PROTHROMBIN TIME: 16.8 SECONDS (ref 19.4–28.5)
RBC # BLD AUTO: 3.95 10*6/MM3 (ref 4.14–5.8)
SODIUM BLD-SCNC: 141 MMOL/L (ref 136–145)
WBC NRBC COR # BLD: 4.3 10*3/MM3 (ref 3.4–10.8)

## 2020-06-14 PROCEDURE — 85025 COMPLETE CBC W/AUTO DIFF WBC: CPT | Performed by: PHYSICIAN ASSISTANT

## 2020-06-14 PROCEDURE — 83735 ASSAY OF MAGNESIUM: CPT | Performed by: PHYSICIAN ASSISTANT

## 2020-06-14 PROCEDURE — 84100 ASSAY OF PHOSPHORUS: CPT | Performed by: PHYSICIAN ASSISTANT

## 2020-06-14 PROCEDURE — 80048 BASIC METABOLIC PNL TOTAL CA: CPT | Performed by: PHYSICIAN ASSISTANT

## 2020-06-14 PROCEDURE — 25010000002 FUROSEMIDE PER 20 MG: Performed by: PHYSICIAN ASSISTANT

## 2020-06-14 PROCEDURE — 99225 PR SBSQ OBSERVATION CARE/DAY 25 MINUTES: CPT | Performed by: HOSPITALIST

## 2020-06-14 PROCEDURE — 85610 PROTHROMBIN TIME: CPT | Performed by: PHYSICIAN ASSISTANT

## 2020-06-14 PROCEDURE — G0378 HOSPITAL OBSERVATION PER HR: HCPCS

## 2020-06-14 PROCEDURE — 99232 SBSQ HOSP IP/OBS MODERATE 35: CPT | Performed by: INTERNAL MEDICINE

## 2020-06-14 RX ORDER — WARFARIN SODIUM 3 MG/1
3 TABLET ORAL
Status: COMPLETED | OUTPATIENT
Start: 2020-06-14 | End: 2020-06-14

## 2020-06-14 RX ADMIN — ROSUVASTATIN CALCIUM 10 MG: 10 TABLET, FILM COATED ORAL at 20:33

## 2020-06-14 RX ADMIN — FUROSEMIDE 40 MG: 10 INJECTION, SOLUTION INTRAMUSCULAR; INTRAVENOUS at 08:57

## 2020-06-14 RX ADMIN — PRAMIPEXOLE DIHYDROCHLORIDE 0.25 MG: 0.25 TABLET ORAL at 20:33

## 2020-06-14 RX ADMIN — ESCITALOPRAM OXALATE 10 MG: 10 TABLET ORAL at 08:57

## 2020-06-14 RX ADMIN — OXYBUTYNIN CHLORIDE 5 MG: 5 TABLET ORAL at 08:57

## 2020-06-14 RX ADMIN — BACLOFEN 10 MG: 10 TABLET ORAL at 08:59

## 2020-06-14 RX ADMIN — MULTIPLE VITAMINS W/ MINERALS TAB 1 TABLET: TAB at 08:57

## 2020-06-14 RX ADMIN — GABAPENTIN 300 MG: 300 CAPSULE ORAL at 20:32

## 2020-06-14 RX ADMIN — POTASSIUM CHLORIDE 20 MEQ: 1500 TABLET, EXTENDED RELEASE ORAL at 20:32

## 2020-06-14 RX ADMIN — NYSTATIN: 100000 POWDER TOPICAL at 20:41

## 2020-06-14 RX ADMIN — Medication 10 ML: at 08:58

## 2020-06-14 RX ADMIN — CARBIDOPA AND LEVODOPA 1.5 TABLET: 25; 100 TABLET ORAL at 08:58

## 2020-06-14 RX ADMIN — FERROUS SULFATE TAB EC 324 MG (65 MG FE EQUIVALENT) 324 MG: 324 (65 FE) TABLET DELAYED RESPONSE at 08:58

## 2020-06-14 RX ADMIN — CARBIDOPA AND LEVODOPA 1.5 TABLET: 25; 100 TABLET ORAL at 17:30

## 2020-06-14 RX ADMIN — Medication 10 ML: at 20:33

## 2020-06-14 RX ADMIN — FUROSEMIDE 40 MG: 10 INJECTION, SOLUTION INTRAMUSCULAR; INTRAVENOUS at 20:33

## 2020-06-14 RX ADMIN — PANTOPRAZOLE SODIUM 40 MG: 40 TABLET, DELAYED RELEASE ORAL at 08:58

## 2020-06-14 RX ADMIN — WARFARIN SODIUM 3 MG: 3 TABLET ORAL at 17:29

## 2020-06-14 RX ADMIN — CARBIDOPA AND LEVODOPA 1.5 TABLET: 25; 100 TABLET ORAL at 20:33

## 2020-06-14 RX ADMIN — POTASSIUM CHLORIDE 20 MEQ: 1500 TABLET, EXTENDED RELEASE ORAL at 08:58

## 2020-06-14 RX ADMIN — POTASSIUM CHLORIDE 20 MEQ: 1500 TABLET, EXTENDED RELEASE ORAL at 17:29

## 2020-06-14 RX ADMIN — NYSTATIN: 100000 POWDER TOPICAL at 08:59

## 2020-06-14 RX ADMIN — FUROSEMIDE 40 MG: 40 TABLET ORAL at 08:58

## 2020-06-14 NOTE — PROGRESS NOTES
"Pharmacy dosing service  Anticoagulant  Warfarin     Subjective:    Rafael Ivey is a 84 y.o.male being continued on warfarin for atrial fibrillation.    INR Goal: 2 - 3  Home medication?: Yes, warfarin 2 mg daily except no dose on Sundays (Pt is \"unsure\" if dose was taken on 6/12 prior to admission)    Bridge Therapy Present?:  No  Interacting Medications Evaluation (New/Present/Discontinued): None   Additional Contributing Factors: N/A      Assessment/Plan:    INR subtherapeutic on admission. Pt unsure if took dose prior to admission on 6/12 therefore dose was not increased and home dose of 2 mg ordered. INR remains subtherapeutic today. Instead of skipping dose today per home regimen, will order 3 mg bolus dose.      Continue to monitor and adjust based on INR.         Date 6/12 6/13 6/14         INR 1.77 1.83 1.73         Dose 2 mg @0100 on 6/13 2 mg 3 mg             Objective:  [Ht: 182.9 cm (72.01\"); Wt: 107 kg (235 lb 3.2 oz); BMI: Body mass index is 31.89 kg/m².]    Lab Results   Component Value Date    ALBUMIN 3.80 06/12/2020     Lab Results   Component Value Date    INR 1.73 (L) 06/14/2020    INR 1.83 (L) 06/13/2020    INR 1.71 (L) 06/13/2020    PROTIME 16.8 (L) 06/14/2020    PROTIME 17.7 (L) 06/13/2020    PROTIME 16.6 (L) 06/13/2020     Lab Results   Component Value Date    HGB 9.5 (L) 06/14/2020    HGB 9.4 (L) 06/13/2020    HGB 10.5 (L) 06/12/2020     Lab Results   Component Value Date    HCT 30.2 (L) 06/14/2020    HCT 30.0 (L) 06/13/2020    HCT 33.7 (L) 06/12/2020       Alee Preston, PharmD  06/14/20 14:20       "

## 2020-06-14 NOTE — PLAN OF CARE
Problem: Patient Care Overview  Goal: Plan of Care Review  Outcome: Ongoing (interventions implemented as appropriate)  Flowsheets (Taken 6/14/2020 0036)  Outcome Summary: pt states that he is feeling better. still requires oxygen and gets SOA with activity. Will continue to monitor pt

## 2020-06-14 NOTE — PLAN OF CARE
Problem: Patient Care Overview  Goal: Plan of Care Review  Outcome: Ongoing (interventions implemented as appropriate)  Flowsheets  Taken 6/14/2020 1525  Progress: improving  Outcome Summary: Patient states he is feeling a lot better. Edema in BLE improving.  Taken 6/14/2020 0728  Plan of Care Reviewed With: patient  Goal: Discharge Needs Assessment  Outcome: Ongoing (interventions implemented as appropriate)  Flowsheets  Taken 6/12/2020 1726 by Anna Tang RN  Equipment Currently Used at Home: walker, rolling  Taken 6/12/2020 1729 by Anna Tang RN  Transportation Anticipated: family or friend will provide  Patient/Family Anticipated Services at Transition:   Patient/Family Anticipates Transition to: inpatient rehabilitation facility;long term care facility  Taken 6/13/2020 1452 by Georgina Bauer RN  Concerns to be Addressed: no discharge needs identified  Readmission Within the Last 30 Days: no previous admission in last 30 days  Goal: Interprofessional Rounds/Family Conf  Outcome: Ongoing (interventions implemented as appropriate)  Flowsheets (Taken 6/13/2020 1452)  Participants: family;;nursing;patient;physician;physical therapy     Problem: Fall Risk (Adult)  Goal: Identify Related Risk Factors and Signs and Symptoms  Outcome: Ongoing (interventions implemented as appropriate)  Flowsheets (Taken 6/13/2020 1452)  Related Risk Factors (Fall Risk): age-related changes;bladder function altered;gait/mobility problems  Signs and Symptoms (Fall Risk): presence of risk factors  Goal: Absence of Fall  Outcome: Ongoing (interventions implemented as appropriate)  Flowsheets (Taken 6/13/2020 1452)  Absence of Fall: making progress toward outcome     Problem: Skin Injury Risk (Adult)  Goal: Identify Related Risk Factors and Signs and Symptoms  Outcome: Ongoing (interventions implemented as appropriate)  Flowsheets (Taken 6/13/2020 1452)  Related Risk Factors (Skin Injury Risk): advanced  age;body weight extremes;edema;mobility impaired;moisture  Goal: Skin Health and Integrity  Outcome: Ongoing (interventions implemented as appropriate)  Flowsheets (Taken 6/13/2020 9770)  Skin Health and Integrity: making progress toward outcome

## 2020-06-14 NOTE — PROGRESS NOTES
Cardiology Progress Note    Patient Identification:  Name: Rafael Ivey  Age: 84 y.o.  Sex: male  :  1935  MRN: [id number]                 Follow Up / Chief Complaint: CHF    Interval History: Is feeling better with edema is decreased       Subjective: Swelling is less.  Denies any chest pain or shortness of breath      Objective:BUN/creatinine are 17/1.04.  Hemoglobin is 9.5       History of Present Illness:       This is an 84-year-old with PMH of     -Symptomatic bradycardia, single-chamber pacemaker  -Chronic atrial fibrillation on long-term anticoagulation, unsuccessful watchman due to large appendage  -Hypertension  -Dyslipidemia  -Mild MR, moderate TR, pulmonary hypertension PA pressures of 58  -Parkinson's and multiple falls  -Anemia and needing blood transfusion, AVM cauterization  -History of anxiety and depression  -Cholecystectomy, cataracts, hip surgery, tonsillectomy  -Positive family history of heart failure in mother and stroke in grandmother  -Former smoker  -Allergy/intolerance to amoxicillin penicillin, clarithromycin, cephalexin     Here with complaint of progressively worsening edema fatigue and shortness of breath.  Patient is on chronic diuretics for edema including Lasix and metolazone and recently has been having multiple falls.  Patient has been having swelling both in the legs and belly and weight gain and shortness of breath therefore was admitted by primary cardiologist to the hospital.  Work-up revealed a negative troponin x2.  proBNP elevated at 2173.  BUN/creatinine are normal at 0.97.  Hemoglobin is 9.4.  UA is negative for protein.     Assessment:       -Acute on chronic CHF due to preserved EF, diastolic heart failure, bilateral lymphedema  -Symptomatic bradycardia status post pacemaker  -Chronic atrial fibrillation poor candidate for long-term anticoagulation due to blood loss anemia  -Moderate mitral regurgitation by [facility] 2020 and moderate tricuspid  regurgitation  -Severe iron deficiency anemia history of AVM cauterization requiring transfusions  -Parkinson's and multiple falls  -Hypertension        Recommendations / Plan:         Telemetry is revealing V paced rhythm  Diuresis as tolerated  Monitor renal function and urine output  Daily weights  Monitor anemia and transfuse as needed  Patient has pulmonary hypertension and moderate MR will continue to monitor  Patient's transesophageal echo from 5/28/2020 reveals normal LV systolic function with moderate MR  Primary cardiologist Dr. Andre Barnes will follow-up in a.m.      Past Medical History:  Past Medical History:   Diagnosis Date   • Anemia    • Arthritis     left hip    • Atrial fibrillation (CMS/HCC)    • Atrial fibrillation (CMS/HCC)     chronic   • Cardiomegaly     mild   • Chronic diastolic heart failure (CMS/HCC)    • GERD (gastroesophageal reflux disease)    • Hyperlipidemia    • Hypertension    • Hypotension    • Lower extremity edema    • Mitral regurgitation    • Obesity    • Pacemaker    • Parkinson disease (CMS/HCC)    • Recurrent falls     with injury    • Sick sinus syndrome (CMS/HCC)     s/p pacemaker implant    • Valvular heart disease     MR     Past Surgical History:  Past Surgical History:   Procedure Laterality Date   • APPENDECTOMY     • ATRIAL APPENDAGE EXCLUSION LEFT WITH TRANSESOPHAGEAL ECHOCARDIOGRAM N/A 5/28/2020    Procedure: Atrial Appendage Occlusion;  Surgeon: Jim Walker MD;  Location:  [facility];  Service: Cardiovascular;  Laterality: N/A;   • ATRIAL APPENDAGE EXCLUSION LEFT WITH TRANSESOPHAGEAL ECHOCARDIOGRAM N/A 5/28/2020    Procedure: Atrial Appendage Occlusion;  Surgeon: Lashaun So MD;  Location:  [facility];  Service: Cardiovascular;  Laterality: N/A;   • CATARACT EXTRACTION     • CHOLECYSTECTOMY     • COLONOSCOPY N/A 5/21/2020    Procedure: COLONOSCOPY WITH BIOPSY X1 AREA;  Surgeon: Sim Collins MD;   Location: [facility] END[facility]COPY;  Service: Gastroenterology;  Laterality: N/A;  Post: poor prep, impacted stool in rectum, and internal hemorrhoids, ascending colon arteriovenous malformation   • ENDOSCOPY N/A 5/21/2020    Procedure: ESOPHAGOGASTRODUODENOSCOPY with clipping x 1 of bleeding gastric polyp;  Surgeon: Sim Collins MD;  Location: [facility] ENDOSCOPY;  Service: Gastroenterology;  Laterality: N/A;  Post: bleeding gastric polyp   • HIP SURGERY Right 08/2017   • OTHER SURGICAL HISTORY      skin mole excisions    • PACEMAKER IMPLANTATION  06/22/2017    [employer] ([employer])   • TONSILLECTOMY     • TOTAL HIP ARTHROPLASTY Left 05/20/2014        Social History:   Social History     Tobacco Use   • Smoking status: Former Smoker     Packs/day: 1.00     Years: 40.00     Pack years: 40.00     Types: Cigarettes   • Smokeless tobacco: Never Used   • Tobacco comment: quit 1970's    Substance Use Topics   • Alcohol use: Yes     Frequency: Never      Family History:  Family History   Problem Relation Age of Onset   • Heart failure Mother    • Stroke Maternal Grandfather           Allergies:  Allergies   Allergen Reactions   • Amoxicillin Diarrhea   • Cephalexin Diarrhea and Nausea And Vomiting   • Penicillins Other (See Comments)   • Amoxicillin-Pot Clavulanate Nausea And Vomiting   • Clarithromycin Nausea And Vomiting     Scheduled Meds:    baclofen 10 mg Daily   carbidopa-levodopa 1.5 tablet TID   escitalopram 10 mg Daily   ferrous sulfate 324 mg Daily With Breakfast   furosemide 40 mg Q12H   furosemide 40 mg Daily   gabapentin 300 mg Nightly   multivitamin (eye vitamin) 1 tablet Daily   nystatin  Q12H   oxybutynin 5 mg Daily   pantoprazole 40 mg QAM   potassium chloride 20 mEq TID   pramipexole 0.25 mg Nightly   rosuvastatin 10 mg Nightly   sodium chloride 10 mL Q12H   warfarin 2 mg Once per day on Mon Tue Wed Thu Fri Sat           INTAKE AND OUTPUT:    Intake/Output Summary (Last 24 hours) at 6/14/2020 1038  Last data  "filed at 6/14/2020 0800  Gross per 24 hour   Intake 1440 ml   Output 300 ml   Net 1140 ml       Review of Systems:   Review of Systems   Constitution: Negative for chills and fever.   Cardiovascular: Negative for chest pain and palpitations.   Respiratory: Negative for cough and hemoptysis.    Gastrointestinal: Negative for nausea.         Constitutional:  Temp:  [97.9 °F (36.6 °C)-98.5 °F (36.9 °C)] 97.9 °F (36.6 °C)  Heart Rate:  [69-79] 69  Resp:  [17-19] 17  BP: (126-134)/(66-80) 134/80    Physical Exam   /80 (BP Location: Left arm, Patient Position: Lying)   Pulse 69   Temp 97.9 °F (36.6 °C) (Oral)   Resp 17   Ht 182.9 cm (72.01\")   Wt 107 kg (235 lb 3.2 oz)   SpO2 98%   BMI 31.89 kg/m²   General:  Appears in no acute distress  Eyes: Sclera is anicteric,  conjunctiva is clear   HEENT:  No JVD. Thyroid not visibly enlarged. No mucosal pallor or cyanosis  Respiratory: Respirations regular and unlabored at rest.  Bilaterally good breath sounds, with good air entry in all fields. No crackles, rubs or wheezes auscultated  Cardiovascular: S1,S2 Regular rate and rhythm. No murmur, rub or gallop auscultated.  . 1+ pretibial pitting edema  Gastrointestinal: Abdomen soft, flat, non tender. Bowel sounds present.   Musculoskeletal:  No abnormal movements  Extremities: 1+ edema with stasis changes  Skin: Stasis changes seen  Neuro: Alert and awake, no lateralizing deficits appreciated        Cardiographics  Telemetry: V paced rhythm    ECG:   ECG 12 Lead   Preliminary Result   HEART RATE= 70  bpm   RR Interval= 860  ms   AK Interval=   ms   P Horizontal Axis= 87  deg   P Front Axis= 0  deg   QRSD Interval= 164  ms   QT Interval= 477  ms   QRS Axis= -79  deg   T Wave Axis= 84  deg   - ABNORMAL ECG -   AV dissociation   Multiple ventricular premature complexes   IVCD, consider RBBB   ST elevation secondary to IVCD   Electronically Signed By:    Date and Time of Study: 2020-06-13 09:45:36      ECG 12 Lead    " "(Results Pending)     I have personally reviewed EKG    Echocardiogram: Results for orders placed during the hospital encounter of 05/20/20   Adult Transesophageal Echo ([facility]) W/ Cont if Necessary Per Protocol    Narrative · Left ventricular systolic function is normal.  · Left atrial cavity size is mild-to-moderately dilated.  · Moderate mitral valve regurgitation is present  · Left atrial appendage diameter was 33 mm at 135 degree which was very   large and not conducive to implantation of watchman device. Depth was 28   mm. After discussion, it was decided that anatomy of left atrial appendage   is not conducive and appropriate for watchman device implantation.   Procedure was aborted          Lab Review   I have reviewed the labs  Results from last 7 days   Lab Units 06/13/20  0607 06/13/20  0011 06/12/20  1753   TROPONIN T ng/mL <0.010 0.015 0.011     Results from last 7 days   Lab Units 06/14/20  0342   MAGNESIUM mg/dL 1.8     Results from last 7 days   Lab Units 06/14/20  0342   SODIUM mmol/L 141   POTASSIUM mmol/L 3.8   BUN  17   CREATININE mg/dL 1.04   CALCIUM mg/dL 8.7         Results from last 7 days   Lab Units 06/14/20  0342 06/13/20  0607 06/12/20  1753   WBC 10*3/mm3 4.30 3.80 4.10   HEMOGLOBIN g/dL 9.5* 9.4* 10.5*   HEMATOCRIT % 30.2* 30.0* 33.7*   PLATELETS 10*3/mm3 211 209 246     Results from last 7 days   Lab Units 06/14/20  0342 06/13/20  0607 06/13/20  0011   INR  1.73* 1.83* 1.71*       RADIOLOGY:  Imaging Results (Last 24 Hours)     ** No results found for the last 24 hours. **                )6/14/2020  Jaylen Brito MD      EMR Dragon/Transcription:   \"Dictated utilizing Dragon dictation\".   "

## 2020-06-14 NOTE — PROGRESS NOTES
[facility] Medicine Services Daily Progress Note      Hospitalist Team  LOS 1 days      Patient Care Team:  Blake Ivey MD as PCP - General (Family Medicine)    Patient Location: 223/1      Subjective   Subjective     Chief Complaint / Subjective  No chief complaint on file.  Lower extremity swelling and dyspnea on exertion      Brief Synopsis of Hospital Course/HPI  Patient is an 84-year-old gentleman with CHF, A. fib, HLD, Parkinson's, anxiety, depression and GERD who presented to the emergency room complaining of increasing fatigue with increased swelling, dyspnea on exertion and a 25 pound weight gain over the last couple of weeks.  Patient reports that he sleeps propped up and he does not know if he feels more short of breath if he lies flat.  He denies any chest pain or palpitations; no syncope or near syncope.  He still lives at home with his wife of 65 years [place].       BNP was 2173.  Renal function is normal.  INR was subtherapeutic at 1.77.  Hemoglobin was low at 10.5 with MCV 77.4.  Patient's most recent echocardiogram was a PRESTON performed 5/28/2020 and showed moderate MVR, normal LVEF of 56 to 60%.  It was performed in preparation for watchman device placement however the atrial appendage too large and was not appropriate for a watchman.  There was no atrial appendage thrombus.    On record review the patient was noted to have a recent evaluation with EGD and colonoscopy last month for iron deficiency anemia requiring transfusions and was found to have an AVM and rectal ulcer.  It was at that time that a watchman procedure was going to be performed so the patient could get off long-term anticoagulation.      Date: 6/13/2020: The patient reports feeling better after being diuresed since admission.  He did notice significant increase in urination with the IV Lasix.  The nursing staff reports that he is incontinent of urine so  "they are unable to keep an accurate output measurement..   I spoke with and examined the patient while also speaking with the patient's wife on speaker phone.  All questions were answered.  6/14/2020: Patient reports feeling much better today.  He feels that his swelling is significantly improved.  He denies any chest pain, shortness of breath, GI or  complaints.      ROS  12 point review of systems was reviewed and was negative except as above.    Objective   Objective      Vital Signs  Temp:  [97.9 °F (36.6 °C)-98.5 °F (36.9 °C)] 97.9 °F (36.6 °C)  Heart Rate:  [69-79] 69  Resp:  [17-19] 17  BP: (126-134)/(66-80) 134/80  Oxygen Therapy  SpO2: 98 %  Pulse Oximetry Type: Intermittent  Device (Oxygen Therapy): nasal cannula  Flow (L/min): 2  Flowsheet Rows      First Filed Value   Admission Height  182.9 cm (72.01\") Documented at 06/12/2020 1927   Admission Weight  111 kg (245 lb 4.8 oz) Documented at 06/13/2020 0239        Intake & Output (last 3 days)       06/11 0701 - 06/12 0700 06/12 0701 - 06/13 0700 06/13 0701 - 06/14 0700 06/14 0701 - 06/15 0700    P.O.  360 1560 240    Total Intake(mL/kg)  360 (3.2) 1560 (14.6) 240 (2.2)    Urine (mL/kg/hr)   300 (0.1)     Stool   0     Total Output   300     Net  +360 +1260 +240            Urine Unmeasured Occurrence  6 x 8 x     Stool Unmeasured Occurrence  2 x 2 x         Lines, Drains & Airways    Active LDAs     Name:   Placement date:   Placement time:   Site:   Days:    Peripheral IV 06/12/20 1700 Right Antecubital   06/12/20    1700    Antecubital   less than 1                  Physical Exam:    Physical Exam Well-developed well-nourished elderly gentleman in no acute distress sitting up in bed awake and alert comfortable on nasal cannula O2; mucous membranes moist; sclerae anicteric; lungs clear to auscultation bilaterally; CV regular rate and rhythm; abdomen soft nontender nondistended with active bowel sounds; extremities with bilateral lower extremity anasarca " which is moderately improved with decrease in the chronic hyperemic changes of the lower legs bilaterally, no cyanosis or calf tenderness; palpable pedal pulses bilaterally; no Lee catheter.       Wounds (last 24 hours)      LDA Wound     Row Name 06/14/20 0728 06/13/20 1901          Wound 06/12/20 1700 Right lower;posterior;proximal arm Skin Tear    Wound - Properties Group Date first assessed: 06/12/20  - Time first assessed: 1700  - Side: Right  -CG Orientation: lower;posterior;proximal  -CG Location: arm  -CG Primary Wound Type: Skin tear  -CG    Dressing Appearance  dry;intact  -VB  dry;intact  -KK     Base  dressing in place, unable to visualize  -VB  dressing in place, unable to visualize  -KK     Dressing Care, Wound  --  foam;border dressing  -KK        Wound 06/12/20 1901 scrotum MASD (Moisture associated skin damage)    Wound - Properties Group Date first assessed: 06/12/20  -KK Time first assessed: 1901 -KK Location: scrotum  -KK Primary Wound Type: MASD  -KK    Dressing Appearance  open to air  -VB  open to air  -KK     Base  blanchable;moist;red  -VB  blanchable;moist;red  -KK     Periwound  intact;excoriated;blanchable  -VB  intact;excoriated;blanchable  -KK     Periwound Care, Wound  --  barrier ointment applied  -KK        Wound 06/12/20 1901 gluteal MASD (Moisture associated skin damage)    Wound - Properties Group Date first assessed: 06/12/20  -KK Time first assessed: 1901 -KK Location: gluteal  -KK Primary Wound Type: MASD  -KK    Dressing Appearance  open to air  -VB  open to air  -KK     Base  blanchable;red;moist  -VB  blanchable;red;moist  -KK     Periwound  intact;excoriated;blanchable  -VB  intact;excoriated;blanchable  -KK     Periwound Care, Wound  --  barrier ointment applied  -KK       User Key  (r) = Recorded By, (t) = Taken By, (c) = Cosigned By    Initials Name Provider Type    Janis Botello, RN Registered Nurse    Anna Gastelum RN Registered Nurse    VB Bauer,  [place], RN Registered Nurse          Procedures:              Results Review:     I reviewed the patient's new clinical results.      Lab Results (last 24 hours)     Procedure Component Value Units Date/Time    BUN [[id number]]  (Normal) Collected:  06/14/20 0342    Specimen:  Blood Updated:  06/14/20 0828     BUN 17 mg/dL     Basic Metabolic Panel [[id number]]  (Abnormal) Collected:  06/14/20 0342    Specimen:  Blood Updated:  06/14/20 0422     Glucose 102 mg/dL      BUN --     Comment: Testing performed by alternate method        Creatinine 1.04 mg/dL      Sodium 141 mmol/L      Potassium 3.8 mmol/L      Chloride 102 mmol/L      CO2 29.0 mmol/L      Calcium 8.7 mg/dL      eGFR Non African Amer 68 mL/min/1.73      BUN/Creatinine Ratio --     Comment: Testing not performed.        Anion Gap 10.0 mmol/L     Narrative:       GFR Normal >60  Chronic Kidney Disease <60  Kidney Failure <15      Magnesium [[id number]]  (Normal) Collected:  06/14/20 0342    Specimen:  Blood Updated:  06/14/20 0422     Magnesium 1.8 mg/dL     Phosphorus [[id number]]  (Normal) Collected:  06/14/20 0342    Specimen:  Blood Updated:  06/14/20 0422     Phosphorus 3.7 mg/dL     Protime-INR [[id number]]  (Abnormal) Collected:  06/14/20 0342    Specimen:  Blood Updated:  06/14/20 0406     Protime 16.8 Seconds      INR 1.73    CBC & Differential [[id number]] Collected:  06/14/20 0342    Specimen:  Blood Updated:  06/14/20 0404    Narrative:       The following orders were created for panel order CBC & Differential.  Procedure                               Abnormality         Status                     ---------                               -----------         ------                     CBC Auto Differential[[id number]]        Abnormal            Final result                 Please view results for these tests on the individual orders.    CBC Auto Differential [[id number]]  (Abnormal) Collected:  06/14/20 0342    Specimen:  Blood Updated:  06/14/20 0404      WBC 4.30 10*3/mm3      RBC 3.95 10*6/mm3      Hemoglobin 9.5 g/dL      Hematocrit 30.2 %      MCV 76.5 fL      MCH 24.2 pg      MCHC 31.6 g/dL      RDW 34.5 %      RDW-SD 82.7 fl      MPV 6.0 fL      Platelets 211 10*3/mm3      Neutrophil % 64.0 %      Lymphocyte % 16.2 %      Monocyte % 11.4 %      Eosinophil % 6.1 %      Basophil % 2.3 %      Neutrophils, Absolute 2.70 10*3/mm3      Lymphocytes, Absolute 0.70 10*3/mm3      Monocytes, Absolute 0.50 10*3/mm3      Eosinophils, Absolute 0.30 10*3/mm3      Basophils, Absolute 0.10 10*3/mm3      nRBC 0.0 /100 WBC     Occult Blood, Fecal By Immunoassay - Stool, Per Rectum [[id number]]  (Abnormal) Collected:  06/13/20 2247    Specimen:  Stool from Per Rectum Updated:  06/14/20 0005     Occult Blood, Fecal by Immunoassay Positive        No results found for: HGBA1C  Results from last 7 days   Lab Units 06/14/20  0342 06/13/20  0607 06/13/20  0011   INR  1.73* 1.83* 1.71*           No results found for: LIPASE  No results found for: CHOL, CHLPL, TRIG, HDL, LDL, LDLDIRECT    Lab Results   Lab Value Date/Time    FINALDX  05/21/2020 1443     Ulcer, rectum, biopsy:    Scant fragments of colonic mucosa with mild chronic inflammation and focal hyperplastic changes    No violette ulceration is identified    No chronic features or malignant changes identified    See comment    MICHAEL/sms       COMDX  05/21/2020 1443     Deeper levels were examined through the block but were not further contributory.     MICHAEL/[employer]          Microbiology Results (last 10 days)     ** No results found for the last 240 hours. **          ECG/EMG Results (most recent)     Procedure Component Value Units Date/Time    ECG 12 Lead [[id number]] Collected:  06/13/20 0945     Updated:  06/13/20 0948    Narrative:       HEART RATE= 70  bpm  RR Interval= 860  ms  MA Interval=   ms  P Horizontal Axis= 87  deg  P Front Axis= 0  deg  QRSD Interval= 164  ms  QT Interval= 477  ms  QRS Axis= -79  deg  T Wave Axis= 84   deg  - ABNORMAL ECG -  AV dissociation  Multiple ventricular premature complexes  IVCD, consider RBBB  ST elevation secondary to IVCD  Electronically Signed By:   Date and Time of Study: 2020-06-13 09:45:36          Results for orders placed during the hospital encounter of 06/12/20   Duplex Venous Lower Extremity - Bilateral CAR    Narrative · Normal bilateral lower extremity venous duplex scan.  · Left popliteal fossa heterogenous fluid collection.          Results for orders placed during the hospital encounter of 05/20/20   Adult Transesophageal Echo (PRESTON) W/ Cont if Necessary Per Protocol    Narrative · Left ventricular systolic function is normal.  · Left atrial cavity size is mild-to-moderately dilated.  · Moderate mitral valve regurgitation is present  · Left atrial appendage diameter was 33 mm at 135 degree which was very   large and not conducive to implantation of watchman device. Depth was 28   mm. After discussion, it was decided that anatomy of left atrial appendage   is not conducive and appropriate for watchman device implantation.   Procedure was aborted          Xr Chest 1 View    Result Date: 6/12/2020  Prominent pulmonary vascularity with interstitial edema and small effusions. Stable cardiomegaly.   Electronically Signed By-Rolando Fofana DO. On:6/12/2020 5:43 PM This report was finalized on [id number] by  Rolando Fofana DO..          Xrays, labs reviewed personally by physician.    Medication Review:   I have reviewed the patient's current medication list      Scheduled Meds    baclofen 10 mg Oral Daily   carbidopa-levodopa 1.5 tablet Oral TID   escitalopram 10 mg Oral Daily   ferrous sulfate 324 mg Oral Daily With Breakfast   furosemide 40 mg Intravenous Q12H   furosemide 40 mg Oral Daily   gabapentin 300 mg Oral Nightly   multivitamin (eye vitamin) 1 tablet Oral Daily   nystatin  Topical Q12H   oxybutynin 5 mg Oral Daily   pantoprazole 40 mg Oral QAM   potassium chloride 20 mEq Oral TID    pramipexole 0.25 mg Oral Nightly   rosuvastatin 10 mg Oral Nightly   sodium chloride 10 mL Intravenous Q12H   warfarin 2 mg Oral Once per day on Mon Tue Wed Thu Fri Sat       Meds Infusions    Pharmacy to dose warfarin        Meds PRN  •  acetaminophen **OR** acetaminophen **OR** acetaminophen  •  Calcium Gluconate-NaCl **AND** calcium gluconate **AND** Calcium, Ionized  •  clonazePAM  •  docusate sodium  •  magnesium sulfate **OR** magnesium sulfate **OR** magnesium sulfate  •  melatonin  •  nitroglycerin  •  ondansetron **OR** ondansetron  •  Pharmacy to dose warfarin  •  potassium & sodium phosphates **OR** potassium & sodium phosphates  •  potassium chloride  •  potassium chloride  •  sodium chloride    I personally reviewed patient's x-ray films and EKG.    Assessment/Plan   Assessment/Plan     Active Hospital Problems    Diagnosis  POA   • **Acute on chronic combined systolic and diastolic CHF (congestive heart failure) (CMS/HCC) [I50.43]  Yes     Priority: High   • Hyperlipidemia [E78.5]  Yes     Priority: High   • Anasarca [R60.1]  Yes     Priority: High   • Valvular heart disease [I38]  Yes     Priority: High   • Sleep apnea [G47.30]  Yes     Priority: High   • Bilateral leg edema [R60.0]  Yes     Priority: High   • Pulmonary hypertension (CMS/HCC) [I27.20]  Yes     Priority: High   • Rectal ulceration [K62.6]  Yes     Priority: Medium   • Iron deficiency anemia due to chronic blood loss [D50.0]  Yes     Priority: Medium   • Depression [F32.9]  Yes   • Anemia [D64.9]  Yes   • Anxiety disorder [F41.9]  Yes   • GERD (gastroesophageal reflux disease) [K21.9]  Yes   • Obesity (BMI 30-39.9) [E66.9]  Yes   • Essential hypertension [I10]  Yes   • RLS (restless legs syndrome) [G25.81]  Yes   • Chronic pain [G89.29]  Yes   • Chronic diastolic CHF (congestive heart failure) (CMS/HCC) [I50.32]  Yes   • Parkinson's disease (CMS/HCC) [G20]  Yes   • Warfarin-induced coagulopathy (CMS/HCC) [D68.32, T45.515A]  Yes   •  Atrial fibrillation, chronic [I48.20]  Yes   • Sick sinus syndrome (CMS/HCC) [I49.5]  Yes   • Pacemaker [Z95.0]  Yes   • Aortic stenosis [I35.0]  Yes   • 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       MEDICAL DECISION MAKING COMPLEXITY BY PROBLEM:     Acute on chronic combined systolic and diastolic congestive heart failure secondary to hypertensive heart disease, valvular heart disease (AS, MR and TVR) and pulmonary hypertension from obstructive sleep apnea  -Continue IV Lasix  -Monitor renal function closely    Bilateral lower extremity anasarca multifactorial from venous stasis, lymphedema and CHF  -Venous duplex to rule out DVT  -Wound consult for compression wraps  -Patient counseled on elevation    Chronic and permanent atrial fibrillation  -Patient is on Coumadin anticoagulation which is subtherapeutic with an INR of 1.77  -Pharmacy to dose Coumadin for INR 2-3     Iron deficiency anemia  -Patient recently required transfusions for severe anemia felt to be secondary to GI blood loss status post EGD and colonoscopy 5/21/2020 at which time a rectal ulcer and AVM were identified  -Stool is currently Hemoccult positive  -Hemoglobin remained stable at 9.5  -Monitor    Sick sinus syndrome status post PPM    Essential hypertension  -Hold oral Lasix while on IV Lasix    Hyperlipidemia  -Continue Crestor  -Hold omega-3 fatty acids while in the hospital    Parkinson's disease  -Continue Sinemet and Mirapex and PRN Klonopin    GERD  -Continue omeprazole    Depression and anxiety  -Continue escitalopram and PRN Klonopin (inspect verified)    Chronic pain  -Continue gabapentin (inspect verified) and baclofen    Restless leg syndrome  -Continue Mirapex    OAB  -Continue oxybutynin    Obesity  -Lifestyle modification counseling          VTE Prophylaxis -   Mechanical Order History:     None      Pharmalogical  Order History:     Ordered     Dose Route Frequency Stop    06/12/20 2235  warfarin (COUMADIN) tablet 2 mg     Question:  Target INR  Answer:  2 - 3    2 mg PO Once (Warfarin) 06/13/20 0109    06/12/20 2022  Pharmacy to dose warfarin     Question:  Target INR  Answer:  2 - 3    -- XX Continuous PRN --    06/12/20 2022  Pharmacy to dose warfarin     Question:  Target INR  Answer:  2 - 3    -- XX Continuous PRN --            Code Status -   Code Status and Medical Interventions:   Ordered at: 06/12/20 1706     Code Status:    CPR     Medical Interventions (Level of Support Prior to Arrest):    Full       This patient has been examined wearing appropriate Personal Protective Equipment  06/14/20        Discharge Planning    PT, OT and  consult for discharge planning.  SLP OP Goals     Row Name 06/13/20 1741          Time Calculation    PT Goal Re-Cert Due Date  06/27/20  -VICKIE       User Key  (r) = Recorded By, (t) = Taken By, (c) = Cosigned By    Initials Name Provider Type    Caitlin Sky, PT Physical Therapist          Destination      Coordination has not been started for this encounter.      Durable Medical Equipment      Coordination has not been started for this encounter.      Dialysis/Infusion      Coordination has not been started for this encounter.      Home Medical Care      Coordination has not been started for this encounter.      Therapy      Coordination has not been started for this encounter.      Community Resources      Coordination has not been started for this encounter.            Electronically signed by Sylvia Rowan MD, 06/14/20, 13:35.  [facility] Hospitalist Team

## 2020-06-15 PROBLEM — I50.9 ACUTE ON CHRONIC HEART FAILURE (HCC): Status: ACTIVE | Noted: 2020-06-15

## 2020-06-15 LAB
ANION GAP SERPL CALCULATED.3IONS-SCNC: 9 MMOL/L (ref 5–15)
BASOPHILS # BLD AUTO: 0 10*3/MM3 (ref 0–0.2)
BASOPHILS NFR BLD AUTO: 0.9 % (ref 0–1.5)
BUN BLD-MCNC: 18 MG/DL (ref 8–23)
BUN BLD-MCNC: ABNORMAL MG/DL
BUN/CREAT SERPL: ABNORMAL
CALCIUM SPEC-SCNC: 8.6 MG/DL (ref 8.6–10.5)
CHLORIDE SERPL-SCNC: 101 MMOL/L (ref 98–107)
CO2 SERPL-SCNC: 31 MMOL/L (ref 22–29)
CREAT BLD-MCNC: 0.95 MG/DL (ref 0.76–1.27)
DEPRECATED RDW RBC AUTO: 82.3 FL (ref 37–54)
EOSINOPHIL # BLD AUTO: 0.2 10*3/MM3 (ref 0–0.4)
EOSINOPHIL NFR BLD AUTO: 4.3 % (ref 0.3–6.2)
ERYTHROCYTE [DISTWIDTH] IN BLOOD BY AUTOMATED COUNT: 33.8 % (ref 12.3–15.4)
GFR SERPL CREATININE-BSD FRML MDRD: 76 ML/MIN/1.73
GLUCOSE BLD-MCNC: 103 MG/DL (ref 65–99)
HCT VFR BLD AUTO: 29.5 % (ref 37.5–51)
HGB BLD-MCNC: 9.6 G/DL (ref 13–17.7)
INR PPP: 1.8 (ref 2–3)
LYMPHOCYTES # BLD AUTO: 0.8 10*3/MM3 (ref 0.7–3.1)
LYMPHOCYTES NFR BLD AUTO: 17.7 % (ref 19.6–45.3)
MAGNESIUM SERPL-MCNC: 1.7 MG/DL (ref 1.6–2.4)
MCH RBC QN AUTO: 25.2 PG (ref 26.6–33)
MCHC RBC AUTO-ENTMCNC: 32.5 G/DL (ref 31.5–35.7)
MCV RBC AUTO: 77.5 FL (ref 79–97)
MONOCYTES # BLD AUTO: 0.6 10*3/MM3 (ref 0.1–0.9)
MONOCYTES NFR BLD AUTO: 12.8 % (ref 5–12)
NEUTROPHILS # BLD AUTO: 3 10*3/MM3 (ref 1.7–7)
NEUTROPHILS NFR BLD AUTO: 64.3 % (ref 42.7–76)
NRBC BLD AUTO-RTO: 0.1 /100 WBC (ref 0–0.2)
PHOSPHATE SERPL-MCNC: 3.3 MG/DL (ref 2.5–4.5)
PLATELET # BLD AUTO: 194 10*3/MM3 (ref 140–450)
PMV BLD AUTO: 8.2 FL (ref 6–12)
POTASSIUM BLD-SCNC: 3.6 MMOL/L (ref 3.5–5.2)
PROTHROMBIN TIME: 17.4 SECONDS (ref 19.4–28.5)
RBC # BLD AUTO: 3.8 10*6/MM3 (ref 4.14–5.8)
SODIUM BLD-SCNC: 141 MMOL/L (ref 136–145)
WBC NRBC COR # BLD: 4.6 10*3/MM3 (ref 3.4–10.8)

## 2020-06-15 PROCEDURE — 80048 BASIC METABOLIC PNL TOTAL CA: CPT | Performed by: PHYSICIAN ASSISTANT

## 2020-06-15 PROCEDURE — 85025 COMPLETE CBC W/AUTO DIFF WBC: CPT | Performed by: PHYSICIAN ASSISTANT

## 2020-06-15 PROCEDURE — 83735 ASSAY OF MAGNESIUM: CPT | Performed by: PHYSICIAN ASSISTANT

## 2020-06-15 PROCEDURE — 84100 ASSAY OF PHOSPHORUS: CPT | Performed by: PHYSICIAN ASSISTANT

## 2020-06-15 PROCEDURE — 99232 SBSQ HOSP IP/OBS MODERATE 35: CPT | Performed by: INTERNAL MEDICINE

## 2020-06-15 PROCEDURE — 25010000002 FUROSEMIDE PER 20 MG: Performed by: PHYSICIAN ASSISTANT

## 2020-06-15 PROCEDURE — 85610 PROTHROMBIN TIME: CPT | Performed by: PHYSICIAN ASSISTANT

## 2020-06-15 RX ADMIN — CARBIDOPA AND LEVODOPA 1.5 TABLET: 25; 100 TABLET ORAL at 15:58

## 2020-06-15 RX ADMIN — CARBIDOPA AND LEVODOPA 1.5 TABLET: 25; 100 TABLET ORAL at 08:33

## 2020-06-15 RX ADMIN — POTASSIUM CHLORIDE 20 MEQ: 1500 TABLET, EXTENDED RELEASE ORAL at 08:33

## 2020-06-15 RX ADMIN — OXYBUTYNIN CHLORIDE 5 MG: 5 TABLET ORAL at 08:33

## 2020-06-15 RX ADMIN — NYSTATIN: 100000 POWDER TOPICAL at 08:40

## 2020-06-15 RX ADMIN — GABAPENTIN 300 MG: 300 CAPSULE ORAL at 20:48

## 2020-06-15 RX ADMIN — Medication 10 ML: at 20:48

## 2020-06-15 RX ADMIN — Medication 10 ML: at 08:34

## 2020-06-15 RX ADMIN — CARBIDOPA AND LEVODOPA 1.5 TABLET: 25; 100 TABLET ORAL at 20:48

## 2020-06-15 RX ADMIN — FERROUS SULFATE TAB EC 324 MG (65 MG FE EQUIVALENT) 324 MG: 324 (65 FE) TABLET DELAYED RESPONSE at 08:33

## 2020-06-15 RX ADMIN — FUROSEMIDE 40 MG: 10 INJECTION, SOLUTION INTRAMUSCULAR; INTRAVENOUS at 08:33

## 2020-06-15 RX ADMIN — BACLOFEN 10 MG: 10 TABLET ORAL at 08:33

## 2020-06-15 RX ADMIN — ESCITALOPRAM OXALATE 10 MG: 10 TABLET ORAL at 08:33

## 2020-06-15 RX ADMIN — WARFARIN 2 MG: 2 TABLET ORAL at 17:39

## 2020-06-15 RX ADMIN — FUROSEMIDE 40 MG: 10 INJECTION, SOLUTION INTRAMUSCULAR; INTRAVENOUS at 20:57

## 2020-06-15 RX ADMIN — PRAMIPEXOLE DIHYDROCHLORIDE 0.25 MG: 0.25 TABLET ORAL at 20:48

## 2020-06-15 RX ADMIN — POTASSIUM CHLORIDE 20 MEQ: 1500 TABLET, EXTENDED RELEASE ORAL at 20:48

## 2020-06-15 RX ADMIN — ROSUVASTATIN CALCIUM 10 MG: 10 TABLET, FILM COATED ORAL at 20:48

## 2020-06-15 RX ADMIN — PANTOPRAZOLE SODIUM 40 MG: 40 TABLET, DELAYED RELEASE ORAL at 06:06

## 2020-06-15 RX ADMIN — POTASSIUM CHLORIDE 20 MEQ: 1500 TABLET, EXTENDED RELEASE ORAL at 15:58

## 2020-06-15 RX ADMIN — NYSTATIN: 100000 POWDER TOPICAL at 20:48

## 2020-06-15 RX ADMIN — MULTIPLE VITAMINS W/ MINERALS TAB 1 TABLET: TAB at 08:33

## 2020-06-15 NOTE — NURSING NOTE
84-year-old male admitted to the hospital with congestive heart failure.  Asked to see patient for moisture associated dermatitis.  Patient is incontinent of urine.  Patient has a red papular rash to the groin and scrotal area patient currently has nystatin ordered this is appropriate with continue to use the nystatin with a moisture barrier and incontinence pads

## 2020-06-15 NOTE — PROGRESS NOTES
Continued Stay Note   Gian     Patient Name: Rafael Ivey  MRN: [id number]  Today's Date: 6/15/2020    Admit Date: 6/12/2020    Discharge Plan     Row Name 06/15/20 1652       Plan    Plan  DC Plan: Pt may be from [facility]. Pending confirmation with facility and pt/family.         Discharge Codes    No documentation.       LON Marinelli    Phone # [phone]  Cell #[phone]  Fax#[phone]  [email or web address]        LON Marinelli

## 2020-06-15 NOTE — PROGRESS NOTES
CC--leg edema, persistent atrial fibrillation, recurrent falls, iron deficiency anemia, single-chamber pacemaker in situ    Subject--patient admitted over the weekend with intravenous diuresis  Lost nearly 20 pounds and feels remarkably better  Significant reduction of his lower extremity edema  He is able to ambulate with a walker  Appetite is improved      Past Medical History:   Diagnosis Date   • Anemia    • Arthritis     left hip    • Atrial fibrillation (CMS/[facility])    • Atrial fibrillation (CMS/[facility])     chronic   • Cardiomegaly     mild   • Chronic diastolic heart failure (CMS/[facility])    • GERD (gastroesophageal reflux disease)    • Hyperlipidemia    • Hypertension    • Hypotension    • Lower extremity edema    • Mitral regurgitation    • Obesity    • Pacemaker    • Parkinson disease (CMS/[facility])    • Recurrent falls     with injury    • Sick sinus syndrome (CMS/[facility])     s/p pacemaker implant    • Valvular heart disease     MR     Past Surgical History:   Procedure Laterality Date   • APPENDECTOMY     • ATRIAL APPENDAGE EXCLUSION LEFT WITH TRANSESOPHAGEAL ECHOCARDIOGRAM N/A 5/28/2020    Procedure: Atrial Appendage Occlusion;  Surgeon: Jim Walker MD;  Location: [facility] CATH INVASIVE LOCATION;  Service: Cardiovascular;  Laterality: N/A;   • ATRIAL APPENDAGE EXCLUSION LEFT WITH TRANSESOPHAGEAL ECHOCARDIOGRAM N/A 5/28/2020    Procedure: Atrial Appendage Occlusion;  Surgeon: Lashaun So MD;  Location: [facility] CATH INVASIVE LOCATION;  Service: Cardiovascular;  Laterality: N/A;   • CATARACT EXTRACTION     • CHOLECYSTECTOMY     • COLONOSCOPY N/A 5/21/2020    Procedure: COLONOSCOPY WITH BIOPSY X1 AREA;  Surgeon: Sim Collins MD;  Location: [facility] ENDOSCOPY;  Service: Gastroenterology;  Laterality: N/A;  Post: poor prep, impacted stool in rectum, and internal hemorrhoids, ascending colon arteriovenous malformation   • ENDOSCOPY N/A 5/21/2020    Procedure: ESOPHAGOGASTRODUODENOSCOPY with clipping x 1 of  bleeding gastric polyp;  Surgeon: Sim Collins MD;  Location: [facility] ENDOSCOPY;  Service: Gastroenterology;  Laterality: N/A;  Post: bleeding gastric polyp   • HIP SURGERY Right 08/2017   • OTHER SURGICAL HISTORY      skin mole excisions    • PACEMAKER IMPLANTATION  06/22/2017    L[employer] ([employer])   • TONSILLECTOMY     • TOTAL HIP ARTHROPLASTY Left 05/20/2014     Family History   Problem Relation Age of Onset   • Heart failure Mother    • Stroke Maternal Grandfather      Social History     Tobacco Use   • Smoking status: Former Smoker     Packs/day: 1.00     Years: 40.00     Pack years: 40.00     Types: Cigarettes   • Smokeless tobacco: Never Used   • Tobacco comment: quit 1970's    Substance Use Topics   • Alcohol use: Yes     Frequency: Never   • Drug use: Never     Allergies:  Amoxicillin; Cephalexin; Penicillins; Amoxicillin-pot clavulanate; and Clarithromycin    Review of Systems   General:  positive for fatigue and tiredness  Eyes: No redness  Cardiovascular: No chest pain, no palpitations  Respiratory:   positive for class 3 shortness of breath  Gastrointestinal: No nausea or vomiting, bleeding  Genitourinary: no hematuria or dysuria  Musculoskeletal: No arthralgia or myalgia  Skin: Positive for scrotal and groin rash   Neurologic: No numbness, tingling  Hematologic/Lymphatic: No abnormal bleeding      Physical Exam  VITALS REVIEWED--blood pressure 123/78 pulse in this patient was 70 patient was afebrile respiration 12 times a minute    General:      well developed, well nourished, in no acute distress.  Pallor positive  Head:      normocephalic and atraumatic.    Eyes:      PERRL/EOM intact, conjunctiva and sclera clear with out nystagmus.    Neck:      no masses, thyromegaly,  trachea central with normal respiratory effort   Lungs:      Reduced breath sounds in bases     Heart:       underlying atrial fibrillation with irregularly irregular rhythm and  without any murmurs gallops or  rubs    Extremities:      Significant reduction of edema with residual lymphedema   Neurologic:      no focal deficits.   alert oriented x3  Skin:      Positive rash in the scrotum     Psych:      alert and cooperative; normal mood and affect; normal attention span and concentration.          CBC    Results from last 7 days   Lab Units 06/15/20  0353 06/14/20  0342 06/13/20  0607 06/12/20  1753   WBC 10*3/mm3 4.60 4.30 3.80 4.10   HEMOGLOBIN g/dL 9.6* 9.5* 9.4* 10.5*   PLATELETS 10*3/mm3 194 211 209 246     BMP   Results from last 7 days   Lab Units 06/15/20  0353 06/14/20  0342 06/13/20  0607 06/12/20  1753   SODIUM mmol/L 141 141 140 139   POTASSIUM mmol/L 3.6 3.8 3.8 4.5   CHLORIDE mmol/L 101 102 106 103   CO2 mmol/L 31.0* 29.0 28.0 27.0   BUN  18 17 15 17   CREATININE mg/dL 0.95 1.04 0.97 1.00   GLUCOSE mg/dL 103* 102* 83 101*   MAGNESIUM mg/dL 1.7 1.8 1.9 2.0   PHOSPHORUS mg/dL 3.3 3.7 3.3 3.2     Infection     CMP   Results from last 7 days   Lab Units 06/15/20  0353 06/14/20  0342 06/13/20  0607 06/12/20  1753   SODIUM mmol/L 141 141 140 139   POTASSIUM mmol/L 3.6 3.8 3.8 4.5   CHLORIDE mmol/L 101 102 106 103   CO2 mmol/L 31.0* 29.0 28.0 27.0   BUN  18 17 15 17   CREATININE mg/dL 0.95 1.04 0.97 1.00   GLUCOSE mg/dL 103* 102* 83 101*   ALBUMIN g/dL  --   --   --  3.80   BILIRUBIN mg/dL  --   --   --  1.0   ALK PHOS U/L  --   --   --  181*   AST (SGOT) U/L  --   --   --  40   ALT (SGPT) U/L  --   --   --  6             Assessment and plan     Increasing  severe leg edema with lymphedema--status post diuresis with significant reduction of edema and significant improvement of symptoms    Single -chamber pacemaker in situ which was interrogated and pacemaker rate increased to 70 bpm  severe and profound iron deficiency anemia --post endoscopy and colonoscopy with cauterization of AVM --hemoglobin stable  Post transfusion and iron infusions   multiple falls with Parkinson's disease  Chronic and permanent atrial  fibrillation  Moderate aortic stenosis and moderate to severe tricuspid regurgitation  Bilateral lymphedema with ongoing class III diastolic chronic heart failure symptoms--improved with diuresis  History of anxiety and depression  Essential hypertension  Clinically improved  Potential discharge in the morning    Electronically signed by iJm Walker MD, 06/15/20, 6:26 PM.

## 2020-06-15 NOTE — PROGRESS NOTES
"Pharmacy dosing service  Anticoagulant  Warfarin     Subjective:    Rafael Ivey is a 84 y.o.male being continued on warfarin for atrial fibrillation.    INR Goal: 2 - 3  Home medication?: Yes, warfarin 2 mg daily except no dose on Sundays (Pt is \"unsure\" if dose was taken on 6/12 prior to admission)    Bridge Therapy Present?:  No  Interacting Medications Evaluation (New/Present/Discontinued): None   Additional Contributing Factors: N/A      Assessment/Plan:    INR subtherapeutic on admission. Pt unsure if took dose prior to admission on 6/12 therefore dose was not increased and home dose of 2 mg ordered. INR remains subtherapeutic today. Will give regular home dose of 2 mg today due to yesterdays loading dose of 3 mg and expected increase in INR tomorrow.     Continue to monitor and adjust based on INR.         Date 6/12 6/13 6/14 6/15        INR 1.77 1.83 1.73 1.80        Dose 2 mg @0100 on 6/13 2 mg 3 mg 2 mg            Objective:  [Ht: 182.9 cm (72.01\"); Wt: 107 kg (235 lb 10.8 oz); BMI: Body mass index is 31.96 kg/m².]    Lab Results   Component Value Date    ALBUMIN 3.80 06/12/2020     Lab Results   Component Value Date    INR 1.80 (L) 06/15/2020    INR 1.73 (L) 06/14/2020    INR 1.83 (L) 06/13/2020    PROTIME 17.4 (L) 06/15/2020    PROTIME 16.8 (L) 06/14/2020    PROTIME 17.7 (L) 06/13/2020     Lab Results   Component Value Date    HGB 9.6 (L) 06/15/2020    HGB 9.5 (L) 06/14/2020    HGB 9.4 (L) 06/13/2020     Lab Results   Component Value Date    HCT 29.5 (L) 06/15/2020    HCT 30.2 (L) 06/14/2020    HCT 30.0 (L) 06/13/2020       Richard Zavala, Pharmacy Intern  06/15/20 08:24         "

## 2020-06-15 NOTE — PROGRESS NOTES
[facility] Medicine Services Daily Progress Note      Hospitalist Team  LOS 1 days      Patient Care Team:  Blake Ivey MD as PCP - General (Family Medicine)    Patient Location: 223/1      Subjective   Subjective     Chief Complaint / Subjective  No chief complaint on file.  Lower extremity swelling and dyspnea on exertion      Brief Synopsis of Hospital Course/HPI  Patient is an 84-year-old gentleman with CHF, A. fib, HLD, Parkinson's, anxiety, depression and GERD who presented to the emergency room complaining of increasing fatigue with increased swelling, dyspnea on exertion and a 25 pound weight gain over the last couple of weeks.  Patient reports that he sleeps propped up and he does not know if he feels more short of breath if he lies flat.  He denies any chest pain or palpitations; no syncope or near syncope.  He still lives at home with his wife of 65 years [place].       BNP was 2173.  Renal function is normal.  INR was subtherapeutic at 1.77.  Hemoglobin was low at 10.5 with MCV 77.4.  Patient's most recent echocardiogram was a PRESTON performed 5/28/2020 and showed moderate MVR, normal LVEF of 56 to 60%.  It was performed in preparation for watchman device placement however the atrial appendage too large and was not appropriate for a watchman.  There was no atrial appendage thrombus.    On record review the patient was noted to have a recent evaluation with EGD and colonoscopy last month for iron deficiency anemia requiring transfusions and was found to have an AVM and rectal ulcer.  It was at that time that a watchman procedure was going to be performed so the patient could get off long-term anticoagulation.      Date: 6/13/2020:   The patient reports feeling better after being diuresed since admission.  He did notice significant increase in urination with the IV Lasix.  The nursing staff reports that he is incontinent of urine so  "they are unable to keep an accurate output measurement..   I spoke with and examined the patient while also speaking with the patient's wife on speaker phone.  All questions were answered.    6/14/2020:   Patient reports feeling much better today.  He feels that his swelling is significantly improved.  He denies any chest pain, shortness of breath, GI or  complaints.    6/15/2020  Today the patient reports he is feeling much better.  He reports much less edema and much less shortness of breath.  Patient remains with an oxygen saturation of 99% on 2 L.  No new complaints today.    Review of Systems   Constitution: Negative.   HENT: Negative.    Eyes: Negative.    Cardiovascular: Negative.    Respiratory: Positive for shortness of breath.    Endocrine: Negative.    Hematologic/Lymphatic: Negative.    Skin: Negative.    Musculoskeletal: Negative.    Gastrointestinal: Negative.    Genitourinary: Negative.    Neurological: Negative.    Psychiatric/Behavioral: Negative.    Allergic/Immunologic: Negative.    All other systems reviewed and are negative.      Objective   Objective      Vital Signs  Temp:  [97.8 °F (36.6 °C)-98.7 °F (37.1 °C)] 97.8 °F (36.6 °C)  Heart Rate:  [69-73] 69  Resp:  [16-17] 17  BP: (121-149)/(66-78) 127/70  Oxygen Therapy  SpO2: 99 %  Pulse Oximetry Type: Intermittent  Device (Oxygen Therapy): nasal cannula  Flow (L/min): 2  Flowsheet Rows      First Filed Value   Admission Height  182.9 cm (72.01\") Documented at 06/12/2020 1927   Admission Weight  111 kg (245 lb 4.8 oz) Documented at 06/13/2020 0239        Intake & Output (last 3 days)       06/12 0701 - 06/13 0700 06/13 0701 - 06/14 0700 06/14 0701 - 06/15 0700 06/15 0701 - 06/16 0700    P.O. 360 1560 480 854    Total Intake(mL/kg) 360 (3.2) 1560 (14.6) 480 (4.5) 854 (8)    Urine (mL/kg/hr)  300 (0.1) 100 (0) 225 (0.2)    Stool  0 0     Total Output  300 100 225    Net +360 +1260 +380 +629            Urine Unmeasured Occurrence 6 x 8 x 2 x     " Stool Unmeasured Occurrence 2 x 2 x 0 x         Lines, Drains & Airways    Active LDAs     Name:   Placement date:   Placement time:   Site:   Days:    Peripheral IV 06/12/20 1700 Right Antecubital   06/12/20    1700    Antecubital   less than 1                  Physical Exam:    Physical Exam   Constitutional: He is oriented to person, place, and time. He appears well-developed and well-nourished. No distress.   HENT:   Head: Normocephalic and atraumatic.   Right Ear: External ear normal.   Left Ear: External ear normal.   Nose: Nose normal.   Mouth/Throat: Oropharynx is clear and moist. No oropharyngeal exudate.   Eyes: Pupils are equal, round, and reactive to light. Conjunctivae and EOM are normal. Right eye exhibits no discharge. Left eye exhibits no discharge. No scleral icterus.   Neck: Normal range of motion. No JVD present. No tracheal deviation present. No thyromegaly present.   Cardiovascular: Normal rate, normal heart sounds and intact distal pulses. Exam reveals no gallop and no friction rub.   No murmur heard.  Patient's rhythm is irregularly irregular with a controlled ventricular response.   Pulmonary/Chest: Effort normal and breath sounds normal. No stridor. No respiratory distress. He has no wheezes. He has no rales. He exhibits no tenderness.   Few rales in the lower bases without dullness to percussion.   Abdominal: Soft. Bowel sounds are normal. He exhibits no distension and no mass. There is no tenderness. There is no rebound and no guarding. No hernia.   Musculoskeletal: Normal range of motion. He exhibits no edema, tenderness or deformity.   Lymphadenopathy:     He has no cervical adenopathy.   Neurological: He is alert and oriented to person, place, and time. No cranial nerve deficit or sensory deficit. He exhibits normal muscle tone. Coordination normal.   Skin: Skin is warm and dry. No rash noted. He is not diaphoretic. No erythema.   Psychiatric: He has a normal mood and affect. His  behavior is normal.   Nursing note and vitals reviewed.          Wounds (last 24 hours)      LDA Wound     Row Name 06/15/20 0705 06/14/20 1940          Wound 06/12/20 1700 Right lower;posterior;proximal arm Skin Tear    Wound - Properties Group Date first assessed: 06/12/20  - Time first assessed: 1700  - Side: Right  -CG Orientation: lower;posterior;proximal  -CG Location: arm  -CG Primary Wound Type: Skin tear  -CG    Dressing Appearance  intact;dry  -KT  intact;dry  -SH     Closure  Adhesive bandage  -KT  Adhesive bandage  -SH     Base  dressing in place, unable to visualize  -KT  dressing in place, unable to visualize  -SH        Wound 06/12/20 1901 scrotum MASD (Moisture associated skin damage)    Wound - Properties Group Date first assessed: 06/12/20 - Time first assessed: 1901 -KK Location: scrotum  -KK Primary Wound Type: MASD  -KK    Dressing Appearance  no drainage  -KT  no drainage  -SH     Closure  Open to air  -KT  Open to air  -SH     Base  blanchable;red;pink  -KT  blanchable;red;pink  -SH     Periwound  excoriated;moist;pink;redness  -KT  excoriated;moist;pink;redness  -SH     Periwound Temperature  warm  -KT  warm  -SH     Periwound Skin Turgor  soft  -KT  soft  -SH     Drainage Amount  none  -KT  none  -SH     Care, Wound  --  cleansed with;soap and water  -SH     Periwound Care, Wound  --  other (see comments) powder  -SH        Wound 06/12/20 1901 gluteal MASD (Moisture associated skin damage)    Wound - Properties Group Date first assessed: 06/12/20 -KK Time first assessed: 1901 -KK Location: gluteal  -KK Primary Wound Type: MASD  -KK    Dressing Appearance  open to air  -KT  --     Closure  Open to air  -KT  Open to air  -SH     Base  blanchable;red;pink  -KT  blanchable;red;pink  -SH     Periwound  moist;pink;excoriated;redness  -KT  moist;pink;excoriated;redness  -SH     Periwound Temperature  warm  -KT  warm  -SH     Periwound Skin Turgor  soft  -KT  soft  -SH     Drainage Amount   none  -KT  none  -     Care, Wound  --  cleansed with;soap and water  -        [REMOVED] Wound 05/28/20 1129 Right lower;posterior arm Skin Tear    Wound - Properties Group Date first assessed: 05/28/20  - Time first assessed: 1129  - Side: Right  - Orientation: lower;posterior  - Location: arm  - Primary Wound Type: Skin tear  - Additional Comments: Per Cath team, skin tear from tournaquet, tegaderm (hematoma) applied 4x4/coban as pressure dressing  -AH Resolution Date: 06/14/20  -, duplicate  Resolution Time: 1940 -      User Key  (r) = Recorded By, (t) = Taken By, (c) = Cosigned By    Initials Name Provider Type     Lynne Pizarro, RN Registered Nurse     Melba Almonte RN Registered Nurse    Janis Botello RN Registered Nurse    Radha Fernandez RN Registered Nurse    Anna Gastelum RN Registered Nurse          Procedures:              Results Review:     I reviewed the patient's new clinical results.      Lab Results (last 24 hours)     Procedure Component Value Units Date/Time    BUN [[id number]]  (Normal) Collected:  06/15/20 0353    Specimen:  Blood Updated:  06/15/20 0746     BUN 18 mg/dL     Basic Metabolic Panel [[id number]]  (Abnormal) Collected:  06/15/20 0353    Specimen:  Blood Updated:  06/15/20 0445     Glucose 103 mg/dL      BUN --     Comment: Testing performed by alternate method        Creatinine 0.95 mg/dL      Sodium 141 mmol/L      Potassium 3.6 mmol/L      Chloride 101 mmol/L      CO2 31.0 mmol/L      Calcium 8.6 mg/dL      eGFR Non African Amer 76 mL/min/1.73      BUN/Creatinine Ratio --     Comment: Testing not performed.        Anion Gap 9.0 mmol/L     Narrative:       GFR Normal >60  Chronic Kidney Disease <60  Kidney Failure <15      Magnesium [[id number]]  (Normal) Collected:  06/15/20 0353    Specimen:  Blood Updated:  06/15/20 0445     Magnesium 1.7 mg/dL     Phosphorus [[id number]]  (Normal) Collected:  06/15/20 0353    Specimen:  Blood Updated:  06/15/20  0445     Phosphorus 3.3 mg/dL     CBC & Differential [[id number]] Collected:  06/15/20 0353    Specimen:  Blood Updated:  06/15/20 0434    Narrative:       The following orders were created for panel order CBC & Differential.  Procedure                               Abnormality         Status                     ---------                               -----------         ------                     CBC Auto Differential[[id number]]        Abnormal            Final result                 Please view results for these tests on the individual orders.    CBC Auto Differential [[id number]]  (Abnormal) Collected:  06/15/20 0353    Specimen:  Blood Updated:  06/15/20 0434     WBC 4.60 10*3/mm3      RBC 3.80 10*6/mm3      Hemoglobin 9.6 g/dL      Hematocrit 29.5 %      MCV 77.5 fL      MCH 25.2 pg      MCHC 32.5 g/dL      RDW 33.8 %      RDW-SD 82.3 fl      MPV 8.2 fL      Platelets 194 10*3/mm3      Neutrophil % 64.3 %      Lymphocyte % 17.7 %      Monocyte % 12.8 %      Eosinophil % 4.3 %      Basophil % 0.9 %      Neutrophils, Absolute 3.00 10*3/mm3      Lymphocytes, Absolute 0.80 10*3/mm3      Monocytes, Absolute 0.60 10*3/mm3      Eosinophils, Absolute 0.20 10*3/mm3      Basophils, Absolute 0.00 10*3/mm3      nRBC 0.1 /100 WBC     Protime-INR [[id number]]  (Abnormal) Collected:  06/15/20 0353    Specimen:  Blood Updated:  06/15/20 0429     Protime 17.4 Seconds      INR 1.80        No results found for: HGBA1C  Results from last 7 days   Lab Units 06/15/20  0353 06/14/20  0342 06/13/20  0607   INR  1.80* 1.73* 1.83*           No results found for: LIPASE  No results found for: CHOL, CHLPL, TRIG, HDL, LDL, LDLDIRECT    Lab Results   Lab Value Date/Time    FINALDX  05/21/2020 1443     Ulcer, rectum, biopsy:    Scant fragments of colonic mucosa with mild chronic inflammation and focal hyperplastic changes    No violette ulceration is identified    No chronic features or malignant changes identified    See comment    MICHAEL/sms        COMDX  05/21/2020 1443     Deeper levels were examined through the block but were not further contributory.     MICHAEL/sms          Microbiology Results (last 10 days)     ** No results found for the last 240 hours. **          ECG/EMG Results (most recent)     Procedure Component Value Units Date/Time    ECG 12 Lead [[id number]] Collected:  06/13/20 0945     Updated:  06/13/20 0948    Narrative:       HEART RATE= 70  bpm  RR Interval= 860  ms  CA Interval=   ms  P Horizontal Axis= 87  deg  P Front Axis= 0  deg  QRSD Interval= 164  ms  QT Interval= 477  ms  QRS Axis= -79  deg  T Wave Axis= 84  deg  - ABNORMAL ECG -  AV dissociation  Multiple ventricular premature complexes  IVCD, consider RBBB  ST elevation secondary to IVCD  Electronically Signed By:   Date and Time of Study: 2020-06-13 09:45:36          Results for orders placed during the hospital encounter of 06/12/20   Duplex Venous Lower Extremity - Bilateral CAR    Narrative · Normal bilateral lower extremity venous duplex scan.  · Left popliteal fossa heterogenous fluid collection.          Results for orders placed during the hospital encounter of 05/20/20   Adult Transesophageal Echo (PRESTON) W/ Cont if Necessary Per Protocol    Narrative · Left ventricular systolic function is normal.  · Left atrial cavity size is mild-to-moderately dilated.  · Moderate mitral valve regurgitation is present  · Left atrial appendage diameter was 33 mm at 135 degree which was very   large and not conducive to implantation of watchman device. Depth was 28   mm. After discussion, it was decided that anatomy of left atrial appendage   is not conducive and appropriate for watchman device implantation.   Procedure was aborted          Xr Chest 1 View    Result Date: 6/12/2020  Prominent pulmonary vascularity with interstitial edema and small effusions. Stable cardiomegaly.   Electronically Signed By-Rolando Fofana DO. On:6/12/2020 5:43 PM This report was finalized on [id number] by   Rolando Fofana DO..          Xrays, labs reviewed personally by physician.    Medication Review:   I have reviewed the patient's current medication list      Scheduled Meds    baclofen 10 mg Oral Daily   carbidopa-levodopa 1.5 tablet Oral TID   escitalopram 10 mg Oral Daily   ferrous sulfate 324 mg Oral Daily With Breakfast   furosemide 40 mg Intravenous Q12H   gabapentin 300 mg Oral Nightly   multivitamin (eye vitamin) 1 tablet Oral Daily   nystatin  Topical Q12H   oxybutynin 5 mg Oral Daily   pantoprazole 40 mg Oral QAM   potassium chloride 20 mEq Oral TID   pramipexole 0.25 mg Oral Nightly   rosuvastatin 10 mg Oral Nightly   sodium chloride 10 mL Intravenous Q12H   warfarin 2 mg Oral Once per day on Mon Tue Wed Thu Fri Sat       Meds Infusions    Pharmacy to dose warfarin        Meds PRN  •  acetaminophen **OR** acetaminophen **OR** acetaminophen  •  Calcium Gluconate-NaCl **AND** calcium gluconate **AND** Calcium, Ionized  •  clonazePAM  •  docusate sodium  •  magnesium sulfate **OR** magnesium sulfate **OR** magnesium sulfate  •  melatonin  •  nitroglycerin  •  ondansetron **OR** ondansetron  •  Pharmacy to dose warfarin  •  potassium & sodium phosphates **OR** potassium & sodium phosphates  •  potassium chloride  •  potassium chloride  •  sodium chloride    I personally reviewed patient's x-ray films and EKG.    Assessment/Plan   Assessment/Plan     Active Hospital Problems    Diagnosis  POA   • **Acute on chronic combined systolic and diastolic CHF (congestive heart failure) (CMS/HCC) [I50.43]  Yes   • Acute on chronic heart failure (CMS/HCC) [I50.9]  Yes   • Depression [F32.9]  Yes   • Anemia [D64.9]  Yes   • Anxiety disorder [F41.9]  Yes   • GERD (gastroesophageal reflux disease) [K21.9]  Yes   • Hyperlipidemia [E78.5]  Yes   • Obesity (BMI 30-39.9) [E66.9]  Yes   • Anasarca [R60.1]  Yes   • Valvular heart disease [I38]  Yes   • Sleep apnea [G47.30]  Yes   • Essential hypertension [I10]  Yes   • RLS  (restless legs syndrome) [G25.81]  Yes   • Chronic pain [G89.29]  Yes   • Chronic diastolic CHF (congestive heart failure) (CMS/HCC) [I50.32]  Yes   • Rectal ulceration [K62.6]  Yes   • Parkinson's disease (CMS/HCC) [G20]  Yes   • Iron deficiency anemia due to chronic blood loss [D50.0]  Yes   • Bilateral leg edema [R60.0]  Yes   • Warfarin-induced coagulopathy (CMS/HCC) [D68.32, T45.515A]  Yes   • Atrial fibrillation, chronic [I48.20]  Yes   • Sick sinus syndrome (CMS/HCC) [I49.5]  Yes   • Pacemaker [Z95.0]  Yes   • Aortic stenosis [I35.0]  Yes   • Pulmonary hypertension (CMS/HCC) [I27.20]  Yes   • 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       MEDICAL DECISION MAKING COMPLEXITY BY PROBLEM:     1.  Acute on chronic combined systolic and diastolic congestive heart failure secondary to hypertensive heart disease, valvular heart disease (AS, MR and TVR) and pulmonary hypertension from obstructive sleep apnea  -Continue IV Lasix  -Monitor renal function closely    2.  Bilateral lower extremity anasarca multifactorial from venous stasis, lymphedema and CHF  -Venous duplex to rule out DVT  -Wound consult for compression wraps  -Patient counseled on elevation  -Edema is markedly improved today.    3.  Chronic and permanent atrial fibrillation  -Patient is on Coumadin anticoagulation which is subtherapeutic with an INR of 1.77  -Pharmacy to dose Coumadin for INR 2-3     4.  Iron deficiency anemia  -Patient recently required transfusions for severe anemia felt to be secondary to GI blood loss status post EGD and colonoscopy 5/21/2020 at which time a rectal ulcer and AVM were identified  -Stool is currently Hemoccult positive  -Hemoglobin remained stable at 9.5  -Monitor    5.  Sick sinus syndrome status post PPM    6.  Essential hypertension  -Hold oral Lasix while on IV Lasix    7.  Hyperlipidemia  -Continue  Crestor  -Hold omega-3 fatty acids while in the hospital    8.  Parkinson's disease  -Continue Sinemet and Mirapex and PRN Klonopin    9.  GERD  -Continue omeprazole    10.  Depression and anxiety  -Continue escitalopram and PRN Klonopin (inspect verified)    11.  Chronic pain  -Continue gabapentin (inspect verified) and baclofen    12.  Restless leg syndrome  -Continue Mirapex    13.  OAB  -Continue oxybutynin    14.  Obesity  -Lifestyle modification counseling    VTE Prophylaxis -   Mechanical Order History:     None      Pharmalogical Order History:     Ordered     Dose Route Frequency Stop    06/12/20 2235  warfarin (COUMADIN) tablet 2 mg     Question:  Target INR  Answer:  2 - 3    2 mg PO Once (Warfarin) 06/13/20 0109    06/12/20 2022  Pharmacy to dose warfarin     Question:  Target INR  Answer:  2 - 3    -- XX Continuous PRN --    06/12/20 2022  Pharmacy to dose warfarin     Question:  Target INR  Answer:  2 - 3    -- XX Continuous PRN --        Code Status -   Code Status and Medical Interventions:   Ordered at: 06/12/20 1706     Code Status:    CPR     Medical Interventions (Level of Support Prior to Arrest):    Full       This patient has been examined wearing appropriate Personal Protective Equipment  06/15/20    Discharge Planning    PT, OT and  consult for discharge planning.      Destination      Coordination has not been started for this encounter.      Durable Medical Equipment      Coordination has not been started for this encounter.      Dialysis/Infusion      Coordination has not been started for this encounter.      Home Medical Care      Coordination has not been started for this encounter.      Therapy      Coordination has not been started for this encounter.      Community Resources      Coordination has not been started for this encounter.            Electronically signed by Kim Childs MD, 06/15/20, 18:50.  [facility]ist Team

## 2020-06-15 NOTE — PLAN OF CARE
Problem: Patient Care Overview  Goal: Plan of Care Review  Outcome: Ongoing (interventions implemented as appropriate)  Flowsheets (Taken 6/15/2020 6729)  Progress: improving  Plan of Care Reviewed With: patient  Outcome Summary: pt stated his edema is better and he is feeling better, pt tolerated meds and rested whole shift

## 2020-06-16 VITALS
BODY MASS INDEX: 30.27 KG/M2 | OXYGEN SATURATION: 94 % | WEIGHT: 223.5 LBS | HEART RATE: 70 BPM | TEMPERATURE: 98.2 F | DIASTOLIC BLOOD PRESSURE: 80 MMHG | RESPIRATION RATE: 17 BRPM | SYSTOLIC BLOOD PRESSURE: 127 MMHG | HEIGHT: 72 IN

## 2020-06-16 LAB
ANION GAP SERPL CALCULATED.3IONS-SCNC: 12 MMOL/L (ref 5–15)
BASOPHILS # BLD AUTO: 0.1 10*3/MM3 (ref 0–0.2)
BASOPHILS NFR BLD AUTO: 1.3 % (ref 0–1.5)
BUN BLD-MCNC: 20 MG/DL (ref 8–23)
BUN BLD-MCNC: ABNORMAL MG/DL
BUN/CREAT SERPL: ABNORMAL
CALCIUM SPEC-SCNC: 8.9 MG/DL (ref 8.6–10.5)
CHLORIDE SERPL-SCNC: 100 MMOL/L (ref 98–107)
CO2 SERPL-SCNC: 29 MMOL/L (ref 22–29)
CREAT BLD-MCNC: 0.99 MG/DL (ref 0.76–1.27)
DEPRECATED RDW RBC AUTO: 82.7 FL (ref 37–54)
EOSINOPHIL # BLD AUTO: 0.2 10*3/MM3 (ref 0–0.4)
EOSINOPHIL NFR BLD AUTO: 5.8 % (ref 0.3–6.2)
ERYTHROCYTE [DISTWIDTH] IN BLOOD BY AUTOMATED COUNT: 34 % (ref 12.3–15.4)
GFR SERPL CREATININE-BSD FRML MDRD: 72 ML/MIN/1.73
GLUCOSE BLD-MCNC: 109 MG/DL (ref 65–99)
HCT VFR BLD AUTO: 30.3 % (ref 37.5–51)
HGB BLD-MCNC: 9.8 G/DL (ref 13–17.7)
INR PPP: 1.91 (ref 2–3)
LYMPHOCYTES # BLD AUTO: 0.8 10*3/MM3 (ref 0.7–3.1)
LYMPHOCYTES NFR BLD AUTO: 19.6 % (ref 19.6–45.3)
MAGNESIUM SERPL-MCNC: 1.8 MG/DL (ref 1.6–2.4)
MCH RBC QN AUTO: 25.1 PG (ref 26.6–33)
MCHC RBC AUTO-ENTMCNC: 32.3 G/DL (ref 31.5–35.7)
MCV RBC AUTO: 77.7 FL (ref 79–97)
MONOCYTES # BLD AUTO: 0.5 10*3/MM3 (ref 0.1–0.9)
MONOCYTES NFR BLD AUTO: 13.2 % (ref 5–12)
NEUTROPHILS # BLD AUTO: 2.4 10*3/MM3 (ref 1.7–7)
NEUTROPHILS NFR BLD AUTO: 60.1 % (ref 42.7–76)
NRBC BLD AUTO-RTO: 0 /100 WBC (ref 0–0.2)
NT-PROBNP SERPL-MCNC: 2078 PG/ML (ref 5–1800)
PHOSPHATE SERPL-MCNC: 3.1 MG/DL (ref 2.5–4.5)
PLATELET # BLD AUTO: 180 10*3/MM3 (ref 140–450)
PMV BLD AUTO: 8.2 FL (ref 6–12)
POTASSIUM BLD-SCNC: 3.8 MMOL/L (ref 3.5–5.2)
PROTHROMBIN TIME: 18.4 SECONDS (ref 19.4–28.5)
RBC # BLD AUTO: 3.91 10*6/MM3 (ref 4.14–5.8)
SARS-COV-2 RNA PNL SPEC NAA+PROBE: NOT DETECTED
SODIUM BLD-SCNC: 141 MMOL/L (ref 136–145)
WBC NRBC COR # BLD: 4.1 10*3/MM3 (ref 3.4–10.8)

## 2020-06-16 PROCEDURE — 85025 COMPLETE CBC W/AUTO DIFF WBC: CPT | Performed by: PHYSICIAN ASSISTANT

## 2020-06-16 PROCEDURE — 99232 SBSQ HOSP IP/OBS MODERATE 35: CPT | Performed by: INTERNAL MEDICINE

## 2020-06-16 PROCEDURE — 99239 HOSP IP/OBS DSCHRG MGMT >30: CPT | Performed by: INTERNAL MEDICINE

## 2020-06-16 PROCEDURE — 83735 ASSAY OF MAGNESIUM: CPT | Performed by: PHYSICIAN ASSISTANT

## 2020-06-16 PROCEDURE — 83880 ASSAY OF NATRIURETIC PEPTIDE: CPT | Performed by: INTERNAL MEDICINE

## 2020-06-16 PROCEDURE — 80048 BASIC METABOLIC PNL TOTAL CA: CPT | Performed by: PHYSICIAN ASSISTANT

## 2020-06-16 PROCEDURE — 87635 SARS-COV-2 COVID-19 AMP PRB: CPT | Performed by: INTERNAL MEDICINE

## 2020-06-16 PROCEDURE — 84100 ASSAY OF PHOSPHORUS: CPT | Performed by: PHYSICIAN ASSISTANT

## 2020-06-16 PROCEDURE — 85610 PROTHROMBIN TIME: CPT | Performed by: PHYSICIAN ASSISTANT

## 2020-06-16 RX ORDER — ONDANSETRON 4 MG/1
4 TABLET, FILM COATED ORAL EVERY 6 HOURS PRN
Qty: 30 TABLET | Refills: 0 | Status: SHIPPED | OUTPATIENT
Start: 2020-06-16

## 2020-06-16 RX ORDER — NYSTATIN 100000 [USP'U]/G
POWDER TOPICAL EVERY 12 HOURS SCHEDULED
Qty: 1 EACH | Refills: 0 | Status: SHIPPED | OUTPATIENT
Start: 2020-06-16 | End: 2021-06-11

## 2020-06-16 RX ORDER — FUROSEMIDE 40 MG/1
40 TABLET ORAL 2 TIMES DAILY
Qty: 60 TABLET | Refills: 0 | Status: SHIPPED | OUTPATIENT
Start: 2020-06-16 | End: 2021-06-11

## 2020-06-16 RX ORDER — FUROSEMIDE 40 MG/1
40 TABLET ORAL
Status: DISCONTINUED | OUTPATIENT
Start: 2020-06-16 | End: 2020-06-16 | Stop reason: HOSPADM

## 2020-06-16 RX ORDER — NITROGLYCERIN 0.4 MG/1
0.4 TABLET SUBLINGUAL
Qty: 25 TABLET | Refills: 0 | Status: SHIPPED | OUTPATIENT
Start: 2020-06-16

## 2020-06-16 RX ADMIN — WARFARIN 2 MG: 2 TABLET ORAL at 17:49

## 2020-06-16 RX ADMIN — BACLOFEN 10 MG: 10 TABLET ORAL at 08:10

## 2020-06-16 RX ADMIN — PANTOPRAZOLE SODIUM 40 MG: 40 TABLET, DELAYED RELEASE ORAL at 06:05

## 2020-06-16 RX ADMIN — CARBIDOPA AND LEVODOPA 1.5 TABLET: 25; 100 TABLET ORAL at 16:01

## 2020-06-16 RX ADMIN — MULTIPLE VITAMINS W/ MINERALS TAB 1 TABLET: TAB at 08:10

## 2020-06-16 RX ADMIN — ESCITALOPRAM OXALATE 10 MG: 10 TABLET ORAL at 08:10

## 2020-06-16 RX ADMIN — CARBIDOPA AND LEVODOPA 1.5 TABLET: 25; 100 TABLET ORAL at 08:10

## 2020-06-16 RX ADMIN — NYSTATIN: 100000 POWDER TOPICAL at 09:58

## 2020-06-16 RX ADMIN — Medication 10 ML: at 08:11

## 2020-06-16 RX ADMIN — POTASSIUM CHLORIDE 20 MEQ: 1500 TABLET, EXTENDED RELEASE ORAL at 16:01

## 2020-06-16 RX ADMIN — FERROUS SULFATE TAB EC 324 MG (65 MG FE EQUIVALENT) 324 MG: 324 (65 FE) TABLET DELAYED RESPONSE at 08:10

## 2020-06-16 RX ADMIN — OXYBUTYNIN CHLORIDE 5 MG: 5 TABLET ORAL at 08:10

## 2020-06-16 RX ADMIN — FUROSEMIDE 40 MG: 40 TABLET ORAL at 08:10

## 2020-06-16 RX ADMIN — POTASSIUM CHLORIDE 20 MEQ: 1500 TABLET, EXTENDED RELEASE ORAL at 08:10

## 2020-06-16 NOTE — DISCHARGE SUMMARY
[facility] Medicine Services  DISCHARGE SUMMARY        Prepared For PCP:  Blake Ivey MD    Patient Name: Rafael Ivey  : 1935  MRN: [id number]    Date of Admission:   2020    Date of Discharge:  2020    Length of stay:  LOS: 2 days     Hospital Course     Presenting Problem:   CHF exacerbation (CMS/HCC) [I50.9]  Anasarca [R60.1]  Acute on chronic heart failure (CMS/HCC) [I50.9]    Active Hospital Problems    Diagnosis  POA   • **Acute on chronic combined systolic and diastolic CHF (congestive heart failure) (CMS/HCC) [I50.43]  Yes   • Atrial fibrillation, chronic [I48.20]  Yes     Priority: High   • Sick sinus syndrome (CMS/HCC) [I49.5]  Yes     Priority: High   • Aortic stenosis [I35.0]  Yes     Priority: High   • GERD (gastroesophageal reflux disease) [K21.9]  Yes     Priority: Medium   • Hyperlipidemia [E78.5]  Yes     Priority: Medium   • Essential hypertension [I10]  Yes     Priority: Medium   • Parkinson's disease (CMS/HCC) [G20]  Yes     Priority: Medium   • Bilateral leg edema [R60.0]  Yes     Priority: Medium   • Pacemaker [Z95.0]  Yes     Priority: Medium   • Iron deficiency anemia due to chronic blood loss [D50.0]  Yes     Priority: Low   • Warfarin-induced coagulopathy (CMS/HCC) [D68.32, T45.515A]  Yes     Priority: Low   • Acute on chronic heart failure (CMS/HCC) [I50.9]  Yes   • Depression [F32.9]  Yes   • Anemia [D64.9]  Yes   • Anxiety disorder [F41.9]  Yes   • Obesity (BMI 30-39.9) [E66.9]  Yes   • Anasarca [R60.1]  Yes   • Valvular heart disease [I38]  Yes   • Sleep apnea [G47.30]  Yes   • RLS (restless legs syndrome) [G25.81]  Yes   • Chronic pain [G89.29]  Yes   • Chronic diastolic CHF (congestive heart failure) (CMS/HCC) [I50.32]  Yes   • Rectal ulceration [K62.6]  Yes   • Pulmonary hypertension (CMS/HCC) [I27.20]  Yes   • 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     Hospital Course:  Rafael Ivey is a 84 y.o. male with a complicated medical history which includes Parkinson's disease, congestive heart failure, atrial fibrillation, hyperlipidemia, gastroesophageal reflux disease as well as anxiety and depression.  The patient was admitted after a 25 pound weight gain 2 weeks prior to admission.  The patient was seen by cardiology as well as EP.  The patient was also seen by wound care.  The patient initially was subtherapeutic on his Coumadin with an INR of 1.77.  This has improved throughout his hospital course.  The patient has a longstanding history of anemia, however his globin and hematocrit were fairly stable throughout his course.  The patient had recently had an upper and lower endoscopy last month for iron deficiency anemia.  He was also evaluated for watchman procedure so that he could get off of long-term anticoagulation.  Throughout his hospital stay his edema began to resolve and he lost approximately 20 pounds during his hospital stay.    The patient was cleared by cardiology and wound care for discharge.  The patient is returning to [facility].  The patient's COVID-19 testing was negative.  The patient is a full code at the time of discharge.  The patient's medications are as listed in that section of this report.  The patient does not have any pending studies at discharge.  The patient should follow-up with his primary care provider in 3 to 5 days.  The patient should follow-up with cardiology and electrophysiology cardiologist as per their instructions.  The patient is on a cardiac diet.  The patient is discharged in satisfactory condition.  He did not require oxygen at discharge.      Recommendation for Outpatient Providers:     Reasons For Change In Medications and Indications for New Medications:    Day of Discharge     HPI:   Patient is feeling better.  He offers no new complaints.  He is anxious to get back  to [facility].    Vital Signs:   Temp:  [97.1 °F (36.2 °C)-98.7 °F (37.1 °C)] 98.2 °F (36.8 °C)  Heart Rate:  [68-70] 70  Resp:  [17-19] 17  BP: (120-127)/(69-80) 127/80     Physical Exam:  Physical Exam   Constitutional: He is oriented to person, place, and time. He appears well-developed and well-nourished. No distress.   HENT:   Head: Normocephalic and atraumatic.   Right Ear: External ear normal.   Left Ear: External ear normal.   Nose: Nose normal.   Mouth/Throat: Oropharynx is clear and moist. No oropharyngeal exudate.   Eyes: Pupils are equal, round, and reactive to light. Conjunctivae and EOM are normal. Right eye exhibits no discharge. Left eye exhibits no discharge. No scleral icterus.   Neck: Normal range of motion. No JVD present. No tracheal deviation present. No thyromegaly present.   Cardiovascular: Normal rate, normal heart sounds and intact distal pulses. Exam reveals no gallop and no friction rub.   No murmur heard.  Irregularly irregular   Pulmonary/Chest: Effort normal and breath sounds normal. No stridor. No respiratory distress. He has no wheezes. He has no rales. He exhibits no tenderness.   Abdominal: Soft. Bowel sounds are normal. He exhibits no distension and no mass. There is no tenderness. There is no rebound and no guarding. No hernia.   Musculoskeletal: Normal range of motion. He exhibits no edema, tenderness or deformity.   Lymphadenopathy:     He has no cervical adenopathy.   Neurological: He is alert and oriented to person, place, and time. No cranial nerve deficit or sensory deficit. He exhibits normal muscle tone. Coordination normal.   Skin: Skin is warm and dry. No rash noted. He is not diaphoretic. No erythema.   Psychiatric: He has a normal mood and affect. His behavior is normal.   Nursing note and vitals reviewed.      Pertinent  and/or Most Recent Results     Results from last 7 days   Lab Units 06/16/20  0244 06/15/20  0353 06/14/20  0342 06/13/20  0607 06/12/20  1750    WBC 10*3/mm3 4.10 4.60 4.30 3.80 4.10   HEMOGLOBIN g/dL 9.8* 9.6* 9.5* 9.4* 10.5*   HEMATOCRIT % 30.3* 29.5* 30.2* 30.0* 33.7*   PLATELETS 10*3/mm3 180 194 211 209 246   SODIUM mmol/L 141 141 141 140 139   POTASSIUM mmol/L 3.8 3.6 3.8 3.8 4.5   CHLORIDE mmol/L 100 101 102 106 103   CO2 mmol/L 29.0 31.0* 29.0 28.0 27.0   BUN  20 18 17 15 17   CREATININE mg/dL 0.99 0.95 1.04 0.97 1.00   GLUCOSE mg/dL 109* 103* 102* 83 101*   CALCIUM mg/dL 8.9 8.6 8.7 9.0 9.0     Results from last 7 days   Lab Units 06/16/20  0244 06/15/20  0353 06/14/20  0342 06/13/20  0607 06/13/20  0011 06/12/20  1753   BILIRUBIN mg/dL  --   --   --   --   --  1.0   ALK PHOS U/L  --   --   --   --   --  181*   ALT (SGPT) U/L  --   --   --   --   --  6   AST (SGOT) U/L  --   --   --   --   --  40   PROTIME Seconds 18.4* 17.4* 16.8* 17.7* 16.6* 17.2*   INR  1.91* 1.80* 1.73* 1.83* 1.71* 1.77*           Invalid input(s): TG, LDLCALC, LDLREALC  Results from last 7 days   Lab Units 06/16/20  0244 06/13/20  0607 06/13/20  0011 06/12/20  1753   PROBNP pg/mL 2,078.0* 2,173.0*  --  1,642.0   TROPONIN T ng/mL  --  <0.010 0.015 0.011     Brief Urine Lab Results  (Last result in the past 365 days)      Color   Clarity   Blood   Leuk Est   Nitrite   Protein   CREAT   Urine HCG        06/12/20 2055 Yellow Clear Negative Negative Negative Negative             Microbiology Results Abnormal     Procedure Component Value - Date/Time    COVID-19,CEPHEID,COR/BLANCHE/PAD IN-HOUSE(OR EMERGENT/ADD-ON),NP SWAB IN TRANSPORT MEDIA 3-4 HR TAT - Swab, Nasopharynx [[id number]]  (Normal) Collected:  06/16/20 0958    Lab Status:  Final result Specimen:  Swab from Nasopharynx Updated:  06/16/20 1128     COVID19 Not Detected        Xr Chest 1 View    Result Date: 6/12/2020  Impression: Prominent pulmonary vascularity with interstitial edema and small effusions. Stable cardiomegaly.   Electronically Signed By-Rolando Fofana DO. On:6/12/2020 5:43 PM This report was finalized on  [id number] by  Rolando Fofana DO..    Xr Chest 1 View    Result Date: 5/23/2020  Impression: 1.Enlarged cardiac silhouette. 2.Mild bibasilar airspace opacities, which may be due to pulmonary edema, atelectasis, or less likely pneumonia. No prior exams are available for comparison.  Electronically Signed By-Manisha Malone On:5/23/2020 3:04 PM This report was finalized on [id number] by  Manisha Malone, .    Results for orders placed during the hospital encounter of 06/12/20   Duplex Venous Lower Extremity - Bilateral CAR    Narrative · Normal bilateral lower extremity venous duplex scan.  · Left popliteal fossa heterogenous fluid collection.        Results for orders placed during the hospital encounter of 06/12/20   Duplex Venous Lower Extremity - Bilateral CAR    Narrative · Normal bilateral lower extremity venous duplex scan.  · Left popliteal fossa heterogenous fluid collection.        Results for orders placed during the hospital encounter of 05/20/20   Adult Transesophageal Echo (PRESTON) W/ Cont if Necessary Per Protocol    Narrative · Left ventricular systolic function is normal.  · Left atrial cavity size is mild-to-moderately dilated.  · Moderate mitral valve regurgitation is present  · Left atrial appendage diameter was 33 mm at 135 degree which was very   large and not conducive to implantation of watchman device. Depth was 28   mm. After discussion, it was decided that anatomy of left atrial appendage   is not conducive and appropriate for watchman device implantation.   Procedure was aborted        Test Results Pending at Discharge    Procedures Performed     Consults:   Consults     Date and Time Order Name Status Description    6/12/2020 1706 Inpatient Cardiology Consult Completed     5/20/2020 1351 Inpatient Cardiology Consult Completed     5/20/2020 1317 Inpatient Gastroenterology Consult Completed     5/20/2020 1257 Hematology & Oncology Inpatient Consult Completed         Discharge Details         Discharge Medications      New Medications      Instructions Start Date   nitroglycerin 0.4 MG SL tablet  Commonly known as:  NITROSTAT   0.4 mg, Sublingual, Every 5 Minutes PRN, Take no more than 3 doses in 15 minutes.      nystatin [id number] UNIT/GM powder  Commonly known as:  MYCOSTATIN   Topical, Every 12 Hours Scheduled      ondansetron 4 MG tablet  Commonly known as:  ZOFRAN   4 mg, Oral, Every 6 Hours PRN         Changes to Medications      Instructions Start Date   furosemide 40 MG tablet  Commonly known as:  LASIX  What changed:  when to take this   40 mg, Oral, 2 Times Daily      potassium chloride 20 MEQ CR tablet  Commonly known as:  K-DUR,KLOR-CON  What changed:  additional instructions   20 mEq, Oral, 3 Times Daily         Continue These Medications      Instructions Start Date   acetaminophen 325 MG tablet  Commonly known as:  TYLENOL   650 mg, Oral, Every 6 Hours PRN      baclofen 10 MG tablet  Commonly known as:  LIORESAL   10 mg, Oral, Daily      carbidopa-levodopa  MG per tablet  Commonly known as:  SINEMET   1.5 tablets, Oral, Every 8 Hours      clonazePAM 0.5 MG tablet  Commonly known as:  KlonoPIN   1 tablet, Oral, Nightly PRN      escitalopram 10 MG tablet  Commonly known as:  LEXAPRO   10 mg, Oral, Daily      ferrous sulfate 325 (65 FE) MG tablet   1 tablet, Oral, Every 12 Hours      FISH OIL PO   2 capsules, Oral, Daily      gabapentin 300 MG capsule  Commonly known as:  NEURONTIN   300 mg, Oral, Every Night at Bedtime      Mirapex 0.25 MG tablet  Generic drug:  pramipexole   1 tablet, Oral, Every 24 Hours      multivitamin-minerals tablet   1 tablet, Oral, Daily      oxybutynin 5 MG tablet  Commonly known as:  DITROPAN   5 mg, Oral, Daily      PrilOSEC 20 MG capsule  Generic drug:  omeprazole   1 capsule, Oral, Daily      rosuvastatin 10 MG tablet  Commonly known as:  CRESTOR   1 tablet, Oral, Every Night at Bedtime      warfarin 2 MG tablet  Commonly known as:  COUMADIN   Take As  Directed, Taking every day but Sunday           Allergies   Allergen Reactions   • Amoxicillin Diarrhea   • Cephalexin Diarrhea and Nausea And Vomiting   • Penicillins Other (See Comments)   • Amoxicillin-Pot Clavulanate Nausea And Vomiting   • Clarithromycin Nausea And Vomiting     Discharge Disposition:  Skilled Nursing Facility (DC - External)    Diet:  Hospital:  Diet Order   Procedures   • Diet Cardiac; 2gm Na+     Discharge Activity:   Activity Instructions     As tolerated             CODE STATUS:    Code Status and Medical Interventions:   Ordered at: 06/12/20 4932     Code Status:    CPR     Medical Interventions (Level of Support Prior to Arrest):    Full     Follow-up Appointments  Future Appointments   Date Time Provider Department Center   8/14/2020 11:30 AM Jim Walker MD [facility] None           Condition on Discharge:      Stable    Electronically signed by Kim Childs MD, 06/16/20, 4:22 PM.    Time: I spent  45  minutes on this discharge activity which included face-to-face encounter with the patient/reviewing the data in the system/coordination of the care with the nursing staff as well as consultants/documentation/entering orders.

## 2020-06-16 NOTE — PROGRESS NOTES
"Pharmacy dosing service  Anticoagulant  Warfarin     Subjective:    Rafael Ivey is a 84 y.o.male being continued on warfarin for atrial fibrillation.    INR Goal: 2 - 3  Home medication?: Yes, warfarin 2 mg daily except no dose on Sundays (Pt is \"unsure\" if dose was taken on 6/12 prior to admission)    Bridge Therapy Present?:  No  Interacting Medications Evaluation (New/Present/Discontinued): None   Additional Contributing Factors: N/A      Assessment/Plan:    INR subtherapeutic on admission. Pt unsure if took dose prior to admission on 6/12 therefore dose was not increased and home dose of 2 mg ordered. INR remains subtherapeutic today. Will give regular home dose of 2 mg today.     Continue to monitor and adjust based on INR.         Date 6/12 6/13 6/14 6/15 6/16       INR 1.77 1.83 1.73 1.80 1.91       Dose 2 mg @0100 on 6/13 2 mg 3 mg 2 mg 2 mg           Objective:  [Ht: 182.9 cm (72.01\"); Wt: 101 kg (223 lb 8 oz); BMI: Body mass index is 30.31 kg/m².]    Lab Results   Component Value Date    ALBUMIN 3.80 06/12/2020     Lab Results   Component Value Date    INR 1.91 (L) 06/16/2020    INR 1.80 (L) 06/15/2020    INR 1.73 (L) 06/14/2020    PROTIME 18.4 (L) 06/16/2020    PROTIME 17.4 (L) 06/15/2020    PROTIME 16.8 (L) 06/14/2020     Lab Results   Component Value Date    HGB 9.8 (L) 06/16/2020    HGB 9.6 (L) 06/15/2020    HGB 9.5 (L) 06/14/2020     Lab Results   Component Value Date    HCT 30.3 (L) 06/16/2020    HCT 29.5 (L) 06/15/2020    HCT 30.2 (L) 06/14/2020       Richard Zavala, Pharmacy Intern  06/16/20 08:21           "

## 2020-06-16 NOTE — NURSING NOTE
Spoke with pt daughter she is concerned sending her Dad back to [facility] and him gaining weight from fluid overload and no one consulting PCP or cardiologist till it gets above a 20 pound weight gain, which she stated happened last time, also concerned about pt not elevating legs during day at facility and leaving them in dependent position to swell each day.      Wants to know if order can be put in if Dad gains over a certain amount daily that PCP or cardiologist to be called

## 2020-06-16 NOTE — PLAN OF CARE
Problem: Patient Care Overview  Goal: Plan of Care Review  Outcome: Ongoing (interventions implemented as appropriate)  Flowsheets (Taken 6/16/2020 4519)  Progress: improving  Plan of Care Reviewed With: patient  Outcome Summary: pt edema is better today per pt, possible d/c today per cardiologist note, pt from [facility]

## 2020-06-16 NOTE — PROGRESS NOTES
CC--leg edema, persistent atrial fibrillation, recurrent falls, iron deficiency anemia, single-chamber pacemaker in situ    Subject--patient admitted over the weekend with intravenous diuresis  Lost nearly 20 pounds and feels remarkably better  Significant reduction of his lower extremity edema  He is able to ambulate with a walker  Appetite is improved  Oxygen saturation up to 96% without oxygen  Lying comfortably without symptoms      Past Medical History:   Diagnosis Date   • Anemia    • Arthritis     left hip    • Atrial fibrillation (CMS/HCC)    • Atrial fibrillation (CMS/HCC)     chronic   • Cardiomegaly     mild   • Chronic diastolic heart failure (CMS/HCC)    • GERD (gastroesophageal reflux disease)    • Hyperlipidemia    • Hypertension    • Hypotension    • Lower extremity edema    • Mitral regurgitation    • Obesity    • Pacemaker    • Parkinson disease (CMS/[facility])    • Recurrent falls     with injury    • Sick sinus syndrome (CMS/[facility])     s/p pacemaker implant    • Valvular heart disease     MR     Past Surgical History:   Procedure Laterality Date   • APPENDECTOMY     • ATRIAL APPENDAGE EXCLUSION LEFT WITH TRANSESOPHAGEAL ECHOCARDIOGRAM N/A 5/28/2020    Procedure: Atrial Appendage Occlusion;  Surgeon: Jim Walker MD;  Location: [facility] CATH INVASIVE LOCATION;  Service: Cardiovascular;  Laterality: N/A;   • ATRIAL APPENDAGE EXCLUSION LEFT WITH TRANSESOPHAGEAL ECHOCARDIOGRAM N/A 5/28/2020    Procedure: Atrial Appendage Occlusion;  Surgeon: Lashaun So MD;  Location: [facility] CATH INVASIVE LOCATION;  Service: Cardiovascular;  Laterality: N/A;   • CATARACT EXTRACTION     • CHOLECYSTECTOMY     • COLONOSCOPY N/A 5/21/2020    Procedure: COLONOSCOPY WITH BIOPSY X1 AREA;  Surgeon: Sim Collins MD;  Location: [facility] ENDOSCOPY;  Service: Gastroenterology;  Laterality: N/A;  Post: poor prep, impacted stool in rectum, and internal hemorrhoids, ascending colon arteriovenous malformation   •  ENDOSCOPY N/A 5/21/2020    Procedure: ESOPHAGOGASTRODUODENOSCOPY with clipping x 1 of bleeding gastric polyp;  Surgeon: Sim Collins MD;  Location: [facility] ENDOSCOPY;  Service: Gastroenterology;  Laterality: N/A;  Post: bleeding gastric polyp   • HIP SURGERY Right 08/2017   • OTHER SURGICAL HISTORY      skin mole excisions    • PACEMAKER IMPLANTATION  06/22/2017    Levin's ([employer])   • TONSILLECTOMY     • TOTAL HIP ARTHROPLASTY Left 05/20/2014     Family History   Problem Relation Age of Onset   • Heart failure Mother    • Stroke Maternal Grandfather      Social History     Tobacco Use   • Smoking status: Former Smoker     Packs/day: 1.00     Years: 40.00     Pack years: 40.00     Types: Cigarettes   • Smokeless tobacco: Never Used   • Tobacco comment: quit 1970's    Substance Use Topics   • Alcohol use: Yes     Frequency: Never   • Drug use: Never     Allergies:  Amoxicillin; Cephalexin; Penicillins; Amoxicillin-pot clavulanate; and Clarithromycin    Review of Systems   General:  positive for fatigue and tiredness  Eyes: No redness  Cardiovascular: No chest pain, no palpitations  Respiratory:   positive for class 3 shortness of breath  Gastrointestinal: No nausea or vomiting, bleeding  Genitourinary: no hematuria or dysuria  Musculoskeletal: No arthralgia or myalgia  Skin: Positive for scrotal and groin rash   Neurologic: No numbness, tingling  Hematologic/Lymphatic: No abnormal bleeding      Physical Exam  VITALS REVIEWED--blood pressure 120/78 pulse in this patient was 70 patient was afebrile respiration 12 times a minute    General:      well developed, well nourished, in no acute distress.  Pallor positive  Head:      normocephalic and atraumatic.    Eyes:      PERRL/EOM intact, conjunctiva and sclera clear with out nystagmus.    Neck:      no masses, thyromegaly,  trachea central with normal respiratory effort   Lungs:      Reduced breath sounds in bases     Heart:       underlying atrial fibrillation  with irregularly irregular rhythm and  without any murmurs gallops or rubs    Extremities:      Significant reduction of edema with residual lymphedema   Neurologic:      no focal deficits.   alert oriented x3  Skin:      Positive rash in the scrotum     Psych:      alert and cooperative; normal mood and affect; normal attention span and concentration.          CBC    Results from last 7 days   Lab Units 06/16/20  0244 06/15/20  0353 06/14/20  0342 06/13/20  0607 06/12/20  1753   WBC 10*3/mm3 4.10 4.60 4.30 3.80 4.10   HEMOGLOBIN g/dL 9.8* 9.6* 9.5* 9.4* 10.5*   PLATELETS 10*3/mm3 180 194 211 209 246     BMP   Results from last 7 days   Lab Units 06/16/20  0244 06/15/20  0353 06/14/20  0342 06/13/20  0607 06/12/20  1753   SODIUM mmol/L 141 141 141 140 139   POTASSIUM mmol/L 3.8 3.6 3.8 3.8 4.5   CHLORIDE mmol/L 100 101 102 106 103   CO2 mmol/L 29.0 31.0* 29.0 28.0 27.0   BUN  20 18 17 15 17   CREATININE mg/dL 0.99 0.95 1.04 0.97 1.00   GLUCOSE mg/dL 109* 103* 102* 83 101*   MAGNESIUM mg/dL 1.8 1.7 1.8 1.9 2.0   PHOSPHORUS mg/dL 3.1 3.3 3.7 3.3 3.2     Infection     CMP   Results from last 7 days   Lab Units 06/16/20  0244 06/15/20  0353 06/14/20  0342 06/13/20  0607 06/12/20  1753   SODIUM mmol/L 141 141 141 140 139   POTASSIUM mmol/L 3.8 3.6 3.8 3.8 4.5   CHLORIDE mmol/L 100 101 102 106 103   CO2 mmol/L 29.0 31.0* 29.0 28.0 27.0   BUN  20 18 17 15 17   CREATININE mg/dL 0.99 0.95 1.04 0.97 1.00   GLUCOSE mg/dL 109* 103* 102* 83 101*   ALBUMIN g/dL  --   --   --   --  3.80   BILIRUBIN mg/dL  --   --   --   --  1.0   ALK PHOS U/L  --   --   --   --  181*   AST (SGOT) U/L  --   --   --   --  40   ALT (SGPT) U/L  --   --   --   --  6             Assessment and plan     Increasing  severe leg edema with lymphedema--status post diuresis with significant reduction of edema and significant improvement of symptoms    Single -chamber pacemaker in situ which was interrogated and pacemaker rate increased to 70 bpm  severe and  profound iron deficiency anemia --post endoscopy and colonoscopy with cauterization of AVM --hemoglobin stable  Post transfusion and iron infusions   multiple falls with Parkinson's disease  Chronic and permanent atrial fibrillation  Moderate aortic stenosis and moderate to severe tricuspid regurgitation  Bilateral lymphedema with ongoing class III diastolic chronic heart failure symptoms--improved with diuresis  History of anxiety and depression  Essential hypertension  Clinically improved  Stop IV Lasix  Start oral Lasix  Patient can be discharged home  Follow-up in 2 weeks in office  Discussed with the nurse    Electronically signed by Jim Walker MD, 06/16/20, 12:24 PM.

## 2020-06-16 NOTE — PROGRESS NOTES
Continued Stay Note   Gian     Patient Name: Rafael Ivey  MRN: [id number]  Today's Date: 6/16/2020    Admit Date: 6/12/2020    Discharge Plan     Row Name 06/16/20 1401       Plan    Plan  [facility] No PASRR or precert required.         Discharge Codes    No documentation.       Expected Discharge Date and Time     Expected Discharge Date Expected Discharge Time    Jun 16, 2020       LON Marinelli    Phone # [phone]  Cell #[phone]  Fax#[phone]  [email or web address]        LON Marinelli

## 2020-06-16 NOTE — PROGRESS NOTES
Sa02 96% on 2 liters at rest.  Took 02 off of patient and rechecked Sa02 at 1115 which was equal to 95% on room air at rest.  After reviewing notes by PT (6/13/20 @ 1740), I do not feel that it is safe for the patient or staff for him to try to do walking oximetry. Walking oximetry was not done due to patient/staff safety.  Radha Barbosa RN notified and decision to not walk patient was discussed with her.

## 2020-06-17 NOTE — PROGRESS NOTES
Case Management Discharge Note      Final Note: Diego Paredes inpt rehab                      Final Discharge Disposition Code: 03 - skilled nursing facility (SNF)

## 2020-06-18 PROCEDURE — 93010 ELECTROCARDIOGRAM REPORT: CPT | Performed by: INTERNAL MEDICINE

## 2020-08-11 ENCOUNTER — APPOINTMENT (OUTPATIENT)
Dept: GENERAL RADIOLOGY | Facility: HOSPITAL | Age: 85
End: 2020-08-11

## 2020-08-11 ENCOUNTER — HOSPITAL ENCOUNTER (INPATIENT)
Facility: HOSPITAL | Age: 85
LOS: 6 days | Discharge: NURSING, REHAB OR LONG-TERM CARE | End: 2020-08-17
Attending: EMERGENCY MEDICINE | Admitting: HOSPITALIST

## 2020-08-11 ENCOUNTER — APPOINTMENT (OUTPATIENT)
Dept: INTERVENTIONAL RADIOLOGY/VASCULAR | Facility: HOSPITAL | Age: 85
End: 2020-08-11

## 2020-08-11 DIAGNOSIS — I50.9 ACUTE CONGESTIVE HEART FAILURE, UNSPECIFIED HEART FAILURE TYPE (HCC): ICD-10-CM

## 2020-08-11 DIAGNOSIS — R06.03 ACUTE RESPIRATORY DISTRESS: Primary | ICD-10-CM

## 2020-08-11 DIAGNOSIS — J18.9 PNEUMONIA OF RIGHT LUNG DUE TO INFECTIOUS ORGANISM, UNSPECIFIED PART OF LUNG: ICD-10-CM

## 2020-08-11 DIAGNOSIS — R50.9 FEVER, UNSPECIFIED FEVER CAUSE: ICD-10-CM

## 2020-08-11 PROBLEM — R53.1 WEAKNESS: Status: ACTIVE | Noted: 2020-08-11

## 2020-08-11 PROBLEM — J90 PLEURAL EFFUSION, RIGHT: Status: ACTIVE | Noted: 2020-08-11

## 2020-08-11 LAB
ALBUMIN SERPL-MCNC: 4 G/DL (ref 3.5–5.2)
ALBUMIN/GLOB SERPL: 1.3 G/DL
ALP SERPL-CCNC: 188 U/L (ref 39–117)
ALT SERPL W P-5'-P-CCNC: 8 U/L (ref 1–41)
ANION GAP SERPL CALCULATED.3IONS-SCNC: 13 MMOL/L (ref 5–15)
APPEARANCE FLD: ABNORMAL
ARTERIAL PATENCY WRIST A: POSITIVE
AST SERPL-CCNC: 35 U/L (ref 1–40)
ATMOSPHERIC PRESS: ABNORMAL MM[HG]
B PARAPERT DNA SPEC QL NAA+PROBE: NOT DETECTED
B PERT DNA SPEC QL NAA+PROBE: NOT DETECTED
BACTERIA UR QL AUTO: ABNORMAL /HPF
BASE EXCESS BLDA CALC-SCNC: 3.7 MMOL/L (ref 0–3)
BASOPHILS # BLD AUTO: 0.1 10*3/MM3 (ref 0–0.2)
BASOPHILS NFR BLD AUTO: 0.7 % (ref 0–1.5)
BDY SITE: ABNORMAL
BILIRUB SERPL-MCNC: 1.2 MG/DL (ref 0–1.2)
BILIRUB UR QL STRIP: NEGATIVE
BUN SERPL-MCNC: 19 MG/DL (ref 8–23)
BUN SERPL-MCNC: ABNORMAL MG/DL
BUN/CREAT SERPL: ABNORMAL
C PNEUM DNA NPH QL NAA+NON-PROBE: NOT DETECTED
CALCIUM SPEC-SCNC: 9.3 MG/DL (ref 8.6–10.5)
CHLORIDE SERPL-SCNC: 101 MMOL/L (ref 98–107)
CLARITY UR: CLEAR
CO2 BLDA-SCNC: 30.2 MMOL/L (ref 22–29)
CO2 SERPL-SCNC: 26 MMOL/L (ref 22–29)
COLOR FLD: ABNORMAL
COLOR UR: YELLOW
CREAT SERPL-MCNC: 1.13 MG/DL (ref 0.76–1.27)
D-LACTATE SERPL-SCNC: 1.9 MMOL/L (ref 0.5–2)
DEPRECATED RDW RBC AUTO: 48.6 FL (ref 37–54)
EOSINOPHIL # BLD AUTO: 0 10*3/MM3 (ref 0–0.4)
EOSINOPHIL NFR BLD AUTO: 0 % (ref 0.3–6.2)
ERYTHROCYTE [DISTWIDTH] IN BLOOD BY AUTOMATED COUNT: 15.2 % (ref 12.3–15.4)
FLUAV H1 2009 PAND RNA NPH QL NAA+PROBE: NOT DETECTED
FLUAV H1 HA GENE NPH QL NAA+PROBE: NOT DETECTED
FLUAV H3 RNA NPH QL NAA+PROBE: NOT DETECTED
FLUAV SUBTYP SPEC NAA+PROBE: NOT DETECTED
FLUBV RNA ISLT QL NAA+PROBE: NOT DETECTED
GFR SERPL CREATININE-BSD FRML MDRD: 62 ML/MIN/1.73
GLOBULIN UR ELPH-MCNC: 3 GM/DL
GLUCOSE SERPL-MCNC: 107 MG/DL (ref 65–99)
GLUCOSE UR STRIP-MCNC: NEGATIVE MG/DL
HADV DNA SPEC NAA+PROBE: NOT DETECTED
HCO3 BLDA-SCNC: 28.8 MMOL/L (ref 21–28)
HCOV 229E RNA SPEC QL NAA+PROBE: NOT DETECTED
HCOV HKU1 RNA SPEC QL NAA+PROBE: NOT DETECTED
HCOV NL63 RNA SPEC QL NAA+PROBE: NOT DETECTED
HCOV OC43 RNA SPEC QL NAA+PROBE: NOT DETECTED
HCT VFR BLD AUTO: 32.9 % (ref 37.5–51)
HEMODILUTION: NO
HGB BLD-MCNC: 10.5 G/DL (ref 13–17.7)
HGB UR QL STRIP.AUTO: ABNORMAL
HMPV RNA NPH QL NAA+NON-PROBE: NOT DETECTED
HOLD SPECIMEN: NORMAL
HOLD SPECIMEN: NORMAL
HPIV1 RNA SPEC QL NAA+PROBE: NOT DETECTED
HPIV2 RNA SPEC QL NAA+PROBE: NOT DETECTED
HPIV3 RNA NPH QL NAA+PROBE: NOT DETECTED
HPIV4 P GENE NPH QL NAA+PROBE: NOT DETECTED
HYALINE CASTS UR QL AUTO: ABNORMAL /LPF
INHALED O2 CONCENTRATION: 100 %
INR PPP: 1.72 (ref 2–3)
INR PPP: 1.8 (ref 2–3)
KETONES UR QL STRIP: NEGATIVE
LEUKOCYTE ESTERASE UR QL STRIP.AUTO: ABNORMAL
LYMPHOCYTES # BLD AUTO: 0.3 10*3/MM3 (ref 0.7–3.1)
LYMPHOCYTES NFR BLD AUTO: 2.2 % (ref 19.6–45.3)
LYMPHOCYTES NFR FLD MANUAL: 19 %
M PNEUMO IGG SER IA-ACNC: NOT DETECTED
MCH RBC QN AUTO: 27.7 PG (ref 26.6–33)
MCHC RBC AUTO-ENTMCNC: 31.9 G/DL (ref 31.5–35.7)
MCV RBC AUTO: 86.7 FL (ref 79–97)
MESOTHL CELL NFR FLD MANUAL: 43 %
METHOD: ABNORMAL
MODALITY: ABNORMAL
MONOCYTES # BLD AUTO: 1 10*3/MM3 (ref 0.1–0.9)
MONOCYTES NFR BLD AUTO: 7.5 % (ref 5–12)
MONOCYTES NFR FLD: 12 %
NEUTROPHILS NFR BLD AUTO: 11.9 10*3/MM3 (ref 1.7–7)
NEUTROPHILS NFR BLD AUTO: 89.6 % (ref 42.7–76)
NEUTROPHILS NFR FLD MANUAL: 26 %
NITRITE UR QL STRIP: POSITIVE
NRBC BLD AUTO-RTO: 0.1 /100 WBC (ref 0–0.2)
NT-PROBNP SERPL-MCNC: 2415 PG/ML (ref 0–1800)
NUC CELL # FLD: 457 /MM3
PCO2 BLDA: 45.4 MM HG (ref 35–48)
PH BLDA: 7.41 PH UNITS (ref 7.35–7.45)
PH FLD: 7 [PH] (ref 6.5–7.5)
PH UR STRIP.AUTO: <=5 [PH] (ref 5–8)
PLAT MORPH BLD: NORMAL
PLATELET # BLD AUTO: 373 10*3/MM3 (ref 140–450)
PMV BLD AUTO: 5.9 FL (ref 6–12)
PO2 BLDA: 111.4 MM HG (ref 83–108)
POTASSIUM SERPL-SCNC: 3.4 MMOL/L (ref 3.5–5.2)
PROT SERPL-MCNC: 7 G/DL (ref 6–8.5)
PROT UR QL STRIP: NEGATIVE
PROTHROMBIN TIME: 18.2 SECONDS (ref 19.4–28.5)
PROTHROMBIN TIME: 19 SECONDS (ref 19.4–28.5)
RBC # BLD AUTO: 3.79 10*6/MM3 (ref 4.14–5.8)
RBC # UR: ABNORMAL /HPF
RBC MORPH BLD: NORMAL
REF LAB TEST METHOD: ABNORMAL
RHINOVIRUS RNA SPEC NAA+PROBE: NOT DETECTED
RSV RNA NPH QL NAA+NON-PROBE: NOT DETECTED
SAO2 % BLDCOA: 98.4 % (ref 94–98)
SARS-COV-2 RNA PNL SPEC NAA+PROBE: NOT DETECTED
SODIUM SERPL-SCNC: 140 MMOL/L (ref 136–145)
SP GR UR STRIP: 1.01 (ref 1–1.03)
SQUAMOUS #/AREA URNS HPF: ABNORMAL /HPF
TROPONIN T SERPL-MCNC: 0.05 NG/ML (ref 0–0.03)
TROPONIN T SERPL-MCNC: 0.05 NG/ML (ref 0–0.03)
UROBILINOGEN UR QL STRIP: ABNORMAL
WBC # BLD AUTO: 13.3 10*3/MM3 (ref 3.4–10.8)
WBC MORPH BLD: NORMAL
WBC UR QL AUTO: ABNORMAL /HPF
WHOLE BLOOD HOLD SPECIMEN: NORMAL
WHOLE BLOOD HOLD SPECIMEN: NORMAL

## 2020-08-11 PROCEDURE — 83986 ASSAY PH BODY FLUID NOS: CPT | Performed by: NURSE PRACTITIONER

## 2020-08-11 PROCEDURE — 87102 FUNGUS ISOLATION CULTURE: CPT | Performed by: NURSE PRACTITIONER

## 2020-08-11 PROCEDURE — 87086 URINE CULTURE/COLONY COUNT: CPT | Performed by: EMERGENCY MEDICINE

## 2020-08-11 PROCEDURE — 83880 ASSAY OF NATRIURETIC PEPTIDE: CPT | Performed by: EMERGENCY MEDICINE

## 2020-08-11 PROCEDURE — C1729 CATH, DRAINAGE: HCPCS

## 2020-08-11 PROCEDURE — 92610 EVALUATE SWALLOWING FUNCTION: CPT

## 2020-08-11 PROCEDURE — 84484 ASSAY OF TROPONIN QUANT: CPT | Performed by: EMERGENCY MEDICINE

## 2020-08-11 PROCEDURE — 76942 ECHO GUIDE FOR BIOPSY: CPT

## 2020-08-11 PROCEDURE — 87075 CULTR BACTERIA EXCEPT BLOOD: CPT | Performed by: NURSE PRACTITIONER

## 2020-08-11 PROCEDURE — 85610 PROTHROMBIN TIME: CPT | Performed by: EMERGENCY MEDICINE

## 2020-08-11 PROCEDURE — 93005 ELECTROCARDIOGRAM TRACING: CPT | Performed by: EMERGENCY MEDICINE

## 2020-08-11 PROCEDURE — 82150 ASSAY OF AMYLASE: CPT | Performed by: NURSE PRACTITIONER

## 2020-08-11 PROCEDURE — 25010000002 VANCOMYCIN 10 G RECONSTITUTED SOLUTION: Performed by: EMERGENCY MEDICINE

## 2020-08-11 PROCEDURE — 99285 EMERGENCY DEPT VISIT HI MDM: CPT

## 2020-08-11 PROCEDURE — 87070 CULTURE OTHR SPECIMN AEROBIC: CPT | Performed by: NURSE PRACTITIONER

## 2020-08-11 PROCEDURE — 83605 ASSAY OF LACTIC ACID: CPT

## 2020-08-11 PROCEDURE — 87040 BLOOD CULTURE FOR BACTERIA: CPT | Performed by: EMERGENCY MEDICINE

## 2020-08-11 PROCEDURE — 83615 LACTATE (LD) (LDH) ENZYME: CPT | Performed by: NURSE PRACTITIONER

## 2020-08-11 PROCEDURE — 89051 BODY FLUID CELL COUNT: CPT | Performed by: NURSE PRACTITIONER

## 2020-08-11 PROCEDURE — 87206 SMEAR FLUORESCENT/ACID STAI: CPT | Performed by: NURSE PRACTITIONER

## 2020-08-11 PROCEDURE — 0202U NFCT DS 22 TRGT SARS-COV-2: CPT | Performed by: EMERGENCY MEDICINE

## 2020-08-11 PROCEDURE — 85610 PROTHROMBIN TIME: CPT | Performed by: NURSE PRACTITIONER

## 2020-08-11 PROCEDURE — 85007 BL SMEAR W/DIFF WBC COUNT: CPT | Performed by: EMERGENCY MEDICINE

## 2020-08-11 PROCEDURE — 84311 SPECTROPHOTOMETRY: CPT | Performed by: NURSE PRACTITIONER

## 2020-08-11 PROCEDURE — 88108 CYTOPATH CONCENTRATE TECH: CPT | Performed by: NURSE PRACTITIONER

## 2020-08-11 PROCEDURE — 71045 X-RAY EXAM CHEST 1 VIEW: CPT

## 2020-08-11 PROCEDURE — 0W993ZX DRAINAGE OF RIGHT PLEURAL CAVITY, PERCUTANEOUS APPROACH, DIAGNOSTIC: ICD-10-PCS | Performed by: RADIOLOGY

## 2020-08-11 PROCEDURE — 25010000002 FUROSEMIDE PER 20 MG: Performed by: EMERGENCY MEDICINE

## 2020-08-11 PROCEDURE — 88185 FLOWCYTOMETRY/TC ADD-ON: CPT

## 2020-08-11 PROCEDURE — 85025 COMPLETE CBC W/AUTO DIFF WBC: CPT | Performed by: EMERGENCY MEDICINE

## 2020-08-11 PROCEDURE — 84484 ASSAY OF TROPONIN QUANT: CPT | Performed by: NURSE PRACTITIONER

## 2020-08-11 PROCEDURE — 82803 BLOOD GASES ANY COMBINATION: CPT

## 2020-08-11 PROCEDURE — 99223 1ST HOSP IP/OBS HIGH 75: CPT | Performed by: INTERNAL MEDICINE

## 2020-08-11 PROCEDURE — 25010000002 CEFEPIME PER 500 MG: Performed by: INTERNAL MEDICINE

## 2020-08-11 PROCEDURE — 87205 SMEAR GRAM STAIN: CPT | Performed by: NURSE PRACTITIONER

## 2020-08-11 PROCEDURE — 36600 WITHDRAWAL OF ARTERIAL BLOOD: CPT

## 2020-08-11 PROCEDURE — 87077 CULTURE AEROBIC IDENTIFY: CPT | Performed by: EMERGENCY MEDICINE

## 2020-08-11 PROCEDURE — 82042 OTHER SOURCE ALBUMIN QUAN EA: CPT | Performed by: NURSE PRACTITIONER

## 2020-08-11 PROCEDURE — 87186 SC STD MICRODIL/AGAR DIL: CPT | Performed by: EMERGENCY MEDICINE

## 2020-08-11 PROCEDURE — 88184 FLOWCYTOMETRY/ TC 1 MARKER: CPT

## 2020-08-11 PROCEDURE — 87116 MYCOBACTERIA CULTURE: CPT | Performed by: NURSE PRACTITIONER

## 2020-08-11 PROCEDURE — P9612 CATHETERIZE FOR URINE SPEC: HCPCS

## 2020-08-11 PROCEDURE — 80053 COMPREHEN METABOLIC PANEL: CPT | Performed by: EMERGENCY MEDICINE

## 2020-08-11 PROCEDURE — 81001 URINALYSIS AUTO W/SCOPE: CPT | Performed by: EMERGENCY MEDICINE

## 2020-08-11 PROCEDURE — 84478 ASSAY OF TRIGLYCERIDES: CPT | Performed by: NURSE PRACTITIONER

## 2020-08-11 PROCEDURE — 82945 GLUCOSE OTHER FLUID: CPT | Performed by: NURSE PRACTITIONER

## 2020-08-11 PROCEDURE — 84157 ASSAY OF PROTEIN OTHER: CPT | Performed by: NURSE PRACTITIONER

## 2020-08-11 RX ORDER — PRAMIPEXOLE DIHYDROCHLORIDE 0.25 MG/1
0.25 TABLET ORAL EVERY 24 HOURS
Status: DISCONTINUED | OUTPATIENT
Start: 2020-08-11 | End: 2020-08-17 | Stop reason: HOSPADM

## 2020-08-11 RX ORDER — CLONAZEPAM 0.5 MG/1
0.5 TABLET ORAL NIGHTLY PRN
Status: DISCONTINUED | OUTPATIENT
Start: 2020-08-11 | End: 2020-08-11

## 2020-08-11 RX ORDER — ACETAMINOPHEN 325 MG/1
650 TABLET ORAL EVERY 4 HOURS PRN
Status: DISCONTINUED | OUTPATIENT
Start: 2020-08-11 | End: 2020-08-17 | Stop reason: HOSPADM

## 2020-08-11 RX ORDER — SODIUM CHLORIDE 0.9 % (FLUSH) 0.9 %
10 SYRINGE (ML) INJECTION EVERY 12 HOURS SCHEDULED
Status: DISCONTINUED | OUTPATIENT
Start: 2020-08-11 | End: 2020-08-17 | Stop reason: HOSPADM

## 2020-08-11 RX ORDER — GABAPENTIN 300 MG/1
300 CAPSULE ORAL NIGHTLY
Status: DISCONTINUED | OUTPATIENT
Start: 2020-08-11 | End: 2020-08-17 | Stop reason: HOSPADM

## 2020-08-11 RX ORDER — ONDANSETRON 4 MG/1
4 TABLET, FILM COATED ORAL EVERY 6 HOURS PRN
Status: DISCONTINUED | OUTPATIENT
Start: 2020-08-11 | End: 2020-08-17 | Stop reason: HOSPADM

## 2020-08-11 RX ORDER — NYSTATIN 100000 [USP'U]/G
POWDER TOPICAL EVERY 12 HOURS SCHEDULED
Status: DISCONTINUED | OUTPATIENT
Start: 2020-08-11 | End: 2020-08-17 | Stop reason: HOSPADM

## 2020-08-11 RX ORDER — ESCITALOPRAM OXALATE 10 MG/1
10 TABLET ORAL DAILY
Status: DISCONTINUED | OUTPATIENT
Start: 2020-08-11 | End: 2020-08-17 | Stop reason: HOSPADM

## 2020-08-11 RX ORDER — SODIUM CHLORIDE 0.9 % (FLUSH) 0.9 %
10 SYRINGE (ML) INJECTION AS NEEDED
Status: DISCONTINUED | OUTPATIENT
Start: 2020-08-11 | End: 2020-08-17 | Stop reason: HOSPADM

## 2020-08-11 RX ORDER — BACLOFEN 10 MG/1
10 TABLET ORAL DAILY
Status: DISCONTINUED | OUTPATIENT
Start: 2020-08-11 | End: 2020-08-17 | Stop reason: HOSPADM

## 2020-08-11 RX ORDER — FUROSEMIDE 10 MG/ML
40 INJECTION INTRAMUSCULAR; INTRAVENOUS
Status: DISCONTINUED | OUTPATIENT
Start: 2020-08-11 | End: 2020-08-11

## 2020-08-11 RX ORDER — ACETAMINOPHEN 650 MG/1
650 SUPPOSITORY RECTAL EVERY 4 HOURS PRN
Status: DISCONTINUED | OUTPATIENT
Start: 2020-08-11 | End: 2020-08-17 | Stop reason: HOSPADM

## 2020-08-11 RX ORDER — FUROSEMIDE 40 MG/1
40 TABLET ORAL
Status: DISCONTINUED | OUTPATIENT
Start: 2020-08-11 | End: 2020-08-11

## 2020-08-11 RX ORDER — PANTOPRAZOLE SODIUM 40 MG/1
40 TABLET, DELAYED RELEASE ORAL EVERY MORNING
Status: DISCONTINUED | OUTPATIENT
Start: 2020-08-11 | End: 2020-08-17 | Stop reason: HOSPADM

## 2020-08-11 RX ORDER — WARFARIN SODIUM 2 MG/1
2 TABLET ORAL
Status: DISCONTINUED | OUTPATIENT
Start: 2020-08-11 | End: 2020-08-11

## 2020-08-11 RX ORDER — ROSUVASTATIN CALCIUM 10 MG/1
10 TABLET, COATED ORAL NIGHTLY
Status: DISCONTINUED | OUTPATIENT
Start: 2020-08-11 | End: 2020-08-17 | Stop reason: HOSPADM

## 2020-08-11 RX ORDER — FUROSEMIDE 10 MG/ML
40 INJECTION INTRAMUSCULAR; INTRAVENOUS ONCE
Status: COMPLETED | OUTPATIENT
Start: 2020-08-11 | End: 2020-08-11

## 2020-08-11 RX ORDER — ACETAMINOPHEN 160 MG/5ML
650 SOLUTION ORAL EVERY 4 HOURS PRN
Status: DISCONTINUED | OUTPATIENT
Start: 2020-08-11 | End: 2020-08-17 | Stop reason: HOSPADM

## 2020-08-11 RX ORDER — ACETAMINOPHEN 500 MG
1000 TABLET ORAL ONCE
Status: COMPLETED | OUTPATIENT
Start: 2020-08-11 | End: 2020-08-11

## 2020-08-11 RX ORDER — POTASSIUM CHLORIDE 20 MEQ/1
20 TABLET, EXTENDED RELEASE ORAL 3 TIMES DAILY
Status: DISCONTINUED | OUTPATIENT
Start: 2020-08-11 | End: 2020-08-17 | Stop reason: HOSPADM

## 2020-08-11 RX ORDER — ONDANSETRON 2 MG/ML
4 INJECTION INTRAMUSCULAR; INTRAVENOUS EVERY 6 HOURS PRN
Status: DISCONTINUED | OUTPATIENT
Start: 2020-08-11 | End: 2020-08-17 | Stop reason: HOSPADM

## 2020-08-11 RX ADMIN — NYSTATIN: 100000 POWDER TOPICAL at 20:02

## 2020-08-11 RX ADMIN — BACLOFEN 10 MG: 10 TABLET ORAL at 10:34

## 2020-08-11 RX ADMIN — POTASSIUM CHLORIDE 20 MEQ: 1500 TABLET, EXTENDED RELEASE ORAL at 18:01

## 2020-08-11 RX ADMIN — GABAPENTIN 300 MG: 300 CAPSULE ORAL at 20:02

## 2020-08-11 RX ADMIN — PANTOPRAZOLE SODIUM 40 MG: 40 TABLET, DELAYED RELEASE ORAL at 10:33

## 2020-08-11 RX ADMIN — Medication 10 ML: at 10:36

## 2020-08-11 RX ADMIN — ROSUVASTATIN CALCIUM 10 MG: 10 TABLET, FILM COATED ORAL at 20:02

## 2020-08-11 RX ADMIN — CARBIDOPA AND LEVODOPA 1.5 TABLET: 25; 100 TABLET ORAL at 21:04

## 2020-08-11 RX ADMIN — POTASSIUM CHLORIDE 20 MEQ: 1500 TABLET, EXTENDED RELEASE ORAL at 20:02

## 2020-08-11 RX ADMIN — POTASSIUM CHLORIDE 20 MEQ: 1500 TABLET, EXTENDED RELEASE ORAL at 10:33

## 2020-08-11 RX ADMIN — CARBIDOPA AND LEVODOPA 1.5 TABLET: 25; 100 TABLET ORAL at 10:34

## 2020-08-11 RX ADMIN — PRAMIPEXOLE DIHYDROCHLORIDE 0.25 MG: 0.25 TABLET ORAL at 10:34

## 2020-08-11 RX ADMIN — ESCITALOPRAM OXALATE 10 MG: 10 TABLET ORAL at 10:34

## 2020-08-11 RX ADMIN — FUROSEMIDE 40 MG: 10 INJECTION, SOLUTION INTRAMUSCULAR; INTRAVENOUS at 01:25

## 2020-08-11 RX ADMIN — VANCOMYCIN HYDROCHLORIDE 1500 MG: 10 INJECTION, POWDER, LYOPHILIZED, FOR SOLUTION INTRAVENOUS at 02:02

## 2020-08-11 RX ADMIN — Medication 10 ML: at 20:07

## 2020-08-11 RX ADMIN — FUROSEMIDE 40 MG: 40 TABLET ORAL at 10:33

## 2020-08-11 RX ADMIN — ACETAMINOPHEN 1000 MG: 500 TABLET, FILM COATED ORAL at 01:25

## 2020-08-11 RX ADMIN — AZTREONAM 1000 MG: 1 INJECTION, POWDER, LYOPHILIZED, FOR SOLUTION INTRAMUSCULAR; INTRAVENOUS at 01:58

## 2020-08-11 RX ADMIN — NYSTATIN: 100000 POWDER TOPICAL at 10:36

## 2020-08-11 RX ADMIN — CEFEPIME 2 G: 2 INJECTION, POWDER, FOR SOLUTION INTRAVENOUS at 15:30

## 2020-08-11 NOTE — PLAN OF CARE
Thoracentesis done.  Speech consulted due to patient's cough with medications and fluids.  Failed bedside evaluation and video swallow study to be completed 08-12-20; patient NPO until after swallow study in the am

## 2020-08-11 NOTE — PROGRESS NOTES
"Pharmacy dosing service  Anticoagulant  Warfarin     Subjective:    Rafael Ivey is a 84 y.o.male being continued on warfarin for atrial fibrillation.    INR Goal: 2 - 3  Home medication?: Yes, warfarin 2 mg PO every day at 1800, except Sunday  Bridge Therapy Present?:  Yes,  Lovenox 40 mg SQ Q24H  Interacting Medications Evaluation (New/Present/Discontinued):   Additional Contributing Factors: acute illness      Assessment/Plan:    Due to subtherapeutic INR will initiate 2 mg daily including Sunday.     Continue to monitor and adjust based on INR.         Date 8/11           INR 1.72           Dose 2 mg               Objective:  [Ht: 182.9 cm (72\"); Wt: 95.6 kg (210 lb 12.2 oz); BMI: Body mass index is 28.58 kg/m².]    Lab Results   Component Value Date    ALBUMIN 4.00 08/11/2020     Lab Results   Component Value Date    INR 1.72 (L) 08/11/2020    INR 1.91 (L) 06/16/2020    INR 1.80 (L) 06/15/2020    PROTIME 18.2 (L) 08/11/2020    PROTIME 18.4 (L) 06/16/2020    PROTIME 17.4 (L) 06/15/2020     Lab Results   Component Value Date    HGB 10.5 (L) 08/11/2020    HGB 9.8 (L) 06/16/2020    HGB 9.6 (L) 06/15/2020     Lab Results   Component Value Date    HCT 32.9 (L) 08/11/2020    HCT 30.3 (L) 06/16/2020    HCT 29.5 (L) 06/15/2020       Radha Clemons, [facility]  08/11/20 07:19     "

## 2020-08-11 NOTE — THERAPY EVALUATION
Acute Care - Speech Language Pathology   Swallow Initial Evaluation  Gian     Patient Name: Rafael Ivey  : 1935  MRN: [id number]  Today's Date: 2020               Admit Date: 2020    Visit Dx:     ICD-10-CM ICD-9-CM   1. Acute respiratory distress R06.03 518.82   2. Fever, unspecified fever cause R50.9 780.60   3. Pneumonia of right lung due to infectious organism, unspecified part of lung J18.9 483.8   4. Acute congestive heart failure, unspecified heart failure type (CMS/HCC) I50.9 428.0     Patient Active Problem List   Diagnosis   • Sick sinus syndrome (CMS/Formerly [facility])   • Atrial fibrillation, chronic   • Pacemaker   • Bilateral leg edema   • Iron deficiency anemia due to chronic blood loss   • Warfarin-induced coagulopathy (CMS/Formerly [facility])   • Parkinson's disease (CMS/Formerly [facility])   • Aortic stenosis   • GERD (gastroesophageal reflux disease)   • Hyperlipidemia   • Essential hypertension   • Mitral regurgitation   • Nonrheumatic tricuspid valve disorder   • Pulmonary hypertension (CMS/Formerly [facility])   • Sleep apnea   • Valvular heart disease   • Hypotension   • Chronic diastolic CHF (congestive heart failure) (CMS/Formerly [facility])   • Anxiety disorder   • Overactive bladder   • Chronic pain   • RLS (restless legs syndrome)   • Obesity (BMI 30-39.9)   • Acute UTI (urinary tract infection)   • Hypokalemia   • Anasarca   • Acute on chronic combined systolic and diastolic CHF (congestive heart failure) (CMS/Formerly [facility])   • Depression   • Anemia   • Rectal ulceration   • Acute on chronic heart failure (CMS/Formerly [facility])   • Acute respiratory distress   • Pneumonia of right lung due to infectious organism   • Weakness   • Pleural effusion, right     Past Medical History:   Diagnosis Date   • Anemia    • Arthritis     left hip    • Atrial fibrillation (CMS/HCC)    • Atrial fibrillation (CMS/HCC)     chronic   • Cardiomegaly     mild   • Chronic diastolic heart failure (CMS/Formerly [facility])    • GERD (gastroesophageal reflux disease)    • Hyperlipidemia    •  Hypertension    • Hypotension    • Lower extremity edema    • Mitral regurgitation    • Obesity    • Pacemaker    • Parkinson disease (CMS/HCC)    • Recurrent falls     with injury    • Sick sinus syndrome (CMS/HCC)     s/p pacemaker implant    • Valvular heart disease     MR     Past Surgical History:   Procedure Laterality Date   • APPENDECTOMY     • ATRIAL APPENDAGE EXCLUSION LEFT WITH TRANSESOPHAGEAL ECHOCARDIOGRAM N/A 5/28/2020    Procedure: Atrial Appendage Occlusion;  Surgeon: Jim Walker MD;  Location: [facility] CATH INVASIVE LOCATION;  Service: Cardiovascular;  Laterality: N/A;   • ATRIAL APPENDAGE EXCLUSION LEFT WITH TRANSESOPHAGEAL ECHOCARDIOGRAM N/A 5/28/2020    Procedure: Atrial Appendage Occlusion;  Surgeon: Lashaun So MD;  Location: [facility] CATH INVASIVE LOCATION;  Service: Cardiovascular;  Laterality: N/A;   • CATARACT EXTRACTION     • CHOLECYSTECTOMY     • COLONOSCOPY N/A 5/21/2020    Procedure: COLONOSCOPY WITH BIOPSY X1 AREA;  Surgeon: Sim Collins MD;  Location: [facility] ENDOSCOPY;  Service: Gastroenterology;  Laterality: N/A;  Post: poor prep, impacted stool in rectum, and internal hemorrhoids, ascending colon arteriovenous malformation   • ENDOSCOPY N/A 5/21/2020    Procedure: ESOPHAGOGASTRODUODENOSCOPY with clipping x 1 of bleeding gastric polyp;  Surgeon: Sim Collins MD;  Location: [facility] ENDOSCOPY;  Service: Gastroenterology;  Laterality: N/A;  Post: bleeding gastric polyp   • HIP SURGERY Right 08/2017   • OTHER SURGICAL HISTORY      skin mole excisions    • PACEMAKER IMPLANTATION  06/22/2017    [employer] ([employer])   • TONSILLECTOMY     • TOTAL HIP ARTHROPLASTY Left 05/20/2014        SWALLOW EVALUATION (last 72 hours)      SLP Adult Swallow Evaluation     Row Name 08/11/20 1600          Document Type  evaluation  -CP    Subjective Information  no complaints  -CP    Patient Observations  alert;cooperative  -CP    Patient/Family Observations  Pt was alert and  responsive. Pt was able to follow commands and answer questions appropriately.   -CP    Patient Effort  good  -CP    Comment  PPE worn: gloves, mask and face shield  -CP          Patient Profile Reviewed  yes  -CP    Pertinent History Of Current Problem  Pt admitted with SOA and fever. Pt being w/u for possible pneumonia. he was noted to cough on water while taking pills earlier today.   -CP    Current Method of Nutrition  regular textures;thin liquids  -CP    Prior Level of Function-Swallowing  no diet consistency restrictions;regular textures;thin liquids;other (see comments) wife reported coughing on coffee at home  -CP    Plans/Goals Discussed with  patient and family  -CP    Barriers to Rehab  none identified  -CP          Additional Documentation  Pain Scale: Numbers Pre/Post-Treatment (Group)  -CP          Pain Scale: Numbers, Pretreatment  0/10 - no pain  -CP    Pain Scale: Numbers, Post-Treatment  0/10 - no pain  -CP          Secretion Management  WNL/WFL  -CP    Mucosal Quality  cracked  -CP          Oral Motor General Assessment  WFL  -CP          Respiratory Support Currently in Use  nasal cannula  -CP    Eating/Swallowing Skills  self-fed  -CP    Positioning During Eating  upright 90 degree;upright in bed  -CP    Utensils Used  straw  -CP    Consistencies Trialed  thin liquids  -CP          Clinical Swallow Evaluation Summary  Pt seen for clinical swallow eval. He was seen consuming water by straw only due to pt being NPO for a procedure.  Pt exhibited good bolus control of all liquids and was able to pull liquids from a straw with no difficulties. Pt exhibited cough on 2/5 liquid trials with intermittant throat clearing. Further eval of swallow is recommended with VFSS prior to PO initiation.  ST will follow up with recs from VFSS.   -CP          SLP Swallowing Diagnosis  functional oral phase;suspected pharyngeal dysfunction  -CP    Functional Impact  risk of aspiration/pneumonia  -CP    Rehab  Potential/Prognosis, Swallowing  good, to achieve stated therapy goals  -CP    Swallow Criteria for Skilled Therapeutic Interventions Met  demonstrates skilled criteria  -CP          Therapy Frequency (Swallow)  PRN  -CP    Predicted Duration Therapy Intervention (Days)  until discharge  -CP    SLP Diet Recommendation  NPO  -CP    Recommended Diagnostics  reassess via VFSS (MBS)  -CP    SLP Rec. for Method of Medication Administration  meds via alternate route  -CP          Oral Nutrition/Hydration Goal Selection (SLP)  oral nutrition/hydration, SLP goal 1;oral nutrition/hydration, SLP goal 2  -CP          Oral Nutrition/Hydration Goal 1, SLP  Pt will have VFSS with 24-48 hous.   -CP    Time Frame (Oral Nutrition/Hydration Goal 1, SLP)  1 day;2 days  -CP          Oral Nutrition/Hydration Goal 2, SLP  Pt will tolerate safest and L/R diet/liquids with no complications of aspiration.   -CP    Time Frame (Oral Nutrition/Hydration Goal 2, SLP)  by discharge  -CP      User Key  (r) = Recorded By, (t) = Taken By, (c) = Cosigned By    Initials Name Effective Dates    Zo Andrade, RONDA 03/01/19 -           EDUCATION  The patient has been educated in the following areas:   Dysphagia (Swallowing Impairment).    SLP Recommendation and Plan  SLP Swallowing Diagnosis: functional oral phase, suspected pharyngeal dysfunction  SLP Diet Recommendation: NPO     SLP Rec. for Method of Medication Administration: meds via alternate route        Recommended Diagnostics: reassess via VFSS (MBS)  Swallow Criteria for Skilled Therapeutic Interventions Met: demonstrates skilled criteria     Rehab Potential/Prognosis, Swallowing: good, to achieve stated therapy goals  Therapy Frequency (Swallow): PRN  Predicted Duration Therapy Intervention (Days): until discharge            SLP GOALS     Row Name 08/11/20 1600             Oral Nutrition/Hydration Goal 1 (SLP)    Oral Nutrition/Hydration Goal 1, SLP  Pt will have VFSS with 24-48 hous.    -CP      Time Frame (Oral Nutrition/Hydration Goal 1, SLP)  1 day;2 days  -CP         Oral Nutrition/Hydration Goal 2 (SLP)    Oral Nutrition/Hydration Goal 2, SLP  Pt will tolerate safest and L/R diet/liquids with no complications of aspiration.   -CP      Time Frame (Oral Nutrition/Hydration Goal 2, SLP)  by discharge  -CP        User Key  (r) = Recorded By, (t) = Taken By, (c) = Cosigned By    Initials Name Provider Type    CP Zo Monique, SLP Speech and Language Pathologist             Time Calculation:                RONDA Serrano  8/11/2020

## 2020-08-11 NOTE — PROGRESS NOTES
Discharge Planning Assessment  [facility]     Patient Name: Rafael Ivey  MRN: [id number]  Today's Date: 8/11/2020    Admit Date: 8/11/2020    Discharge Needs Assessment     Row Name 08/11/20 1447       Living Environment    Lives With  spouse    Current Living Arrangements  home/apartment/condo    Primary Care Provided by  self    Provides Primary Care For  no one    Family Caregiver if Needed  spouse    Able to Return to Prior Arrangements  yes       Resource/Environmental Concerns    Resource/Environmental Concerns  none    Transportation Concerns  car, none       Transition Planning    Patient/Family Anticipates Transition to  home with help/services    Patient/Family Anticipated Services at Transition  none    Transportation Anticipated  family or friend will provide       Discharge Needs Assessment    Readmission Within the Last 30 Days  no previous admission in last 30 days    Concerns to be Addressed  discharge planning    Equipment Currently Used at Home  rollator    Anticipated Changes Related to Illness  inability to care for self    Equipment Needed After Discharge  none    Provided Post Acute Provider List?  Yes    Post Acute Provider List  Home Health    Delivered To  Patient    Method of Delivery  Telephone        Discharge Plan     Row Name 08/11/20 1452       Plan    Plan  DC plan: current with [facility], needs pending PT eval    Plan Comments  due to covid precautions spoke to patient by phone.  patient states has been weak in past weeks, is current with [facility]. states lives with wife and uses rollator.  needs pending PT and SLP evals.  patient on 10L O2.  has been to [facility] in past            Demographic Summary     Row Name 08/11/20 1446       General Information    Admission Type  inpatient    Arrived From  home    Required Notices Provided  Important Message from Medicare    Referral Source  admission list    Reason for Consult  discharge planning    Preferred Language  English      Used During This Interaction  no        Functional Status     Row Name 08/11/20 1447       Functional Status    Usual Activity Tolerance  moderate    Current Activity Tolerance  moderate       Functional Status, IADL    Medications  independent    Meal Preparation  assistive person    Housekeeping  assistive person    Laundry  assistive person    Shopping  assistive person    IADL Comments  lives with wife          Patient Forms     Row Name 08/11/20 1448       Patient Forms    Important Message from Medicare (IMM)  Delivered spoke to patient by phone, verbalized understanding, declined copy    Delivered to  Patient    Method of delivery  Telephone            Christi Rothman RN

## 2020-08-11 NOTE — PROGRESS NOTES
"Pharmacy Antimicrobial Dosing Service    Subjective:  Rafael Ivey is a 84 y.o.male admitted with acute respiratory distress. Pharmacy has been consulted to dose Vancomycin for possible pneumonia.           Assessment/Plan    1. Day # 1 Vancomycin: Goal -600 mcg*h/mL. 1500 mg IV x1 load dose given in ED. Will initiate 1500 mg (~15 mg/kg ABW) q24h due to age and renal function. Will obtain levels prior to 4th dose.  Peak 8/13 at 0600  Trough 8/14 at 0100    2. Day #1 Cefepime: 2 gm IV q12h for estCrCl 30-59 mL/min.    Will continue to monitor drug levels, renal function, culture and sensitivities, and patient clinical status.       Objective:  Relevant clinical data and objective history reviewed:  182.9 cm (72\")   95.6 kg (210 lb 12.2 oz)   Ideal body weight: 77.6 kg (171 lb 1.2 oz)  Adjusted ideal body weight: 84.8 kg (186 lb 15.2 oz)  Body mass index is 28.58 kg/m².        Results from last 7 days   Lab Units 08/11/20  0114   CREATININE mg/dL 1.13     Estimated Creatinine Clearance: 58.4 mL/min (by C-G formula based on SCr of 1.13 mg/dL).  I/O last 3 completed shifts:  In: -   Out: 325 [Urine:325]    Results from last 7 days   Lab Units 08/11/20  0114   WBC 10*3/mm3 13.30*     Temperature    08/11/20 0113 08/11/20 0449   Temp: (!) 101.7 °F (38.7 °C) 98.4 °F (36.9 °C)     Baseline culture/source/susceptibility:  Microbiology Results (last 10 days)       Procedure Component Value - Date/Time    Respiratory Panel PCR w/COVID-19(SARS-CoV-2) [facility] In-House, NP Swab in UTM/VTM, 3-4 HR TAT - Swab, Nasopharynx [[id number]]  (Normal) Collected:  08/11/20 0121    Lab Status:  Final result Specimen:  Swab from Nasopharynx Updated:  08/11/20 0325     ADENOVIRUS, PCR Not Detected     Coronavirus 229E Not Detected     Coronavirus HKU1 Not Detected     Coronavirus NL63 Not Detected     Coronavirus OC43 Not Detected     COVID19 Not Detected     Human Metapneumovirus Not Detected     Human Rhinovirus/Enterovirus " Not Detected     Influenza A PCR Not Detected     Influenza A H1 Not Detected     Influenza A H1 2009 PCR Not Detected     Influenza A H3 Not Detected     Influenza B PCR Not Detected     Parainfluenza Virus 1 Not Detected     Parainfluenza Virus 2 Not Detected     Parainfluenza Virus 3 Not Detected     Parainfluenza Virus 4 Not Detected     RSV, PCR Not Detected     Bordetella pertussis pcr Not Detected     Bordetella parapertussis PCR Not Detected     Chlamydophila pneumoniae PCR Not Detected     Mycoplasma pneumo by PCR Not Detected    Narrative:       Fact sheet for providers: https://docs.[employer]/wp-content/uploads/HUX4826-8452-LU0.1-EUA-Provider-Fact-Sheet-3.pdf    Fact sheet for patients: https://docs.[employer]/wp-content/uploads/ZKC2596-5476-KY0.1-EUA-Patient-Fact-Sheet-1.pdf             Anti-Infectives (From admission, onward)      Ordered     Dose/Rate Route Frequency Start Stop    08/11/20 0907  aztreonam (AZACTAM) 1,000 mg in sodium chloride 0.9 % 100 mL IVPB-MBP     Ordering Provider:  Estrellita Pang APRN    1,000 mg  200 mL/hr over 30 Minutes Intravenous Every 12 Hours 08/11/20 1000 08/18/20 0959    08/11/20 0906  Pharmacy to dose vancomycin     Ordering Provider:  Estrellita Pang APRN     Does not apply Continuous PRN 08/11/20 0906 08/18/20 0905    08/11/20 0117  aztreonam (AZACTAM) 1,000 mg in sodium chloride 0.9 % 100 mL IVPB-MBP     Ordering Provider:  Homero German MD    1,000 mg  200 mL/hr over 30 Minutes Intravenous Once 08/11/20 0119 08/11/20 0258    08/11/20 0117  vancomycin 1500 mg/500 mL 0.9% NS IVPB ([facility])     Ordering Provider:  Homero German MD    20 mg/kg × 77.3 kg (Adjusted) Intravenous Once 08/11/20 0119 08/11/20 0202            Radha Clemons, [facility]  08/11/20 09:18

## 2020-08-11 NOTE — PLAN OF CARE
Patient newly admitted unsure of all needs for discharge at present.  Reviewed Plan of Care with patient and patient verbalized understanding.

## 2020-08-11 NOTE — ED PROVIDER NOTES
Subjective   Complaint Short of breath    History of present illness this is an 84-year-old male with several health problems complains of increasing shortness of breath since yesterday.  He is developed a fever today has had nonproductive cough.  Swelling in his legs but no worse than usual symptoms are severe worse with exertion better with rest continuous ongoing since yesterday.  No vomiting or diarrhea no urinary problems no recent hospitalization no recent long car ride plane ride immobilization or foreign travels.  No one at home with similar illness.  Denies chest pain neck arm or jaw pain no black or bloody stool.          Review of Systems   Constitutional: Positive for chills and fever.   HENT: Negative for congestion and sinus pressure.    Eyes: Negative for photophobia and visual disturbance.   Respiratory: Positive for cough and shortness of breath. Negative for chest tightness.    Cardiovascular: Positive for leg swelling. Negative for chest pain.   Gastrointestinal: Negative for abdominal pain and vomiting.   Endocrine: Negative for cold intolerance and heat intolerance.   Genitourinary: Negative for difficulty urinating and dysuria.   Musculoskeletal: Positive for arthralgias. Negative for back pain.   Skin: Negative for color change and pallor.   Neurological: Negative for dizziness and light-headedness.   Psychiatric/Behavioral: Negative for agitation and behavioral problems.       Past Medical History:   Diagnosis Date   • Anemia    • Arthritis     left hip    • Atrial fibrillation (CMS/HCC)    • Atrial fibrillation (CMS/HCC)     chronic   • Cardiomegaly     mild   • Chronic diastolic heart failure (CMS/HCC)    • GERD (gastroesophageal reflux disease)    • Hyperlipidemia    • Hypertension    • Hypotension    • Lower extremity edema    • Mitral regurgitation    • Obesity    • Pacemaker    • Parkinson disease (CMS/HCC)    • Recurrent falls     with injury    • Sick sinus syndrome (CMS/HCC)     s/p  pacemaker implant    • Valvular heart disease     MR       Allergies   Allergen Reactions   • Amoxicillin Diarrhea   • Cephalexin Diarrhea and Nausea And Vomiting   • Penicillins Other (See Comments)   • Amoxicillin-Pot Clavulanate Nausea And Vomiting   • Clarithromycin Nausea And Vomiting       Past Surgical History:   Procedure Laterality Date   • APPENDECTOMY     • ATRIAL APPENDAGE EXCLUSION LEFT WITH TRANSESOPHAGEAL ECHOCARDIOGRAM N/A 5/28/2020    Procedure: Atrial Appendage Occlusion;  Surgeon: Jim Walker MD;  Location: [facility] CATH INVASIVE LOCATION;  Service: Cardiovascular;  Laterality: N/A;   • ATRIAL APPENDAGE EXCLUSION LEFT WITH TRANSESOPHAGEAL ECHOCARDIOGRAM N/A 5/28/2020    Procedure: Atrial Appendage Occlusion;  Surgeon: Lashaun So MD;  Location: [facility] CATH INVASIVE LOCATION;  Service: Cardiovascular;  Laterality: N/A;   • CATARACT EXTRACTION     • CHOLECYSTECTOMY     • COLONOSCOPY N/A 5/21/2020    Procedure: COLONOSCOPY WITH BIOPSY X1 AREA;  Surgeon: Sim Collins MD;  Location: [facility] ENDOSCOPY;  Service: Gastroenterology;  Laterality: N/A;  Post: poor prep, impacted stool in rectum, and internal hemorrhoids, ascending colon arteriovenous malformation   • ENDOSCOPY N/A 5/21/2020    Procedure: ESOPHAGOGASTRODUODENOSCOPY with clipping x 1 of bleeding gastric polyp;  Surgeon: Sim Collins MD;  Location: [facility] ENDOSCOPY;  Service: Gastroenterology;  Laterality: N/A;  Post: bleeding gastric polyp   • HIP SURGERY Right 08/2017   • OTHER SURGICAL HISTORY      skin mole excisions    • PACEMAKER IMPLANTATION  06/22/2017    [employer]'s ([employer])   • TONSILLECTOMY     • TOTAL HIP ARTHROPLASTY Left 05/20/2014       Family History   Problem Relation Age of Onset   • Heart failure Mother    • Stroke Maternal Grandfather        Social History     Socioeconomic History   • Marital status:      Spouse name: Not on file   • Number of children: Not on file   • Years of education: Not  on file   • Highest education level: Not on file   Tobacco Use   • Smoking status: Former Smoker     Packs/day: 1.00     Years: 40.00     Pack years: 40.00     Types: Cigarettes   • Smokeless tobacco: Never Used   • Tobacco comment: quit 1970's    Substance and Sexual Activity   • Alcohol use: Yes     Frequency: Never   • Drug use: Never   • Sexual activity: Defer     Prior to Admission medications    Medication Sig Start Date End Date Taking? Authorizing Provider   acetaminophen (TYLENOL) 325 MG tablet Take 650 mg by mouth Every 6 (Six) Hours As Needed for Mild Pain .    Lisa Gunter MD   baclofen (LIORESAL) 10 MG tablet Take 10 mg by mouth Daily.    Lisa Gunter MD   carbidopa-levodopa (SINEMET)  MG per tablet Take 1.5 tablets by mouth Every 8 (Eight) Hours. 1/4/19   Lisa Gunter MD   clonazePAM (KlonoPIN) 0.5 MG tablet Take 1 tablet by mouth At Night As Needed. 7/5/19   Lisa Gunter MD   escitalopram (LEXAPRO) 10 MG tablet Take 10 mg by mouth Daily.    Lisa Gunter MD   ferrous sulfate 325 (65 FE) MG tablet Take 1 tablet by mouth Every 12 (Twelve) Hours. 1/4/19   Lisa Gunter MD   furosemide (LASIX) 40 MG tablet Take 1 tablet by mouth 2 (Two) Times a Day. 6/16/20   Kim Childs MD   gabapentin (NEURONTIN) 300 MG capsule Take 300 mg by mouth every night at bedtime. 6/26/19   Lisa Gunter MD   multivitamin-minerals (CENTRUM) tablet Take 1 tablet by mouth Daily.    Lisa Gunter MD   nitroglycerin (NITROSTAT) 0.4 MG SL tablet Place 1 tablet under the tongue Every 5 (Five) Minutes As Needed for Chest Pain (Only if SBP Greater Than 100). Take no more than 3 doses in 15 minutes. 6/16/20   Kim Childs MD   nystatin (MYCOSTATIN) 704941 UNIT/GM powder Apply  topically to the appropriate area as directed Every 12 (Twelve) Hours. 6/16/20   Kim Childs MD   Omega-3 Fatty Acids (FISH OIL PO) Take 2 capsules by mouth Daily.    Lyric  MD Lisa   omeprazole (PRILOSEC) 20 MG capsule Take 1 capsule by mouth Daily.    Lisa Gunter MD   ondansetron (ZOFRAN) 4 MG tablet Take 1 tablet by mouth Every 6 (Six) Hours As Needed for Nausea or Vomiting. 6/16/20   Kim Childs MD   oxybutynin (DITROPAN) 5 MG tablet Take 5 mg by mouth Daily.    Lisa Gunter MD   potassium chloride (K-DUR,KLOR-CON) 20 MEQ CR tablet Take 1 tablet by mouth 3 (Three) Times a Day.  Patient taking differently: Take 20 mEq by mouth 3 (Three) Times a Day. Pt takes this 5 times a day 3/9/20   Jose Gonzalez DO   pramipexole (MIRAPEX) 0.25 MG tablet Take 1 tablet by mouth Daily. 1/4/19   Lisa Gunter MD   rosuvastatin (CRESTOR) 10 MG tablet Take 1 tablet by mouth every night at bedtime. 6/27/19   Lisa Gunter MD   warfarin (COUMADIN) 2 MG tablet Take As Directed. Taking every day but Sunday 7/9/19   Lisa Gunter MD           Objective   Physical Exam  84-year-old awake alert severe distress sats in the 70s initially improved on nonrebreather at the 100%.  HEENT extraocular muscles intact pupils equal and reactive mouth clear neck supple with some JVD but no bruits lungs rhonchi and rales throughout but no retractions heart irregular without murmur rub abdomen soft without tenderness no pulsatile masses extremities 2+ edema no palp cords or Homans sign evidence of DVT.  Good and equal pulses right upper and lower extremities.  Patient's awake alert follows commands motor strength normal no facial asymmetry normal speech without focal weakness  Procedures           ED Course      Results for orders placed or performed during the hospital encounter of 08/11/20   Comprehensive Metabolic Panel   Result Value Ref Range    Glucose 107 (H) 65 - 99 mg/dL    BUN      Creatinine 1.13 0.76 - 1.27 mg/dL    Sodium 140 136 - 145 mmol/L    Potassium 3.4 (L) 3.5 - 5.2 mmol/L    Chloride 101 98 - 107 mmol/L    CO2 26.0 22.0 - 29.0 mmol/L     Calcium 9.3 8.6 - 10.5 mg/dL    Total Protein 7.0 6.0 - 8.5 g/dL    Albumin 4.00 3.50 - 5.20 g/dL    ALT (SGPT) 8 1 - 41 U/L    AST (SGOT) 35 1 - 40 U/L    Alkaline Phosphatase 188 (H) 39 - 117 U/L    Total Bilirubin 1.2 0.0 - 1.2 mg/dL    eGFR Non African Amer 62 >60 mL/min/1.73    Globulin 3.0 gm/dL    A/G Ratio 1.3 g/dL    BUN/Creatinine Ratio      Anion Gap 13.0 5.0 - 15.0 mmol/L   CBC Auto Differential   Result Value Ref Range    WBC 13.30 (H) 3.40 - 10.80 10*3/mm3    RBC 3.79 (L) 4.14 - 5.80 10*6/mm3    Hemoglobin 10.5 (L) 13.0 - 17.7 g/dL    Hematocrit 32.9 (L) 37.5 - 51.0 %    MCV 86.7 79.0 - 97.0 fL    MCH 27.7 26.6 - 33.0 pg    MCHC 31.9 31.5 - 35.7 g/dL    RDW 15.2 12.3 - 15.4 %    RDW-SD 48.6 37.0 - 54.0 fl    MPV 5.9 (L) 6.0 - 12.0 fL    Platelets 373 140 - 450 10*3/mm3    Neutrophil % 89.6 (H) 42.7 - 76.0 %    Lymphocyte % 2.2 (L) 19.6 - 45.3 %    Monocyte % 7.5 5.0 - 12.0 %    Eosinophil % 0.0 (L) 0.3 - 6.2 %    Basophil % 0.7 0.0 - 1.5 %    Neutrophils, Absolute 11.90 (H) 1.70 - 7.00 10*3/mm3    Lymphocytes, Absolute 0.30 (L) 0.70 - 3.10 10*3/mm3    Monocytes, Absolute 1.00 (H) 0.10 - 0.90 10*3/mm3    Eosinophils, Absolute 0.00 0.00 - 0.40 10*3/mm3    Basophils, Absolute 0.10 0.00 - 0.20 10*3/mm3    nRBC 0.1 0.0 - 0.2 /100 WBC   Blood Gas, Arterial   Result Value Ref Range    Site Left Radial     See's Test Positive     pH, Arterial 7.411 7.350 - 7.450 pH units    pCO2, Arterial 45.4 35.0 - 48.0 mm Hg    pO2, Arterial 111.4 (H) 83.0 - 108.0 mm Hg    HCO3, Arterial 28.8 (H) 21.0 - 28.0 mmol/L    Base Excess, Arterial 3.7 (H) 0.0 - 3.0 mmol/L    O2 Saturation, Arterial 98.4 (H) 94.0 - 98.0 %    CO2 Content 30.2 (H) 22 - 29 mmol/L    Barometric Pressure for Blood Gas      Modality NRB     FIO2 100 %    Hemodilution No    Urinalysis With Culture If Indicated - Urine, Catheter   Result Value Ref Range    Color, UA Yellow Yellow, Straw    Appearance, UA Clear Clear    pH, UA <=5.0 5.0 - 8.0    Specific  Gravity, UA 1.013 1.005 - 1.030    Glucose, UA Negative Negative    Ketones, UA Negative Negative    Bilirubin, UA Negative Negative    Blood, UA Trace (A) Negative    Protein, UA Negative Negative    Leuk Esterase, UA Small (1+) (A) Negative    Nitrite, UA Positive (A) Negative    Urobilinogen, UA 1.0 E.U./dL 0.2 - 1.0 E.U./dL   Troponin   Result Value Ref Range    Troponin T 0.052 (C) 0.000 - 0.030 ng/mL   BNP   Result Value Ref Range    proBNP 2,415.0 (H) 0.0-1,800.0 pg/mL   Protime-INR   Result Value Ref Range    Protime 18.2 (L) 19.4 - 28.5 Seconds    INR 1.72 (L) 2.00 - 3.00   Scan Slide   Result Value Ref Range    RBC Morphology Normal Normal    WBC Morphology Normal Normal    Platelet Morphology Normal Normal   BUN   Result Value Ref Range    BUN 19 8 - 23 mg/dL   Urinalysis, Microscopic Only - Urine, Catheter   Result Value Ref Range    RBC, UA 0-2 (A) None Seen /HPF    WBC, UA 13-20 (A) None Seen /HPF    Bacteria, UA 1+ (A) None Seen /HPF    Squamous Epithelial Cells, UA 0-2 None Seen, 0-2 /HPF    Hyaline Casts, UA 3-6 None Seen /LPF    Methodology Automated Microscopy    POC Lactate   Result Value Ref Range    Lactate 1.9 0.5 - 2.0 mmol/L   Light Blue Top   Result Value Ref Range    Extra Tube hold for add-on    Green Top (Gel)   Result Value Ref Range    Extra Tube Hold for add-ons.    Lavender Top   Result Value Ref Range    Extra Tube hold for add-on    Gold Top - SST   Result Value Ref Range    Extra Tube Hold for add-ons.      No radiology results for the last day  Medications   sodium chloride 0.9 % flush 10 mL (has no administration in time range)   aztreonam (AZACTAM) 1,000 mg in sodium chloride 0.9 % 100 mL IVPB-MBP (1,000 mg Intravenous New Bag 8/11/20 0158)   vancomycin 1500 mg/500 mL 0.9% NS IVPB ([facility]) (1,500 mg Intravenous New Bag 8/11/20 0202)   furosemide (LASIX) injection 40 mg (40 mg Intravenous Given 8/11/20 0125)   acetaminophen (TYLENOL) tablet 1,000 mg (1,000 mg Oral Given 8/11/20  0125)          EKG my interpretation atrial fibrillation rate of 77 normal axis hypertrophy QTC of 483 nonspecific T wave changes lateral leads this is changed from previous EKG abnormal                                  MDM  Number of Diagnoses or Management Options  Acute congestive heart failure, unspecified heart failure type (CMS/[facility]):   Acute respiratory distress:   Fever, unspecified fever cause:   Pneumonia of right lung due to infectious organism, unspecified part of lung:   Diagnosis management comments: Medical decision making.  Patient had IV established placed on a monitor placed on nonrebreather was given Lasix 40 mg IV and underwent sepsis protocol.  Labs including cultures and lactate obtained and was started on aztreonam and vancomycin.  The patient was able to go to high flow nasal cannula 10 L keep his sats 100% white count was 13,000 the patient's lactate was normal.  Chemistries unremarkable troponin was 0.052.  Blood gas pH 7.4 PCO2 of 45 and his PO2 was 111 on nonrebreather he was able go to 10 L nasal cannula high flow to keep his sats 100% given Lasix 40 mg IV aztreonam and vancomycin.  Tylenol for his fever on repeat examination at 2:25 AM he was feeling much better still has some rales throughout sats 100% and he was doing much better his COVID-19 test is currently pending.  I do not see any evidence of acute cardiac ischemia on his EKG no evidence of suggest DVT or pulmonary embolism his INR is 1.7.  He is feeling much better he will be admitted to the hospital hospitalist nurse practitioner notified.  Patient examined in appropriate PPE per hospital protocol      Final diagnoses:   Acute respiratory distress   Fever, unspecified fever cause   Pneumonia of right lung due to infectious organism, unspecified part of lung   Acute congestive heart failure, unspecified heart failure type (CMS/[facility])            Homero German MD  08/11/20 0236

## 2020-08-11 NOTE — PLAN OF CARE
Problem: Patient Care Overview  Goal: Plan of Care Review  Outcome: Ongoing (interventions implemented as appropriate)  Note:   Pt seen for clinical swallow eval. He was seen consuming water by straw only due to pt being NPO for a procedure.  Pt exhibited good bolus control of all liquids and was able to pull liquids from a straw with no difficulties. Pt exhibited cough on 2/5 liquid trials with intermittant throat clearing. Further eval of swallow is recommended with [facility] prior to PO initiation.  ST will follow up with recs from [facility].

## 2020-08-11 NOTE — POST-PROCEDURE NOTE
IR POST OP NOTE    Procedure:R thoracentesis      Pre Op DX:Effusion, soa      Post Op DX:same      Anesthesia: Local      Findings:Large dark bloody fluid aspirated. Sent for requested studies.       Complications:No immediate.       Provider Signature: Dr. Chas Lopez

## 2020-08-11 NOTE — H&P
[facility] Medicine Services      Patient Name: Rafael Ivey  : 1935  MRN: [id number]  Primary Care Physician: Blake Ivey MD  Date of admission: 2020    Patient Care Team:  Blake Ivey MD as PCP - General (Family Medicine)          Subjective   History Present Illness     Chief Complaint:   Chief Complaint   Patient presents with   • Shortness of Breath       Mr. Ivey is a 84 y.o. male with PMH of CHF, afib on coumadin, HLD, parkinson's disease, anxiety/depression, and GERD who presented to [facility] ER 20 with complaints of shortness of breath. He has also had a cough and fever. He denied any chest pain. He has been weak and been falling at home. He has lower extremity edema but this has been ongoing and he stated the edema is not any worse. He has been urinating. He is not on home oxygen therapy.      In the ER the patient required nonrebreather initially that was able to be weaned down to 10L high flow nasal cannula after 40mg IV lasix was given. CXR showed large infiltrate and effusion right lower lobe and right midlung zone has gotten worse since previous study. Small infiltrate small pleural effusion inferior medial aspect left lower lobe unchanged. Diffuse mild infiltrate throughout the right upper lobe and throughout the left lung unchanged. proBNP 2,415, troponin 0.052, WBC 13.3. UA showed nitrite, small leukocytes, wbc 13-20, 1+ bacteria.   He was given IV aztreonam, IV vancomycin. He was admitted to PCU for further treatment of RLL pneumonia, large R pleural effusion, and UTI.     Medical record review:  Pt admitted 2020 for treatment of CHF exacerbation, anasarca      Review of Systems   Constitution: Positive for decreased appetite and fever.   HENT: Negative.    Eyes: Negative.    Cardiovascular: Positive for dyspnea on exertion.   Respiratory: Positive for cough and shortness of breath.    Endocrine: Negative.    Hematologic/Lymphatic:  Negative.    Skin: Negative.    Musculoskeletal: Negative.    Gastrointestinal: Negative.    Genitourinary: Negative.    Neurological: Positive for weakness.   Psychiatric/Behavioral: Negative.    Allergic/Immunologic: Negative.    All other systems reviewed and are negative.          Personal History     Past Medical History:   Past Medical History:   Diagnosis Date   • Anemia    • Arthritis     left hip    • Atrial fibrillation (CMS/HCC)    • Atrial fibrillation (CMS/HCC)     chronic   • Cardiomegaly     mild   • Chronic diastolic heart failure (CMS/HCC)    • GERD (gastroesophageal reflux disease)    • Hyperlipidemia    • Hypertension    • Hypotension    • Lower extremity edema    • Mitral regurgitation    • Obesity    • Pacemaker    • Parkinson disease (CMS/HCC)    • Recurrent falls     with injury    • Sick sinus syndrome (CMS/HCC)     s/p pacemaker implant    • Valvular heart disease     MR       Surgical History:      Past Surgical History:   Procedure Laterality Date   • APPENDECTOMY     • ATRIAL APPENDAGE EXCLUSION LEFT WITH TRANSESOPHAGEAL ECHOCARDIOGRAM N/A 5/28/2020    Procedure: Atrial Appendage Occlusion;  Surgeon: Jim Walker MD;  Location: [facility] CATH INVASIVE LOCATION;  Service: Cardiovascular;  Laterality: N/A;   • ATRIAL APPENDAGE EXCLUSION LEFT WITH TRANSESOPHAGEAL ECHOCARDIOGRAM N/A 5/28/2020    Procedure: Atrial Appendage Occlusion;  Surgeon: Lashaun So MD;  Location: [facility] CATH INVASIVE LOCATION;  Service: Cardiovascular;  Laterality: N/A;   • CATARACT EXTRACTION     • CHOLECYSTECTOMY     • COLONOSCOPY N/A 5/21/2020    Procedure: COLONOSCOPY WITH BIOPSY X1 AREA;  Surgeon: Sim Collins MD;  Location: [facility] ENDOSCOPY;  Service: Gastroenterology;  Laterality: N/A;  Post: poor prep, impacted stool in rectum, and internal hemorrhoids, ascending colon arteriovenous malformation   • ENDOSCOPY N/A 5/21/2020    Procedure: ESOPHAGOGASTRODUODENOSCOPY with clipping x 1 of  bleeding gastric polyp;  Surgeon: Sim Collins MD;  Location: [facility] ENDOSCOPY;  Service: Gastroenterology;  Laterality: N/A;  Post: bleeding gastric polyp   • HIP SURGERY Right 08/2017   • OTHER SURGICAL HISTORY      skin mole excisions    • PACEMAKER IMPLANTATION  06/22/2017    [employer] ([employer])   • TONSILLECTOMY     • TOTAL HIP ARTHROPLASTY Left 05/20/2014           Family History: family history includes Heart failure in his mother; Stroke in his maternal grandfather. Otherwise pertinent FHx was reviewed and unremarkable.     Social History:  reports that he has quit smoking. His smoking use included cigarettes. He has a 40.00 pack-year smoking history. He has never used smokeless tobacco. He reports that he drinks alcohol. He reports that he does not use drugs.      Medications:  Prior to Admission medications    Medication Sig Start Date End Date Taking? Authorizing Provider   acetaminophen (TYLENOL) 325 MG tablet Take 650 mg by mouth Every 6 (Six) Hours As Needed for Mild Pain .   Yes Lisa Gunter MD   baclofen (LIORESAL) 10 MG tablet Take 10 mg by mouth Daily.   Yes Lisa Gunter MD   carbidopa-levodopa (SINEMET)  MG per tablet Take 1.5 tablets by mouth Every 8 (Eight) Hours. 1/4/19  Yes Lisa Gunter MD   clonazePAM (KlonoPIN) 0.5 MG tablet Take 1 tablet by mouth At Night As Needed. 7/5/19  Yes Lisa Gunter MD   escitalopram (LEXAPRO) 10 MG tablet Take 10 mg by mouth Daily.   Yes Lisa Gunter MD   ferrous sulfate 325 (65 FE) MG tablet Take 1 tablet by mouth Every 12 (Twelve) Hours. 1/4/19  Yes Lisa Gunter MD   furosemide (LASIX) 40 MG tablet Take 1 tablet by mouth 2 (Two) Times a Day. 6/16/20  Yes Kim Childs MD   gabapentin (NEURONTIN) 300 MG capsule Take 300 mg by mouth every night at bedtime. 6/26/19  Yes Lisa Gunter MD   multivitamin-minerals (CENTRUM) tablet Take 1 tablet by mouth Daily.   Yes Lisa Gunter MD    nystatin (MYCOSTATIN) 289889 UNIT/GM powder Apply  topically to the appropriate area as directed Every 12 (Twelve) Hours. 6/16/20  Yes Kim Childs MD   Omega-3 Fatty Acids (FISH OIL PO) Take 2 capsules by mouth Daily.   Yes Lisa Gunter MD   omeprazole (PRILOSEC) 20 MG capsule Take 1 capsule by mouth Daily.   Yes Lisa Gunter MD   oxybutynin (DITROPAN) 5 MG tablet Take 5 mg by mouth Daily.   Yes Lisa Gunter MD   potassium chloride (K-DUR,KLOR-CON) 20 MEQ CR tablet Take 1 tablet by mouth 3 (Three) Times a Day.  Patient taking differently: Take 20 mEq by mouth 3 (Three) Times a Day. Pt takes this 5 times a day 3/9/20  Yes Jose Gonzalez,    pramipexole (MIRAPEX) 0.25 MG tablet Take 1 tablet by mouth Daily. 1/4/19  Yes Lisa Gunter MD   rosuvastatin (CRESTOR) 10 MG tablet Take 1 tablet by mouth every night at bedtime. 6/27/19  Yes Lisa Gunter MD   warfarin (COUMADIN) 2 MG tablet Take As Directed. Taking every day but Sunday 7/9/19  Yes iLsa Gunter MD   nitroglycerin (NITROSTAT) 0.4 MG SL tablet Place 1 tablet under the tongue Every 5 (Five) Minutes As Needed for Chest Pain (Only if SBP Greater Than 100). Take no more than 3 doses in 15 minutes. 6/16/20   Kim Childs MD   ondansetron (ZOFRAN) 4 MG tablet Take 1 tablet by mouth Every 6 (Six) Hours As Needed for Nausea or Vomiting. 6/16/20   Kim Childs MD       Allergies:    Allergies   Allergen Reactions   • Amoxicillin Diarrhea   • Cephalexin Diarrhea and Nausea And Vomiting   • Penicillins Other (See Comments)   • Amoxicillin-Pot Clavulanate Nausea And Vomiting   • Clarithromycin Nausea And Vomiting       Objective   Objective     Vital Signs  Temp:  [98.4 °F (36.9 °C)-101.7 °F (38.7 °C)] 98.4 °F (36.9 °C)  Heart Rate:  [72-85] 72  Resp:  [19-22] 20  BP: (114-152)/(53-68) 132/59  SpO2:  [64 %-99 %] 95 %  on  Flow (L/min):  [2-15] 10;   Device (Oxygen Therapy): humidified;high-flow nasal  cannula  Body mass index is 28.58 kg/m².    Physical Exam   Constitutional: He is oriented to person, place, and time. He appears well-developed and well-nourished. He has a sickly appearance. No distress.   HENT:   Head: Normocephalic and atraumatic.   Nose: Nose normal.   Eyes: Pupils are equal, round, and reactive to light. Conjunctivae and EOM are normal. Right eye exhibits no discharge. Left eye exhibits no discharge. No scleral icterus.   Neck: Normal range of motion.   Cardiovascular: Normal rate, regular rhythm and intact distal pulses.   Murmur heard.  Pulmonary/Chest: Effort normal. No stridor. No respiratory distress. He has decreased breath sounds in the right middle field and the right lower field. He has no wheezes. He has rhonchi in the right upper field, the right lower field, the left upper field and the left lower field. He has rales.   Abdominal: Soft. Bowel sounds are normal. He exhibits no distension. There is no tenderness. There is no guarding.   Musculoskeletal: Normal range of motion. He exhibits edema. He exhibits no tenderness or deformity.   Neurological: He is alert and oriented to person, place, and time. No cranial nerve deficit.   Skin: Skin is warm and dry. No rash noted. He is not diaphoretic. No erythema.   Psychiatric: He has a normal mood and affect. His behavior is normal.   Nursing note and vitals reviewed.      Results Review:  I have personally reviewed most recent cardiac tracings, lab results, microbiology results and radiology images and interpretations and agree with findings    Results from last 7 days   Lab Units 08/11/20  0114   WBC 10*3/mm3 13.30*   HEMOGLOBIN g/dL 10.5*   HEMATOCRIT % 32.9*   PLATELETS 10*3/mm3 373   INR  1.72*     Results from last 7 days   Lab Units 08/11/20  0116 08/11/20  0114   SODIUM mmol/L  --  140   POTASSIUM mmol/L  --  3.4*   CHLORIDE mmol/L  --  101   CO2 mmol/L  --  26.0   BUN   --  19   CREATININE mg/dL  --  1.13   GLUCOSE mg/dL  --   107*   CALCIUM mg/dL  --  9.3   ALT (SGPT) U/L  --  8   AST (SGOT) U/L  --  35   TROPONIN T ng/mL  --  0.052*   PROBNP pg/mL  --  2,415.0*   LACTATE mmol/L 1.9  --      Estimated Creatinine Clearance: 58.4 mL/min (by C-G formula based on SCr of 1.13 mg/dL).  Brief Urine Lab Results  (Last result in the past 365 days)      Color   Clarity   Blood   Leuk Est   Nitrite   Protein   CREAT   Urine HCG        08/11/20 0216 Yellow Clear Trace Small (1+) Positive Negative               Microbiology Results (last 10 days)     Procedure Component Value - Date/Time    Respiratory Panel PCR w/COVID-19(SARS-CoV-2) [facility]I/[facility]ANCHE/[facility] In-House, NP Swab in UTM/VTM, 3-4 HR TAT - Swab, Nasopharynx [[id number]]  (Normal) Collected:  08/11/20 0121    Lab Status:  Final result Specimen:  Swab from Nasopharynx Updated:  08/11/20 0325     ADENOVIRUS, PCR Not Detected     Coronavirus 229E Not Detected     Coronavirus HKU1 Not Detected     Coronavirus NL63 Not Detected     Coronavirus OC43 Not Detected     COVID19 Not Detected     Human Metapneumovirus Not Detected     Human Rhinovirus/Enterovirus Not Detected     Influenza A PCR Not Detected     Influenza A H1 Not Detected     Influenza A H1 2009 PCR Not Detected     Influenza A H3 Not Detected     Influenza B PCR Not Detected     Parainfluenza Virus 1 Not Detected     Parainfluenza Virus 2 Not Detected     Parainfluenza Virus 3 Not Detected     Parainfluenza Virus 4 Not Detected     RSV, PCR Not Detected     Bordetella pertussis pcr Not Detected     Bordetella parapertussis PCR Not Detected     Chlamydophila pneumoniae PCR Not Detected     Mycoplasma pneumo by PCR Not Detected    Narrative:       Fact sheet for providers: https://docs.[employer]/wp-content/uploads/KYI2368-0090-UI1.1-EUA-Provider-Fact-Sheet-3.pdf    Fact sheet for patients: https://docs.[employer]/wp-content/uploads/ZRU6690-2079-PR5.1-EUA-Patient-Fact-Sheet-1.pdf          ECG/EMG Results (most recent)     Procedure  Component Value Units Date/Time    ECG 12 Lead [[id number]] Collected:  08/11/20 0108     Updated:  08/11/20 0110    Narrative:       HEART RATE= 77  bpm  RR Interval= 781  ms  NC Interval=   ms  P Horizontal Axis=   deg  P Front Axis=   deg  QRSD Interval= 109  ms  QT Interval= 427  ms  QRS Axis= 11  deg  T Wave Axis= -71  deg  - ABNORMAL ECG -  Atrial fibrillation  Low voltage, extremity leads  Repol abnrm suggests ischemia, diffuse leads  When compared with ECG of 13-Jun-2020 9:45:36,  Significant repolarization change  Significant axis, voltage or hypertrophy change  Electronically Signed By:   Date and Time of Study: 2020-08-11 01:08:56          Results for orders placed during the hospital encounter of 06/12/20   Duplex Venous Lower Extremity - Bilateral CAR    Narrative · Normal bilateral lower extremity venous duplex scan.  · Left popliteal fossa heterogenous fluid collection.          Results for orders placed during the hospital encounter of 05/20/20   Adult Transesophageal Echo (PRESTON) W/ Cont if Necessary Per Protocol    Narrative · Left ventricular systolic function is normal.  · Left atrial cavity size is mild-to-moderately dilated.  · Moderate mitral valve regurgitation is present  · Left atrial appendage diameter was 33 mm at 135 degree which was very   large and not conducive to implantation of watchman device. Depth was 28   mm. After discussion, it was decided that anatomy of left atrial appendage   is not conducive and appropriate for watchman device implantation.   Procedure was aborted          Xr Chest 1 View    Result Date: 8/11/2020   1. Large infiltrate and effusion right lower lobe and right midlung zone has gotten worse since previous study. 2. Small infiltrate small pleural effusion inferior medial aspect left lower lobe lung unchanged. 3. Diffuse mild infiltrate throughout the right upper lobe and throughout the left lung unchanged.  Electronically Signed By-DR. Bob Mcgee MD  On:8/11/2020 7:28 AM This report was finalized on [id number] by DR. Bob Mcgee MD.        Estimated Creatinine Clearance: 58.4 mL/min (by C-G formula based on SCr of 1.13 mg/dL).    Assessment/Plan   Assessment/Plan       Active Hospital Problems    Diagnosis  POA   • **Acute respiratory distress [R06.03]  Yes     Priority: High   • Pneumonia of right lung due to infectious organism [J18.9]  Unknown     Priority: High   • Pleural effusion, right [J90]  Yes     Priority: High   • Acute on chronic combined systolic and diastolic CHF (congestive heart failure) (CMS/HCC) [I50.43]  Yes     Priority: High   • Acute UTI (urinary tract infection) [N39.0]  Yes     Priority: High   • Weakness [R53.1]  Yes   • GERD (gastroesophageal reflux disease) [K21.9]  Yes   • Hyperlipidemia [E78.5]  Yes   • Essential hypertension [I10]  Yes   • Sleep apnea [G47.30]  Yes   • RLS (restless legs syndrome) [G25.81]  Yes   • Chronic pain [G89.29]  Yes   • Overactive bladder [N32.81]  Yes   • Anxiety disorder [F41.9]  Yes   • Bilateral leg edema [R60.0]  Yes   • Iron deficiency anemia due to chronic blood loss [D50.0]  Yes   • Parkinson's disease (CMS/[facility]) [G20]  Yes   • Atrial fibrillation, chronic [I48.20]  Yes   • Pacemaker [Z95.0]  Yes   • Aortic stenosis [I35.0]  Yes   • 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     Acute respiratory failure with hypoxia   -secondary to pneumonia, pleural effusion and CHF exacerbation  -required NRB initially now on 10L high flow O2, wean as tolerated  -CXR showed large infiltrate and effusion right lower lobe and right midlung zone has gotten worse since previous study. Small infiltrate small pleural effusion inferior medial aspect left lower lobe unchanged. Diffuse mild infiltrate throughout the right upper lobe and throughout  the left lung unchanged.   -ABG ph 7/411, CO2 45, pO2 111, HCO3 28.8 on NRB  -40mg IV lasix given in ER and will continue IV lasix q12 hours  -IV antibiotics  -consult IR for thoracentesis  -Covid-19 swab negative    Right sided pneumonia  -WBC 13.3, lactate normal 1.9  -HCAP treated with IV cefepime and IV vanc  -follow blood and sputum cultures  -check urine antigens    Acute on chronic combined systolic and diastolic congestive heart failure  -secondary to hypertensive heart disease, valvular heart disease (AS, MR, TVR) and pulmonary hypertension from sleep apnea  -proBNP 2,415  -given 40mg IV lasix x1 in ER  -IV lasix 40mg q12 hours for two more doses  -troponin 0.052 likely from CHF, repeat troponin    Large Right Pleural effusion  -consult IR for thoracentesis and send pleural fluid for culture    Urinary tract infection  -UA showed nitrite, small leukocytes, wbc 13-20, 1+ bacteria.   -IV cefepime as above  -follow urine culture    Weakness secondary to all above  -consult PT    Chronic, permanent atrial fibrillation, SSS s/p PPM  -on coumadin with INR 1.7  -pharmacy to dose lovenox and coumadin; however, pt will need thoracentesis    BRONSON  -hgb stable 10.5  -EGD/colonoscopy 5/21/20 rectal ulcer, AVM  -cont home ferrous sulfate    Essential hypertension    Hyperlipidemia  -cont home statin    Parkinson's disease  -cont home sinemet    GERD  -cont ppi    Depression/anxiety  -cont home lexapro,  -No klonopin seen on INSPECT since Dec 2019    RLS  -cont home mirapex    OAB  -cont home oxybutynin    Chronic pain  -cont home baclofen, Neurontin (INSPECT verified)        VTE Prophylaxis -   Mechanical Order History:     None      Pharmalogical Order History:     Ordered     Dose Route Frequency Stop    08/11/20 0621  warfarin (COUMADIN) tablet 2 mg     Question:  Target INR  Answer:  2 - 3    2 mg PO Daily Warfarin --    08/11/20 0438  enoxaparin (LOVENOX) syringe 40 mg      40 mg SC Every 24 Hours --    08/11/20  0621  Pharmacy to dose warfarin     Question:  Target INR  Answer:  2 - 3    -- XX Continuous PRN --          CODE STATUS:    Code Status and Medical Interventions:   Ordered at: 08/11/20 0438     Code Status:    CPR     Medical Interventions (Level of Support Prior to Arrest):    Full       This patient has been examined wearing appropriate Personal Protective Equipment. 08/11/20      I discussed the patient's findings and my recommendations with patient and nursing staff.        Electronically signed by STEPHANY Faulkner, 08/11/20, 7:42 AM.  [facility] Hospitalist Team

## 2020-08-11 NOTE — PROGRESS NOTES
8.11.20 Pt is current with [facility]. Please call [phone] with any questions or concerns. Thank you

## 2020-08-12 ENCOUNTER — APPOINTMENT (OUTPATIENT)
Dept: CT IMAGING | Facility: HOSPITAL | Age: 85
End: 2020-08-12

## 2020-08-12 ENCOUNTER — APPOINTMENT (OUTPATIENT)
Dept: GENERAL RADIOLOGY | Facility: HOSPITAL | Age: 85
End: 2020-08-12

## 2020-08-12 LAB
ALBUMIN FLD-MCNC: 2.8 G/DL
AMYLASE FLD-CCNC: 35 U/L
ANION GAP SERPL CALCULATED.3IONS-SCNC: 10 MMOL/L (ref 5–15)
ANISOCYTOSIS BLD QL: NORMAL
BASOPHILS # BLD AUTO: 0.1 10*3/MM3 (ref 0–0.2)
BASOPHILS NFR BLD AUTO: 0.9 % (ref 0–1.5)
BUN SERPL-MCNC: 20 MG/DL (ref 8–23)
BUN SERPL-MCNC: ABNORMAL MG/DL
BUN/CREAT SERPL: ABNORMAL
CALCIUM SPEC-SCNC: 8.3 MG/DL (ref 8.6–10.5)
CHLORIDE SERPL-SCNC: 106 MMOL/L (ref 98–107)
CO2 SERPL-SCNC: 27 MMOL/L (ref 22–29)
CREAT SERPL-MCNC: 0.95 MG/DL (ref 0.76–1.27)
DEPRECATED RDW RBC AUTO: 47.7 FL (ref 37–54)
EOSINOPHIL # BLD AUTO: 0.1 10*3/MM3 (ref 0–0.4)
EOSINOPHIL NFR BLD AUTO: 1.7 % (ref 0.3–6.2)
ERYTHROCYTE [DISTWIDTH] IN BLOOD BY AUTOMATED COUNT: 15.1 % (ref 12.3–15.4)
GFR SERPL CREATININE-BSD FRML MDRD: 76 ML/MIN/1.73
GIE STN SPEC: NORMAL
GLUCOSE FLD-MCNC: 86 MG/DL
GLUCOSE SERPL-MCNC: 94 MG/DL (ref 65–99)
HCT VFR BLD AUTO: 27.9 % (ref 37.5–51)
HGB BLD-MCNC: 9.1 G/DL (ref 13–17.7)
INR PPP: 1.71 (ref 2–3)
LDH FLD-CCNC: 232 U/L
LYMPHOCYTES # BLD AUTO: 0.5 10*3/MM3 (ref 0.7–3.1)
LYMPHOCYTES NFR BLD AUTO: 7.5 % (ref 19.6–45.3)
MCH RBC QN AUTO: 27.9 PG (ref 26.6–33)
MCHC RBC AUTO-ENTMCNC: 32.6 G/DL (ref 31.5–35.7)
MCV RBC AUTO: 85.7 FL (ref 79–97)
MONOCYTES # BLD AUTO: 0.7 10*3/MM3 (ref 0.1–0.9)
MONOCYTES NFR BLD AUTO: 10.1 % (ref 5–12)
NEUTROPHILS NFR BLD AUTO: 5.7 10*3/MM3 (ref 1.7–7)
NEUTROPHILS NFR BLD AUTO: 79.8 % (ref 42.7–76)
NRBC BLD AUTO-RTO: 0.1 /100 WBC (ref 0–0.2)
NT-PROBNP SERPL-MCNC: 2754 PG/ML (ref 0–1800)
PLAT MORPH BLD: NORMAL
PLATELET # BLD AUTO: 259 10*3/MM3 (ref 140–450)
PMV BLD AUTO: 5.9 FL (ref 6–12)
POIKILOCYTOSIS BLD QL SMEAR: NORMAL
POTASSIUM SERPL-SCNC: 3.3 MMOL/L (ref 3.5–5.2)
PROT FLD-MCNC: 3.9 G/DL
PROTHROMBIN TIME: 17.9 SECONDS (ref 19.4–28.5)
RBC # BLD AUTO: 3.26 10*6/MM3 (ref 4.14–5.8)
SODIUM SERPL-SCNC: 143 MMOL/L (ref 136–145)
TRIGL FLD-MCNC: 21 MG/DL
TROPONIN T SERPL-MCNC: 0.05 NG/ML (ref 0–0.03)
WBC # BLD AUTO: 7.1 10*3/MM3 (ref 3.4–10.8)
WBC MORPH BLD: NORMAL

## 2020-08-12 PROCEDURE — 25010000002 VANCOMYCIN 10 G RECONSTITUTED SOLUTION: Performed by: NURSE PRACTITIONER

## 2020-08-12 PROCEDURE — 83880 ASSAY OF NATRIURETIC PEPTIDE: CPT | Performed by: NURSE PRACTITIONER

## 2020-08-12 PROCEDURE — 84484 ASSAY OF TROPONIN QUANT: CPT | Performed by: NURSE PRACTITIONER

## 2020-08-12 PROCEDURE — 25010000002 ENOXAPARIN PER 10 MG: Performed by: INTERNAL MEDICINE

## 2020-08-12 PROCEDURE — 25010000002 CEFEPIME PER 500 MG: Performed by: INTERNAL MEDICINE

## 2020-08-12 PROCEDURE — 74230 X-RAY XM SWLNG FUNCJ C+: CPT

## 2020-08-12 PROCEDURE — 92611 MOTION FLUOROSCOPY/SWALLOW: CPT

## 2020-08-12 PROCEDURE — 99233 SBSQ HOSP IP/OBS HIGH 50: CPT | Performed by: INTERNAL MEDICINE

## 2020-08-12 PROCEDURE — 97163 PT EVAL HIGH COMPLEX 45 MIN: CPT

## 2020-08-12 PROCEDURE — 80048 BASIC METABOLIC PNL TOTAL CA: CPT | Performed by: INTERNAL MEDICINE

## 2020-08-12 PROCEDURE — 71250 CT THORAX DX C-: CPT

## 2020-08-12 PROCEDURE — 85610 PROTHROMBIN TIME: CPT | Performed by: NURSE PRACTITIONER

## 2020-08-12 PROCEDURE — 97116 GAIT TRAINING THERAPY: CPT

## 2020-08-12 PROCEDURE — 85007 BL SMEAR W/DIFF WBC COUNT: CPT | Performed by: INTERNAL MEDICINE

## 2020-08-12 PROCEDURE — 71045 X-RAY EXAM CHEST 1 VIEW: CPT

## 2020-08-12 PROCEDURE — 85025 COMPLETE CBC W/AUTO DIFF WBC: CPT | Performed by: INTERNAL MEDICINE

## 2020-08-12 RX ORDER — WARFARIN SODIUM 2 MG/1
2 TABLET ORAL
Status: COMPLETED | OUTPATIENT
Start: 2020-08-12 | End: 2020-08-12

## 2020-08-12 RX ADMIN — CARBIDOPA AND LEVODOPA 1.5 TABLET: 25; 100 TABLET ORAL at 23:23

## 2020-08-12 RX ADMIN — POTASSIUM CHLORIDE 20 MEQ: 1500 TABLET, EXTENDED RELEASE ORAL at 08:43

## 2020-08-12 RX ADMIN — ROSUVASTATIN CALCIUM 10 MG: 10 TABLET, FILM COATED ORAL at 23:23

## 2020-08-12 RX ADMIN — Medication 10 ML: at 23:26

## 2020-08-12 RX ADMIN — PANTOPRAZOLE SODIUM 40 MG: 40 TABLET, DELAYED RELEASE ORAL at 08:43

## 2020-08-12 RX ADMIN — CARBIDOPA AND LEVODOPA 1.5 TABLET: 25; 100 TABLET ORAL at 15:23

## 2020-08-12 RX ADMIN — CEFEPIME 2 G: 2 INJECTION, POWDER, FOR SOLUTION INTRAVENOUS at 04:34

## 2020-08-12 RX ADMIN — Medication 10 ML: at 08:45

## 2020-08-12 RX ADMIN — ENOXAPARIN SODIUM 90 MG: 100 INJECTION SUBCUTANEOUS at 17:25

## 2020-08-12 RX ADMIN — VANCOMYCIN HYDROCHLORIDE 1500 MG: 10 INJECTION, POWDER, LYOPHILIZED, FOR SOLUTION INTRAVENOUS at 01:30

## 2020-08-12 RX ADMIN — PRAMIPEXOLE DIHYDROCHLORIDE 0.25 MG: 0.25 TABLET ORAL at 08:43

## 2020-08-12 RX ADMIN — GABAPENTIN 300 MG: 300 CAPSULE ORAL at 23:24

## 2020-08-12 RX ADMIN — ENOXAPARIN SODIUM 90 MG: 100 INJECTION SUBCUTANEOUS at 23:24

## 2020-08-12 RX ADMIN — POTASSIUM CHLORIDE 20 MEQ: 1500 TABLET, EXTENDED RELEASE ORAL at 15:23

## 2020-08-12 RX ADMIN — NYSTATIN: 100000 POWDER TOPICAL at 23:26

## 2020-08-12 RX ADMIN — ESCITALOPRAM OXALATE 10 MG: 10 TABLET ORAL at 08:43

## 2020-08-12 RX ADMIN — NYSTATIN: 100000 POWDER TOPICAL at 08:46

## 2020-08-12 RX ADMIN — CEFEPIME 2 G: 2 INJECTION, POWDER, FOR SOLUTION INTRAVENOUS at 15:23

## 2020-08-12 RX ADMIN — POTASSIUM CHLORIDE 20 MEQ: 1500 TABLET, EXTENDED RELEASE ORAL at 23:23

## 2020-08-12 RX ADMIN — CARBIDOPA AND LEVODOPA 1.5 TABLET: 25; 100 TABLET ORAL at 05:31

## 2020-08-12 RX ADMIN — WARFARIN 2 MG: 2 TABLET ORAL at 17:25

## 2020-08-12 RX ADMIN — BACLOFEN 10 MG: 10 TABLET ORAL at 08:43

## 2020-08-12 RX ADMIN — CEFEPIME 2 G: 2 INJECTION, POWDER, FOR SOLUTION INTRAVENOUS at 23:24

## 2020-08-12 NOTE — PROGRESS NOTES
"Pharmacy dosing service  Anticoagulant  Warfarin     Subjective:    Rafael Ivey is a 84 y.o.male being continued on warfarin for atrial fibrillation.    INR Goal: 2 - 3  Home medication?: Yes, warfarin 2 mg PO every day at 1800, except Sunday  Bridge Therapy Present?:  Yes,  Lovenox 90 mg SQ Q12H  Additional Contributing Factors: acute illness      Assessment/Plan:    Due to subtherapeutic INR will initiate 2 mg daily including Sunday.     Continue to monitor and adjust based on INR.         Date 8/11 8/12          INR 1.72 1.71          Dose ------ 2 mg              Objective:  [Ht: 182.9 cm (72\"); Wt: 92.6 kg (204 lb 2.3 oz); BMI: Body mass index is 27.69 kg/m².]    Lab Results   Component Value Date    ALBUMIN 4.00 08/11/2020     Lab Results   Component Value Date    INR 1.71 (L) 08/12/2020    INR 1.80 (L) 08/11/2020    INR 1.72 (L) 08/11/2020    PROTIME 17.9 (L) 08/12/2020    PROTIME 19.0 (L) 08/11/2020    PROTIME 18.2 (L) 08/11/2020     Lab Results   Component Value Date    HGB 9.1 (L) 08/12/2020    HGB 10.5 (L) 08/11/2020    HGB 9.8 (L) 06/16/2020     Lab Results   Component Value Date    HCT 27.9 (L) 08/12/2020    HCT 32.9 (L) 08/11/2020    HCT 30.3 (L) 06/16/2020       Radha Clemons, [facility]  08/12/20 09:37       "

## 2020-08-12 NOTE — PLAN OF CARE
Problem: Patient Care Overview  Goal: Plan of Care Review  Flowsheets  Taken 8/12/2020 1600  Plan of Care Reviewed With: patient  Taken 8/12/2020 1601  Outcome Summary: 85 yo male adm for acute hypoxic respiratory failure, lg pleural effusion, R HCA pna, uti. Hx of multiple falls recently and Parkinsons Disease. Requires mod assist to come to sit, mod assist to come to stand, and min to mod assist to amb 40 ft. Pt has marked posterior lean w/ attempts to come to stand, and he took 3 attempts before able to successfully do so. Ambulated 40 ft in room on 10L HFO2. Vital signs remained stable throughout rx. Pt presents w/ significant weakness. He reports he has been home from IP rehab x 2 wks, and he has fallen 4-5 times during that time. Pt not safe for home. Far below his functional baseline. Will need to return to IP rehab at d/c. Will follow for skilled PT. PPE: N95, gloves, goggles.

## 2020-08-12 NOTE — PLAN OF CARE
Problem: Patient Care Overview  Goal: Plan of Care Review  Outcome: Ongoing (interventions implemented as appropriate)  Flowsheets  Taken 8/11/2020 0509 by Mery Pretty, RN  Progress: no change  Taken 8/12/2020 1643 by Christy Farmer RN  Plan of Care Reviewed With: patient  Taken 8/12/2020 1820 by Christy Farmer RN  Outcome Summary: Mr. Ivey is a pleasant 84 year old gentleman admitted for acute respiratory distress and PNA.  Patient alert and oriented and able to communicate needs without difficulty.  Patient has denied complaints of pain/discomfort.  Had a swallow studt today and was ordered a mechanical ground diet with no straws.  Patient appetite appears good, and intake is adequate.  Patient awaiting CT of chest without contrast.  Sitting in chair at bedside at this time without any acute distress noted.

## 2020-08-12 NOTE — MBS/VFSS/FEES
Acute Care - Speech Language Pathology   Swallow Initial Evaluation  Gian     Patient Name: Rafael Ivey  : 1935  MRN: [id number]  Today's Date: 2020               Admit Date: 2020    Visit Dx:     ICD-10-CM ICD-9-CM   1. Acute respiratory distress R06.03 518.82   2. Fever, unspecified fever cause R50.9 780.60   3. Pneumonia of right lung due to infectious organism, unspecified part of lung J18.9 483.8   4. Acute congestive heart failure, unspecified heart failure type (CMS/HCC) I50.9 428.0     Patient Active Problem List   Diagnosis   • Sick sinus syndrome (CMS/Formerly [facility])   • Atrial fibrillation, chronic   • Pacemaker   • Bilateral leg edema   • Iron deficiency anemia due to chronic blood loss   • Warfarin-induced coagulopathy (CMS/Formerly [facility])   • Parkinson's disease (CMS/Formerly [facility])   • Aortic stenosis   • GERD (gastroesophageal reflux disease)   • Hyperlipidemia   • Essential hypertension   • Mitral regurgitation   • Nonrheumatic tricuspid valve disorder   • Pulmonary hypertension (CMS/[facility]ly [facility])   • Sleep apnea   • Valvular heart disease   • Hypotension   • Chronic diastolic CHF (congestive heart failure) (CMS/Formerly [facility])   • Anxiety disorder   • Overactive bladder   • Chronic pain   • RLS (restless legs syndrome)   • Obesity (BMI 30-39.9)   • Acute UTI (urinary tract infection)   • Hypokalemia   • Anasarca   • Acute on chronic combined systolic and diastolic CHF (congestive heart failure) (CMS/Formerly [facility])   • Depression   • Anemia   • Rectal ulceration   • Acute on chronic heart failure (CMS/Formerly [facility])   • Acute respiratory distress   • Pneumonia of right lung due to infectious organism   • Weakness   • Pleural effusion, right     Past Medical History:   Diagnosis Date   • Anemia    • Arthritis     left hip    • Atrial fibrillation (CMS/HCC)    • Atrial fibrillation (CMS/HCC)     chronic   • Cardiomegaly     mild   • Chronic diastolic heart failure (CMS/[facility])    • GERD (gastroesophageal reflux disease)    • Hyperlipidemia    •  Hypertension    • Hypotension    • Lower extremity edema    • Mitral regurgitation    • Obesity    • Pacemaker    • Parkinson disease (CMS/HCC)    • Recurrent falls     with injury    • Sick sinus syndrome (CMS/HCC)     s/p pacemaker implant    • Valvular heart disease     MR     Past Surgical History:   Procedure Laterality Date   • APPENDECTOMY     • ATRIAL APPENDAGE EXCLUSION LEFT WITH TRANSESOPHAGEAL ECHOCARDIOGRAM N/A 5/28/2020    Procedure: Atrial Appendage Occlusion;  Surgeon: Jim Walker MD;  Location: [facility] CATH INVASIVE LOCATION;  Service: Cardiovascular;  Laterality: N/A;   • ATRIAL APPENDAGE EXCLUSION LEFT WITH TRANSESOPHAGEAL ECHOCARDIOGRAM N/A 5/28/2020    Procedure: Atrial Appendage Occlusion;  Surgeon: Lashaun So MD;  Location: [facility] CATH INVASIVE LOCATION;  Service: Cardiovascular;  Laterality: N/A;   • CATARACT EXTRACTION     • CHOLECYSTECTOMY     • COLONOSCOPY N/A 5/21/2020    Procedure: COLONOSCOPY WITH BIOPSY X1 AREA;  Surgeon: Sim Collins MD;  Location: [facility] ENDOSCOPY;  Service: Gastroenterology;  Laterality: N/A;  Post: poor prep, impacted stool in rectum, and internal hemorrhoids, ascending colon arteriovenous malformation   • ENDOSCOPY N/A 5/21/2020    Procedure: ESOPHAGOGASTRODUODENOSCOPY with clipping x 1 of bleeding gastric polyp;  Surgeon: Sim Collins MD;  Location: [facility] ENDOSCOPY;  Service: Gastroenterology;  Laterality: N/A;  Post: bleeding gastric polyp   • HIP SURGERY Right 08/2017   • OTHER SURGICAL HISTORY      skin mole excisions    • PACEMAKER IMPLANTATION  06/22/2017    [employer] ([employer])   • TONSILLECTOMY     • TOTAL HIP ARTHROPLASTY Left 05/20/2014        SWALLOW EVALUATION (last 72 hours)      SLP Adult Swallow Evaluation     Row Name 08/12/20 8491       Rehab Evaluation    Document Type  --    Subjective Information  no complaints  -EC    Patient Observations  alert;cooperative;agree to therapy  -EC    Patient/Family Observations  --     Patient Effort  good  -EC    Comment  PPE worn: n95, goggles  -EC       General Information    Patient Profile Reviewed  yes  -EC    Pertinent History Of Current Problem  --    Current Method of Nutrition  NPO  -EC    Prior Level of Function-Swallowing  no diet consistency restrictions;regular textures;thin liquids;other (see comments)  -EC    Plans/Goals Discussed with  patient  -EC    Barriers to Rehab  none identified  -EC             MBS/VFSS    Utensils Used  spoon;cup  -EC    Consistencies Trialed  thin liquids;pureed mechanical soft, mixed consistency  -EC       MBS/VFSS Interpretation    VFSS Summary  Pt administered trials of thin liquid by cup, puree applesauce and mechanical soft mixed consistency peaches for evaluation. Regular solids not trialed due to pt not wearing upper dentures during VFSS. Pt demonstrates a functional swallow for thin liquids and mechanical soft solids. Pt noted w/ mild, transient penetration of thin liquids on 2/4 trials during VFSS, no aspiration of any consistency and intermittent mild base of tongue and pharyngeal residue which clears w/second swallows.  Detailed results are as follows: THIN LIQUID: Pt takes 4 trials by cup. Shallow, transient penetration is seen w/ 2/4 trials which appears to completely clear w/completion of swallow.  Pt w/mild oral and vallecular residue on 2/4 swallows which is cleared w/a second swallow (one of which is verbally cued, the other is independent).  PUREE: Pt noted w/good bolus control/formation and no penetration or aspiration of this consistency.  Mild base of tongue and vallecular residue is noted w/1/2 swallows.  MECHANICAL SOFT, MIXED CONSISTENCY: Pt noted w/spillage of the juice to the valleculae and pyriforms. Mild, transient penetration of the juice is seen on 1/2 trials. No aspiration is seen w/this consistency. Mild base of tongue residue is noted after 1/2 trials.    -EC       Clinical Impression    SLP Swallowing Diagnosis   functional oral phase;functional pharyngeal phase  -EC    Functional Impact  --    Rehab Potential/Prognosis, Swallowing  good, to achieve stated therapy goals  -EC    Swallow Criteria for Skilled Therapeutic Interventions Met  demonstrates skilled criteria  -EC       Recommendations    Therapy Frequency (Swallow)  PRN  -EC    Predicted Duration Therapy Intervention (Days)  until discharge  -EC    SLP Diet Recommendation  thin liquids mechanical soft  -EC    Recommended Diagnostics  --    SLP Rec. for Method of Medication Administration  with thin liquids;with pudding or applesauce;as tolerated  -EC    Monitor for Signs of Aspiration  yes;notify SLP if any concerns  -EC       Swallow Goals (SLP)    Oral Nutrition/Hydration Goal Selection (SLP)  oral nutrition/hydration, SLP goal 1;oral nutrition/hydration, SLP goal 2  -EC       Oral Nutrition/Hydration Goal 1 (SLP)    Oral Nutrition/Hydration Goal 1, SLP  Pt will have VFSS with 24-48 hous.   -EC    Time Frame (Oral Nutrition/Hydration Goal 1, SLP)  1 day;2 days  -EC    Progress/Outcomes (Oral Nutrition/Hydration Goal 1, SLP)  goal met  -EC       Oral Nutrition/Hydration Goal 2 (SLP)    Oral Nutrition/Hydration Goal 2, SLP  Pt will tolerate safest and L/R diet/liquids with no complications of aspiration.   -EC    Time Frame (Oral Nutrition/Hydration Goal 2, SLP)  by discharge  -EC    Progress/Outcomes (Oral Nutrition/Hydration Goal 2, SLP)  goal ongoing  -EC      User Key  (r) = Recorded By, (t) = Taken By, (c) = Cosigned By    Initials Name Effective Dates    Zo Andrade SLP 03/01/19 -     EC Birdie Jean 03/01/19 -           EDUCATION  The patient has been educated in the following areas:   Dysphagia (Swallowing Impairment).    SLP Recommendation and Plan  SLP Swallowing Diagnosis: functional oral phase, functional pharyngeal phase  SLP Diet Recommendation: thin liquids(mechanical soft)     SLP Rec. for Method of Medication Administration: with thin  liquids, with pudding or applesauce, as tolerated     Monitor for Signs of Aspiration: yes, notify SLP if any concerns     Swallow Criteria for Skilled Therapeutic Interventions Met: demonstrates skilled criteria     Rehab Potential/Prognosis, Swallowing: good, to achieve stated therapy goals  Therapy Frequency (Swallow): PRN  Predicted Duration Therapy Intervention (Days): until discharge            SLP GOALS     Row Name 08/12/20 1552 08/11/20 1600          Oral Nutrition/Hydration Goal 1 (SLP)    Oral Nutrition/Hydration Goal 1, SLP  Pt will have VFSS with 24-48 hous.   -EC  Pt will have VFSS with 24-48 hous.   -CP     Time Frame (Oral Nutrition/Hydration Goal 1, SLP)  1 day;2 days  -EC  1 day;2 days  -CP     Progress/Outcomes (Oral Nutrition/Hydration Goal 1, SLP)  goal met  -EC  --        Oral Nutrition/Hydration Goal 2 (SLP)    Oral Nutrition/Hydration Goal 2, SLP  Pt will tolerate safest and L/R diet/liquids with no complications of aspiration.   -EC  Pt will tolerate safest and L/R diet/liquids with no complications of aspiration.   -CP     Time Frame (Oral Nutrition/Hydration Goal 2, SLP)  by discharge  -EC  by discharge  -CP     Progress/Outcomes (Oral Nutrition/Hydration Goal 2, SLP)  goal ongoing  -EC  --       User Key  (r) = Recorded By, (t) = Taken By, (c) = Cosigned By    Initials Name Provider Type    Zo Andrade SLP Speech and Language Pathologist    EC Birdie Jean Speech and Language Pathologist             Time Calculation:                Birdie Jean  8/12/2020

## 2020-08-12 NOTE — PLAN OF CARE
Problem: Patient Care Overview  Goal: Plan of Care Review  Outcome: Ongoing (interventions implemented as appropriate)  Flowsheets (Taken 8/12/2020 5423)  Outcome Summary:   VFSS completed on this date. Pt administered trials of thin liquid by cup, puree applesauce and mechanical soft mixed consistency peaches for evaluation. Regular solids not trialed due to pt not wearing upper dentures during VFSS. Pt demonstrates a functional swallow for thin liquids and mechanical soft solids. Pt noted w/ mild, transient penetration of thin liquids on 2/4 trials during VFSS, which appears to clear completely, no aspiration of any consistency, and intermittent mild base of tongue and pharyngeal residue which clears w/second swallows. Recommend pt initiate a mechanical soft diet w/thin liquids. ST to continue to follow for diet tolerance w/further recommendations as indicated.

## 2020-08-12 NOTE — THERAPY EVALUATION
Patient Name: Rafael Ivey  : 1935    MRN: [id number]                              Today's Date: 2020       Admit Date: 2020    Visit Dx:     ICD-10-CM ICD-9-CM   1. Acute respiratory distress R06.03 518.82   2. Fever, unspecified fever cause R50.9 780.60   3. Pneumonia of right lung due to infectious organism, unspecified part of lung J18.9 483.8   4. Acute congestive heart failure, unspecified heart failure type (CMS/[facility]) I50.9 428.0     Patient Active Problem List   Diagnosis   • Sick sinus syndrome (CMS/[facility])   • Atrial fibrillation, chronic   • Pacemaker   • Bilateral leg edema   • Iron deficiency anemia due to chronic blood loss   • Warfarin-induced coagulopathy (CMS/[facility])   • Parkinson's disease (CMS/[facility])   • Aortic stenosis   • GERD (gastroesophageal reflux disease)   • Hyperlipidemia   • Essential hypertension   • Mitral regurgitation   • Nonrheumatic tricuspid valve disorder   • Pulmonary hypertension (CMS/[facility])   • Sleep apnea   • Valvular heart disease   • Hypotension   • Chronic diastolic CHF (congestive heart failure) (CMS/[facility])   • Anxiety disorder   • Overactive bladder   • Chronic pain   • RLS (restless legs syndrome)   • Obesity (BMI 30-39.9)   • Acute UTI (urinary tract infection)   • Hypokalemia   • Anasarca   • Acute on chronic combined systolic and diastolic CHF (congestive heart failure) (CMS/[facility])   • Depression   • Anemia   • Rectal ulceration   • Acute on chronic heart failure (CMS/[facility])   • Acute respiratory distress   • Pneumonia of right lung due to infectious organism   • Weakness   • Pleural effusion, right     Past Medical History:   Diagnosis Date   • Anemia    • Arthritis     left hip    • Atrial fibrillation (CMS/[facility])    • Atrial fibrillation (CMS/[facility])     chronic   • Cardiomegaly     mild   • Chronic diastolic heart failure (CMS/[facility])    • GERD (gastroesophageal reflux disease)    • Hyperlipidemia    • Hypertension    • Hypotension    • Lower extremity edema    •  Mitral regurgitation    • Obesity    • Pacemaker    • Parkinson disease (CMS/[facility])    • Recurrent falls     with injury    • Sick sinus syndrome (CMS/[facility])     s/p pacemaker implant    • Valvular heart disease     MR     Past Surgical History:   Procedure Laterality Date   • APPENDECTOMY     • ATRIAL APPENDAGE EXCLUSION LEFT WITH TRANSESOPHAGEAL ECHOCARDIOGRAM N/A 5/28/2020    Procedure: Atrial Appendage Occlusion;  Surgeon: Jim Walker MD;  Location: [facility] CATH INVASIVE LOCATION;  Service: Cardiovascular;  Laterality: N/A;   • ATRIAL APPENDAGE EXCLUSION LEFT WITH TRANSESOPHAGEAL ECHOCARDIOGRAM N/A 5/28/2020    Procedure: Atrial Appendage Occlusion;  Surgeon: Lashaun So MD;  Location: [facility] CATH INVASIVE LOCATION;  Service: Cardiovascular;  Laterality: N/A;   • CATARACT EXTRACTION     • CHOLECYSTECTOMY     • COLONOSCOPY N/A 5/21/2020    Procedure: COLONOSCOPY WITH BIOPSY X1 AREA;  Surgeon: Sim Collins MD;  Location: [facility] ENDOSCOPY;  Service: Gastroenterology;  Laterality: N/A;  Post: poor prep, impacted stool in rectum, and internal hemorrhoids, ascending colon arteriovenous malformation   • ENDOSCOPY N/A 5/21/2020    Procedure: ESOPHAGOGASTRODUODENOSCOPY with clipping x 1 of bleeding gastric polyp;  Surgeon: Sim Collins MD;  Location: [facility] ENDOSCOPY;  Service: Gastroenterology;  Laterality: N/A;  Post: bleeding gastric polyp   • HIP SURGERY Right 08/2017   • OTHER SURGICAL HISTORY      skin mole excisions    • PACEMAKER IMPLANTATION  06/22/2017    [employer] ([employer])   • TONSILLECTOMY     • TOTAL HIP ARTHROPLASTY Left 05/20/2014     General Information     Row Name 08/12/20 1552          PT Evaluation Time/Intention    Document Type  evaluation  -CM     Mode of Treatment  physical therapy  -CM     Row Name 08/12/20 1551          General Information    Patient Profile Reviewed?  yes  -CM     Prior Level of Function  independent:;all household mobility;gait uses rollator wx to amb  around home; no home O2; admits he has been home from rehab for about 2 weeks & has fallen 4-5 times during that time period.  -CM     Existing Precautions/Restrictions  fall;oxygen therapy device and L/min  -CM     Row Name 08/12/20 1552          Relationship/Environment    Lives With  spouse lives w/ elderly wife; has mother/son who come to help them for a few hours each day but they live about 5 minutes away.   -CM     Row Name 08/12/20 1552          Resource/Environmental Concerns    Current Living Arrangements  home/apartment/condo  -CM     Row Name 08/12/20 1552          Home Main Entrance    Number of Stairs, Main Entrance  none  -CM     Row Name 08/12/20 1552          Stairs Within Home, Primary    Number of Stairs, Within Home, Primary  none  -CM     Row Name 08/12/20 1552          Cognitive Assessment/Intervention- PT/OT    Orientation Status (Cognition)  oriented x 4  -CM     Cognitive Assessment/Intervention Comment  pleasant, cooperative  -CM     Row Name 08/12/20 1552          Safety Issues, Functional Mobility    Impairments Affecting Function (Mobility)  balance;coordination;endurance/activity tolerance;shortness of breath;strength;postural/trunk control;muscle tone abnormal  -CM     Comment, Safety Issues/Impairments (Mobility)  rigidity noted  -CM       User Key  (r) = Recorded By, (t) = Taken By, (c) = Cosigned By    Initials Name Provider Type    CM Deb White, PT Physical Therapist        Mobility     Row Name 08/12/20 1556          Bed Mobility Assessment/Treatment    Bed Mobility Assessment/Treatment  bed mobility (all) activities;supine-sit  -CM     [place] Level (Bed Mobility)  moderate assist (50% patient effort);2 person assist  -CM     Supine-Sit [place] (Bed Mobility)  moderate assist (50% patient effort);1 person assist  -CM     Assistive Device (Bed Mobility)  draw sheet  -CM     Comment (Bed Mobility)  needs use of draw sheet to come to sitting at eob  -CM     Row  Name 08/12/20 1556          Transfer Assessment/Treatment    Comment (Transfers)  initially has marked posterior lean in attempting to come to stand; required mod to max assist to prevent fall posteriorly; PT had pt return to sitting twice to work toward improved anterior wt shift; on 3rd attempt, pt able to come to stand w/ mod assist x 1  -CM     Row Name 08/12/20 1556          Sit-Stand Transfer    Sit-Stand [place] (Transfers)  moderate assist (50% patient effort);1 person assist  -CM     Assistive Device (Sit-Stand Transfers)  walker, front-wheeled  -CM     Row Name 08/12/20 1556          Gait/Stairs Assessment/Training    Gait/Stairs Assessment/Training  gait/ambulation independence;gait/ambulation assistive device  -CM     [place] Level (Gait)  minimum assist (75% patient effort);1 person assist  -CM     Assistive Device (Gait)  walker, front-wheeled  -CM     Distance in Feet (Gait)  40  -CM     Pattern (Gait)  step-to  -CM     Deviations/Abnormal Patterns (Gait)  bilateral deviations;festinating/shuffling;base of support, wide  -CM     Bilateral Gait Deviations  forward flexed posture;heel strike decreased  -CM       User Key  (r) = Recorded By, (t) = Taken By, (c) = Cosigned By    Initials Name Provider Type    CM Deb White, PT Physical Therapist        Obj/Interventions     Row Name 08/12/20 1559          General ROM    GENERAL ROM COMMENTS  BUEs limited by 50%; BLEs limited by 25%  -CM     Row Name 08/12/20 1559          MMT (Manual Muscle Testing)    General MMT Comments  BUEs wfl; B hips 3-/5; B knees 4/5; B ankles 4/5.  -CM     Row Name 08/12/20 1559          Static Sitting Balance    Level of [place] (Unsupported Sitting, Static Balance)  contact guard assist  -CM     Sitting Position (Unsupported Sitting, Static Balance)  sitting on edge of bed  -CM     Row Name 08/12/20 1979          Dynamic Sitting Balance    Level of [place], Reaches Outside Midline (Sitting, Dynamic  Balance)  minimal assist, 75% patient effort  -CM     Sitting Position, Reaches Outside Midline (Sitting, Dynamic Balance)  sitting on edge of bed  -CM     Row Name 08/12/20 1559          Static Standing Balance    Level of [place] (Supported Standing, Static Balance)  minimal assist, 75% patient effort  -CM     Assistive Device Utilized (Supported Standing, Static Balance)  walker, rolling  -CM     Row Name 08/12/20 1559          Dynamic Standing Balance    Level of [place], Reaches Outside Midline (Standing, Dynamic Balance)  minimal assist, 75% patient effort;moderate assist, 50 to 74% patient effort  -CM     Assistive Device Utilized (Supported Standing, Dynamic Balance)  walker, rolling  -CM     Row Name 08/12/20 1559          Sensory Assessment/Intervention    Sensory General Assessment  no sensation deficits identified  -CM       User Key  (r) = Recorded By, (t) = Taken By, (c) = Cosigned By    Initials Name Provider Type    Deb Ledezma, PT Physical Therapist        Goals/Plan     Row Name 08/12/20 1606          Bed Mobility Goal 1 (PT)    Activity/Assistive Device (Bed Mobility Goal 1, PT)  bed mobility activities, all  -CM     [place] Level/Cues Needed (Bed Mobility Goal 1, PT)  supervision required;set-up required;tactile cues required;verbal cues required  -CM     Time Frame (Bed Mobility Goal 1, PT)  2 weeks  -CM     Row Name 08/12/20 1606          Transfer Goal 1 (PT)    Activity/Assistive Device (Transfer Goal 1, PT)  transfers, all;walker, rolling  -CM     [place] Level/Cues Needed (Transfer Goal 1, PT)  contact guard assist no loss of balance w/ attempts to come to standing  -CM     Time Frame (Transfer Goal 1, PT)  2 weeks  -CM     Row Name 08/12/20 1606          Gait Training Goal 1 (PT)    Activity/Assistive Device (Gait Training Goal 1, PT)  gait (walking locomotion);assistive device use;walker, rolling  -CM     [place] Level (Gait Training Goal 1, PT)  contact  guard assist  -CM     Distance (Gait Goal 1, PT)  75 ft; no loss of balance  -CM     Time Frame (Gait Training Goal 1, PT)  2 weeks  -CM       User Key  (r) = Recorded By, (t) = Taken By, (c) = Cosigned By    Initials Name Provider Type    Deb Ledezma, PT Physical Therapist        Clinical Impression     Row Name 08/12/20 1600          Pain Scale: Numbers Pre/Post-Treatment    Pain Scale: Numbers, Pretreatment  0/10 - no pain  -CM     Pain Scale: Numbers, Post-Treatment  0/10 - no pain  -CM     Row Name 08/12/20 1609 08/12/20 1600       Plan of Care Review    Plan of Care Reviewed With  --  patient  -CM    Outcome Summary  83 yo male adm for acute hypoxic respiratory failure, lg pleural effusion, R HCA pna, uti. Hx of multiple falls recently and Parkinsons Disease. Requires mod assist to come to sit, mod assist to come to stand, and min to mod assist to amb 40 ft. Pt has marked posterior lean w/ attempts to come to stand, and he took 3 attempts before able to successfully do so. Ambulated 40 ft in room on 10L HFO2. Vital signs remained stable throughout rx. Pt presents w/ significant weakness. He reports he has been home from IP rehab x 2 wks, and he has fallen 4-5 times during that time. Pt not safe for home. Far below his functional baseline. Will need to return to IP rehab at d/c. Will follow for skilled PT. PPE: N95, gloves, goggles.   -CM  83 yo male being followed for acute hypoxic respiratory failure, Large pleural effusion, R HCA pna, uti. Hx of parkinsons disease and multiple falls. Pt has been home from recent rehab stay for 2 weeks and has already fallen 4-5 times during that. Pt requires mod assist to come to sitting at eob. He requires mod assist for safety w/ attempts to come to stand. Has marked posterior lean w/ attempting to come to stand, but was able to eventually wt shift anteriorly and come to stand w/ min assist. He ambulated 40 ft w/ rw and min assist today. Pt has some significant  weakness in BLEs. He has some rigidity as well. Pt not safe for home w/ his elderly wife. He is weak and has already incurred multiple falls during his time at home. Will require return to IP rehab. Also now on 10L HFO2. Will follow 5xwk while in hospital.   -CM    Row Name 08/12/20 1609 08/12/20 1600       Physical Therapy Clinical Impression    Patient/Family Goals Statement (PT Clinical Impression)  85 yo male adm for acute hypoxic respiratory failure, lg pleural effusion, R HCA pna, uti. Hx of multiple falls recently and Parkinsons Disease. Requires mod assist to come to sit, mod assist to come to stand, and min to mod assist to amb 40 ft. Pt has marked posterior lean w/ attempts to come to stand, and he took 3 attempts before able to successfully do so. Ambulated 40 ft in room on 10L HFO2. Vital signs remained stable throughout rx. Pt presents w/ significant weakness. He reports he has been home from IP rehab x 2 wks, and he has fallen 4-5 times during that time. Pt not safe for home. Far below his functional baseline. Will need to return to IP rehab at d/c. Will follow for skilled PT. PPE: N95, gloves, goggles.   -CM  --    Criteria for Skilled Interventions Met (PT Clinical Impression)  yes;treatment indicated  -CM  yes;treatment indicated  -CM    Rehab Potential (PT Clinical Summary)  --  good, to achieve stated therapy goals  -CM    Predicted Duration of Therapy (PT)  --  until d/c  -CM    Row Name 08/12/20 1600          Vital Signs    O2 Delivery Pre Treatment  hi-flow 10L HF  -CM     O2 Delivery Intra Treatment  hi-flow  -CM     O2 Delivery Post Treatment  hi-flow  -CM     Row Name 08/12/20 1600          Positioning and Restraints    Pre-Treatment Position  in bed  -CM     Post Treatment Position  chair  -CM     In Chair  notified nsg;sitting;call light within reach;encouraged to call for assist;exit alarm on;legs elevated  -CM       User Key  (r) = Recorded By, (t) = Taken By, (c) = Cosigned By     Initials Name Provider Type    Deb Ledezma, PT Physical Therapist        Outcome Measures     Row Name 08/12/20 1607          How much help from another person do you currently need...    Turning from your back to your side while in flat bed without using bedrails?  3  -CM     Moving from lying on back to sitting on the side of a flat bed without bedrails?  2  -CM     Moving to and from a bed to a chair (including a wheelchair)?  2  -CM     Standing up from a chair using your arms (e.g., wheelchair, bedside chair)?  2  -CM     Climbing 3-5 steps with a railing?  1  -CM     To walk in hospital room?  2  -CM     AM-PAC 6 Clicks Score (PT)  12  -CM     Row Name 08/12/20 1607          Functional Assessment    Outcome Measure Options  AM-PAC 6 Clicks Basic Mobility (PT)  -CM       User Key  (r) = Recorded By, (t) = Taken By, (c) = Cosigned By    Initials Name Provider Type    Deb Ledezma, PT Physical Therapist        Physical Therapy Education                 Title: PT OT SLP Therapies (Done)     Topic: Physical Therapy (Done)     Point: Mobility training (Done)     Description:   Instruct learner(s) on safety and technique for assisting patient out of bed, chair or wheelchair.  Instruct in the proper use of assistive devices, such as walker, crutches, cane or brace.              Patient Friendly Description:   It's important to get you on your feet again, but we need to do so in a way that is safe for you. Falling has serious consequences, and your personal safety is the most important thing of all.        When it's time to get out of bed, one of us or a family member will sit next to you on the bed to give you support.     If your doctor or nurse tells you to use a walker, crutches, a cane, or a brace, be sure you use it every time you get out of bed, even if you think you don't need it.    Learning Progress Summary           Patient Acceptance, E,TB, VU,NR by JESSIE at 8/12/2020 9917                    Point: Precautions (Done)     Description:   Instruct learner(s) on prescribed precautions during mobility and gait tasks              Learning Progress Summary           Patient Acceptance, E,TB, VU,NR by  at 8/12/2020 1608                               User Key     Initials Effective Dates Name Provider Type Discipline    JESSIE 03/01/19 -  Deb White, PT Physical Therapist PT              PT Recommendation and Plan  Planned Therapy Interventions (PT Eval): balance training, bed mobility training, gait training, patient/family education, neuromuscular re-education, motor coordination training, postural re-education, ROM (range of motion), strengthening, transfer training  Outcome Summary/Treatment Plan (PT)  Anticipated Discharge Disposition (PT): inpatient rehabilitation facility  Plan of Care Reviewed With: patient  Outcome Summary: 83 yo male adm for acute hypoxic respiratory failure, lg pleural effusion, R HCA pna, uti. Hx of multiple falls recently and Parkinsons Disease. Requires mod assist to come to sit, mod assist to come to stand, and min to mod assist to amb 40 ft. Pt has marked posterior lean w/ attempts to come to stand, and he took 3 attempts before able to successfully do so. Ambulated 40 ft in room on 10L HFO2. Vital signs remained stable throughout rx. Pt presents w/ significant weakness. He reports he has been home from IP rehab x 2 wks, and he has fallen 4-5 times during that time. Pt not safe for home. Far below his functional baseline. Will need to return to IP rehab at d/c. Will follow for skilled PT. PPE: N95, gloves, goggles.      Time Calculation:   PT Charges     Row Name 08/12/20 1614 08/12/20 1106          Time Calculation    Start Time  1408  -CM  --     Stop Time  1449  -CM  --     Time Calculation (min)  41 min  -CM  --     PT Received On  08/12/20  -CM  --     PT - Next Appointment  08/13/20  -CM  08/13/20  -CM     PT Goal Re-Cert Due Date  08/26/20  -CM  --        Time  Calculation- PT    Total Timed Code Minutes- PT  8 minute(s)  -CM  --       User Key  (r) = Recorded By, (t) = Taken By, (c) = Cosigned By    Initials Name Provider Type    CM Deb White, PT Physical Therapist        Therapy Charges for Today     Code Description Service Date Service Provider Modifiers Qty    74896731203 HC PT EVAL HIGH COMPLEXITY 3 8/12/2020 Deb White, PT GP 1    13076909757 HC GAIT TRAINING EA 15 MIN 8/12/2020 Deb White, PT GP 1          PT G-Codes  Outcome Measure Options: AM-PAC 6 Clicks Basic Mobility (PT)  AM-PAC 6 Clicks Score (PT): 12    Deb White PT  8/12/2020

## 2020-08-12 NOTE — PROGRESS NOTES
"      [facility] Medicine Services Daily Progress Note      Hospitalist Team  LOS 1 days      Patient Care Team:  Blake Ivey MD as PCP - General (Family Medicine)    Patient Location: 2123/1      Subjective   Subjective     Chief Complaint / Subjective  Chief Complaint   Patient presents with   • Shortness of Breath         Brief Synopsis of Hospital Course/HPI  Patient is an 84-year-old male with multiple diagnoses who presented with increasing shortness of breath and was noted to have a large right-sided pleural effusion.  The patient underwent a thoracentesis on the day of admission which is pending in terms of cultures and studies at this time.  The patient is markedly improved.  Patient is much less short of breath.  The patient remains extremely weak.  He also has underlying Parkinson's disease as well.      Date::          Review of Systems   HENT: Negative.    Eyes: Negative.    Cardiovascular: Negative.    Respiratory: Positive for cough and shortness of breath.    Endocrine: Negative.    Hematologic/Lymphatic: Negative.    Skin: Negative.    Musculoskeletal: Negative.    Gastrointestinal: Negative.    Genitourinary: Negative.    Neurological: Positive for disturbances in coordination, tremors and weakness.   Psychiatric/Behavioral: Negative.    Allergic/Immunologic: Negative.    All other systems reviewed and are negative.        Objective   Objective      Vital Signs  Temp:  [97.6 °F (36.4 °C)-98.5 °F (36.9 °C)] 98 °F (36.7 °C)  Heart Rate:  [70-74] 70  Resp:  [15-20] 20  BP: (102-121)/(46-62) 114/62  Oxygen Therapy  SpO2: 97 %  Pulse Oximetry Type: Continuous  Device (Oxygen Therapy): high flow nasal cannula  Device (Oxygen Therapy): high-flow nasal cannula  Flow (L/min): 10  Flowsheet Rows      First Filed Value   Admission Height  167.6 cm (66\") Documented at 08/11/2020 0105   Admission Weight  97.5 kg (215 lb) Documented at 08/11/2020 0105        Intake & Output (last 3 " days)       08/09 0701 - 08/10 0700 08/10 0701 - 08/11 0700 08/11 0701 - 08/12 0700 08/12 0701 - 08/13 0700    P.O.   200     IV Piggyback   200     Total Intake(mL/kg)   400 (4.3)     Urine (mL/kg/hr)  325 1375 (0.6)     Other   2050     Total Output  325 3425     Net  -325 -3025                 Lines, Drains & Airways    Active LDAs     Name:   Placement date:   Placement time:   Site:   Days:    Peripheral IV 08/11/20 0109 Left Antecubital   08/11/20 0109    Antecubital   1    Peripheral IV 08/11/20 0157 Right Antecubital   08/11/20 0157    Antecubital   1    Urethral Catheter Temperature probe 16 Fr.   08/11/20    0219     1              Physical Exam:    Physical Exam   Constitutional: He is oriented to person, place, and time. He appears well-developed and well-nourished. No distress.   HENT:   Head: Normocephalic and atraumatic.   Right Ear: External ear normal.   Left Ear: External ear normal.   Nose: Nose normal.   Mouth/Throat: Oropharynx is clear and moist. No oropharyngeal exudate.   Eyes: Pupils are equal, round, and reactive to light. Conjunctivae and EOM are normal. Right eye exhibits no discharge. Left eye exhibits no discharge. No scleral icterus.   Neck: Normal range of motion. No JVD present. No tracheal deviation present. No thyromegaly present.   Cardiovascular: Normal rate, regular rhythm, normal heart sounds and intact distal pulses. Exam reveals no gallop and no friction rub.   No murmur heard.  Pulmonary/Chest: Effort normal and breath sounds normal. No stridor. No respiratory distress. He has no wheezes. He has no rales. He exhibits no tenderness.   Improved breath sounds in the right base but they are still somewhat diminished.   Abdominal: Soft. Bowel sounds are normal. He exhibits no distension and no mass. There is no tenderness. There is no rebound and no guarding. No hernia.   Musculoskeletal: Normal range of motion. He exhibits no edema, tenderness or deformity.    Lymphadenopathy:     He has no cervical adenopathy.   Neurological: He is alert and oriented to person, place, and time. No cranial nerve deficit or sensory deficit. He exhibits normal muscle tone. Coordination normal.   Skin: Skin is warm and dry. No rash noted. He is not diaphoretic. No erythema.   Psychiatric: He has a normal mood and affect. His behavior is normal.   Nursing note and vitals reviewed.           Wounds (last 24 hours)      LDA Wound     Row Name 08/12/20 0348 08/12/20 0039 08/11/20 2100       Wound 06/12/20 1901 scrotum MASD (Moisture associated skin damage)    Wound - Properties Group Date first assessed: 06/12/20 -KK Time first assessed: 1901 -KK Location: scrotum  -KK Primary Wound Type: MASD  -KK    Dressing Appearance  open to air  -LB  open to air  -LB  open to air  -LB    Closure  Open to air  -LB  Open to air  -LB  Open to air  -LB    Base  blanchable  -LB  blanchable  -LB  blanchable  -LB    Periwound  excoriated  -LB  excoriated  -LB  excoriated  -LB    Periwound Temperature  warm  -LB  warm  -LB  warm  -LB    Periwound Skin Turgor  soft  -LB  soft  -LB  soft  -LB    Drainage Amount  none  -LB  none  -LB  none  -LB       Wound 06/12/20 1901 gluteal MASD (Moisture associated skin damage)    Wound - Properties Group Date first assessed: 06/12/20 -KK Time first assessed: 1901 -KK Location: gluteal  -KK Primary Wound Type: MASD  -KK    Dressing Appearance  open to air  -LB  open to air  -LB  open to air  -LB    Closure  Open to air  -LB  Open to air  -LB  Open to air  -LB    Base  blanchable  -LB  blanchable  -LB  blanchable  -LB    Periwound  excoriated  -LB  excoriated  -LB  excoriated  -LB    Periwound Temperature  warm  -LB  warm  -LB  warm  -LB    Periwound Skin Turgor  soft  -LB  soft  -LB  soft  -LB    Drainage Amount  none  -LB  none  -LB  none  -LB    Row Name 08/11/20 1715             Wound 06/12/20 1901 scrotum MASD (Moisture associated skin damage)    Wound - Properties  Group Date first assessed: 06/12/20 -KK Time first assessed: 1901 -KK Location: scrotum  -KK Primary Wound Type: MASD  -KK    Dressing Appearance  open to air  -DA      Closure  Open to air  -DA      Base  blanchable  -DA      Periwound  excoriated  -DA      Periwound Temperature  warm  -DA      Periwound Skin Turgor  soft  -DA      Drainage Amount  none  -DA         Wound 06/12/20 1901 gluteal MASD (Moisture associated skin damage)    Wound - Properties Group Date first assessed: 06/12/20 -KK Time first assessed: 1901 -KK Location: gluteal  -KK Primary Wound Type: MASD  -KK    Dressing Appearance  open to air  -DA      Closure  Open to air  -DA      Base  blanchable  -DA      Periwound  excoriated  -DA      Periwound Temperature  warm  -DA      Periwound Skin Turgor  soft  -DA      Drainage Amount  none  -DA        User Key  (r) = Recorded By, (t) = Taken By, (c) = Cosigned By    Initials Name Provider Type    Janis Botello, RN Registered Nurse    Christianne Parker RN Registered Nurse    Mery Rios RN Registered Nurse          Procedures:  Right-sided thoracentesis on 8/11/2020            Results Review:     I reviewed the patient's new clinical results.      Lab Results (last 24 hours)     Procedure Component Value Units Date/Time    Triglycerides, Body Fluid - Pleural Fluid, Pleural Cavity [[id number]] Collected:  08/11/20 1622    Specimen:  Pleural Fluid from Pleural Cavity Updated:  08/12/20 1210     Triglycerides, Fluid 21 mg/dL      Comment: The reference intervals and other method performance specifications  have not been established for this test. The test result should be  integrated into the clinical context for interpretation.  The reference interval(s) and other method performance specifications  have not been established for this body fluid. The test result must be  integrated into the clinical context for interpretation.       Narrative:       Performed at:  [address] [place] [address]  [address]  [place], [place]  [id number]  : Westley Sarabia PhD, Phone:  [phone]    Fungus Smear - Body Fluid, Pleural Cavity [[id number]] Collected:  08/11/20 1622    Specimen:  Body Fluid from Pleural Cavity Updated:  08/12/20 1131     Fungal Stain No fungal elements seen    AFB Culture - Body Fluid, Pleural Cavity [[id number]] Collected:  08/11/20 1622    Specimen:  Body Fluid from Pleural Cavity Updated:  08/12/20 1059     AFB Stain No acid fast bacilli seen    Anaerobic Culture - Pleural Fluid, Pleural Cavity [[id number]] Collected:  08/11/20 1622    Specimen:  Pleural Fluid from Pleural Cavity Updated:  08/12/20 0852     Anaerobic Culture No anaerobes isolated at 24 hours    Body Fluid Culture - Body Fluid, Pleural Cavity [[id number]] Collected:  08/11/20 1622    Specimen:  Body Fluid from Pleural Cavity Updated:  08/12/20 0851     Body Fluid Culture No growth at less than 24 hours     Gram Stain Moderate (3+) WBCs seen      No organisms seen    BUN [[id number]]  (Normal) Collected:  08/12/20 0442    Specimen:  Blood Updated:  08/12/20 0734     BUN 20 mg/dL     CBC & Differential [[id number]] Collected:  08/12/20 0442    Specimen:  Blood Updated:  08/12/20 0707    Narrative:       The following orders were created for panel order CBC & Differential.  Procedure                               Abnormality         Status                     ---------                               -----------         ------                     CBC Auto Differential[[id number]]        Abnormal            Final result                 Please view results for these tests on the individual orders.    CBC Auto Differential [[id number]]  (Abnormal) Collected:  08/12/20 0442    Specimen:  Blood Updated:  08/12/20 0707     WBC 7.10 10*3/mm3      RBC 3.26 10*6/mm3      Hemoglobin 9.1 g/dL      Hematocrit 27.9 %      MCV 85.7 fL      MCH 27.9 pg      MCHC 32.6 g/dL      RDW 15.1 %      RDW-SD 47.7 fl      MPV 5.9 fL      Platelets 259  10*3/mm3      Neutrophil % 79.8 %      Lymphocyte % 7.5 %      Monocyte % 10.1 %      Eosinophil % 1.7 %      Basophil % 0.9 %      Neutrophils, Absolute 5.70 10*3/mm3      Lymphocytes, Absolute 0.50 10*3/mm3      Monocytes, Absolute 0.70 10*3/mm3      Eosinophils, Absolute 0.10 10*3/mm3      Basophils, Absolute 0.10 10*3/mm3      nRBC 0.1 /100 WBC     Narrative:       Appended report. These results have been appended to a previously verified report.    Scan Slide [[id number]] Collected:  08/12/20 0442    Specimen:  Blood Updated:  08/12/20 0707     Anisocytosis Slight/1+     Poikilocytes Slight/1+     WBC Morphology Normal     Platelet Morphology Normal    Urine Culture - Urine, Urine, Catheter [[id number]]  (Abnormal) Collected:  08/11/20 0216    Specimen:  Urine, Catheter Updated:  08/12/20 0650     Urine Culture >100,000 CFU/mL Gram Negative Bacilli    Non-gynecologic Cytology [[id number]] Collected:  08/11/20 1622    Specimen:  Pleural Fluid from Pleural Cavity Updated:  08/12/20 0621    Basic Metabolic Panel [[id number]]  (Abnormal) Collected:  08/12/20 0442    Specimen:  Blood Updated:  08/12/20 0536     Glucose 94 mg/dL      BUN --     Comment: Testing performed by alternate method        Creatinine 0.95 mg/dL      Sodium 143 mmol/L      Potassium 3.3 mmol/L      Chloride 106 mmol/L      CO2 27.0 mmol/L      Calcium 8.3 mg/dL      eGFR Non African Amer 76 mL/min/1.73      BUN/Creatinine Ratio --     Comment: Testing not performed        Anion Gap 10.0 mmol/L     Narrative:       GFR Normal >60  Chronic Kidney Disease <60  Kidney Failure <15      Protime-INR [[id number]]  (Abnormal) Collected:  08/12/20 0442    Specimen:  Blood Updated:  08/12/20 0512     Protime 17.9 Seconds      INR 1.71    Blood Culture - Blood, Arm, Right [[id number]] Collected:  08/11/20 0120    Specimen:  Blood from Arm, Right Updated:  08/12/20 0130     Blood Culture No growth at 24 hours    Blood Culture - Blood, Arm, Left [[id number]]  Collected:  08/11/20 0114    Specimen:  Blood from Arm, Left Updated:  08/12/20 0130     Blood Culture No growth at 24 hours    Troponin [[id number]]  (Abnormal) Collected:  08/12/20 0010    Specimen:  Blood Updated:  08/12/20 0052     Troponin T 0.050 ng/mL     Narrative:       Troponin T Reference Range:  <= 0.03 ng/mL-   Negative for AMI  >0.03 ng/mL-     Abnormal for myocardial necrosis.  Clinicians would have to utilize clinical acumen, EKG, Troponin and serial changes to determine if it is an Acute Myocardial Infarction or myocardial injury due to an underlying chronic condition.       Results may be falsely decreased if patient taking Biotin.      BNP [[id number]]  (Abnormal) Collected:  08/12/20 0010    Specimen:  Blood Updated:  08/12/20 0041     proBNP 2,754.0 pg/mL     Narrative:       Among patients with dyspnea, NT-proBNP is highly sensitive for the detection of acute congestive heart failure. In addition NT-proBNP of <300 pg/ml effectively rules out acute congestive heart failure with 99% negative predictive value.    Results may be falsely decreased if patient taking Biotin.      Albumin, Fluid - Pleural Fluid, Pleural Cavity [[id number]] Collected:  08/11/20 1622    Specimen:  Pleural Fluid from Pleural Cavity Updated:  08/12/20 0033     Albumin, Fluid 2.80 g/dL     Narrative:       No Reference Ranges Established.    A Serous fluid albumin gradient (serum albumin-fluid) <1.1 g/dL suggests the fluid is an exudate.  Cirrhosis usually results in an ascites fluid albumin gradient >1.1 g/dL.    This test was developed, its performance characteristics determined and judged suitable for clinical purposes by [facility].  It has not been cleared or approved by the FDA.  The laboratory is regulated under CLIA as qualified to perfom high-complexity testing.      Protein, Body Fluid - Pleural Fluid, Pleural Cavity [[id number]] Collected:  08/11/20 1622    Specimen:  Pleural Fluid from  Pleural Cavity Updated:  08/12/20 0033     Protein, Total, Fluid 3.9 g/dL     Narrative:       No Reference Ranges Established.    A serous fluid total fluid (TP) greater than 50 percent of the serum TP suggests the fluid is an exudate.      1. Pleural TP/Serum TP >0.5  2. Pleural LD/Serum LD >0.6  3. Pleural LD >2/3 of the upper limit of normal for serum LDH    This test was developed, it performance characteristics determined and judged suitable for clinical purposes by [facility].  It has not been cleared or approved by the FDA.  The laboratory is regulated under CLIA as qualified to perform high-complexity testing.     Lactate Dehydrogenase, Body Fluid - Pleural Fluid, Pleural Cavity [[id number]] Collected:  08/11/20 1622    Specimen:  Pleural Fluid from Pleural Cavity Updated:  08/12/20 0033     Lactate Dehydrogenase (LD), Fluid 232 U/L     Narrative:       No Reference Ranges Established.    Serous fluid LDH greater than 60 percent of the serum LDH or serous fluid LDH two-thirds of the upper limit of normal for serum LDH suggests the fluid is an exudate.     1. Pleural TP/Serum TP >0.5  2. Pleural LD/Serum LD >0.6  3. Pleural LD >2/3 of the upper limit of normal for serum LDH    This test was developed, it performance characteristics determined and judged suitable for clinical purposes by [facility].  It has not been cleared or approved by the FDA.  The laboratory is regulated under CLIA as qualified to perform high-complexity testing.     Glucose, Body Fluid - Pleural Fluid, Pleural Cavity [[id number]] Collected:  08/11/20 1622    Specimen:  Pleural Fluid from Pleural Cavity Updated:  08/12/20 0033     Glucose, Fluid 86 mg/dL     Narrative:       No Reference Ranges Established.    Serous fluid glucose less than 60 mg/dL or less than 30 mg/dL below serum glucose suggests an infectious or malignant exudate.     This test was developed, it performance  characteristics determined and judged suitable for clinical purposes by [facility].  It has not been cleared or approved by the FDA.  The laboratory is regulated under CLIA as qualified to perform high-complexity testing.     Amylase, Body Fluid - Pleural Fluid, Pleural Cavity [[id number]] Collected:  08/11/20 1622    Specimen:  Pleural Fluid from Pleural Cavity Updated:  08/12/20 0033     Amylase, Fluid 35 U/L     Narrative:       No Reference Ranges Established.    This test was developed, it performance characteristics determined and judged suitable for clinical purposes by [facility].  It has not been cleared or approved by the FDA.  The laboratory is regulated under CLIA as qualified to perform high-complexity testing.     pH, Body Fluid - Pleural Fluid, Pleural Cavity [[id number]]  (Normal) Collected:  08/11/20 1622    Specimen:  Pleural Fluid from Pleural Cavity Updated:  08/11/20 2056     pH, Fluid 7.00    Body Fluid Cell Count With Differential - Pleural Fluid, Pleural Cavity [[id number]] Collected:  08/11/20 1622    Specimen:  Pleural Fluid from Pleural Cavity Updated:  08/11/20 1822    Narrative:       The following orders were created for panel order Body Fluid Cell Count With Differential - Pleural Fluid, Pleural Cavity.  Procedure                               Abnormality         Status                     ---------                               -----------         ------                     Body fluid cell count - ...[[id number]]  Abnormal            Final result               Body fluid differential ...[[id number]]                      Final result                 Please view results for these tests on the individual orders.    Body fluid differential - Pleural Fluid, Pleural Cavity [[id number]] Collected:  08/11/20 1622    Specimen:  Pleural Fluid from Pleural Cavity Updated:  08/11/20 1822     Neutrophils, Fluid 26 %      Lymphocytes, Fluid 19 %       Monocytes, Fluid 12 %      Mesothelial Cells, Fluid 43 %     Narrative:       Concentrated Smear by Cytocentrifuge Prep.    Troponin [[id number]]  (Abnormal) Collected:  08/11/20 1718    Specimen:  Blood Updated:  08/11/20 1808     Troponin T 0.053 ng/mL     Narrative:       Troponin T Reference Range:  <= 0.03 ng/mL-   Negative for AMI  >0.03 ng/mL-     Abnormal for myocardial necrosis.  Clinicians would have to utilize clinical acumen, EKG, Troponin and serial changes to determine if it is an Acute Myocardial Infarction or myocardial injury due to an underlying chronic condition.       Results may be falsely decreased if patient taking Biotin.      Body fluid cell count - Pleural Fluid, Pleural Cavity [[id number]]  (Abnormal) Collected:  08/11/20 1622    Specimen:  Pleural Fluid from Pleural Cavity Updated:  08/11/20 1750     Color, Fluid Red     Appearance, Fluid Cloudy     Nucleated Cells, Fluid 457 /mm3      Method: Automated DXH Analyzer    Fungus Culture - Body Fluid, Pleural Cavity [[id number]] Collected:  08/11/20 1622    Specimen:  Body Fluid from Pleural Cavity Updated:  08/11/20 1703    Flow Cytometry [[id number]] Collected:  08/11/20 1622    Specimen:  Body Fluid from Pleural Cavity Updated:  08/11/20 1703    Cholesterol, Body Fluid - Pleural Fluid, Pleural Cavity [[id number]] Collected:  08/11/20 1622    Specimen:  Pleural Fluid from Pleural Cavity Updated:  08/11/20 1703        No results found for: HGBA1C  Results from last 7 days   Lab Units 08/12/20  0442 08/11/20  0826 08/11/20  0114   INR  1.71* 1.80* 1.72*       Results from last 7 days   Lab Units 08/11/20  0111   PH, ARTERIAL pH units 7.411   PO2 ART mm Hg 111.4*   PCO2, ARTERIAL mm Hg 45.4   HCO3 ART mmol/L 28.8*     No results found for: LIPASE  No results found for: CHOL, CHLPL, TRIG, HDL, LDL, LDLDIRECT    Lab Results   Lab Value Date/Time    FINALDX  05/21/2020 1443     Ulcer, rectum, biopsy:    Scant fragments of colonic mucosa with mild  chronic inflammation and focal hyperplastic changes    No violette ulceration is identified    No chronic features or malignant changes identified    See comment    MICHAEL/sms       COMDX  05/21/2020 1443     Deeper levels were examined through the block but were not further contributory.     MICHAEL/[facility]          Microbiology Results (last 10 days)     Procedure Component Value - Date/Time    Body Fluid Culture - Body Fluid, Pleural Cavity [[id number]] Collected:  08/11/20 1622    Lab Status:  Preliminary result Specimen:  Body Fluid from Pleural Cavity Updated:  08/12/20 0851     Body Fluid Culture No growth at less than 24 hours     Gram Stain Moderate (3+) WBCs seen      No organisms seen    Anaerobic Culture - Pleural Fluid, Pleural Cavity [[id number]] Collected:  08/11/20 1622    Lab Status:  Preliminary result Specimen:  Pleural Fluid from Pleural Cavity Updated:  08/12/20 0852     Anaerobic Culture No anaerobes isolated at 24 hours    AFB Culture - Body Fluid, Pleural Cavity [[id number]] Collected:  08/11/20 1622    Lab Status:  Preliminary result Specimen:  Body Fluid from Pleural Cavity Updated:  08/12/20 1059     AFB Stain No acid fast bacilli seen    Fungus Smear - Body Fluid, Pleural Cavity [[id number]] Collected:  08/11/20 1622    Lab Status:  Final result Specimen:  Body Fluid from Pleural Cavity Updated:  08/12/20 1131     Fungal Stain No fungal elements seen    Urine Culture - Urine, Urine, Catheter [[id number]]  (Abnormal) Collected:  08/11/20 0216    Lab Status:  Preliminary result Specimen:  Urine, Catheter Updated:  08/12/20 0650     Urine Culture >100,000 CFU/mL Gram Negative Bacilli    Respiratory Panel PCR w/COVID-19(SARS-CoV-2) [facility]ESAR/[facility] In-House, NP Swab in UTM/VTM, 3-4 HR TAT - Swab, Nasopharynx [[id number]]  (Normal) Collected:  08/11/20 0121    Lab Status:  Final result Specimen:  Swab from Nasopharynx Updated:  08/11/20 0325     ADENOVIRUS, PCR Not Detected     Coronavirus 229E Not Detected      Coronavirus HKU1 Not Detected     Coronavirus NL63 Not Detected     Coronavirus OC43 Not Detected     COVID19 Not Detected     Human Metapneumovirus Not Detected     Human Rhinovirus/Enterovirus Not Detected     Influenza A PCR Not Detected     Influenza A H1 Not Detected     Influenza A H1 2009 PCR Not Detected     Influenza A H3 Not Detected     Influenza B PCR Not Detected     Parainfluenza Virus 1 Not Detected     Parainfluenza Virus 2 Not Detected     Parainfluenza Virus 3 Not Detected     Parainfluenza Virus 4 Not Detected     RSV, PCR Not Detected     Bordetella pertussis pcr Not Detected     Bordetella parapertussis PCR Not Detected     Chlamydophila pneumoniae PCR Not Detected     Mycoplasma pneumo by PCR Not Detected    Narrative:       Fact sheet for providers: https://docs.[employer]/wp-content/uploads/DBH1544-8372-MD2.1-EUA-Provider-Fact-Sheet-3.pdf    Fact sheet for patients: https://docs.[employer]/wp-content/uploads/HMN4321-6334-CI3.1-EUA-Patient-Fact-Sheet-1.pdf    Blood Culture - Blood, Arm, Right [[id number]] Collected:  08/11/20 0120    Lab Status:  Preliminary result Specimen:  Blood from Arm, Right Updated:  08/12/20 0130     Blood Culture No growth at 24 hours    Blood Culture - Blood, Arm, Left [[id number]] Collected:  08/11/20 0114    Lab Status:  Preliminary result Specimen:  Blood from Arm, Left Updated:  08/12/20 0130     Blood Culture No growth at 24 hours          ECG/EMG Results (most recent)     Procedure Component Value Units Date/Time    ECG 12 Lead [[id number]] Collected:  08/11/20 0108     Updated:  08/11/20 0110    Narrative:       HEART RATE= 77  bpm  RR Interval= 781  ms  OR Interval=   ms  P Horizontal Axis=   deg  P Front Axis=   deg  QRSD Interval= 109  ms  QT Interval= 427  ms  QRS Axis= 11  deg  T Wave Axis= -71  deg  - ABNORMAL ECG -  Atrial fibrillation  Low voltage, extremity leads  Repol abnrm suggests ischemia, diffuse leads  When compared with ECG of 13-Jun-2020  9:45:36,  Significant repolarization change  Significant axis, voltage or hypertrophy change  Electronically Signed By:   Date and Time of Study: 2020-08-11 01:08:56          Results for orders placed during the hospital encounter of 06/12/20   Duplex Venous Lower Extremity - Bilateral CAR    Narrative · Normal bilateral lower extremity venous duplex scan.  · Left popliteal fossa heterogenous fluid collection.          Results for orders placed during the hospital encounter of 05/20/20   Adult Transesophageal Echo (PRESTON) W/ Cont if Necessary Per Protocol    Narrative · Left ventricular systolic function is normal.  · Left atrial cavity size is mild-to-moderately dilated.  · Moderate mitral valve regurgitation is present  · Left atrial appendage diameter was 33 mm at 135 degree which was very   large and not conducive to implantation of watchman device. Depth was 28   mm. After discussion, it was decided that anatomy of left atrial appendage   is not conducive and appropriate for watchman device implantation.   Procedure was aborted          Xr Chest 1 View    Result Date: 8/12/2020  1. Stable cardiomegaly and pulmonary edema. 2. Persistent bibasilar airspace disease with small right pleural effusion. 3. No pneumothorax.  Electronically Signed By-Jason Dan On:8/12/2020 8:07 AM This report was finalized on [id number] by  Jason Dan, .    Xr Chest 1 View    Result Date: 8/11/2020   1. No evidence of postthoracentesis pneumothorax. 2. Small amount of residual right pleural fluid with overlying infiltrate or atelectasis. 3. Cardiomegaly with pulmonary vascular congestion. 4. Diffuse perihilar and left basilar mixed interstitial and alveolar opacities suggesting pulmonary edema or atypical infection pattern. Early clinically.  Electronically Signed By-Chas Lopez On:8/11/2020 5:07 PM This report was finalized on [id number] by  Chas Lopez, .    Xr Chest 1 View    Result Date: 8/11/2020   1. Large infiltrate and  effusion right lower lobe and right midlung zone has gotten worse since previous study. 2. Small infiltrate small pleural effusion inferior medial aspect left lower lobe lung unchanged. 3. Diffuse mild infiltrate throughout the right upper lobe and throughout the left lung unchanged.  Electronically Signed By-DR. Bob Mcgee MD On:8/11/2020 7:28 AM This report was finalized on [id number] by DR. Bob Mcgee MD.    Us Thoracentesis    Result Date: 8/11/2020  IMPRESSION : Ultrasound-guided diagnostic and therapeutic right thoracentesis procedure. 2050 cc of dark bloody fluid obtained.  Electronically Signed By-Chas Lopez On:8/11/2020 5:03 PM This report was finalized on [id number] by  Chas Lopez, .          Xrays, labs reviewed personally by physician.    Medication Review:   I have reviewed the patient's current medication list      Scheduled Meds    [START ON 8/13/2020] !Vancomycin Level Draw Needed  Does not apply Once   [START ON 8/14/2020] !Vancomycin Level Draw Needed  Does not apply Once   baclofen 10 mg Oral Daily   carbidopa-levodopa 1.5 tablet Oral Q8H   cefepime 2 g Intravenous Q8H   enoxaparin 1 mg/kg Subcutaneous Q12H   And      warfarin 2 mg Oral Once   escitalopram 10 mg Oral Daily   gabapentin 300 mg Oral Nightly   nystatin  Topical Q12H   pantoprazole 40 mg Oral QAM   potassium chloride 20 mEq Oral TID   pramipexole 0.25 mg Oral Q24H   rosuvastatin 10 mg Oral Nightly   sodium chloride 10 mL Intravenous Q12H   vancomycin 1,500 mg Intravenous Q24H       Meds Infusions    hold 1 each   Pharmacy Consult - Pharmacy to dose    Pharmacy to dose vancomycin    Pharmacy to dose warfarin        Meds PRN  acetaminophen **OR** acetaminophen **OR** acetaminophen  •  hold  •  ondansetron **OR** ondansetron  •  Pharmacy Consult - Pharmacy to dose  •  Pharmacy to dose vancomycin  •  Pharmacy to dose warfarin  •  sodium chloride  •  sodium chloride        Assessment/Plan   Assessment/Plan      Active Hospital Problems    Diagnosis  POA   • **Acute respiratory distress [R06.03]  Yes   • Atrial fibrillation, chronic [I48.20]  Yes     Priority: High   • Aortic stenosis [I35.0]  Yes     Priority: High   • GERD (gastroesophageal reflux disease) [K21.9]  Yes     Priority: Medium   • Hyperlipidemia [E78.5]  Yes     Priority: Medium   • Essential hypertension [I10]  Yes     Priority: Medium   • Bilateral leg edema [R60.0]  Yes     Priority: Medium   • Parkinson's disease (CMS/HCC) [G20]  Yes     Priority: Medium   • Pacemaker [Z95.0]  Yes     Priority: Medium   • Iron deficiency anemia due to chronic blood loss [D50.0]  Yes     Priority: Low   • Pneumonia of right lung due to infectious organism [J18.9]  Unknown   • Weakness [R53.1]  Yes   • Pleural effusion, right [J90]  Yes   • Acute on chronic combined systolic and diastolic CHF (congestive heart failure) (CMS/HCC) [I50.43]  Yes   • Sleep apnea [G47.30]  Yes   • RLS (restless legs syndrome) [G25.81]  Yes   • Chronic pain [G89.29]  Yes   • Acute UTI (urinary tract infection) [N39.0]  Yes   • Overactive bladder [N32.81]  Yes   • Anxiety disorder [F41.9]  Yes   • 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       MEDICAL DECISION MAKING COMPLEXITY BY PROBLEM:   1.  Acute hypoxemic respiratory failure  -This is most likely secondary to the patient's large pleural effusion  -We will do a CT of the chest without contrast to see what was underlying the effusion  -Continue with breathing treatments and oxygen therapy  -The patient is also on Lasix which will be continued    2.  Right-sided pneumonia  -This is a healthcare associated pneumonia and is being treated with cefepime and vancomycin intravenously pending cultures  -Pleural fluid was sent for both cytology as well as for culture of various  types.    3.  Acute on chronic biventricular failure currently exacerbated  -Trend troponins  -Intravenous Lasix following BNP as well as electrolytes and renal function    4.  Right pleural effusion  -This has been drained and studies are pending.  There are no positive cultures at this time.    5.  Urinary tract infection with a gram-negative bacillus to be identified and sensitivities are pending  -Continue cefepime    6.  Generalized weakness  -Physical therapy consult for evaluation and recommendations    7.  BRONSON  -Hemoglobin is currently stable  -Patient was treated for an AVM on 5/21/2020 and had a colonoscopy and EGD at that time  -Continue iron supplementation    8.  Hypertension  -Well-controlled at present    9.  Hyperlipidemia  -Continue home statin therapy    10.  Parkinson's disease  -Continue Parkinson's medication (Sinemet)    11.  Gastroesophageal reflux disease  -Continue PPI    12.  Depression  -Continue patient's Lexapro    13.  Restless leg syndrome  -Continue Mirapex    14.  Overactive bladder  -Continue oxybutynin    15.  Chronic pain syndrome  -Continue Neurontin and baclofen  VTE Prophylaxis -   Mechanical Order History:     None      Pharmalogical Order History:     Ordered     Dose Route Frequency Stop    08/12/20 0958  warfarin (COUMADIN) tablet 2 mg     Question:  Target INR  Answer:  2 - 3    2 mg PO Once (Warfarin) --    08/12/20 0958  enoxaparin (LOVENOX) syringe 90 mg      1 mg/kg SC Every 12 Hours --    08/11/20 0621  warfarin (COUMADIN) tablet 2 mg  Status:  Discontinued     Question:  Target INR  Answer:  2 - 3    2 mg PO Daily Warfarin 08/11/20 0947    08/11/20 0438  enoxaparin (LOVENOX) syringe 40 mg  Status:  Discontinued      40 mg SC Every 24 Hours 08/11/20 0947    08/11/20 0621  Pharmacy to dose warfarin     Question:  Target INR  Answer:  2 - 3    -- XX Continuous PRN --        Code Status -   Code Status and Medical Interventions:   Ordered at: 08/11/20 0438     Code  Status:    CPR     Medical Interventions (Level of Support Prior to Arrest):    Full     This patient has been examined wearing appropriate Personal Protective Equipment and discussed with hospital infection control department. 08/12/20    Discharge Planning  May need placement    Electronically signed by Kim Childs MD, 08/12/20, 12:49.  [facility] Team

## 2020-08-12 NOTE — PLAN OF CARE
No complaints of pain. No significant changes. Will have swallow study today.  Will continue to follow plan.

## 2020-08-13 LAB
ACANTHOCYTES BLD QL SMEAR: NORMAL
ANION GAP SERPL CALCULATED.3IONS-SCNC: 7 MMOL/L (ref 5–15)
BACTERIA SPEC AEROBE CULT: ABNORMAL
BASOPHILS # BLD AUTO: 0.1 10*3/MM3 (ref 0–0.2)
BASOPHILS NFR BLD AUTO: 1 % (ref 0–1.5)
BUN SERPL-MCNC: 22 MG/DL (ref 8–23)
BUN SERPL-MCNC: ABNORMAL MG/DL
BUN/CREAT SERPL: ABNORMAL
CALCIUM SPEC-SCNC: 8.8 MG/DL (ref 8.6–10.5)
CHLORIDE SERPL-SCNC: 109 MMOL/L (ref 98–107)
CHOLEST FLD-MCNC: 55 MG/DL
CO2 SERPL-SCNC: 28 MMOL/L (ref 22–29)
CREAT SERPL-MCNC: 0.89 MG/DL (ref 0.76–1.27)
DEPRECATED RDW RBC AUTO: 50.3 FL (ref 37–54)
EOSINOPHIL # BLD AUTO: 0.4 10*3/MM3 (ref 0–0.4)
EOSINOPHIL NFR BLD AUTO: 6.9 % (ref 0.3–6.2)
ERYTHROCYTE [DISTWIDTH] IN BLOOD BY AUTOMATED COUNT: 15.5 % (ref 12.3–15.4)
GFR SERPL CREATININE-BSD FRML MDRD: 81 ML/MIN/1.73
GLUCOSE SERPL-MCNC: 117 MG/DL (ref 65–99)
HCT VFR BLD AUTO: 29.5 % (ref 37.5–51)
HGB BLD-MCNC: 9.5 G/DL (ref 13–17.7)
INR PPP: 1.41 (ref 2–3)
LAB AP CASE REPORT: NORMAL
LAB AP FLOW CYTOMETRY SUMMARY: NORMAL
LYMPHOCYTES # BLD AUTO: 0.6 10*3/MM3 (ref 0.7–3.1)
LYMPHOCYTES NFR BLD AUTO: 11 % (ref 19.6–45.3)
MCH RBC QN AUTO: 28.1 PG (ref 26.6–33)
MCHC RBC AUTO-ENTMCNC: 32.1 G/DL (ref 31.5–35.7)
MCV RBC AUTO: 87.5 FL (ref 79–97)
MONOCYTES # BLD AUTO: 0.5 10*3/MM3 (ref 0.1–0.9)
MONOCYTES NFR BLD AUTO: 8.8 % (ref 5–12)
NEUTROPHILS NFR BLD AUTO: 3.7 10*3/MM3 (ref 1.7–7)
NEUTROPHILS NFR BLD AUTO: 72.3 % (ref 42.7–76)
NRBC BLD AUTO-RTO: 0 /100 WBC (ref 0–0.2)
PATH REPORT.FINAL DX SPEC: NORMAL
PATH REPORT.GROSS SPEC: NORMAL
PLAT MORPH BLD: NORMAL
PLATELET # BLD AUTO: 284 10*3/MM3 (ref 140–450)
PMV BLD AUTO: 6.1 FL (ref 6–12)
POIKILOCYTOSIS BLD QL SMEAR: NORMAL
POTASSIUM SERPL-SCNC: 3.8 MMOL/L (ref 3.5–5.2)
POTASSIUM SERPL-SCNC: 4.1 MMOL/L (ref 3.5–5.2)
PROTHROMBIN TIME: 15.1 SECONDS (ref 19.4–28.5)
RBC # BLD AUTO: 3.37 10*6/MM3 (ref 4.14–5.8)
REF LAB TEST METHOD: NORMAL
SODIUM SERPL-SCNC: 144 MMOL/L (ref 136–145)
VANCOMYCIN PEAK SERPL-MCNC: 18.3 MCG/ML (ref 20–40)
WBC # BLD AUTO: 5.2 10*3/MM3 (ref 3.4–10.8)
WBC MORPH BLD: NORMAL

## 2020-08-13 PROCEDURE — 80202 ASSAY OF VANCOMYCIN: CPT | Performed by: NURSE PRACTITIONER

## 2020-08-13 PROCEDURE — 85610 PROTHROMBIN TIME: CPT | Performed by: NURSE PRACTITIONER

## 2020-08-13 PROCEDURE — 84132 ASSAY OF SERUM POTASSIUM: CPT | Performed by: INTERNAL MEDICINE

## 2020-08-13 PROCEDURE — 25010000002 VANCOMYCIN 10 G RECONSTITUTED SOLUTION: Performed by: NURSE PRACTITIONER

## 2020-08-13 PROCEDURE — 94799 UNLISTED PULMONARY SVC/PX: CPT

## 2020-08-13 PROCEDURE — 97530 THERAPEUTIC ACTIVITIES: CPT

## 2020-08-13 PROCEDURE — 25010000002 CEFEPIME PER 500 MG: Performed by: INTERNAL MEDICINE

## 2020-08-13 PROCEDURE — 25010000002 FUROSEMIDE PER 20 MG: Performed by: INTERNAL MEDICINE

## 2020-08-13 PROCEDURE — 92526 ORAL FUNCTION THERAPY: CPT

## 2020-08-13 PROCEDURE — 85025 COMPLETE CBC W/AUTO DIFF WBC: CPT | Performed by: INTERNAL MEDICINE

## 2020-08-13 PROCEDURE — 85007 BL SMEAR W/DIFF WBC COUNT: CPT | Performed by: INTERNAL MEDICINE

## 2020-08-13 PROCEDURE — 80048 BASIC METABOLIC PNL TOTAL CA: CPT | Performed by: INTERNAL MEDICINE

## 2020-08-13 PROCEDURE — 99233 SBSQ HOSP IP/OBS HIGH 50: CPT | Performed by: INTERNAL MEDICINE

## 2020-08-13 PROCEDURE — 25010000002 ENOXAPARIN PER 10 MG: Performed by: INTERNAL MEDICINE

## 2020-08-13 PROCEDURE — 97116 GAIT TRAINING THERAPY: CPT

## 2020-08-13 RX ORDER — FUROSEMIDE 10 MG/ML
40 INJECTION INTRAMUSCULAR; INTRAVENOUS 2 TIMES DAILY
Status: DISCONTINUED | OUTPATIENT
Start: 2020-08-13 | End: 2020-08-17 | Stop reason: HOSPADM

## 2020-08-13 RX ORDER — POTASSIUM CHLORIDE 20 MEQ/1
40 TABLET, EXTENDED RELEASE ORAL AS NEEDED
Status: DISCONTINUED | OUTPATIENT
Start: 2020-08-13 | End: 2020-08-17 | Stop reason: HOSPADM

## 2020-08-13 RX ORDER — WARFARIN SODIUM 3 MG/1
3 TABLET ORAL
Status: COMPLETED | OUTPATIENT
Start: 2020-08-13 | End: 2020-08-13

## 2020-08-13 RX ORDER — POTASSIUM CHLORIDE 7.45 MG/ML
10 INJECTION INTRAVENOUS
Status: DISCONTINUED | OUTPATIENT
Start: 2020-08-13 | End: 2020-08-17 | Stop reason: HOSPADM

## 2020-08-13 RX ORDER — POTASSIUM CHLORIDE 1.5 G/1.77G
40 POWDER, FOR SOLUTION ORAL AS NEEDED
Status: DISCONTINUED | OUTPATIENT
Start: 2020-08-13 | End: 2020-08-17 | Stop reason: HOSPADM

## 2020-08-13 RX ADMIN — PANTOPRAZOLE SODIUM 40 MG: 40 TABLET, DELAYED RELEASE ORAL at 09:03

## 2020-08-13 RX ADMIN — CEFEPIME 2 G: 2 INJECTION, POWDER, FOR SOLUTION INTRAVENOUS at 12:12

## 2020-08-13 RX ADMIN — CARBIDOPA AND LEVODOPA 1.5 TABLET: 25; 100 TABLET ORAL at 06:09

## 2020-08-13 RX ADMIN — ENOXAPARIN SODIUM 90 MG: 100 INJECTION SUBCUTANEOUS at 23:10

## 2020-08-13 RX ADMIN — BACLOFEN 10 MG: 10 TABLET ORAL at 09:03

## 2020-08-13 RX ADMIN — VANCOMYCIN HYDROCHLORIDE 1500 MG: 10 INJECTION, POWDER, LYOPHILIZED, FOR SOLUTION INTRAVENOUS at 03:30

## 2020-08-13 RX ADMIN — NYSTATIN: 100000 POWDER TOPICAL at 09:03

## 2020-08-13 RX ADMIN — ESCITALOPRAM OXALATE 10 MG: 10 TABLET ORAL at 09:03

## 2020-08-13 RX ADMIN — ROSUVASTATIN CALCIUM 10 MG: 10 TABLET, FILM COATED ORAL at 20:52

## 2020-08-13 RX ADMIN — FUROSEMIDE 40 MG: 10 INJECTION, SOLUTION INTRAMUSCULAR; INTRAVENOUS at 12:12

## 2020-08-13 RX ADMIN — CEFEPIME 2 G: 2 INJECTION, POWDER, FOR SOLUTION INTRAVENOUS at 05:24

## 2020-08-13 RX ADMIN — NYSTATIN: 100000 POWDER TOPICAL at 20:52

## 2020-08-13 RX ADMIN — PRAMIPEXOLE DIHYDROCHLORIDE 0.25 MG: 0.25 TABLET ORAL at 09:03

## 2020-08-13 RX ADMIN — POTASSIUM CHLORIDE 40 MEQ: 1500 TABLET, EXTENDED RELEASE ORAL at 16:11

## 2020-08-13 RX ADMIN — GABAPENTIN 300 MG: 300 CAPSULE ORAL at 20:52

## 2020-08-13 RX ADMIN — ENOXAPARIN SODIUM 90 MG: 100 INJECTION SUBCUTANEOUS at 12:12

## 2020-08-13 RX ADMIN — FUROSEMIDE 40 MG: 10 INJECTION, SOLUTION INTRAMUSCULAR; INTRAVENOUS at 20:52

## 2020-08-13 RX ADMIN — POTASSIUM CHLORIDE 40 MEQ: 1500 TABLET, EXTENDED RELEASE ORAL at 09:03

## 2020-08-13 RX ADMIN — Medication 10 ML: at 09:04

## 2020-08-13 RX ADMIN — CEFEPIME 2 G: 2 INJECTION, POWDER, FOR SOLUTION INTRAVENOUS at 20:52

## 2020-08-13 RX ADMIN — Medication 10 ML: at 20:52

## 2020-08-13 RX ADMIN — CARBIDOPA AND LEVODOPA 1.5 TABLET: 25; 100 TABLET ORAL at 16:11

## 2020-08-13 RX ADMIN — WARFARIN SODIUM 3 MG: 3 TABLET ORAL at 19:24

## 2020-08-13 RX ADMIN — POTASSIUM CHLORIDE 20 MEQ: 1500 TABLET, EXTENDED RELEASE ORAL at 20:52

## 2020-08-13 RX ADMIN — CARBIDOPA AND LEVODOPA 1.5 TABLET: 25; 100 TABLET ORAL at 22:45

## 2020-08-13 NOTE — PLAN OF CARE
Changes are as follows, patient potassium remains low at 3.3. Protocol added to plan. Will continue to monitor.

## 2020-08-13 NOTE — DISCHARGE PLACEMENT REQUEST
"Rafael Shankar (84 y.o. Male)     Date of Birth Social Security Number Address Home Phone MRN    [id number]  [id number]  [place] [address] [phone] [id number]    L[employer]-day Marital Status          Yazidi        Admission Date Admission Type Admitting Provider Attending Provider Department, Room/Bed    8/11/20 Emergency Kim Childs MD Lackey, Diana, MD [facility], 2123/1    Discharge Date Discharge Disposition Discharge Destination                       Attending Provider:  Kim Childs MD    Allergies:  Amoxicillin, Cephalexin, Penicillins, Amoxicillin-pot Clavulanate, Clarithromycin    Isolation:  None   Infection:  COVID Screen (preop/placement) (06/16/20)   Code Status:  CPR    Ht:  182.9 cm (72\")   Wt:  94.6 kg (208 lb 8.9 oz)    Admission Cmt:  None   Principal Problem:  Acute respiratory distress [R06.03]                 Active Insurance as of 8/11/2020     Primary Coverage     Payor Plan Insurance Group Employer/Plan Group    MEDICARE MEDICARE A & B      Payor Plan Address Payor Plan Phone Number Payor Plan Fax Number Effective Dates    [address] [phone]  11/1/2000 - None Entered    [facility] [id number]       Subscriber Name Subscriber Birth Date Member ID       RAFAEL SHANKAR 1935 [id number]           Secondary Coverage     Payor Plan Insurance Group Employer/Plan Group    ANTHEM BLUE CROSS ANTHEM BLUE CROSS BLUE SHIELD PPO [id number]     Payor Plan Address Payor Plan Phone Number Payor Plan Fax Number Effective Dates    [address] [phone]  1/1/2017 - None Entered    [facility] [id number]       Subscriber Name Subscriber Birth Date Member ID       RAFAEL SHANKAR 1935 [id number]                 Emergency Contacts      (Rel.) Home Phone Work Phone Mobile Phone    Josefina Shankar (Spouse) -- -- [phone]              "

## 2020-08-13 NOTE — NURSING NOTE
Patient had an uneventful day.  Worked with therapy and set up in chair.  F/C discontinued at 1620, due to void at 2220.  Patient has denied complaints of pain/discomfort.  In chair at bedside watching television.

## 2020-08-13 NOTE — PROGRESS NOTES
"Discharge Planning Assessment  [facility]     Patient Name: Rafael Ivey  MRN: [id number]  Today's Date: 8/13/2020    Admit Date: 8/11/2020      Discharge Plan     Row Name 08/13/20 1420       Plan    Plan  DC Plan: [facility] referral. Current with [facility]. No precert needed.     Patient/Family in Agreement with Plan  yes    Plan Comments  discussed PT recs w/ pt. He states he has been in [facility] recently and this is where he would want to go if he \"makes that decision.\" He is agreeable to referral being made, but is unsure if he will ultimately go there at [place]. He is current with [facility] and prfers to dc home if he is able, when the time comes.     Row Name 08/13/20 1230       Plan    Plan Comments  attempted to call pt for dc plans re:IP rehab recs. No answer. Will f/u at later time today.        Destination      Service Provider Request Status Selected Services Address Phone Number Fax Number    [facility] Pending - Request Sent N/A [address] [place] [address] [phone]           Rica Coronel RN    "

## 2020-08-13 NOTE — PROGRESS NOTES
"      [facility] Medicine Services Daily Progress Note      Hospitalist Team  LOS 2 days      Patient Care Team:  Blake Ivey MD as PCP - General (Family Medicine)    Patient Location: 2123/1      Subjective   Subjective     Chief Complaint / Subjective  Chief Complaint   Patient presents with   • Shortness of Breath         Brief Synopsis of Hospital Course/HPI  Patient is an 84-year-old male with multiple diagnoses who presented with increasing shortness of breath and was noted to have a large right-sided pleural effusion.  The patient underwent a thoracentesis on the day of admission which is pending in terms of cultures and studies at this time.  The patient is markedly improved.  Patient is much less short of breath.  The patient remains extremely weak.  He also has underlying Parkinson's disease as well.    Date:  8/13/2020  Overall the patient feels somewhat better.  He still has some shortness of breath.  His BNP is more elevated today.    Review of Systems   HENT: Negative.    Eyes: Negative.    Cardiovascular: Negative.    Respiratory: Positive for cough and shortness of breath.    Endocrine: Negative.    Hematologic/Lymphatic: Negative.    Skin: Negative.    Musculoskeletal: Negative.    Gastrointestinal: Negative.    Genitourinary: Negative.    Neurological: Positive for disturbances in coordination, tremors and weakness.   Psychiatric/Behavioral: Negative.    Allergic/Immunologic: Negative.    All other systems reviewed and are negative.    Objective   Objective      Vital Signs  Temp:  [97.7 °F (36.5 °C)-98.6 °F (37 °C)] 97.9 °F (36.6 °C)  Heart Rate:  [68-79] 70  Resp:  [18-20] 18  BP: (108-125)/(53-70) 108/61  Oxygen Therapy  SpO2: 95 %  Pulse Oximetry Type: Continuous  Device (Oxygen Therapy): high flow nasal cannula  Device (Oxygen Therapy): humidified, high-flow nasal cannula  Flow (L/min): 6  Flowsheet Rows      First Filed Value   Admission Height  167.6 cm (66\") " Documented at 08/11/2020 0105   Admission Weight  97.5 kg (215 lb) Documented at 08/11/2020 0105        Intake & Output (last 3 days)       08/10 0701 - 08/11 0700 08/11 0701 - 08/12 0700 08/12 0701 - 08/13 0700 08/13 0701 - 08/14 0700    P.O.  200 360 360    IV Piggyback  200 100     Total Intake(mL/kg)  400 (4.3) 460 (4.9) 360 (3.8)    Urine (mL/kg/hr) 325 1375 (0.6) 600 (0.3)     Other  2050      Stool   0     Total Output 325 3425 600     Net -325 -3025 -140 +360            Stool Unmeasured Occurrence   3 x 1 x        Lines, Drains & Airways    Active LDAs     Name:   Placement date:   Placement time:   Site:   Days:    Peripheral IV 08/11/20 0109 Left Antecubital   08/11/20 0109    Antecubital   1    Peripheral IV 08/11/20 0157 Right Antecubital   08/11/20 0157    Antecubital   1    Urethral Catheter Temperature probe 16 Fr.   08/11/20    0219     1              Physical Exam:    Physical Exam   Constitutional: He is oriented to person, place, and time. He appears well-developed and well-nourished. No distress.   HENT:   Head: Normocephalic and atraumatic.   Right Ear: External ear normal.   Left Ear: External ear normal.   Nose: Nose normal.   Mouth/Throat: Oropharynx is clear and moist. No oropharyngeal exudate.   Eyes: Pupils are equal, round, and reactive to light. Conjunctivae and EOM are normal. Right eye exhibits no discharge. Left eye exhibits no discharge. No scleral icterus.   Neck: Normal range of motion. No JVD present. No tracheal deviation present. No thyromegaly present.   Cardiovascular: Normal rate, regular rhythm, normal heart sounds and intact distal pulses. Exam reveals no gallop and no friction rub.   No murmur heard.  Pulmonary/Chest: Effort normal and breath sounds normal. No stridor. No respiratory distress. He has no wheezes. He has no rales. He exhibits no tenderness.   Today the patient has more dullness to percussion at the right base.  There also is more decreased breath  sounds in that right base.  This is when compared to yesterday's exam.   Abdominal: Soft. Bowel sounds are normal. He exhibits no distension and no mass. There is no tenderness. There is no rebound and no guarding. No hernia.   Musculoskeletal: Normal range of motion. He exhibits no edema, tenderness or deformity.   Lymphadenopathy:     He has no cervical adenopathy.   Neurological: He is alert and oriented to person, place, and time. No cranial nerve deficit or sensory deficit. He exhibits normal muscle tone. Coordination normal.   Skin: Skin is warm and dry. No rash noted. He is not diaphoretic. No erythema.   Psychiatric: He has a normal mood and affect. His behavior is normal.   Nursing note and vitals reviewed.       Wounds (last 24 hours)      LDA Wound     Row Name 08/13/20 0900 08/13/20 0400 08/13/20 0000       Wound 06/12/20 1901 scrotum MASD (Moisture associated skin damage)    Wound - Properties Group Date first assessed: 06/12/20 -KK Time first assessed: 1901 -KK Location: scrotum  -KK Primary Wound Type: MASD  -KK    Dressing Appearance  open to air  -JS  open to air  -LB  open to air  -LB    Closure  Open to air  -JS  Open to air  -LB  Open to air  -LB    Base  blanchable  -JS  blanchable  -LB  blanchable  -LB    Periwound  excoriated  -JS  excoriated  -LB  excoriated  -LB    Periwound Temperature  warm  -JS  warm  -LB  warm  -LB    Periwound Skin Turgor  soft  -JS  soft  -LB  soft  -LB    Drainage Amount  none  -JS  none  -LB  none  -LB       Wound 06/12/20 1901 gluteal MASD (Moisture associated skin damage)    Wound - Properties Group Date first assessed: 06/12/20 -KK Time first assessed: 1901 -KK Location: gluteal  -KK Primary Wound Type: MASD  -KK    Dressing Appearance  open to air  -JS  open to air  -LB  open to air  -LB    Closure  Open to air  -JS  Open to air  -LB  Open to air  -LB    Base  blanchable  -JS  blanchable  -LB  blanchable  -LB    Periwound  excoriated  -JS  excoriated  -LB   excoriated  -LB    Periwound Temperature  warm  -JS  warm  -LB  warm  -LB    Periwound Skin Turgor  soft  -JS  soft  -LB  soft  -LB    Drainage Amount  none  -JS  none  -LB  none  -LB    Row Name 08/12/20 2000 08/12/20 1643          Wound 06/12/20 1901 scrotum MASD (Moisture associated skin damage)    Wound - Properties Group Date first assessed: 06/12/20 - Time first assessed: 1901 -KK Location: scrotum  -KK Primary Wound Type: MASD  -KK    Dressing Appearance  open to air  -LB  open to air  -JS     Closure  Open to air  -LB  Open to air  -JS     Base  blanchable  -LB  blanchable  -JS     Periwound  excoriated  -LB  excoriated  -JS     Periwound Temperature  warm  -LB  warm  -JS     Periwound Skin Turgor  soft  -LB  soft  -JS     Drainage Amount  none  -LB  none  -JS        Wound 06/12/20 1901 gluteal MASD (Moisture associated skin damage)    Wound - Properties Group Date first assessed: 06/12/20 - Time first assessed: 1901 -KK Location: gluteal  -KK Primary Wound Type: MASD  -KK    Dressing Appearance  open to air  -LB  open to air  -JS     Closure  Open to air  -LB  Open to air  -JS     Base  blanchable  -LB  blanchable  -JS     Periwound  excoriated  -LB  excoriated  -JS     Periwound Temperature  warm  -LB  warm  -JS     Periwound Skin Turgor  soft  -LB  soft  -JS     Drainage Amount  none  -LB  none  -JS       User Key  (r) = Recorded By, (t) = Taken By, (c) = Cosigned By    Initials Name Provider Type    Christy Chaudhari RN Registered Nurse    Janis Botello RN Registered Nurse    Mery Rios RN Registered Nurse          Procedures:  Right-sided thoracentesis on 8/11/2020    Results Review:     I reviewed the patient's new clinical results.    Lab Results (last 24 hours)     Procedure Component Value Units Date/Time    Non-gynecologic Cytology [[id number]] Collected:  08/11/20 1622    Specimen:  Pleural Fluid from Pleural Cavity Updated:  08/13/20 9692     Case Report --     Medical  "Cytology Report                           Case: [id number]                                  Authorizing Provider:  Estrellita Pang APRN Collected:           08/11/2020 04:22 PM          Ordering Location:     [facility]       Received:            08/12/2020 06:21 AM                                 PROGRESS CARE                                                                Pathologist:           Spencer Mott MD                                                             Specimen:    Pleural Cavity                                                                              Final Diagnosis --     Lung, pleural fluid, smears and cytospin preparation:    Benign mesothelial cells, degenerating mesothelial cells, histiocytes and mild acute inflammation    Abundant blood present    No evidence of malignancy      JPR/cec         Gross Description --     Received in [employer] and designated \"Pleural fluid\" are 10 mL of cloudy, red colored fluid. Particulate matter is not present. This specimen is processed as per protocol.         Flow Cytometry Summary --     Fluid, pleural cavity:    No significant lymphoid immunophenotypic abnormalities    See associated flow cytometry from [facility].       Flow Cytometry [[id number]] Collected:  08/11/20 1622    Specimen:  Body Fluid from Pleural Cavity Updated:  08/13/20 1105     Reference Lab Report See attached flow cytometry report from [facility].     CBC & Differential [[id number]] Collected:  08/13/20 0755    Specimen:  Blood Updated:  08/13/20 0942    Narrative:       The following orders were created for panel order CBC & Differential.  Procedure                               Abnormality         Status                     ---------                               -----------         ------                     CBC Auto Differential[[id number]]        Abnormal            Final result                 Please view results for these tests on the " individual orders.    CBC Auto Differential [[id number]]  (Abnormal) Collected:  08/13/20 0755    Specimen:  Blood Updated:  08/13/20 0942     WBC 5.20 10*3/mm3      RBC 3.37 10*6/mm3      Hemoglobin 9.5 g/dL      Hematocrit 29.5 %      MCV 87.5 fL      MCH 28.1 pg      MCHC 32.1 g/dL      RDW 15.5 %      RDW-SD 50.3 fl      MPV 6.1 fL      Platelets 284 10*3/mm3      Neutrophil % 72.3 %      Lymphocyte % 11.0 %      Monocyte % 8.8 %      Eosinophil % 6.9 %      Basophil % 1.0 %      Neutrophils, Absolute 3.70 10*3/mm3      Lymphocytes, Absolute 0.60 10*3/mm3      Monocytes, Absolute 0.50 10*3/mm3      Eosinophils, Absolute 0.40 10*3/mm3      Basophils, Absolute 0.10 10*3/mm3      nRBC 0.0 /100 WBC     Narrative:       Appended report. These results have been appended to a previously verified report.    Scan Slide [[id number]] Collected:  08/13/20 0755    Specimen:  Blood Updated:  08/13/20 0942     Acanthocytes Slight/1+     Poikilocytes Slight/1+     WBC Morphology Normal     Platelet Morphology Normal    Narrative:       Slide Reviewed    BUN [[id number]]  (Normal) Collected:  08/13/20 0755    Specimen:  Blood Updated:  08/13/20 0918     BUN 22 mg/dL     Vancomycin, Peak [[id number]]  (Abnormal) Collected:  08/13/20 0755    Specimen:  Blood Updated:  08/13/20 0917     Vancomycin Peak 18.30 mcg/mL     Basic Metabolic Panel [[id number]]  (Abnormal) Collected:  08/13/20 0755    Specimen:  Blood Updated:  08/13/20 0917     Glucose 117 mg/dL      BUN --     Comment: Testing performed by alternate method        Creatinine 0.89 mg/dL      Sodium 144 mmol/L      Potassium 3.8 mmol/L      Chloride 109 mmol/L      CO2 28.0 mmol/L      Calcium 8.8 mg/dL      eGFR Non African Amer 81 mL/min/1.73      BUN/Creatinine Ratio --     Comment: Testing not performed        Anion Gap 7.0 mmol/L     Narrative:       GFR Normal >60  Chronic Kidney Disease <60  Kidney Failure <15      Protime-INR [[id number]]  (Abnormal) Collected:  08/13/20  0755    Specimen:  Blood Updated:  08/13/20 0853     Protime 15.1 Seconds      INR 1.41    Anaerobic Culture - Pleural Fluid, Pleural Cavity [[id number]] Collected:  08/11/20 1622    Specimen:  Pleural Fluid from Pleural Cavity Updated:  08/13/20 0817     Anaerobic Culture No anaerobes isolated at 2 days    Body Fluid Culture - Body Fluid, Pleural Cavity [[id number]] Collected:  08/11/20 1622    Specimen:  Body Fluid from Pleural Cavity Updated:  08/13/20 0817     Body Fluid Culture No growth at 2 days     Gram Stain Moderate (3+) WBCs seen      No organisms seen    Urine Culture - Urine, Urine, Catheter [[id number]]  (Abnormal)  (Susceptibility) Collected:  08/11/20 0216    Specimen:  Urine, Catheter Updated:  08/13/20 0348     Urine Culture >100,000 CFU/mL Escherichia coli    Susceptibility      Escherichia coli     MOY     Ampicillin Susceptible     Ampicillin + Sulbactam Susceptible     Cefazolin Susceptible     Cefepime Susceptible     Ceftazidime Susceptible     Ceftriaxone Susceptible     Gentamicin Susceptible     Levofloxacin Susceptible     Nitrofurantoin Susceptible     Piperacillin + Tazobactam Susceptible     Tetracycline Susceptible     Trimethoprim + Sulfamethoxazole Susceptible                    Blood Culture - Blood, Arm, Right [[id number]] Collected:  08/11/20 0120    Specimen:  Blood from Arm, Right Updated:  08/13/20 0130     Blood Culture No growth at 2 days    Blood Culture - Blood, Arm, Left [[id number]] Collected:  08/11/20 0114    Specimen:  Blood from Arm, Left Updated:  08/13/20 0130     Blood Culture No growth at 2 days        No results found for: HGBA1C  Results from last 7 days   Lab Units 08/13/20  0755 08/12/20  0442 08/11/20  0826   INR  1.41* 1.71* 1.80*       Results from last 7 days   Lab Units 08/11/20  0111   PH, ARTERIAL pH units 7.411   PO2 ART mm Hg 111.4*   PCO2, ARTERIAL mm Hg 45.4   HCO3 ART mmol/L 28.8*     No results found for: LIPASE  No results found for: CHOL, CHLPL,  TRIG, HDL, LDL, LDLDIRECT    Lab Results   Lab Value Date/Time    FINALDX  08/11/2020 1622     Lung, pleural fluid, smears and cytospin preparation:    Benign mesothelial cells, degenerating mesothelial cells, histiocytes and mild acute inflammation    Abundant blood present    No evidence of malignancy      JPR/cec        FINALDX  05/21/2020 1443     Ulcer, rectum, biopsy:    Scant fragments of colonic mucosa with mild chronic inflammation and focal hyperplastic changes    No violette ulceration is identified    No chronic features or malignant changes identified    See comment    MICHAEL/sms       COMDX  05/21/2020 1443     Deeper levels were examined through the block but were not further contributory.     MICHAEL/sms          Microbiology Results (last 10 days)     Procedure Component Value - Date/Time    Body Fluid Culture - Body Fluid, Pleural Cavity [[id number]] Collected:  08/11/20 1622    Lab Status:  Preliminary result Specimen:  Body Fluid from Pleural Cavity Updated:  08/13/20 0817     Body Fluid Culture No growth at 2 days     Gram Stain Moderate (3+) WBCs seen      No organisms seen    Anaerobic Culture - Pleural Fluid, Pleural Cavity [[id number]] Collected:  08/11/20 1622    Lab Status:  Preliminary result Specimen:  Pleural Fluid from Pleural Cavity Updated:  08/13/20 0817     Anaerobic Culture No anaerobes isolated at 2 days    AFB Culture - Body Fluid, Pleural Cavity [[id number]] Collected:  08/11/20 1622    Lab Status:  Preliminary result Specimen:  Body Fluid from Pleural Cavity Updated:  08/12/20 1059     AFB Stain No acid fast bacilli seen    Fungus Smear - Body Fluid, Pleural Cavity [[id number]] Collected:  08/11/20 1622    Lab Status:  Final result Specimen:  Body Fluid from Pleural Cavity Updated:  08/12/20 1131     Fungal Stain No fungal elements seen    Urine Culture - Urine, Urine, Catheter [[id number]]  (Abnormal)  (Susceptibility) Collected:  08/11/20 0216    Lab Status:  Final result Specimen:  Urine,  Catheter Updated:  08/13/20 0348     Urine Culture >100,000 CFU/mL Escherichia coli    Susceptibility      Escherichia coli     MOY     Ampicillin Susceptible     Ampicillin + Sulbactam Susceptible     Cefazolin Susceptible     Cefepime Susceptible     Ceftazidime Susceptible     Ceftriaxone Susceptible     Gentamicin Susceptible     Levofloxacin Susceptible     Nitrofurantoin Susceptible     Piperacillin + Tazobactam Susceptible     Tetracycline Susceptible     Trimethoprim + Sulfamethoxazole Susceptible                    Respiratory Panel PCR w/COVID-19(SARS-CoV-2) [facility] In-House, NP Swab in UTM/VTM, 3-4 HR TAT - Swab, Nasopharynx [[id number]]  (Normal) Collected:  08/11/20 0121    Lab Status:  Final result Specimen:  Swab from Nasopharynx Updated:  08/11/20 0325     ADENOVIRUS, PCR Not Detected     Coronavirus 229E Not Detected     Coronavirus HKU1 Not Detected     Coronavirus NL63 Not Detected     Coronavirus OC43 Not Detected     COVID19 Not Detected     Human Metapneumovirus Not Detected     Human Rhinovirus/Enterovirus Not Detected     Influenza A PCR Not Detected     Influenza A H1 Not Detected     Influenza A H1 2009 PCR Not Detected     Influenza A H3 Not Detected     Influenza B PCR Not Detected     Parainfluenza Virus 1 Not Detected     Parainfluenza Virus 2 Not Detected     Parainfluenza Virus 3 Not Detected     Parainfluenza Virus 4 Not Detected     RSV, PCR Not Detected     Bordetella pertussis pcr Not Detected     Bordetella parapertussis PCR Not Detected     Chlamydophila pneumoniae PCR Not Detected     Mycoplasma pneumo by PCR Not Detected    Narrative:       Fact sheet for providers: https://docs.[employer]/wp-content/uploads/MFG9777-3557-PJ1.1-EUA-Provider-Fact-Sheet-3.pdf    Fact sheet for patients: https://docs.[employer]/wp-content/uploads/ROP7540-1759-XQ0.1-EUA-Patient-Fact-Sheet-1.pdf    Blood Culture - Blood, Arm, Right [[id number]] Collected:  08/11/20 0120    Lab Status:   Preliminary result Specimen:  Blood from Arm, Right Updated:  08/13/20 0130     Blood Culture No growth at 2 days    Blood Culture - Blood, Arm, Left [[id number]] Collected:  08/11/20 0114    Lab Status:  Preliminary result Specimen:  Blood from Arm, Left Updated:  08/13/20 0130     Blood Culture No growth at 2 days          ECG/EMG Results (most recent)     Procedure Component Value Units Date/Time    ECG 12 Lead [[id number]] Collected:  08/11/20 0108     Updated:  08/11/20 0110    Narrative:       HEART RATE= 77  bpm  RR Interval= 781  ms  ND Interval=   ms  P Horizontal Axis=   deg  P Front Axis=   deg  QRSD Interval= 109  ms  QT Interval= 427  ms  QRS Axis= 11  deg  T Wave Axis= -71  deg  - ABNORMAL ECG -  Atrial fibrillation  Low voltage, extremity leads  Repol abnrm suggests ischemia, diffuse leads  When compared with ECG of 13-Jun-2020 9:45:36,  Significant repolarization change  Significant axis, voltage or hypertrophy change  Electronically Signed By:   Date and Time of Study: 2020-08-11 01:08:56          Results for orders placed during the hospital encounter of 06/12/20   Duplex Venous Lower Extremity - Bilateral CAR    Narrative · Normal bilateral lower extremity venous duplex scan.  · Left popliteal fossa heterogenous fluid collection.          Results for orders placed during the hospital encounter of 05/20/20   Adult Transesophageal Echo (PRESTON) W/ Cont if Necessary Per Protocol    Narrative · Left ventricular systolic function is normal.  · Left atrial cavity size is mild-to-moderately dilated.  · Moderate mitral valve regurgitation is present  · Left atrial appendage diameter was 33 mm at 135 degree which was very   large and not conducive to implantation of watchman device. Depth was 28   mm. After discussion, it was decided that anatomy of left atrial appendage   is not conducive and appropriate for watchman device implantation.   Procedure was aborted          Ct Chest Without Contrast    Result  Date: 8/12/2020  1. Patchy airspace disease in both lung bases, likely representing pneumonia. 2. Large right and small left pleural effusions. 3. Fluid within the esophagus. This could represent evidence of reflux. Consider aspiration in this context. 4. Cardiomegaly and atherosclerosis. 5. Age-indeterminate superior endplate fractures of T11, T12, L1, L3 and L4. 6. Additional chronic findings as above. Electronically signed by:  Gonzalo Lipscomb M.D.  8/12/2020 9:28 PM    Fl Video Swallow With Speech Single Contrast    Result Date: 8/12/2020  Negative for aspiration. Please see the speech report for additional detailed findings.  Electronically Signed By-Jason Dan On:8/12/2020 2:27 PM This report was finalized on [id number] by  Jason Dan .    Xr Chest 1 View    Result Date: 8/12/2020  1. Stable cardiomegaly and pulmonary edema. 2. Persistent bibasilar airspace disease with small right pleural effusion. 3. No pneumothorax.  Electronically Signed By-Jason Dan On:8/12/2020 8:07 AM This report was finalized on [id number] by  Jason Dan, .    Xr Chest 1 View    Result Date: 8/11/2020   1. No evidence of postthoracentesis pneumothorax. 2. Small amount of residual right pleural fluid with overlying infiltrate or atelectasis. 3. Cardiomegaly with pulmonary vascular congestion. 4. Diffuse perihilar and left basilar mixed interstitial and alveolar opacities suggesting pulmonary edema or atypical infection pattern. Early clinically.  Electronically Signed By-Chas Lopez On:8/11/2020 5:07 PM This report was finalized on [id number] by  Chas Lopez, .    Xr Chest 1 View    Result Date: 8/11/2020   1. Large infiltrate and effusion right lower lobe and right midlung zone has gotten worse since previous study. 2. Small infiltrate small pleural effusion inferior medial aspect left lower lobe lung unchanged. 3. Diffuse mild infiltrate throughout the right upper lobe and throughout the left lung unchanged.   Electronically Signed By-DR. Bob Mcgee MD On:8/11/2020 7:28 AM This report was finalized on [id number] by DR. Bob Mcgee MD.    Us Thoracentesis    Result Date: 8/11/2020  IMPRESSION : Ultrasound-guided diagnostic and therapeutic right thoracentesis procedure. 2050 cc of dark bloody fluid obtained.  Electronically Signed By-Chas Lopez On:8/11/2020 5:03 PM This report was finalized on [id number] by  Chas Lopez, .          Xrays, labs reviewed personally by physician.    Medication Review:   I have reviewed the patient's current medication list      Scheduled Meds    [START ON 8/14/2020] !Vancomycin Level Draw Needed  Does not apply Once   baclofen 10 mg Oral Daily   carbidopa-levodopa 1.5 tablet Oral Q8H   cefepime 2 g Intravenous Q8H   enoxaparin 1 mg/kg Subcutaneous Q12H   escitalopram 10 mg Oral Daily   furosemide 40 mg Intravenous BID   gabapentin 300 mg Oral Nightly   nystatin  Topical Q12H   pantoprazole 40 mg Oral QAM   potassium chloride 20 mEq Oral TID   pramipexole 0.25 mg Oral Q24H   rosuvastatin 10 mg Oral Nightly   sodium chloride 10 mL Intravenous Q12H   vancomycin 1,500 mg Intravenous Q24H   warfarin 3 mg Oral Once       Meds Infusions    hold 1 each   Pharmacy Consult - Pharmacy to dose    Pharmacy to dose vancomycin    Pharmacy to dose warfarin        Meds PRN  acetaminophen **OR** acetaminophen **OR** acetaminophen  •  hold  •  ondansetron **OR** ondansetron  •  Pharmacy Consult - Pharmacy to dose  •  Pharmacy to dose vancomycin  •  Pharmacy to dose warfarin  •  potassium chloride **OR** potassium chloride **OR** potassium chloride  •  sodium chloride  •  sodium chloride        Assessment/Plan   Assessment/Plan     Active Hospital Problems    Diagnosis  POA   • **Acute respiratory distress [R06.03]  Yes   • Atrial fibrillation, chronic [I48.20]  Yes     Priority: High   • Aortic stenosis [I35.0]  Yes     Priority: High   • GERD (gastroesophageal reflux disease) [K21.9]   Yes     Priority: Medium   • Hyperlipidemia [E78.5]  Yes     Priority: Medium   • Essential hypertension [I10]  Yes     Priority: Medium   • Bilateral leg edema [R60.0]  Yes     Priority: Medium   • Parkinson's disease (CMS/HCC) [G20]  Yes     Priority: Medium   • Pacemaker [Z95.0]  Yes     Priority: Medium   • Iron deficiency anemia due to chronic blood loss [D50.0]  Yes     Priority: Low   • Pneumonia of right lung due to infectious organism [J18.9]  Unknown   • Weakness [R53.1]  Yes   • Pleural effusion, right [J90]  Yes   • Acute on chronic combined systolic and diastolic CHF (congestive heart failure) (CMS/HCC) [I50.43]  Yes   • Sleep apnea [G47.30]  Yes   • RLS (restless legs syndrome) [G25.81]  Yes   • Chronic pain [G89.29]  Yes   • Acute UTI (urinary tract infection) [N39.0]  Yes   • Overactive bladder [N32.81]  Yes   • Anxiety disorder [F41.9]  Yes   • 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       MEDICAL DECISION MAKING COMPLEXITY BY PROBLEM:   1.  Acute hypoxemic respiratory failure  -This is most likely secondary to the patient's large pleural effusion  -We will do a CT of the chest without contrast to see what was underlying the effusion       -The patient's CT scan done postthoracentesis continues to show large right pleural effusion.  There was also some fluid within the esophagus.  It was suggested that the patient may have aspirated, however the patient passed a video swallow study making this much less likely.            -Chest x-ray in the a.m.  -Continue with breathing treatments and oxygen therapy  -The patient is also on Lasix which will be continued    2.  Right-sided pneumonia  -This is a healthcare associated pneumonia and is being treated with cefepime and vancomycin intravenously pending cultures  -Pleural fluid was sent for  both cytology as well as for culture of various types.    3.  Acute on chronic biventricular failure currently exacerbated  -Trend troponins  -Intravenous Lasix following BNP as well as electrolytes and renal function    4.  Right pleural effusion  -This has been drained and studies are pending.  There are no positive cultures at this time.  -Repeat chest x-ray in the a.m.  -Patient's effusion appears to be transudate of in nature.  We will start the patient's on Lasix 40 mg IV twice daily today and follow his BNP and clinical picture.    5.  Urinary tract infection with a gram-negative bacillus to be identified and sensitivities are pending  -Continue cefepime  -Discontinue Lee and start voiding trial.    6.  Generalized weakness  -Physical therapy consult for evaluation and recommendations    7.  BRONSON  -Hemoglobin is currently stable  -Patient was treated for an AVM on 5/21/2020 and had a colonoscopy and EGD at that time  -Continue iron supplementation    8.  Hypertension  -Well-controlled at present    9.  Hyperlipidemia  -Continue home statin therapy    10.  Parkinson's disease  -Continue Parkinson's medication (Sinemet)    11.  Gastroesophageal reflux disease  -Continue PPI    12.  Depression  -Continue patient's Lexapro    13.  Restless leg syndrome  -Continue Mirapex    14.  Overactive bladder  -Continue oxybutynin    15.  Chronic pain syndrome  -Continue Neurontin and baclofen    VTE Prophylaxis -   Mechanical Order History:     None      Pharmalogical Order History:     Ordered     Dose Route Frequency Stop    08/12/20 0958  warfarin (COUMADIN) tablet 2 mg     Question:  Target INR  Answer:  2 - 3    2 mg PO Once (Warfarin) --    08/12/20 0958  enoxaparin (LOVENOX) syringe 90 mg      1 mg/kg SC Every 12 Hours --    08/11/20 0621  warfarin (COUMADIN) tablet 2 mg  Status:  Discontinued     Question:  Target INR  Answer:  2 - 3    2 mg PO Daily Warfarin 08/11/20 0947    08/11/20 0438  enoxaparin (LOVENOX)  syringe 40 mg  Status:  Discontinued      40 mg SC Every 24 Hours 08/11/20 0947    08/11/20 0621  Pharmacy to dose warfarin     Question:  Target INR  Answer:  2 - 3    -- XX Continuous PRN --        Code Status -   Code Status and Medical Interventions:   Ordered at: 08/11/20 0438     Code Status:    CPR     Medical Interventions (Level of Support Prior to Arrest):    Full     This patient has been examined wearing appropriate Personal Protective Equipment and discussed with hospital infection control department. 08/13/20    Discharge Planning  May need placement    Electronically signed by Kim Childs MD, 08/13/20, 14:53.  [facility] Hospitalist Team

## 2020-08-13 NOTE — THERAPY TREATMENT NOTE
Acute Care - Speech Language Pathology   Swallow Treatment Note  Gian     Patient Name: Rafael Ivey  : 1935  MRN: [id number]  Today's Date: 2020               Admit Date: 2020    Visit Dx:      ICD-10-CM ICD-9-CM   1. Acute respiratory distress R06.03 518.82   2. Fever, unspecified fever cause R50.9 780.60   3. Pneumonia of right lung due to infectious organism, unspecified part of lung J18.9 483.8   4. Acute congestive heart failure, unspecified heart failure type (CMS/HCC) I50.9 428.0     Patient Active Problem List   Diagnosis   • Sick sinus syndrome (CMS/[facility])   • Atrial fibrillation, chronic   • Pacemaker   • Bilateral leg edema   • Iron deficiency anemia due to chronic blood loss   • Warfarin-induced coagulopathy (CMS/[facility])   • Parkinson's disease (CMS/[facility])   • Aortic stenosis   • GERD (gastroesophageal reflux disease)   • Hyperlipidemia   • Essential hypertension   • Mitral regurgitation   • Nonrheumatic tricuspid valve disorder   • Pulmonary hypertension (CMS/[facility])   • Sleep apnea   • Valvular heart disease   • Hypotension   • Chronic diastolic CHF (congestive heart failure) (CMS/[facility])   • Anxiety disorder   • Overactive bladder   • Chronic pain   • RLS (restless legs syndrome)   • Obesity (BMI 30-39.9)   • Acute UTI (urinary tract infection)   • Hypokalemia   • Anasarca   • Acute on chronic combined systolic and diastolic CHF (congestive heart failure) (CMS/[facility])   • Depression   • Anemia   • Rectal ulceration   • Acute on chronic heart failure (CMS/[facility])   • Acute respiratory distress   • Pneumonia of right lung due to infectious organism   • Weakness   • Pleural effusion, right       Therapy Treatment  Rehabilitation Treatment Summary     Row Name 20 1500             Treatment Time/Intention    Discipline  speech language pathologist  -EC      Document Type  therapy note (daily note)  -EC      Subjective Information  no complaints  -EC      Care Plan Review  evaluation/treatment  "results reviewed;care plan/treatment goals reviewed;patient/other agree to care plan  -EC      Patient Effort  excellent  -EC      Patient Response to Treatment  Pt sitting up in chair feeding himself independently and functionally. Pt reports his swallow is \"better\" today. Pt is on a mechanical soft diet and states it is working well for him because he does not have his lower dentures w/him at the hospital, only upper denture plate in place.  Pt noted w/prolonged but functional mastication. No clinical s/s of aspiration are seen at lunch time meal.  Results and recs from yesterday's VFSS reviewed and pt verbalizes understanding.  -EC      Recorded by [EC] Birdie Jean 08/13/20 1549      Row Name                Wound 06/12/20 1901 scrotum MASD (Moisture associated skin damage)    Wound - Properties Group Date first assessed: 06/12/20 [KK] Time first assessed: 1901 [KK] Location: scrotum [KK] Primary Wound Type: MASD [KK] Recorded by:  [KK] Janis Rocha RN 06/13/20 0141    Row Name                Wound 06/12/20 1901 gluteal MASD (Moisture associated skin damage)    Wound - Properties Group Date first assessed: 06/12/20 [KK] Time first assessed: 1901 [KK] Location: gluteal [KK] Primary Wound Type: MASD [KK] Recorded by:  [CHELITA] Janis Rocha RN 06/13/20 0142      User Key  (r) = Recorded By, (t) = Taken By, (c) = Cosigned By    Initials Name Effective Dates Discipline    EC Birdie Jean 03/01/19 -  SLP    Janis Botello RN 03/01/19 -  Nurse          Outcome Summary         SLP GOALS     Row Name 08/13/20 1500 08/12/20 1552 08/11/20 1600       Oral Nutrition/Hydration Goal 1 (SLP)    Oral Nutrition/Hydration Goal 1, SLP  Pt will have VFSS with 24-48 hous.   -EC  Pt will have VFSS with 24-48 hous.   -EC  Pt will have VFSS with 24-48 hous.   -CP    Time Frame (Oral Nutrition/Hydration Goal 1, SLP)  --  1 day;2 days  -EC  1 day;2 days  -CP    Progress/Outcomes (Oral Nutrition/Hydration Goal 1, SLP)  " goal met  -EC  goal met  -EC  --       Oral Nutrition/Hydration Goal 2 (SLP)    Oral Nutrition/Hydration Goal 2, SLP  Pt will tolerate safest and L/R diet/liquids with no complications of aspiration.   -EC  Pt will tolerate safest and L/R diet/liquids with no complications of aspiration.   -EC  Pt will tolerate safest and L/R diet/liquids with no complications of aspiration.   -CP    Time Frame (Oral Nutrition/Hydration Goal 2, SLP)  by discharge  -EC  by discharge  -EC  by discharge  -CP    Progress/Outcomes (Oral Nutrition/Hydration Goal 2, SLP)  goal ongoing;good progress toward goal  -EC  goal ongoing  -EC  --      User Key  (r) = Recorded By, (t) = Taken By, (c) = Cosigned By    Initials Name Provider Type    Zo Andrade SLP Speech and Language Pathologist    EC Birdie Jean Speech and Language Pathologist          EDUCATION  The patient has been educated in the following areas:   Dysphagia (Swallowing Impairment) Modified Diet Instruction.    SLP Recommendation and Plan      Continue mechanical soft diet w/thin liquids. ST to continue to follow for diet tolerance w/further recs as indicated.                          Time Calculation:       Therapy Charges for Today     Code Description Service Date Service Provider Modifiers Qty    [id number] HC ST MOTION FLUORO EVAL SWALLOW 6 8/12/2020 Birdie Jean GN 1                 Birdie Jean  8/13/2020

## 2020-08-13 NOTE — PROGRESS NOTES
"Pharmacy dosing service  Anticoagulant  Warfarin     Subjective:    Rafael Ivey is a 84 y.o.male being continued on warfarin for atrial fibrillation.    INR Goal: 2 - 3  Home medication?: Yes, warfarin 2 mg PO every day at 1800, except Sunday  Bridge Therapy Present?:  Yes,  Lovenox 90 mg SQ Q12H  Additional Contributing Factors: acute illness      Assessment/Plan:    Due to subtherapeutic INR will give 3 mg today .     Continue to monitor and adjust based on INR.         Date 8/11 8/12 8/13         INR 1.72 1.71 1.41         Dose ------ 2 mg 3 mg             Objective:  [Ht: 182.9 cm (72\"); Wt: 94.6 kg (208 lb 8.9 oz); BMI: Body mass index is 28.29 kg/m².]    Lab Results   Component Value Date    ALBUMIN 4.00 08/11/2020     Lab Results   Component Value Date    INR 1.41 (L) 08/13/2020    INR 1.71 (L) 08/12/2020    INR 1.80 (L) 08/11/2020    PROTIME 15.1 (L) 08/13/2020    PROTIME 17.9 (L) 08/12/2020    PROTIME 19.0 (L) 08/11/2020     Lab Results   Component Value Date    HGB 9.5 (L) 08/13/2020    HGB 9.1 (L) 08/12/2020    HGB 10.5 (L) 08/11/2020     Lab Results   Component Value Date    HCT 29.5 (L) 08/13/2020    HCT 27.9 (L) 08/12/2020    HCT 32.9 (L) 08/11/2020       Radha Clemons, [facility]  08/13/20 14:02         "

## 2020-08-13 NOTE — THERAPY TREATMENT NOTE
Patient Name: Rafael Ivey  : 1935    MRN: [id number]                              Today's Date: 2020       Admit Date: 2020    Visit Dx:     ICD-10-CM ICD-9-CM   1. Acute respiratory distress R06.03 518.82   2. Fever, unspecified fever cause R50.9 780.60   3. Pneumonia of right lung due to infectious organism, unspecified part of lung J18.9 483.8   4. Acute congestive heart failure, unspecified heart failure type (CMS/[facility]) I50.9 428.0     Patient Active Problem List   Diagnosis   • Sick sinus syndrome (CMS/[facility])   • Atrial fibrillation, chronic   • Pacemaker   • Bilateral leg edema   • Iron deficiency anemia due to chronic blood loss   • Warfarin-induced coagulopathy (CMS/[facility])   • Parkinson's disease (CMS/[facility])   • Aortic stenosis   • GERD (gastroesophageal reflux disease)   • Hyperlipidemia   • Essential hypertension   • Mitral regurgitation   • Nonrheumatic tricuspid valve disorder   • Pulmonary hypertension (CMS/[facility])   • Sleep apnea   • Valvular heart disease   • Hypotension   • Chronic diastolic CHF (congestive heart failure) (CMS/[facility])   • Anxiety disorder   • Overactive bladder   • Chronic pain   • RLS (restless legs syndrome)   • Obesity (BMI 30-39.9)   • Acute UTI (urinary tract infection)   • Hypokalemia   • Anasarca   • Acute on chronic combined systolic and diastolic CHF (congestive heart failure) (CMS/[facility])   • Depression   • Anemia   • Rectal ulceration   • Acute on chronic heart failure (CMS/[facility])   • Acute respiratory distress   • Pneumonia of right lung due to infectious organism   • Weakness   • Pleural effusion, right     Past Medical History:   Diagnosis Date   • Anemia    • Arthritis     left hip    • Atrial fibrillation (CMS/[facility])    • Atrial fibrillation (CMS/[facility])     chronic   • Cardiomegaly     mild   • Chronic diastolic heart failure (CMS/[facility])    • GERD (gastroesophageal reflux disease)    • Hyperlipidemia    • Hypertension    • Hypotension    • Lower extremity edema    •  Mitral regurgitation    • Obesity    • Pacemaker    • Parkinson disease (CMS/HCC)    • Recurrent falls     with injury    • Sick sinus syndrome (CMS/HCC)     s/p pacemaker implant    • Valvular heart disease     MR     Past Surgical History:   Procedure Laterality Date   • APPENDECTOMY     • ATRIAL APPENDAGE EXCLUSION LEFT WITH TRANSESOPHAGEAL ECHOCARDIOGRAM N/A 5/28/2020    Procedure: Atrial Appendage Occlusion;  Surgeon: Jim Walker MD;  Location: [facility] CATH INVASIVE LOCATION;  Service: Cardiovascular;  Laterality: N/A;   • ATRIAL APPENDAGE EXCLUSION LEFT WITH TRANSESOPHAGEAL ECHOCARDIOGRAM N/A 5/28/2020    Procedure: Atrial Appendage Occlusion;  Surgeon: Lashaun So MD;  Location: [facility] CATH INVASIVE LOCATION;  Service: Cardiovascular;  Laterality: N/A;   • CATARACT EXTRACTION     • CHOLECYSTECTOMY     • COLONOSCOPY N/A 5/21/2020    Procedure: COLONOSCOPY WITH BIOPSY X1 AREA;  Surgeon: Sim Collins MD;  Location: [facility] ENDOSCOPY;  Service: Gastroenterology;  Laterality: N/A;  Post: poor prep, impacted stool in rectum, and internal hemorrhoids, ascending colon arteriovenous malformation   • ENDOSCOPY N/A 5/21/2020    Procedure: ESOPHAGOGASTRODUODENOSCOPY with clipping x 1 of bleeding gastric polyp;  Surgeon: Sim Collins MD;  Location: [facility] ENDOSCOPY;  Service: Gastroenterology;  Laterality: N/A;  Post: bleeding gastric polyp   • HIP SURGERY Right 08/2017   • OTHER SURGICAL HISTORY      skin mole excisions    • PACEMAKER IMPLANTATION  06/22/2017    [employer] ([employer])   • TONSILLECTOMY     • TOTAL HIP ARTHROPLASTY Left 05/20/2014     General Information     Row Name 08/13/20 1617          PT Evaluation Time/Intention    Document Type  therapy note (daily note)  -     Mode of Treatment  physical therapy  -     Row Name 08/13/20 1617          Cognitive Assessment/Intervention- PT/OT    Orientation Status (Cognition)  oriented x 3  -     Row Name 08/13/20 1615          Safety  Issues, Functional Mobility    Impairments Affecting Function (Mobility)  strength;motor control;endurance/activity tolerance;coordination  -       User Key  (r) = Recorded By, (t) = Taken By, (c) = Cosigned By    Initials Name Provider Type     Allegra Mathews PTA Physical Therapy Assistant        Mobility     Row Name 08/13/20 1619          Bed Mobility Assessment/Treatment    Bed Mobility Assessment/Treatment  bed mobility (all) activities  -     [place] Level (Bed Mobility)  minimum assist (75% patient effort);verbal cues  -     Assistive Device (Bed Mobility)  bed rails;head of bed elevated  -     Row Name 08/13/20 1619          Sit-Stand Transfer    Sit-Stand [place] (Transfers)  minimum assist (75% patient effort);verbal cues  -     Assistive Device (Sit-Stand Transfers)  walker, front-wheeled Bed elevated  -     Row Name 08/13/20 1619          Gait/Stairs Assessment/Training    Gait/Stairs Assessment/Training  gait/ambulation assistive device  -     [place] Level (Gait)  contact guard;verbal cues  -     Assistive Device (Gait)  walker, front-wheeled  -     Distance in Feet (Gait)  25'  -     Pattern (Gait)  step-through  -     Deviations/Abnormal Patterns (Gait)  gait speed decreased;delgaod decreased  -     Bilateral Gait Deviations  forward flexed posture;heel strike decreased  -     Comment (Gait/Stairs)  FFposture, slowed delgado, manual assist c rwx. for transitional moves. Weakness present, decreased righting recovery,at risk for falls.  -       User Key  (r) = Recorded By, (t) = Taken By, (c) = Cosigned By    Initials Name Provider Type     Allegra Mathews PTA Physical Therapy Assistant        Obj/Interventions     Row Name 08/13/20 1621          Static Sitting Balance    Level of [place] (Unsupported Sitting, Static Balance)  supervision  -     Sitting Position (Unsupported Sitting, Static Balance)  sitting on edge of bed;sitting in chair  -[facility]  Name 08/13/20 1621          Dynamic Sitting Balance    Sitting Position, Reaches Outside Midline (Sitting, Dynamic Balance)  sitting on edge of bed  -     Comment, Reaches Outside Midline (Sitting, Dynamic Balance)  Unable to dress lower body.  -[facility] Name 08/13/20 1621          Static Standing Balance    Level of [place] (Supported Standing, Static Balance)  contact guard assist  -     Assistive Device Utilized (Supported Standing, Static Balance)  walker, rolling  -[facility] Name 08/13/20 1621          Dynamic Standing Balance    Level of [place], Reaches Outside Midline (Standing, Dynamic Balance)  minimal assist, 75% patient effort  -     Assistive Device Utilized (Supported Standing, Dynamic Balance)  walker, rolling  -     Comment, Reaches Outside Midline (Standing, Dynamic Balance)  Limited away from midline.  -       User Key  (r) = Recorded By, (t) = Taken By, (c) = Cosigned By    Initials Name Provider Type     Allegra Mathews PTA Physical Therapy Assistant        Goals/Plan    No documentation.       Clinical Impression     [facility] Name 08/13/20 1622          Pain Assessment    Additional Documentation  Pain Scale: Numbers Pre/Post-Treatment (Group)  -LH     Row Name 08/13/20 1622          Pain Scale: Numbers Pre/Post-Treatment    Pain Scale: Numbers, Pretreatment  0/10 - no pain  -     Pain Scale: Numbers, Post-Treatment  0/10 - no pain  -[facility] Name 08/13/20 1622          Plan of Care Review    Progress  improving  -     Outcome Summary  Required cga/min A for safe, functional mobility.  Slow to progress to eob, required sitting time before progressing. Bed elevated for standing, time to stablize. Amb 25'using rwx. ccga/min A, weakness present, no lob. Will progress as able, will need rehab at d/c to address deficits. PPE worn : Mask, gloves,shield.  -[facility] Name 08/13/20 1622          Vital Signs    O2 Delivery Pre Treatment  supplemental O2  -     O2 Delivery Intra  Treatment  supplemental O2  -LH     O2 Delivery Post Treatment  supplemental O2  -LH     Pre Patient Position  Supine  -LH     Intra Patient Position  Standing  -LH     Post Patient Position  Sitting  -LH     Row Name 08/13/20 1622          Positioning and Restraints    Pre-Treatment Position  in bed  -LH     Post Treatment Position  chair  -LH     In Chair  notified nsg;call light within reach;sitting  -LH       User Key  (r) = Recorded By, (t) = Taken By, (c) = Cosigned By    Initials Name Provider Type     Allegra Mathews PTA Physical Therapy Assistant        Outcome Measures     Row Name 08/13/20 1626          How much help from another person do you currently need...    Turning from your back to your side while in flat bed without using bedrails?  3  -LH     Moving from lying on back to sitting on the side of a flat bed without bedrails?  3  -LH     Moving to and from a bed to a chair (including a wheelchair)?  2  -LH     Standing up from a chair using your arms (e.g., wheelchair, bedside chair)?  3  -LH     Climbing 3-5 steps with a railing?  2  -LH     To walk in hospital room?  3  -LH     AM-PAC 6 Clicks Score (PT)  16  -LH       User Key  (r) = Recorded By, (t) = Taken By, (c) = Cosigned By    Initials Name Provider Type     Allegra Mathews PTA Physical Therapy Assistant        Physical Therapy Education                 Title: PT OT SLP Therapies (Done)     Topic: Physical Therapy (Done)     Point: Mobility training (Done)     Description:   Instruct learner(s) on safety and technique for assisting patient out of bed, chair or wheelchair.  Instruct in the proper use of assistive devices, such as walker, crutches, cane or brace.              Patient Friendly Description:   It's important to get you on your feet again, but we need to do so in a way that is safe for you. Falling has serious consequences, and your personal safety is the most important thing of all.        When it's time to get out of bed, one  of us or a family member will sit next to you on the bed to give you support.     If your doctor or nurse tells you to use a walker, crutches, a cane, or a brace, be sure you use it every time you get out of bed, even if you think you don't need it.    Learning Progress Summary           Patient Acceptance, E,D, DU,VU by  at 8/13/2020 1626    Comment:  Reinforced safe hand placement c rwx. use.    Acceptance, E,TB, VU,NR by  at 8/12/2020 1608                   Point: Precautions (Done)     Description:   Instruct learner(s) on prescribed precautions during mobility and gait tasks              Learning Progress Summary           Patient Acceptance, E,D, DU,VU by  at 8/13/2020 1626    Comment:  Reinforced safe hand placement c rwx. use.    Acceptance, E,TB, VU,NR by  at 8/12/2020 1608                               User Key     Initials Effective Dates Name Provider Type Discipline     03/01/19 -  Deb White, PT Physical Therapist PT     03/01/19 -  Allegra Mathews PTA Physical Therapy Assistant PT              PT Recommendation and Plan     Outcome Summary/Treatment Plan (PT)  Anticipated Discharge Disposition (PT): inpatient rehabilitation facility  Progress: improving  Outcome Summary: Required cga/min A for safe, functional mobility.  Slow to progress to eob, required sitting time before progressing. Bed elevated for standing, time to stablize. Amb 25'using rwx. ccga/min A, weakness present, no lob. Will progress as able, will need rehab at d/c to address deficits. PPE worn : Mask, gloves,shield.     Time Calculation:   PT Charges     Row Name 08/13/20 1627             Time Calculation    Start Time  0329  -      Stop Time  0402  -      Time Calculation (min)  33 min  -      PT Received On  08/13/20  -      PT - Next Appointment  08/14/20  -         Time Calculation- PT    Total Timed Code Minutes- PT  33 minute(s)  -         Timed Charges    08980 - Gait Training Minutes   9  -       82322 - PT Therapeutic Activity Minutes  24  -        User Key  (r) = Recorded By, (t) = Taken By, (c) = Cosigned By    Initials Name Provider Type     Allegra Mathews PTA Physical Therapy Assistant        Therapy Charges for Today     Code Description Service Date Service Provider Modifiers Qty    84295681032 HC GAIT TRAINING EA 15 MIN 8/13/2020 Allegra Mathews PTA GP 1    [id number]  PT THERAPEUTIC ACT EA 15 MIN 8/13/2020 Allegra Mathews PTA GP 2          PT G-Codes  Outcome Measure Options: AM-PAC 6 Clicks Basic Mobility (PT)  AM-PAC 6 Clicks Score (PT): 16    Allegra Mathews PTA  8/13/2020

## 2020-08-13 NOTE — PLAN OF CARE
Problem: Patient Care Overview  Goal: Plan of Care Review  Outcome: Ongoing (interventions implemented as appropriate)  Flowsheets  Taken 8/13/2020 1622 by Allegra Mathews PTA  Progress: improving  Outcome Summary: Required cga/min A for safe, functional mobility.  Slow to progress to eob, required sitting time before progressing. Bed elevated for standing, time to stablize. Amb 25'using rwx. ccga/min A, weakness present, no lob. Will progress as able, will need rehab at d/c to address deficits. PPE worn : Mask, gloves,shield.  Taken 8/13/2020 0900 by Christy Farmer, RN  Plan of Care Reviewed With: patient

## 2020-08-14 ENCOUNTER — APPOINTMENT (OUTPATIENT)
Dept: GENERAL RADIOLOGY | Facility: HOSPITAL | Age: 85
End: 2020-08-14

## 2020-08-14 LAB
ANION GAP SERPL CALCULATED.3IONS-SCNC: 12 MMOL/L (ref 5–15)
ANISOCYTOSIS BLD QL: ABNORMAL
BUN SERPL-MCNC: 19 MG/DL (ref 8–23)
BUN SERPL-MCNC: ABNORMAL MG/DL
BUN/CREAT SERPL: ABNORMAL
CALCIUM SPEC-SCNC: 8.1 MG/DL (ref 8.6–10.5)
CHLORIDE SERPL-SCNC: 104 MMOL/L (ref 98–107)
CO2 SERPL-SCNC: 25 MMOL/L (ref 22–29)
CREAT SERPL-MCNC: 0.88 MG/DL (ref 0.76–1.27)
DEPRECATED RDW RBC AUTO: 48.6 FL (ref 37–54)
EOSINOPHIL # BLD MANUAL: 0.15 10*3/MM3 (ref 0–0.4)
EOSINOPHIL NFR BLD MANUAL: 3 % (ref 0.3–6.2)
ERYTHROCYTE [DISTWIDTH] IN BLOOD BY AUTOMATED COUNT: 15.5 % (ref 12.3–15.4)
GFR SERPL CREATININE-BSD FRML MDRD: 83 ML/MIN/1.73
GLUCOSE SERPL-MCNC: 114 MG/DL (ref 65–99)
HCT VFR BLD AUTO: 28.8 % (ref 37.5–51)
HGB BLD-MCNC: 9.4 G/DL (ref 13–17.7)
INR PPP: 1.52 (ref 2–3)
LYMPHOCYTES # BLD MANUAL: 0.29 10*3/MM3 (ref 0.7–3.1)
LYMPHOCYTES NFR BLD MANUAL: 6 % (ref 19.6–45.3)
LYMPHOCYTES NFR BLD MANUAL: 7 % (ref 5–12)
MCH RBC QN AUTO: 28.1 PG (ref 26.6–33)
MCHC RBC AUTO-ENTMCNC: 32.6 G/DL (ref 31.5–35.7)
MCV RBC AUTO: 86.4 FL (ref 79–97)
MONOCYTES # BLD AUTO: 0.34 10*3/MM3 (ref 0.1–0.9)
MRSA DNA SPEC QL NAA+PROBE: NORMAL
NEUTROPHILS # BLD AUTO: 4.12 10*3/MM3 (ref 1.7–7)
NEUTROPHILS NFR BLD MANUAL: 82 % (ref 42.7–76)
NEUTS BAND NFR BLD MANUAL: 2 % (ref 0–5)
NT-PROBNP SERPL-MCNC: 2742 PG/ML (ref 0–1800)
PLAT MORPH BLD: NORMAL
PLATELET # BLD AUTO: 281 10*3/MM3 (ref 140–450)
PMV BLD AUTO: 6 FL (ref 6–12)
POLYCHROMASIA BLD QL SMEAR: ABNORMAL
POTASSIUM SERPL-SCNC: 3.5 MMOL/L (ref 3.5–5.2)
PROCALCITONIN SERPL-MCNC: 0.17 NG/ML (ref 0–0.25)
PROTHROMBIN TIME: 16 SECONDS (ref 19.4–28.5)
RBC # BLD AUTO: 3.33 10*6/MM3 (ref 4.14–5.8)
SCAN SLIDE: NORMAL
SODIUM SERPL-SCNC: 141 MMOL/L (ref 136–145)
VANCOMYCIN TROUGH SERPL-MCNC: 11.1 MCG/ML (ref 5–20)
WBC # BLD AUTO: 4.9 10*3/MM3 (ref 3.4–10.8)
WBC MORPH BLD: NORMAL

## 2020-08-14 PROCEDURE — 85610 PROTHROMBIN TIME: CPT | Performed by: NURSE PRACTITIONER

## 2020-08-14 PROCEDURE — 25010000002 ENOXAPARIN PER 10 MG: Performed by: INTERNAL MEDICINE

## 2020-08-14 PROCEDURE — 87641 MR-STAPH DNA AMP PROBE: CPT | Performed by: INTERNAL MEDICINE

## 2020-08-14 PROCEDURE — 25010000002 FUROSEMIDE PER 20 MG: Performed by: INTERNAL MEDICINE

## 2020-08-14 PROCEDURE — 97530 THERAPEUTIC ACTIVITIES: CPT

## 2020-08-14 PROCEDURE — 80202 ASSAY OF VANCOMYCIN: CPT | Performed by: NURSE PRACTITIONER

## 2020-08-14 PROCEDURE — 80048 BASIC METABOLIC PNL TOTAL CA: CPT | Performed by: INTERNAL MEDICINE

## 2020-08-14 PROCEDURE — 85025 COMPLETE CBC W/AUTO DIFF WBC: CPT | Performed by: INTERNAL MEDICINE

## 2020-08-14 PROCEDURE — 83880 ASSAY OF NATRIURETIC PEPTIDE: CPT | Performed by: INTERNAL MEDICINE

## 2020-08-14 PROCEDURE — 25010000002 CEFEPIME PER 500 MG: Performed by: INTERNAL MEDICINE

## 2020-08-14 PROCEDURE — 84145 PROCALCITONIN (PCT): CPT | Performed by: INTERNAL MEDICINE

## 2020-08-14 PROCEDURE — 92526 ORAL FUNCTION THERAPY: CPT

## 2020-08-14 PROCEDURE — 25010000002 VANCOMYCIN 10 G RECONSTITUTED SOLUTION: Performed by: NURSE PRACTITIONER

## 2020-08-14 PROCEDURE — 71045 X-RAY EXAM CHEST 1 VIEW: CPT

## 2020-08-14 PROCEDURE — 99233 SBSQ HOSP IP/OBS HIGH 50: CPT | Performed by: INTERNAL MEDICINE

## 2020-08-14 PROCEDURE — 85007 BL SMEAR W/DIFF WBC COUNT: CPT | Performed by: INTERNAL MEDICINE

## 2020-08-14 RX ORDER — LEVOFLOXACIN 500 MG/1
250 TABLET, FILM COATED ORAL EVERY 24 HOURS
Status: DISCONTINUED | OUTPATIENT
Start: 2020-08-14 | End: 2020-08-14

## 2020-08-14 RX ORDER — WARFARIN SODIUM 2.5 MG/1
2.5 TABLET ORAL
Status: COMPLETED | OUTPATIENT
Start: 2020-08-14 | End: 2020-08-14

## 2020-08-14 RX ORDER — LEVOFLOXACIN 500 MG/1
500 TABLET, FILM COATED ORAL ONCE
Status: COMPLETED | OUTPATIENT
Start: 2020-08-14 | End: 2020-08-14

## 2020-08-14 RX ORDER — LEVOFLOXACIN 5 MG/ML
750 INJECTION, SOLUTION INTRAVENOUS EVERY 24 HOURS
Status: DISCONTINUED | OUTPATIENT
Start: 2020-08-14 | End: 2020-08-14 | Stop reason: SDUPTHER

## 2020-08-14 RX ORDER — LEVOFLOXACIN 750 MG/1
750 TABLET ORAL EVERY 24 HOURS
Status: DISCONTINUED | OUTPATIENT
Start: 2020-08-15 | End: 2020-08-17 | Stop reason: HOSPADM

## 2020-08-14 RX ADMIN — ACETAMINOPHEN 650 MG: 325 TABLET, FILM COATED ORAL at 20:18

## 2020-08-14 RX ADMIN — CARBIDOPA AND LEVODOPA 1.5 TABLET: 25; 100 TABLET ORAL at 05:23

## 2020-08-14 RX ADMIN — POTASSIUM CHLORIDE 20 MEQ: 1500 TABLET, EXTENDED RELEASE ORAL at 08:17

## 2020-08-14 RX ADMIN — BACLOFEN 10 MG: 10 TABLET ORAL at 08:16

## 2020-08-14 RX ADMIN — VANCOMYCIN HYDROCHLORIDE 1500 MG: 10 INJECTION, POWDER, LYOPHILIZED, FOR SOLUTION INTRAVENOUS at 02:21

## 2020-08-14 RX ADMIN — FUROSEMIDE 40 MG: 10 INJECTION, SOLUTION INTRAMUSCULAR; INTRAVENOUS at 20:18

## 2020-08-14 RX ADMIN — Medication: at 01:37

## 2020-08-14 RX ADMIN — ENOXAPARIN SODIUM 90 MG: 100 INJECTION SUBCUTANEOUS at 12:57

## 2020-08-14 RX ADMIN — POTASSIUM CHLORIDE 20 MEQ: 1500 TABLET, EXTENDED RELEASE ORAL at 20:18

## 2020-08-14 RX ADMIN — GABAPENTIN 300 MG: 300 CAPSULE ORAL at 20:18

## 2020-08-14 RX ADMIN — Medication 10 ML: at 08:16

## 2020-08-14 RX ADMIN — LEVOFLOXACIN 500 MG: 500 TABLET, FILM COATED ORAL at 12:57

## 2020-08-14 RX ADMIN — ENOXAPARIN SODIUM 90 MG: 100 INJECTION SUBCUTANEOUS at 23:24

## 2020-08-14 RX ADMIN — NYSTATIN: 100000 POWDER TOPICAL at 08:16

## 2020-08-14 RX ADMIN — CARBIDOPA AND LEVODOPA 1.5 TABLET: 25; 100 TABLET ORAL at 23:14

## 2020-08-14 RX ADMIN — NYSTATIN: 100000 POWDER TOPICAL at 20:22

## 2020-08-14 RX ADMIN — Medication 10 ML: at 20:17

## 2020-08-14 RX ADMIN — LEVOFLOXACIN 250 MG: 500 TABLET, FILM COATED ORAL at 09:07

## 2020-08-14 RX ADMIN — CEFEPIME 2 G: 2 INJECTION, POWDER, FOR SOLUTION INTRAVENOUS at 04:55

## 2020-08-14 RX ADMIN — FUROSEMIDE 40 MG: 10 INJECTION, SOLUTION INTRAMUSCULAR; INTRAVENOUS at 08:17

## 2020-08-14 RX ADMIN — CARBIDOPA AND LEVODOPA 1.5 TABLET: 25; 100 TABLET ORAL at 13:00

## 2020-08-14 RX ADMIN — POTASSIUM CHLORIDE 20 MEQ: 1500 TABLET, EXTENDED RELEASE ORAL at 15:43

## 2020-08-14 RX ADMIN — PRAMIPEXOLE DIHYDROCHLORIDE 0.25 MG: 0.25 TABLET ORAL at 08:15

## 2020-08-14 RX ADMIN — ROSUVASTATIN CALCIUM 10 MG: 10 TABLET, FILM COATED ORAL at 20:19

## 2020-08-14 RX ADMIN — WARFARIN 2.5 MG: 2.5 TABLET ORAL at 17:36

## 2020-08-14 RX ADMIN — PANTOPRAZOLE SODIUM 40 MG: 40 TABLET, DELAYED RELEASE ORAL at 08:17

## 2020-08-14 RX ADMIN — ESCITALOPRAM OXALATE 10 MG: 10 TABLET ORAL at 08:17

## 2020-08-14 NOTE — PLAN OF CARE
"  Problem: Patient Care Overview  Goal: Plan of Care Review  Outcome: Ongoing (interventions implemented as appropriate)  Flowsheets (Taken 8/14/2020 1457)  Progress: declining  Plan of Care Reviewed With: patient  Outcome Summary: Required mod/max A for safe, functional mobility to eob and to stand c rwx. Able to side step c mod A, required max A sit/supine. Pt. reports feeling \"light headed\" and weak. Leaning posterior, struggling bringing cog over david. Will progress as able, will need rehab at d/c to address deficts. PPE worn: N95, gloves, shield.     "

## 2020-08-14 NOTE — THERAPY TREATMENT NOTE
Acute Care - Speech Language Pathology   Swallow Treatment Note  Gian     Patient Name: Rafael Ivey  : 1935  MRN: [id number]  Today's Date: 2020               Admit Date: 2020    Visit Dx:      ICD-10-CM ICD-9-CM   1. Acute respiratory distress R06.03 518.82   2. Fever, unspecified fever cause R50.9 780.60   3. Pneumonia of right lung due to infectious organism, unspecified part of lung J18.9 483.8   4. Acute congestive heart failure, unspecified heart failure type (CMS/HCC) I50.9 428.0     Patient Active Problem List   Diagnosis   • Sick sinus syndrome (CMS/[facility])   • Atrial fibrillation, chronic   • Pacemaker   • Bilateral leg edema   • Iron deficiency anemia due to chronic blood loss   • Warfarin-induced coagulopathy (CMS/[facility])   • Parkinson's disease (CMS/[facility])   • Aortic stenosis   • GERD (gastroesophageal reflux disease)   • Hyperlipidemia   • Essential hypertension   • Mitral regurgitation   • Nonrheumatic tricuspid valve disorder   • Pulmonary hypertension (CMS/[facility])   • Sleep apnea   • Valvular heart disease   • Hypotension   • Chronic diastolic CHF (congestive heart failure) (CMS/[facility])   • Anxiety disorder   • Overactive bladder   • Chronic pain   • RLS (restless legs syndrome)   • Obesity (BMI 30-39.9)   • Acute UTI (urinary tract infection)   • Hypokalemia   • Anasarca   • Acute on chronic combined systolic and diastolic CHF (congestive heart failure) (CMS/[facility])   • Depression   • Anemia   • Rectal ulceration   • Acute on chronic heart failure (CMS/[facility])   • Acute respiratory distress   • Pneumonia of right lung due to infectious organism   • Weakness   • Pleural effusion, right       Therapy Treatment  Rehabilitation Treatment Summary     Row Name 20 0900          Discipline  speech language pathologist  -LF    Document Type  therapy note (daily note)  -LF    Subjective Information  no complaints  -LF    Mode of Treatment  individual therapy;speech-language pathology  -     Patient/Family Observations  Pt awake and alert with breakfast tray upon entry  -LF    Patient Effort  good  -LF    Comment  Pt seen with breakfast tray this date to assess diet tolerance of m/s and thin liquids. Pt's breakfast tray included scrambled eggs, oatmeal, pancakes, and apple juice by cup. Pt self fed and independently demonstrated safe swallow strategies. Extended but functinonal mastication and adequate bolus control with no anterior labial spillage. No oral residue or pocketing noted. Cough observed x1. This did not appear correlated to PO. No other s/s of difficulty or aspiration observed at any time.   -LF2    Treatment Considerations/Comments  PPE worn: gloves, mask, and glasses  -LF    Recorded by [LF] Liliana Bright SLP 08/14/20 0921  [LF2] Liliana Bright SLP 08/14/20 0929    Row Name 08/14/20 0900          Daily Summary of Progress (SLP)  progress toward functional goals is good  -LF    Plan for Continued Treatment (SLP)  Recommend pt continue mechanical soft and thin liquid diet.  -LF    Recorded by [LF] Liliana Bright SLP 08/14/20 0921      User Key  (r) = Recorded By, (t) = Taken By, (c) = Cosigned By    Initials Name Effective Dates Discipline    KK Janis Rocha RN 03/01/19 -  Nurse    LF Liliana Bright SLP 01/02/20 -  SLP          Outcome Summary  Outcome Summary/Treatment Plan (SLP)  Daily Summary of Progress (SLP): progress toward functional goals is good (08/14/20 0900 : Liliana Bright, SLP)  Plan for Continued Treatment (SLP): Recommend pt continue mechanical soft and thin liquid diet. (08/14/20 0900 : Liliana Bright, SLP)      SLP GOALS     Row Name 08/14/20 0900          Oral Nutrition/Hydration Goal 1, SLP  Pt will have VFSS with 24-48 hous.   -LF    Time Frame (Oral Nutrition/Hydration Goal 1, SLP)  1 day;2 days  -LF    Progress/Outcomes (Oral Nutrition/Hydration Goal 1, SLP)  goal met  -LF          Oral Nutrition/Hydration Goal 2, SLP  Pt will tolerate safest and L/R  diet/liquids with no complications of aspiration.   -LF    Time Frame (Oral Nutrition/Hydration Goal 2, SLP)  by discharge  -LF    Barriers (Oral Nutrition/Hydration Goal 2, SLP)  See above note.   -LF    Progress/Outcomes (Oral Nutrition/Hydration Goal 2, SLP)  goal ongoing;good progress toward goal  -LF    Row Name 08/11/20 1600      User Key  (r) = Recorded By, (t) = Taken By, (c) = Cosigned By    Initials Name Provider Type    Zo Andrade, SLP Speech and Language Pathologist    Birdie Fernandez Speech and Language Pathologist    Liliana Nava, SLP Speech and Language Pathologist          EDUCATION  The patient has been educated in the following areas:   Modified Diet Instruction.    SLP Recommendation and Plan  Daily Summary of Progress (SLP): progress toward functional goals is good     Plan for Continued Treatment (SLP): Recommend pt continue mechanical soft and thin liquid diet.                       Time Calculation:       Therapy Charges for Today     Code Description Service Date Service Provider Modifiers Qty    [id number] HC ST TREATMENT SWALLOW 3 8/14/2020 Liliana Bright, SLP GN 1                 RONDA Lauren  8/14/2020

## 2020-08-14 NOTE — PROGRESS NOTES
[facility] Medicine Services Daily Progress Note      Hospitalist Team  LOS 3 days      Patient Care Team:  Blake Ivey MD as PCP - General (Family Medicine)    Patient Location: 2123/1      Subjective   Subjective     Chief Complaint / Subjective  Chief Complaint   Patient presents with   • Shortness of Breath         Brief Synopsis of Hospital Course/HPI  Patient is an 84-year-old male with multiple diagnoses who presented with increasing shortness of breath and was noted to have a large right-sided pleural effusion.  The patient underwent a thoracentesis on the day of admission which is pending in terms of cultures and studies at this time.  The patient is markedly improved.  Patient is much less short of breath.  The patient remains extremely weak.  He also has underlying Parkinson's disease as well.    Date:  8/13/2020  Overall the patient feels somewhat better.  He still has some shortness of breath.  His BNP is more elevated today.    8/14/2020  Overall the patient still has some shortness of breath.  The patient also has a nonproductive cough.  The patient's effusion has reappeared.    Review of Systems   HENT: Negative.    Eyes: Negative.    Cardiovascular: Negative.    Respiratory: Positive for cough and shortness of breath.    Endocrine: Negative.    Hematologic/Lymphatic: Negative.    Skin: Negative.    Musculoskeletal: Negative.    Gastrointestinal: Negative.    Genitourinary: Negative.    Neurological: Positive for disturbances in coordination, tremors and weakness.   Psychiatric/Behavioral: Negative.    Allergic/Immunologic: Negative.    All other systems reviewed and are negative.    Objective   Objective      Vital Signs  Temp:  [98.1 °F (36.7 °C)-98.6 °F (37 °C)] 98.5 °F (36.9 °C)  Heart Rate:  [70-72] 72  Resp:  [16-20] 16  BP: (116-145)/(57-75) 127/64  Oxygen Therapy  SpO2: 96 %  Pulse Oximetry Type: Continuous  Device (Oxygen Therapy): high flow nasal  "cannula  Device (Oxygen Therapy): nasal cannula  Flow (L/min): 5  Flowsheet Rows      First Filed Value   Admission Height  167.6 cm (66\") Documented at 08/11/2020 0105   Admission Weight  97.5 kg (215 lb) Documented at 08/11/2020 0105        Intake & Output (last 3 days)       08/11 0701 - 08/12 0700 08/12 0701 - 08/13 0700 08/13 0701 - 08/14 0700 08/14 0701 - 08/15 0700    P.O. 200 360 840 200    IV Piggyback 200 100      Total Intake(mL/kg) 400 (4.3) 460 (4.9) 840 (8.8) 200 (2.1)    Urine (mL/kg/hr) 1375 (0.6) 600 (0.3) 1550 (0.7)     Other 2050       Stool  0 0     Total Output 3425 600 1550     Net -3025 -140 -710 +200            Urine Unmeasured Occurrence   3 x 1 x    Stool Unmeasured Occurrence  3 x 2 x         Lines, Drains & Airways    Active LDAs     Name:   Placement date:   Placement time:   Site:   Days:    Peripheral IV 08/11/20 0109 Left Antecubital   08/11/20    0109    Antecubital   1    Peripheral IV 08/11/20 0157 Right Antecubital   08/11/20    0157    Antecubital   1    Urethral Catheter Temperature probe 16 Fr.   08/11/20    0219     1              Physical Exam:    Physical Exam   Constitutional: He is oriented to person, place, and time. He appears well-developed and well-nourished. No distress.   HENT:   Head: Normocephalic and atraumatic.   Right Ear: External ear normal.   Left Ear: External ear normal.   Nose: Nose normal.   Mouth/Throat: Oropharynx is clear and moist. No oropharyngeal exudate.   Eyes: Pupils are equal, round, and reactive to light. Conjunctivae and EOM are normal. Right eye exhibits no discharge. Left eye exhibits no discharge. No scleral icterus.   Neck: Normal range of motion. No JVD present. No tracheal deviation present. No thyromegaly present.   Cardiovascular: Normal rate, regular rhythm, normal heart sounds and intact distal pulses. Exam reveals no gallop and no friction rub.   No murmur heard.  Pulmonary/Chest: Effort normal and breath sounds normal. No stridor. " No respiratory distress. He has no wheezes. He has no rales. He exhibits no tenderness.   Today the patient has more dullness to percussion at the right base.  There also is more decreased breath sounds in that right base.  This is when compared to yesterday's exam.   Abdominal: Soft. Bowel sounds are normal. He exhibits no distension and no mass. There is no tenderness. There is no rebound and no guarding. No hernia.   Musculoskeletal: Normal range of motion. He exhibits no edema, tenderness or deformity.   Lymphadenopathy:     He has no cervical adenopathy.   Neurological: He is alert and oriented to person, place, and time. No cranial nerve deficit or sensory deficit. He exhibits normal muscle tone. Coordination normal.   Skin: Skin is warm and dry. No rash noted. He is not diaphoretic. No erythema.   Psychiatric: He has a normal mood and affect. His behavior is normal.   Nursing note and vitals reviewed.       Wounds (last 24 hours)      LDA Wound     Row Name 08/14/20 1216 08/14/20 0816 08/14/20 0457       Wound 06/12/20 1901 scrotum MASD (Moisture associated skin damage)    Wound - Properties Group Date first assessed: 06/12/20 -KK Time first assessed: 1901 -KK Location: scrotum  -KK Primary Wound Type: MASD  -KK    Dressing Appearance  open to air  -JS  open to air  -JS  open to air  -DR    Closure  Open to air  -JS  Open to air  -JS  Open to air  -DR    Base  blanchable  -JS  blanchable  -JS  blanchable  -DR    Periwound  excoriated  -JS  excoriated  -JS  excoriated  -DR    Periwound Temperature  warm  -JS  warm  -JS  warm  -DR    Periwound Skin Turgor  soft  -JS  soft  -JS  soft  -DR    Drainage Amount  none  -JS  none  -JS  --       Wound 06/12/20 1901 gluteal MASD (Moisture associated skin damage)    Wound - Properties Group Date first assessed: 06/12/20 -KK Time first assessed: 1901 -KK Location: gluteal  -KK Primary Wound Type: MASD  -KK    Dressing Appearance  open to air  -JS  open to air  -JS   open to air  -DR    Closure  Open to air  -JS  Open to air  -JS  Open to air  -DR    Base  blanchable  -JS  blanchable  -JS  blanchable  -DR    Periwound  excoriated  -JS  excoriated  -JS  excoriated  -DR    Periwound Temperature  warm  -JS  warm  -JS  warm  -DR    Periwound Skin Turgor  soft  -JS  soft  -JS  soft  -DR    Drainage Amount  none  -JS  none  -JS  --    Row Name 08/14/20 0047 08/13/20 2000 08/13/20 1600       Wound 06/12/20 1901 scrotum MASD (Moisture associated skin damage)    Wound - Properties Group Date first assessed: 06/12/20 -KK Time first assessed: 1901 -KK Location: scrotum  -KK Primary Wound Type: MASD  -KK    Dressing Appearance  open to air  -DR  open to air  -DR  open to air  -JS    Closure  Open to air  -DR  Open to air  -DR  Open to air  -JS    Base  blanchable  -DR  blanchable  -DR  blanchable  -JS    Periwound  excoriated  -DR  excoriated  -DR  excoriated  -JS    Periwound Temperature  warm  -DR  warm  -DR  warm  -JS    Periwound Skin Turgor  soft  -DR  soft  -DR  soft  -JS    Drainage Amount  --  --  none  -JS       Wound 06/12/20 1901 gluteal MASD (Moisture associated skin damage)    Wound - Properties Group Date first assessed: 06/12/20 -KK Time first assessed: 1901 -KK Location: gluteal  -KK Primary Wound Type: MASD  -KK    Dressing Appearance  open to air  -DR  open to air  -DR  open to air  -JS    Closure  Open to air  -DR  Open to air  -DR  Open to air  -JS    Base  blanchable  -DR  blanchable  -DR  blanchable  -JS    Periwound  excoriated  -DR  excoriated  -DR  excoriated  -JS    Periwound Temperature  warm  -DR  warm  -DR  warm  -JS    Periwound Skin Turgor  soft  -DR  soft  -DR  soft  -JS    Drainage Amount  --  --  none  -JS      User Key  (r) = Recorded By, (t) = Taken By, (c) = Cosigned By    Initials Name Provider Type    Christy Chaudhari RN Registered Nurse    Janis Botello RN Registered Nurse    Liborio Madrigal RN Registered Nurse           Procedures:  Right-sided thoracentesis on 8/11/2020    Results Review:     I reviewed the patient's new clinical results.    Lab Results (last 24 hours)     Procedure Component Value Units Date/Time    Procalcitonin [[id number]] Collected:  08/14/20 1510    Specimen:  Blood Updated:  08/14/20 1517    MRSA Screen, PCR (Inpatient) - Swab, Nares [[id number]]  (Normal) Collected:  08/14/20 0908    Specimen:  Swab from Nares Updated:  08/14/20 1102     MRSA PCR No MRSA Detected    Scan Slide [[id number]] Collected:  08/14/20 0126    Specimen:  Blood Updated:  08/14/20 0224     Scan Slide --     Comment: See Manual Differential Results       CBC & Differential [[id number]] Collected:  08/14/20 0126    Specimen:  Blood Updated:  08/14/20 0224    Narrative:       The following orders were created for panel order CBC & Differential.  Procedure                               Abnormality         Status                     ---------                               -----------         ------                     CBC Auto Differential[[id number]]        Abnormal            Final result                 Please view results for these tests on the individual orders.    CBC Auto Differential [[id number]]  (Abnormal) Collected:  08/14/20 0126    Specimen:  Blood Updated:  08/14/20 0224     WBC 4.90 10*3/mm3      RBC 3.33 10*6/mm3      Hemoglobin 9.4 g/dL      Hematocrit 28.8 %      MCV 86.4 fL      MCH 28.1 pg      MCHC 32.6 g/dL      RDW 15.5 %      RDW-SD 48.6 fl      MPV 6.0 fL      Platelets 281 10*3/mm3     Manual Differential [[id number]]  (Abnormal) Collected:  08/14/20 0126    Specimen:  Blood Updated:  08/14/20 0224     Neutrophil % 82.0 %      Lymphocyte % 6.0 %      Monocyte % 7.0 %      Eosinophil % 3.0 %      Bands %  2.0 %      Neutrophils Absolute 4.12 10*3/mm3      Lymphocytes Absolute 0.29 10*3/mm3      Monocytes Absolute 0.34 10*3/mm3      Eosinophils Absolute 0.15 10*3/mm3      Anisocytosis Slight/1+     Polychromasia  Slight/1+     WBC Morphology Normal     Platelet Morphology Normal    BUN [[id number]]  (Normal) Collected:  08/14/20 0126    Specimen:  Blood Updated:  08/14/20 0218     BUN 19 mg/dL     Vancomycin, Trough [[id number]]  (Normal) Collected:  08/14/20 0126    Specimen:  Blood Updated:  08/14/20 0218     Vancomycin Trough 11.10 mcg/mL     Basic Metabolic Panel [[id number]]  (Abnormal) Collected:  08/14/20 0126    Specimen:  Blood Updated:  08/14/20 0215     Glucose 114 mg/dL      BUN --     Comment: Testing performed by alternate method        Creatinine 0.88 mg/dL      Sodium 141 mmol/L      Potassium 3.5 mmol/L      Chloride 104 mmol/L      CO2 25.0 mmol/L      Calcium 8.1 mg/dL      eGFR Non African Amer 83 mL/min/1.73      BUN/Creatinine Ratio --     Comment: Testing not performed        Anion Gap 12.0 mmol/L     Narrative:       GFR Normal >60  Chronic Kidney Disease <60  Kidney Failure <15      BNP [[id number]]  (Abnormal) Collected:  08/14/20 0126    Specimen:  Blood Updated:  08/14/20 0212     proBNP 2,742.0 pg/mL     Narrative:       Among patients with dyspnea, NT-proBNP is highly sensitive for the detection of acute congestive heart failure. In addition NT-proBNP of <300 pg/ml effectively rules out acute congestive heart failure with 99% negative predictive value.    Results may be falsely decreased if patient taking Biotin.      Protime-INR [[id number]]  (Abnormal) Collected:  08/14/20 0126    Specimen:  Blood Updated:  08/14/20 0143     Protime 16.0 Seconds      INR 1.52    Blood Culture - Blood, Arm, Right [[id number]] Collected:  08/11/20 0120    Specimen:  Blood from Arm, Right Updated:  08/14/20 0130     Blood Culture No growth at 3 days    Blood Culture - Blood, Arm, Left [[id number]] Collected:  08/11/20 0114    Specimen:  Blood from Arm, Left Updated:  08/14/20 0130     Blood Culture No growth at 3 days    Cholesterol, Body Fluid - Pleural Fluid, Pleural Cavity [[id number]] Collected:  08/11/20 1628     Specimen:  Pleural Fluid from Pleural Cavity Updated:  08/13/20 2306     Cholesterol, Fluid 55 mg/dL      Comment: INTERPRETIVE INFORMATION: Cholesterol, Body Fluid  For information on body fluid reference ranges and/or interpretive  guidance visit [email or web address]  Test developed and characteristics determined by [facility]. See Compliance Statement B: [email or web address]       Narrative:       Performed at:  01 - [facility]  [address] [place] [address]  [id number]  : Sreekanth Campbell MD, Phone:  [phone]    Potassium [[id number]]  (Normal) Collected:  08/13/20 2003    Specimen:  Blood Updated:  08/13/20 2041     Potassium 4.1 mmol/L         No results found for: HGBA1C  Results from last 7 days   Lab Units 08/14/20  0126 08/13/20  0755 08/12/20  0442   INR  1.52* 1.41* 1.71*       Results from last 7 days   Lab Units 08/11/20  0111   PH, ARTERIAL pH units 7.411   PO2 ART mm Hg 111.4*   PCO2, ARTERIAL mm Hg 45.4   HCO3 ART mmol/L 28.8*     No results found for: LIPASE  No results found for: CHOL, CHLPL, TRIG, HDL, LDL, LDLDIRECT    Lab Results   Lab Value Date/Time    FINALDX  08/11/2020 1622     Lung, pleural fluid, smears and cytospin preparation:    Benign mesothelial cells, degenerating mesothelial cells, histiocytes and mild acute inflammation    Abundant blood present    No evidence of malignancy      JPR/cec        FINALDX  05/21/2020 1443     Ulcer, rectum, biopsy:    Scant fragments of colonic mucosa with mild chronic inflammation and focal hyperplastic changes    No violette ulceration is identified    No chronic features or malignant changes identified    See comment    MICHAEL/sms       COMDX  05/21/2020 1443     Deeper levels were examined through the block but were not further contributory.     MICHAEL/sms          Microbiology Results (last 10 days)     Procedure Component Value - Date/Time    MRSA Screen, PCR (Inpatient) - Swab, Nares [[id number]]  (Normal)  Collected:  08/14/20 0908    Lab Status:  Final result Specimen:  Swab from Nares Updated:  08/14/20 1102     MRSA PCR No MRSA Detected    Body Fluid Culture - Body Fluid, Pleural Cavity [[id number]] Collected:  08/11/20 1622    Lab Status:  Preliminary result Specimen:  Body Fluid from Pleural Cavity Updated:  08/13/20 0817     Body Fluid Culture No growth at 2 days     Gram Stain Moderate (3+) WBCs seen      No organisms seen    Anaerobic Culture - Pleural Fluid, Pleural Cavity [[id number]] Collected:  08/11/20 1622    Lab Status:  Preliminary result Specimen:  Pleural Fluid from Pleural Cavity Updated:  08/13/20 0817     Anaerobic Culture No anaerobes isolated at 2 days    AFB Culture - Body Fluid, Pleural Cavity [[id number]] Collected:  08/11/20 1622    Lab Status:  Preliminary result Specimen:  Body Fluid from Pleural Cavity Updated:  08/12/20 1059     AFB Stain No acid fast bacilli seen    Fungus Smear - Body Fluid, Pleural Cavity [[id number]] Collected:  08/11/20 1622    Lab Status:  Final result Specimen:  Body Fluid from Pleural Cavity Updated:  08/12/20 1131     Fungal Stain No fungal elements seen    Urine Culture - Urine, Urine, Catheter [[id number]]  (Abnormal)  (Susceptibility) Collected:  08/11/20 0216    Lab Status:  Final result Specimen:  Urine, Catheter Updated:  08/13/20 0348     Urine Culture >100,000 CFU/mL Escherichia coli    Susceptibility      Escherichia coli     MOY     Ampicillin Susceptible     Ampicillin + Sulbactam Susceptible     Cefazolin Susceptible     Cefepime Susceptible     Ceftazidime Susceptible     Ceftriaxone Susceptible     Gentamicin Susceptible     Levofloxacin Susceptible     Nitrofurantoin Susceptible     Piperacillin + Tazobactam Susceptible     Tetracycline Susceptible     Trimethoprim + Sulfamethoxazole Susceptible                    Respiratory Panel PCR w/COVID-19(SARS-CoV-2) [facility] In-House, NP Swab in UTM/VTM, 3-4 HR TAT - Swab, Nasopharynx [[id number]]   (Normal) Collected:  08/11/20 0121    Lab Status:  Final result Specimen:  Swab from Nasopharynx Updated:  08/11/20 0325     ADENOVIRUS, PCR Not Detected     Coronavirus 229E Not Detected     Coronavirus HKU1 Not Detected     Coronavirus NL63 Not Detected     Coronavirus OC43 Not Detected     COVID19 Not Detected     Human Metapneumovirus Not Detected     Human Rhinovirus/Enterovirus Not Detected     Influenza A PCR Not Detected     Influenza A H1 Not Detected     Influenza A H1 2009 PCR Not Detected     Influenza A H3 Not Detected     Influenza B PCR Not Detected     Parainfluenza Virus 1 Not Detected     Parainfluenza Virus 2 Not Detected     Parainfluenza Virus 3 Not Detected     Parainfluenza Virus 4 Not Detected     RSV, PCR Not Detected     Bordetella pertussis pcr Not Detected     Bordetella parapertussis PCR Not Detected     Chlamydophila pneumoniae PCR Not Detected     Mycoplasma pneumo by PCR Not Detected    Narrative:       Fact sheet for providers: https://docs.[employer]/wp-content/uploads/VBM7933-9491-SR3.1-EUA-Provider-Fact-Sheet-3.pdf    Fact sheet for patients: https://docs.[employer]/wp-content/uploads/CTI9756-4442-YM7.1-EUA-Patient-Fact-Sheet-1.pdf    Blood Culture - Blood, Arm, Right [[id number]] Collected:  08/11/20 0120    Lab Status:  Preliminary result Specimen:  Blood from Arm, Right Updated:  08/14/20 0130     Blood Culture No growth at 3 days    Blood Culture - Blood, Arm, Left [[id number]] Collected:  08/11/20 0114    Lab Status:  Preliminary result Specimen:  Blood from Arm, Left Updated:  08/14/20 0130     Blood Culture No growth at 3 days          ECG/EMG Results (most recent)     Procedure Component Value Units Date/Time    ECG 12 Lead [[id number]] Collected:  08/11/20 0108     Updated:  08/11/20 0110    Narrative:       HEART RATE= 77  bpm  RR Interval= 781  ms  HI Interval=   ms  P Horizontal Axis=   deg  P Front Axis=   deg  QRSD Interval= 109  ms  QT Interval= 427  ms  QRS  Axis= 11  deg  T Wave Axis= -71  deg  - ABNORMAL ECG -  Atrial fibrillation  Low voltage, extremity leads  Repol abnrm suggests ischemia, diffuse leads  When compared with ECG of 13-Jun-2020 9:45:36,  Significant repolarization change  Significant axis, voltage or hypertrophy change  Electronically Signed By:   Date and Time of Study: 2020-08-11 01:08:56          Results for orders placed during the hospital encounter of 06/12/20   Duplex Venous Lower Extremity - Bilateral CAR    Narrative · Normal bilateral lower extremity venous duplex scan.  · Left popliteal fossa heterogenous fluid collection.          Results for orders placed during the hospital encounter of 05/20/20   Adult Transesophageal Echo (PRESTON) W/ Cont if Necessary Per Protocol    Narrative · Left ventricular systolic function is normal.  · Left atrial cavity size is mild-to-moderately dilated.  · Moderate mitral valve regurgitation is present  · Left atrial appendage diameter was 33 mm at 135 degree which was very   large and not conducive to implantation of watchman device. Depth was 28   mm. After discussion, it was decided that anatomy of left atrial appendage   is not conducive and appropriate for watchman device implantation.   Procedure was aborted          Ct Chest Without Contrast    Result Date: 8/12/2020  1. Patchy airspace disease in both lung bases, likely representing pneumonia. 2. Large right and small left pleural effusions. 3. Fluid within the esophagus. This could represent evidence of reflux. Consider aspiration in this context. 4. Cardiomegaly and atherosclerosis. 5. Age-indeterminate superior endplate fractures of T11, T12, L1, L3 and L4. 6. Additional chronic findings as above. Electronically signed by:  Gonzalo Lipscomb M.D.  8/12/2020 9:28 PM    Fl Video Swallow With Speech Single Contrast    Result Date: 8/12/2020  Negative for aspiration. Please see the speech report for additional detailed findings.  Electronically Signed  By-Jason Dan On:8/12/2020 2:27 PM This report was finalized on [id number] by  Jason Dan .    Xr Chest 1 View    Result Date: 8/14/2020  1. Stable cardiomegaly with findings which could relate to pulmonary edema or multifocal pneumonia. 2. Moderate right and small left pleural effusions with persistent bibasilar airspace disease.  Electronically Signed By-Jason Dan On:8/14/2020 7:35 AM This report was finalized on [id number] by  Jason Dan .    Xr Chest 1 View    Result Date: 8/12/2020  1. Stable cardiomegaly and pulmonary edema. 2. Persistent bibasilar airspace disease with small right pleural effusion. 3. No pneumothorax.  Electronically Signed By-Jason Mousejayashree On:8/12/2020 8:07 AM This report was finalized on [id number] by  aJson Dan .    Xr Chest 1 View    Result Date: 8/11/2020   1. No evidence of postthoracentesis pneumothorax. 2. Small amount of residual right pleural fluid with overlying infiltrate or atelectasis. 3. Cardiomegaly with pulmonary vascular congestion. 4. Diffuse perihilar and left basilar mixed interstitial and alveolar opacities suggesting pulmonary edema or atypical infection pattern. Early clinically.  Electronically Signed By-Chas Lopez On:8/11/2020 5:07 PM This report was finalized on [id number] by  Chas Lopez, .    Xr Chest 1 View    Result Date: 8/11/2020   1. Large infiltrate and effusion right lower lobe and right midlung zone has gotten worse since previous study. 2. Small infiltrate small pleural effusion inferior medial aspect left lower lobe lung unchanged. 3. Diffuse mild infiltrate throughout the right upper lobe and throughout the left lung unchanged.  Electronically Signed By-DR. Bob Mcgee MD On:8/11/2020 7:28 AM This report was finalized on [id number] by DR. Bob Mcgee MD.    Us Thoracentesis    Result Date: 8/11/2020  IMPRESSION : Ultrasound-guided diagnostic and therapeutic right thoracentesis procedure. 2050 cc of dark  bloody fluid obtained.  Electronically Signed By-Chas Lopez On:8/11/2020 5:03 PM This report was finalized on [id number] by  Chas Lopez, .          Xrays, labs reviewed personally by physician.    Medication Review:   I have reviewed the patient's current medication list      Scheduled Meds    baclofen 10 mg Oral Daily   carbidopa-levodopa 1.5 tablet Oral Q8H   enoxaparin 1 mg/kg Subcutaneous Q12H   escitalopram 10 mg Oral Daily   furosemide 40 mg Intravenous BID   gabapentin 300 mg Oral Nightly   [START ON 8/15/2020] levoFLOXacin 750 mg Oral Q24H   nystatin  Topical Q12H   pantoprazole 40 mg Oral QAM   potassium chloride 20 mEq Oral TID   pramipexole 0.25 mg Oral Q24H   rosuvastatin 10 mg Oral Nightly   sodium chloride 10 mL Intravenous Q12H   warfarin 2.5 mg Oral Once       Meds Infusions    hold 1 each   Pharmacy to dose warfarin    Pharmacy to dose warfarin        Meds PRN  •  acetaminophen **OR** acetaminophen **OR** acetaminophen  •  hold  •  ondansetron **OR** ondansetron  •  Pharmacy to dose warfarin  •  Pharmacy to dose warfarin  •  potassium chloride **OR** potassium chloride **OR** potassium chloride  •  sodium chloride  •  sodium chloride        Assessment/Plan   Assessment/Plan     Active Hospital Problems    Diagnosis  POA   • **Acute respiratory distress [R06.03]  Yes   • Atrial fibrillation, chronic [I48.20]  Yes     Priority: High   • Aortic stenosis [I35.0]  Yes     Priority: High   • GERD (gastroesophageal reflux disease) [K21.9]  Yes     Priority: Medium   • Hyperlipidemia [E78.5]  Yes     Priority: Medium   • Essential hypertension [I10]  Yes     Priority: Medium   • Bilateral leg edema [R60.0]  Yes     Priority: Medium   • Parkinson's disease (CMS/HCC) [G20]  Yes     Priority: Medium   • Pacemaker [Z95.0]  Yes     Priority: Medium   • Iron deficiency anemia due to chronic blood loss [D50.0]  Yes     Priority: Low   • Pneumonia of right lung due to infectious organism [J18.9]  Unknown   •  Weakness [R53.1]  Yes   • Pleural effusion, right [J90]  Yes   • Acute on chronic combined systolic and diastolic CHF (congestive heart failure) (CMS/HCC) [I50.43]  Yes   • Sleep apnea [G47.30]  Yes   • RLS (restless legs syndrome) [G25.81]  Yes   • Chronic pain [G89.29]  Yes   • Acute UTI (urinary tract infection) [N39.0]  Yes   • Overactive bladder [N32.81]  Yes   • Anxiety disorder [F41.9]  Yes   • 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       MEDICAL DECISION MAKING COMPLEXITY BY PROBLEM:   1.  Acute hypoxemic respiratory failure  -This is most likely secondary to the patient's large pleural effusion  -We will do a CT of the chest without contrast to see what was underlying the effusion       -The patient's CT scan done postthoracentesis continues to show large right pleural effusion.  There was also some fluid within the esophagus.  It was suggested that the patient may have aspirated, however the patient passed a video swallow study making this much less likely.            -Chest x-ray in the a.m.  -Continue with breathing treatments and oxygen therapy  -The patient is also on Lasix which will be continued    2.  Right-sided pneumonia  -This is a healthcare associated pneumonia and is being treated with cefepime and vancomycin intravenously pending cultures  -Pleural fluid was sent for both cytology as well as for culture of various types.    3.  Acute on chronic biventricular failure currently exacerbated  -Trend troponins  -Intravenous Lasix following BNP as well as electrolytes and renal function    4.  Right pleural effusion  -This has been drained and studies are pending.  There are no positive cultures at this time.  -Repeat chest x-ray in the a.m.  -Patient's effusion appears to be exudative of in nature.  It was grossly bloody with a high  protein count and high LDH.         -We will ask pulmonary medicine to consult to see if he needs a chest tube placed.  The pH on the pleural fluid was 7.0.  -We will start the patient's on Lasix 40 mg IV twice daily today and follow his BNP and clinical picture.    5.  Urinary tract infection with a gram-negative bacillus to be identified and sensitivities are pending  -Continue cefepime  -Discontinue Lee and start voiding trial.    6.  Generalized weakness  -Physical therapy consult for evaluation and recommendations    7.  BRONSON  -Hemoglobin is currently stable  -Patient was treated for an AVM on 5/21/2020 and had a colonoscopy and EGD at that time  -Continue iron supplementation    8.  Hypertension  -Well-controlled at present    9.  Hyperlipidemia  -Continue home statin therapy    10.  Parkinson's disease  -Continue Parkinson's medication (Sinemet)    11.  Gastroesophageal reflux disease  -Continue PPI    12.  Depression  -Continue patient's Lexapro    13.  Restless leg syndrome  -Continue Mirapex    14.  Overactive bladder  -Continue oxybutynin    15.  Chronic pain syndrome  -Continue Neurontin and baclofen    VTE Prophylaxis -   Mechanical Order History:     None      Pharmalogical Order History:     Ordered     Dose Route Frequency Stop    08/12/20 0958  warfarin (COUMADIN) tablet 2 mg     Question:  Target INR  Answer:  2 - 3    2 mg PO Once (Warfarin) --    08/12/20 0958  enoxaparin (LOVENOX) syringe 90 mg      1 mg/kg SC Every 12 Hours --    08/11/20 0621  warfarin (COUMADIN) tablet 2 mg  Status:  Discontinued     Question:  Target INR  Answer:  2 - 3    2 mg PO Daily Warfarin 08/11/20 0947    08/11/20 0438  enoxaparin (LOVENOX) syringe 40 mg  Status:  Discontinued      40 mg SC Every 24 Hours 08/11/20 0947    08/11/20 0621  Pharmacy to dose warfarin     Question:  Target INR  Answer:  2 - 3    -- XX Continuous PRN --        Code Status -   Code Status and Medical Interventions:   Ordered at: 08/11/20  0438     Code Status:    CPR     Medical Interventions (Level of Support Prior to Arrest):    Full     This patient has been examined wearing appropriate Personal Protective Equipment and discussed with hospital infection control department. 08/14/20    Discharge Planning  May need placement    Electronically signed by Kim Childs MD, 08/14/20, 15:44.  [facility] Hospitalist Team

## 2020-08-14 NOTE — PROGRESS NOTES
"Pharmacy dosing service  Anticoagulant  Warfarin     Subjective:    Rafael Ivey is a 84 y.o.male being continued on warfarin for atrial fibrillation.    INR Goal: 2 - 3  Home medication?: Yes, warfarin 2 mg PO every day at 1800, except Sunday  Bridge Therapy Present?:  Yes,  Lovenox 90 mg SQ Q12H  Additional Contributing Factors: acute illness      Assessment/Plan:    Due to subtherapeutic INR will give 2.5 mg today.     Continue to monitor and adjust based on INR.         Date 8/11 8/12 8/13 8/14        INR 1.72 1.71 1.41 1.52        Dose ------ 2 mg 3 mg 2.5 mg            Objective:  [Ht: 182.9 cm (72\"); Wt: 95.9 kg (211 lb 6.7 oz); BMI: Body mass index is 28.67 kg/m².]    Lab Results   Component Value Date    ALBUMIN 4.00 08/11/2020     Lab Results   Component Value Date    INR 1.52 (L) 08/14/2020    INR 1.41 (L) 08/13/2020    INR 1.71 (L) 08/12/2020    PROTIME 16.0 (L) 08/14/2020    PROTIME 15.1 (L) 08/13/2020    PROTIME 17.9 (L) 08/12/2020     Lab Results   Component Value Date    HGB 9.4 (L) 08/14/2020    HGB 9.5 (L) 08/13/2020    HGB 9.1 (L) 08/12/2020     Lab Results   Component Value Date    HCT 28.8 (L) 08/14/2020    HCT 29.5 (L) 08/13/2020    HCT 27.9 (L) 08/12/2020       Radha Clemons, [facility]  08/14/20 08:05           "

## 2020-08-14 NOTE — THERAPY TREATMENT NOTE
Patient Name: Rafael Ivey  : 1935    MRN: [id number]                              Today's Date: 2020       Admit Date: 2020    Visit Dx:     ICD-10-CM ICD-9-CM   1. Acute respiratory distress R06.03 518.82   2. Fever, unspecified fever cause R50.9 780.60   3. Pneumonia of right lung due to infectious organism, unspecified part of lung J18.9 483.8   4. Acute congestive heart failure, unspecified heart failure type (CMS/[facility]) I50.9 428.0     Patient Active Problem List   Diagnosis   • Sick sinus syndrome (CMS/[facility])   • Atrial fibrillation, chronic   • Pacemaker   • Bilateral leg edema   • Iron deficiency anemia due to chronic blood loss   • Warfarin-induced coagulopathy (CMS/[facility])   • Parkinson's disease (CMS/[facility])   • Aortic stenosis   • GERD (gastroesophageal reflux disease)   • Hyperlipidemia   • Essential hypertension   • Mitral regurgitation   • Nonrheumatic tricuspid valve disorder   • Pulmonary hypertension (CMS/[facility])   • Sleep apnea   • Valvular heart disease   • Hypotension   • Chronic diastolic CHF (congestive heart failure) (CMS/[facility])   • Anxiety disorder   • Overactive bladder   • Chronic pain   • RLS (restless legs syndrome)   • Obesity (BMI 30-39.9)   • Acute UTI (urinary tract infection)   • Hypokalemia   • Anasarca   • Acute on chronic combined systolic and diastolic CHF (congestive heart failure) (CMS/[facility])   • Depression   • Anemia   • Rectal ulceration   • Acute on chronic heart failure (CMS/[facility])   • Acute respiratory distress   • Pneumonia of right lung due to infectious organism   • Weakness   • Pleural effusion, right     Past Medical History:   Diagnosis Date   • Anemia    • Arthritis     left hip    • Atrial fibrillation (CMS/[facility])    • Atrial fibrillation (CMS/[facility])     chronic   • Cardiomegaly     mild   • Chronic diastolic heart failure (CMS/[facility])    • GERD (gastroesophageal reflux disease)    • Hyperlipidemia    • Hypertension    • Hypotension    • Lower extremity edema    •  Mitral regurgitation    • Obesity    • Pacemaker    • Parkinson disease (CMS/[facility])    • Recurrent falls     with injury    • Sick sinus syndrome (CMS/[facility])     s/p pacemaker implant    • Valvular heart disease     MR     Past Surgical History:   Procedure Laterality Date   • APPENDECTOMY     • ATRIAL APPENDAGE EXCLUSION LEFT WITH TRANSESOPHAGEAL ECHOCARDIOGRAM N/A 5/28/2020    Procedure: Atrial Appendage Occlusion;  Surgeon: Jim Walker MD;  Location: [facility] CATH INVASIVE LOCATION;  Service: Cardiovascular;  Laterality: N/A;   • ATRIAL APPENDAGE EXCLUSION LEFT WITH TRANSESOPHAGEAL ECHOCARDIOGRAM N/A 5/28/2020    Procedure: Atrial Appendage Occlusion;  Surgeon: Lashaun So MD;  Location: [facility] CATH INVASIVE LOCATION;  Service: Cardiovascular;  Laterality: N/A;   • CATARACT EXTRACTION     • CHOLECYSTECTOMY     • COLONOSCOPY N/A 5/21/2020    Procedure: COLONOSCOPY WITH BIOPSY X1 AREA;  Surgeon: Sim Collins MD;  Location: [facility] ENDOSCOPY;  Service: Gastroenterology;  Laterality: N/A;  Post: poor prep, impacted stool in rectum, and internal hemorrhoids, ascending colon arteriovenous malformation   • ENDOSCOPY N/A 5/21/2020    Procedure: ESOPHAGOGASTRODUODENOSCOPY with clipping x 1 of bleeding gastric polyp;  Surgeon: Sim Collins MD;  Location: [facility] ENDOSCOPY;  Service: Gastroenterology;  Laterality: N/A;  Post: bleeding gastric polyp   • HIP SURGERY Right 08/2017   • OTHER SURGICAL HISTORY      skin mole excisions    • PACEMAKER IMPLANTATION  06/22/2017    [employer] ([employer])   • TONSILLECTOMY     • TOTAL HIP ARTHROPLASTY Left 05/20/2014     General Information     Row Name 08/14/20 1526          PT Evaluation Time/Intention    Document Type  therapy note (daily note)  -     Mode of Treatment  physical therapy  -     Row Name 08/14/20 1526          Cognitive Assessment/Intervention- PT/OT    Orientation Status (Cognition)  oriented x 3  -     Row Name 08/14/20 1526          Safety  Issues, Functional Mobility    Impairments Affecting Function (Mobility)  endurance/activity tolerance;motor control;strength;postural/trunk control  -       User Key  (r) = Recorded By, (t) = Taken By, (c) = Cosigned By    Initials Name Provider Type     Allegra Mathews PTA Physical Therapy Assistant        Mobility     [facility] Name 08/14/20 1527          Bed Mobility Assessment/Treatment    Bed Mobility Assessment/Treatment  bed mobility (all) activities  -     [place] Level (Bed Mobility)  verbal cues;moderate assist (50% patient effort)  -     Assistive Device (Bed Mobility)  bed rails;head of bed elevated  -     Comment (Bed Mobility)  Pt. with increased struggling to eob this rx. Max A c sit/supine, dependent c BLEs into bed.  -[facility] Name 08/14/20 1527          Sit-Stand Transfer    Sit-Stand [place] (Transfers)  maximum assist (25% patient effort) x 2. Requiring BLEs blocked, struggling to bring cog over david.  -     Assistive Device (Sit-Stand Transfers)  walker, front-wheeled  -[facility] Name 08/14/20 1527          Gait/Stairs Assessment/Training    Gait/Stairs Assessment/Training  gait/ambulation assistive device  -     Distance in Feet (Gait)  3' towards hob.  -     Comment (Gait/Stairs)  Pt. leaning posterior, mod A to bring cog over david, able to side step, instability present, unsafe to amb. this rx. High falls risk.  -       User Key  (r) = Recorded By, (t) = Taken By, (c) = Cosigned By    Initials Name Provider Type     Allegra Mathews PTA Physical Therapy Assistant        Obj/Interventions     [facility] Name 08/14/20 1532          Static Sitting Balance    Level of [place] (Unsupported Sitting, Static Balance)  contact guard assist  -     Sitting Position (Unsupported Sitting, Static Balance)  sitting on edge of bed  -     Comment (Unsupported Sitting, Static Balance)  Vcs to flex B knees, leaning posterior.  -[facility] Name 08/14/20 1532          Dynamic Sitting  "Balance    Level of [place], Reaches Outside Midline (Sitting, Dynamic Balance)  unable to perform activity  [facility] 08/14/20 1532          Static Standing Balance    Level of [place] (Supported Standing, Static Balance)  minimal assist, 75% patient effort;1 person assist  -     Assistive Device Utilized (Supported Standing, Static Balance)  walker, rolling  -LH     Row Name 08/14/20 1532          Dynamic Standing Balance    Level of [place], Reaches Outside Midline (Standing, Dynamic Balance)  moderate assist, 50 to 74% patient effort;1 person assist  [facility]     Assistive Device Utilized (Supported Standing, Dynamic Balance)  walker, rolling  -       User Key  (r) = Recorded By, (t) = Taken By, (c) = Cosigned By    Initials Name Provider Type     Allegra Mathews PTA Physical Therapy Assistant        Goals/Plan    No documentation.       Clinical Impression     Row Name 08/14/20 1534          Pain Assessment    Additional Documentation  Pain Scale: Numbers Pre/Post-Treatment (Group)  -LH     Row Name 08/14/20 1533          Pain Scale: Numbers Pre/Post-Treatment    Pain Scale: Numbers, Pretreatment  0/10 - no pain  -     Pain Scale: Numbers, Post-Treatment  0/10 - no pain  -LH     Row Name 08/14/20 1533          Plan of Care Review    Plan of Care Reviewed With  patient  -     Progress  declining  -     Outcome Summary  Required mod/max A for safe, functional mobility to eob and to stand c rwx. Able to side step c mod A, required max A sit/supine. Pt. reports feeling \"light headed\" and weak. Leaning posterior, struggling bringing cog over david. Will progress as able, will need rehab at d/c to address deficts. PPE worn: N95, gloves, shield.  [facility] 08/14/20 1539          Vital Signs    O2 Delivery Pre Treatment  supplemental O2  -     O2 Delivery Intra Treatment  supplemental O2  -     O2 Delivery Post Treatment  supplemental O2  -     Pre Patient Position  Supine  -     " Intra Patient Position  Standing  -LH     Post Patient Position  Supine  -LH     Row Name 08/14/20 1534          Positioning and Restraints    Pre-Treatment Position  in bed  -LH     Post Treatment Position  bed  -LH     In Bed  notified nsg;fowlers;call light within reach;with other staff CNA at b/s cleaning urine soaked bed.  -       User Key  (r) = Recorded By, (t) = Taken By, (c) = Cosigned By    Initials Name Provider Type     Allegra Mathews PTA Physical Therapy Assistant        Outcome Measures     Row Name 08/14/20 1539          How much help from another person do you currently need...    Turning from your back to your side while in flat bed without using bedrails?  2  -LH     Moving from lying on back to sitting on the side of a flat bed without bedrails?  2  -LH     Moving to and from a bed to a chair (including a wheelchair)?  2  -LH     Standing up from a chair using your arms (e.g., wheelchair, bedside chair)?  2  -LH     Climbing 3-5 steps with a railing?  1  -LH     To walk in hospital room?  2  -LH     AM-PAC 6 Clicks Score (PT)  11  -       User Key  (r) = Recorded By, (t) = Taken By, (c) = Cosigned By    Initials Name Provider Type     Allegra Mathews PTA Physical Therapy Assistant        Physical Therapy Education                 Title: PT OT SLP Therapies (In Progress)     Topic: Physical Therapy (In Progress)     Point: Mobility training (In Progress)     Description:   Instruct learner(s) on safety and technique for assisting patient out of bed, chair or wheelchair.  Instruct in the proper use of assistive devices, such as walker, crutches, cane or brace.              Patient Friendly Description:   It's important to get you on your feet again, but we need to do so in a way that is safe for you. Falling has serious consequences, and your personal safety is the most important thing of all.        When it's time to get out of bed, one of us or a family member will sit next to you on the bed  "to give you support.     If your doctor or nurse tells you to use a walker, crutches, a cane, or a brace, be sure you use it every time you get out of bed, even if you think you don't need it.    Learning Progress Summary           Patient Acceptance, E, NR,NL by  at 8/14/2020 1539    Comment:  Needs reinforced safety c hand placement et rwx. use    Acceptance, E,D, DU,VU by  at 8/13/2020 1626    Comment:  Reinforced safe hand placement c rwx. use.    Acceptance, E,TB, VU,NR by  at 8/12/2020 1608                   Point: Precautions (In Progress)     Description:   Instruct learner(s) on prescribed precautions during mobility and gait tasks              Learning Progress Summary           Patient Acceptance, E, NR,NL by  at 8/14/2020 1539    Comment:  Needs reinforced safety c hand placement et rwx. use    Acceptance, E,D, DU,VU by  at 8/13/2020 1626    Comment:  Reinforced safe hand placement c rwx. use.    Acceptance, E,TB, VU,NR by  at 8/12/2020 1608                               User Key     Initials Effective Dates Name Provider Type Discipline     03/01/19 -  Deb White, PT Physical Therapist PT     03/01/19 -  Allegra Mathews PTA Physical Therapy Assistant PT              PT Recommendation and Plan     Outcome Summary/Treatment Plan (PT)  Anticipated Discharge Disposition (PT): inpatient rehabilitation facility  Plan of Care Reviewed With: patient  Progress: declining  Outcome Summary: Required mod/max A for safe, functional mobility to eob and to stand c rwx. Able to side step c mod A, required max A sit/supine. Pt. reports feeling \"light headed\" and weak. Leaning posterior, struggling bringing cog over david. Will progress as able, will need rehab at d/c to address deficts. PPE worn: N95, gloves, shield.     Time Calculation:   PT Charges     Row Name 08/14/20 1545             Time Calculation    Start Time  0200  -      Stop Time  0225  -      Time Calculation (min)  25 min  -      " PT Received On  08/14/20  -      PT - Next Appointment  08/16/20  -         Time Calculation- PT    Total Timed Code Minutes- PT  25 minute(s)  -         Timed Charges    10846 - PT Therapeutic Activity Minutes  25  -        User Key  (r) = Recorded By, (t) = Taken By, (c) = Cosigned By    Initials Name Provider Type     Allegra Mathews PTA Physical Therapy Assistant        Therapy Charges for Today     Code Description Service Date Service Provider Modifiers Qty    43224057639 HC GAIT TRAINING EA 15 MIN 8/13/2020 Allegra Mathews PTA GP 1    33708615549 HC PT THERAPEUTIC ACT EA 15 MIN 8/13/2020 Allegra Mathews PTA GP 2    83025678821 HC PT THERAPEUTIC ACT EA 15 MIN 8/14/2020 Allegra Mathews PTA GP 2          PT G-Codes  Outcome Measure Options: AM-PAC 6 Clicks Basic Mobility (PT)  AM-PAC 6 Clicks Score (PT): 11    Allegra Mathews PTA  8/14/2020

## 2020-08-14 NOTE — CONSULTS
PULMONARY/ CRITICAL CARE/ SLEEP MEDICINE CONSULT NOTE        Patient Name:  Rfaael Ivey    :  1935    Medical Record:  [id number]    REQUESTING PHYSICIAN    Provider, No Known    PRIMARY CARE PHYSICIAN     Blake Ivey MD    REASON FOR CONSULTATION    Rafael Ivey is a 84 y.o. male who was referred for consultation for pleural effusion.  Patient was initially admitted to the hospital on 2020 for further evaluation of increased shortness of breath and cough.  Patient has been feeling weak at home.  Also has been having some lower extremity edema.  Patient was admitted to the hospital.  He was treated with antibiotics.  Also was noted to have pleural effusion.  He underwent thoracentesis on 2020 and  of bloody fluid was aspirated.  Patient remains on supplemental oxygen at 4.5 L nasal cannula.  He had a repeat chest x-ray today and was noted to have moderate right and small left pleural effusion with persistent basilar airspace disease.  I have been asked to see him for further evaluation.  At time of my evaluation patient is awake.  He does complain of some shortness of breath.  He has minimal cough.  He denies any complaints of chest pain.  Denies any nausea or vomiting.  Denies any fever or chills    REVIEW OF SYSTEMS    Constitutional:  Denies fever or chills   Eyes:  Denies change in visual acuity   HENT:  Denies nasal congestion or sore throat   Respiratory:  + cough + shortness of breath   Cardiovascular:  Denies chest pain + edema   GI:  Denies abdominal pain, nausea, vomiting, bloody stools or diarrhea   :  Denies dysuria   Musculoskeletal:  Denies back pain or joint pain   Integument:  Denies rash   Neurologic:  Denies headache, focal weakness or sensory changes   Endocrine:  Denies polyuria or polydipsia   Lymphatic:  Denies swollen glands   Psychiatric:  Denies depression or anxiety     MEDICAL HISTORY    Past Medical History:   Diagnosis Date   • Anemia    • Arthritis      left hip    • Atrial fibrillation (CMS/HCC)    • Atrial fibrillation (CMS/HCC)     chronic   • Cardiomegaly     mild   • Chronic diastolic heart failure (CMS/HCC)    • GERD (gastroesophageal reflux disease)    • Hyperlipidemia    • Hypertension    • Hypotension    • Lower extremity edema    • Mitral regurgitation    • Obesity    • Pacemaker    • Parkinson disease (CMS/HCC)    • Recurrent falls     with injury    • Sick sinus syndrome (CMS/HCC)     s/p pacemaker implant    • Valvular heart disease     MR        SURGICAL HISTORY    Past Surgical History:   Procedure Laterality Date   • APPENDECTOMY     • ATRIAL APPENDAGE EXCLUSION LEFT WITH TRANSESOPHAGEAL ECHOCARDIOGRAM N/A 5/28/2020    Procedure: Atrial Appendage Occlusion;  Surgeon: Jim Walker MD;  Location: [facility] CATH INVASIVE LOCATION;  Service: Cardiovascular;  Laterality: N/A;   • ATRIAL APPENDAGE EXCLUSION LEFT WITH TRANSESOPHAGEAL ECHOCARDIOGRAM N/A 5/28/2020    Procedure: Atrial Appendage Occlusion;  Surgeon: Lashaun So MD;  Location: [facility] CATH INVASIVE LOCATION;  Service: Cardiovascular;  Laterality: N/A;   • CATARACT EXTRACTION     • CHOLECYSTECTOMY     • COLONOSCOPY N/A 5/21/2020    Procedure: COLONOSCOPY WITH BIOPSY X1 AREA;  Surgeon: Sim Collins MD;  Location: [facility] ENDOSCOPY;  Service: Gastroenterology;  Laterality: N/A;  Post: poor prep, impacted stool in rectum, and internal hemorrhoids, ascending colon arteriovenous malformation   • ENDOSCOPY N/A 5/21/2020    Procedure: ESOPHAGOGASTRODUODENOSCOPY with clipping x 1 of bleeding gastric polyp;  Surgeon: Sim Collins MD;  Location: [facility] ENDOSCOPY;  Service: Gastroenterology;  Laterality: N/A;  Post: bleeding gastric polyp   • HIP SURGERY Right 08/2017   • OTHER SURGICAL HISTORY      skin mole excisions    • PACEMAKER IMPLANTATION  06/22/2017    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[employer])   • TONSILLECTOMY     • TOTAL HIP ARTHROPLASTY Left 05/20/2014        FAMILY HISTORY    Family  History   Problem Relation Age of Onset   • Heart failure Mother    • Stroke Maternal Grandfather        SOCIAL HISTORY    Social History     Tobacco Use   • Smoking status: Former Smoker     Packs/day: 1.00     Years: 40.00     Pack years: 40.00     Types: Cigarettes   • Smokeless tobacco: Never Used   • Tobacco comment: quit     Substance Use Topics   • Alcohol use: Yes     Frequency: Never        ALLERGIES    Allergies   Allergen Reactions   • Amoxicillin Diarrhea   • Cephalexin Diarrhea and Nausea And Vomiting   • Penicillins Other (See Comments)   • Amoxicillin-Pot Clavulanate Nausea And Vomiting   • Clarithromycin Nausea And Vomiting       MEDICATIONS    Scheduled Meds:  baclofen 10 mg Oral Daily   carbidopa-levodopa 1.5 tablet Oral Q8H   enoxaparin 1 mg/kg Subcutaneous Q12H   escitalopram 10 mg Oral Daily   furosemide 40 mg Intravenous BID   gabapentin 300 mg Oral Nightly   [START ON 8/15/2020] levoFLOXacin 750 mg Oral Q24H   nystatin  Topical Q12H   pantoprazole 40 mg Oral QAM   potassium chloride 20 mEq Oral TID   pramipexole 0.25 mg Oral Q24H   rosuvastatin 10 mg Oral Nightly   sodium chloride 10 mL Intravenous Q12H   warfarin 2.5 mg Oral Once     Continuous Infusions:  hold 1 each   Pharmacy to dose warfarin    Pharmacy to dose warfarin      PRN Meds:.•  acetaminophen **OR** acetaminophen **OR** acetaminophen  •  hold  •  ondansetron **OR** ondansetron  •  Pharmacy to dose warfarin  •  Pharmacy to dose warfarin  •  potassium chloride **OR** potassium chloride **OR** potassium chloride  •  sodium chloride  •  sodium chloride      PHYSICAL EXAM    tMax 24 hrs:  Temp (24hrs), Av.3 °F (36.8 °C), Min:98.1 °F (36.7 °C), Max:98.6 °F (37 °C)    Vitals Ranges:  Temp:  [98.1 °F (36.7 °C)-98.6 °F (37 °C)] 98.6 °F (37 °C)  Heart Rate:  [70-71] 71  Resp:  [16-20] 16  BP: (116-145)/(57-75) 118/60  Intake and Output Last 3 Shifts:  I/O last 3 completed shifts:  In: 940 [P.O.:840; IV Piggyback:100]  Out: 1800  [Urine:1800]    Constitutional: Awake, no acute distress, non-toxic appearance   Eyes:  conjunctiva normal   HENT:  Atraumatic, external ears normal, nose normal, oropharynx moist, no pharyngeal exudates.   Neck- normal range of motion, no tenderness, supple   Respiratory: Decreased air entry right base with bibasilar crackles  Cardiovascular:  Normal rate, normal rhythm, no murmurs, no gallops, no rubs   GI:  Soft, nondistended, normal bowel sounds, nontender, no organomegaly, no mass, no rebound, no guarding   :  No costovertebral angle tenderness   Musculoskeletal:  No edema, no tenderness, no deformities. Back- no tenderness  Integument:  Well hydrated, no rash   Lymphatic:  No lymphadenopathy noted   Neurologic:  Alert & oriented x 3, CN 2-12 normal, normal motor function, normal sensory function, no focal deficits noted   Psychiatric:  Speech and behavior appropriate     LABS    Lab Results (last 24 hours)     Procedure Component Value Units Date/Time    MRSA Screen, PCR (Inpatient) - Swab, Nares [[id number]]  (Normal) Collected:  08/14/20 0908    Specimen:  Swab from Nares Updated:  08/14/20 1102     MRSA PCR No MRSA Detected    Scan Slide [[id number]] Collected:  08/14/20 0126    Specimen:  Blood Updated:  08/14/20 0224     Scan Slide --     Comment: See Manual Differential Results       CBC & Differential [[id number]] Collected:  08/14/20 0126    Specimen:  Blood Updated:  08/14/20 0224    Narrative:       The following orders were created for panel order CBC & Differential.  Procedure                               Abnormality         Status                     ---------                               -----------         ------                     CBC Auto Differential[[id number]]        Abnormal            Final result                 Please view results for these tests on the individual orders.    CBC Auto Differential [[id number]]  (Abnormal) Collected:  08/14/20 0126    Specimen:  Blood Updated:  08/14/20  0224     WBC 4.90 10*3/mm3      RBC 3.33 10*6/mm3      Hemoglobin 9.4 g/dL      Hematocrit 28.8 %      MCV 86.4 fL      MCH 28.1 pg      MCHC 32.6 g/dL      RDW 15.5 %      RDW-SD 48.6 fl      MPV 6.0 fL      Platelets 281 10*3/mm3     Manual Differential [[id number]]  (Abnormal) Collected:  08/14/20 0126    Specimen:  Blood Updated:  08/14/20 0224     Neutrophil % 82.0 %      Lymphocyte % 6.0 %      Monocyte % 7.0 %      Eosinophil % 3.0 %      Bands %  2.0 %      Neutrophils Absolute 4.12 10*3/mm3      Lymphocytes Absolute 0.29 10*3/mm3      Monocytes Absolute 0.34 10*3/mm3      Eosinophils Absolute 0.15 10*3/mm3      Anisocytosis Slight/1+     Polychromasia Slight/1+     WBC Morphology Normal     Platelet Morphology Normal    BUN [[id number]]  (Normal) Collected:  08/14/20 0126    Specimen:  Blood Updated:  08/14/20 0218     BUN 19 mg/dL     Vancomycin, Trough [[id number]]  (Normal) Collected:  08/14/20 0126    Specimen:  Blood Updated:  08/14/20 0218     Vancomycin Trough 11.10 mcg/mL     Basic Metabolic Panel [[id number]]  (Abnormal) Collected:  08/14/20 0126    Specimen:  Blood Updated:  08/14/20 0215     Glucose 114 mg/dL      BUN --     Comment: Testing performed by alternate method        Creatinine 0.88 mg/dL      Sodium 141 mmol/L      Potassium 3.5 mmol/L      Chloride 104 mmol/L      CO2 25.0 mmol/L      Calcium 8.1 mg/dL      eGFR Non African Amer 83 mL/min/1.73      BUN/Creatinine Ratio --     Comment: Testing not performed        Anion Gap 12.0 mmol/L     Narrative:       GFR Normal >60  Chronic Kidney Disease <60  Kidney Failure <15      BNP [[id number]]  (Abnormal) Collected:  08/14/20 0126    Specimen:  Blood Updated:  08/14/20 0212     proBNP 2,742.0 pg/mL     Narrative:       Among patients with dyspnea, NT-proBNP is highly sensitive for the detection of acute congestive heart failure. In addition NT-proBNP of <300 pg/ml effectively rules out acute congestive heart failure with 99% negative  predictive value.    Results may be falsely decreased if patient taking Biotin.      Protime-INR [[id number]]  (Abnormal) Collected:  08/14/20 0126    Specimen:  Blood Updated:  08/14/20 0143     Protime 16.0 Seconds      INR 1.52    Blood Culture - Blood, Arm, Right [[id number]] Collected:  08/11/20 0120    Specimen:  Blood from Arm, Right Updated:  08/14/20 0130     Blood Culture No growth at 3 days    Blood Culture - Blood, Arm, Left [[id number]] Collected:  08/11/20 0114    Specimen:  Blood from Arm, Left Updated:  08/14/20 0130     Blood Culture No growth at 3 days    Cholesterol, Body Fluid - Pleural Fluid, Pleural Cavity [[id number]] Collected:  08/11/20 1622    Specimen:  Pleural Fluid from Pleural Cavity Updated:  08/13/20 2306     Cholesterol, Fluid 55 mg/dL      Comment: INTERPRETIVE INFORMATION: Cholesterol, Body Fluid  For information on body fluid reference ranges and/or interpretive  guidance visit [email or web address]/bodyfluids/  Test developed and characteristics determined by [facility]. See Compliance Statement B: [employer]/CS       Narrative:       Performed at:  01 - [facility]  [address] [place] [address]  [id number]  : Sreekanth Campbell MD, Phone:  [phone]    Potassium [[id number]]  (Normal) Collected:  08/13/20 2003    Specimen:  Blood Updated:  08/13/20 2041     Potassium 4.1 mmol/L          Microbiology Results (last 10 days)     Procedure Component Value - Date/Time    MRSA Screen, PCR (Inpatient) - Swab, Nares [[id number]]  (Normal) Collected:  08/14/20 0908    Lab Status:  Final result Specimen:  Swab from Nares Updated:  08/14/20 1102     MRSA PCR No MRSA Detected    Body Fluid Culture - Body Fluid, Pleural Cavity [[id number]] Collected:  08/11/20 1622    Lab Status:  Preliminary result Specimen:  Body Fluid from Pleural Cavity Updated:  08/13/20 0817     Body Fluid Culture No growth at 2 days     Gram Stain Moderate (3+) WBCs seen      No organisms seen     Anaerobic Culture - Pleural Fluid, Pleural Cavity [[id number]] Collected:  08/11/20 1622    Lab Status:  Preliminary result Specimen:  Pleural Fluid from Pleural Cavity Updated:  08/13/20 0817     Anaerobic Culture No anaerobes isolated at 2 days    AFB Culture - Body Fluid, Pleural Cavity [[id number]] Collected:  08/11/20 1622    Lab Status:  Preliminary result Specimen:  Body Fluid from Pleural Cavity Updated:  08/12/20 1059     AFB Stain No acid fast bacilli seen    Fungus Smear - Body Fluid, Pleural Cavity [[id number]] Collected:  08/11/20 1622    Lab Status:  Final result Specimen:  Body Fluid from Pleural Cavity Updated:  08/12/20 1131     Fungal Stain No fungal elements seen    Urine Culture - Urine, Urine, Catheter [[id number]]  (Abnormal)  (Susceptibility) Collected:  08/11/20 0216    Lab Status:  Final result Specimen:  Urine, Catheter Updated:  08/13/20 0348     Urine Culture >100,000 CFU/mL Escherichia coli    Susceptibility      Escherichia coli     MOY     Ampicillin Susceptible     Ampicillin + Sulbactam Susceptible     Cefazolin Susceptible     Cefepime Susceptible     Ceftazidime Susceptible     Ceftriaxone Susceptible     Gentamicin Susceptible     Levofloxacin Susceptible     Nitrofurantoin Susceptible     Piperacillin + Tazobactam Susceptible     Tetracycline Susceptible     Trimethoprim + Sulfamethoxazole Susceptible                    Respiratory Panel PCR w/COVID-19(SARS-CoV-2) [facility] In-House, NP Swab in UTM/VTM, 3-4 HR TAT - Swab, Nasopharynx [[id number]]  (Normal) Collected:  08/11/20 0121    Lab Status:  Final result Specimen:  Swab from Nasopharynx Updated:  08/11/20 0325     ADENOVIRUS, PCR Not Detected     Coronavirus 229E Not Detected     Coronavirus HKU1 Not Detected     Coronavirus NL63 Not Detected     Coronavirus OC43 Not Detected     COVID19 Not Detected     Human Metapneumovirus Not Detected     Human Rhinovirus/Enterovirus Not Detected     Influenza A PCR Not Detected      Influenza A H1 Not Detected     Influenza A H1 2009 PCR Not Detected     Influenza A H3 Not Detected     Influenza B PCR Not Detected     Parainfluenza Virus 1 Not Detected     Parainfluenza Virus 2 Not Detected     Parainfluenza Virus 3 Not Detected     Parainfluenza Virus 4 Not Detected     RSV, PCR Not Detected     Bordetella pertussis pcr Not Detected     Bordetella parapertussis PCR Not Detected     Chlamydophila pneumoniae PCR Not Detected     Mycoplasma pneumo by PCR Not Detected    Narrative:       Fact sheet for providers: https://docs.[employer]ee[employer].by/wp-content/uploads/LAR0142-1752-PV1.1-EUA-Provider-Fact-Sheet-3.pdf    Fact sheet for patients: https://docs.[employer]ee[employer].by/wp-content/uploads/ILI4176-5948-GW7.1-EUA-Patient-Fact-Sheet-1.pdf    Blood Culture - Blood, Arm, Right [[id number]] Collected:  08/11/20 0120    Lab Status:  Preliminary result Specimen:  Blood from Arm, Right Updated:  08/14/20 0130     Blood Culture No growth at 3 days    Blood Culture - Blood, Arm, Left [[id number]] Collected:  08/11/20 0114    Lab Status:  Preliminary result Specimen:  Blood from Arm, Left Updated:  08/14/20 0130     Blood Culture No growth at 3 days         CBC  Results from last 7 days   Lab Units 08/14/20 0126 08/13/20 0755 08/12/20  0442 08/11/20  0114   WBC 10*3/mm3 4.90 5.20 7.10 13.30*   RBC 10*6/mm3 3.33* 3.37* 3.26* 3.79*   HEMOGLOBIN g/dL 9.4* 9.5* 9.1* 10.5*   HEMATOCRIT % 28.8* 29.5* 27.9* 32.9*   MCV fL 86.4 87.5 85.7 86.7   PLATELETS 10*3/mm3 281 284 259 373       BMP  Results from last 7 days   Lab Units 08/14/20  0126 08/13/20 2003 08/13/20  0755 08/12/20  0442 08/11/20  0114   SODIUM mmol/L 141  --  144 143 140   POTASSIUM mmol/L 3.5 4.1 3.8 3.3* 3.4*   CHLORIDE mmol/L 104  --  109* 106 101   CO2 mmol/L 25.0  --  28.0 27.0 26.0   BUN  19  --  22 20 19   CREATININE mg/dL 0.88  --  0.89 0.95 1.13   GLUCOSE mg/dL 114*  --  117* 94 107*       CMP Results from last 7 days   Lab Units 08/14/20  012  08/13/20 2003 08/13/20 0755 08/12/20 0442 08/11/20  0114   SODIUM mmol/L 141  --  144 143 140   POTASSIUM mmol/L 3.5 4.1 3.8 3.3* 3.4*   CHLORIDE mmol/L 104  --  109* 106 101   CO2 mmol/L 25.0  --  28.0 27.0 26.0   BUN  19  --  22 20 19   CREATININE mg/dL 0.88  --  0.89 0.95 1.13   GLUCOSE mg/dL 114*  --  117* 94 107*   ALBUMIN g/dL  --   --   --   --  4.00   BILIRUBIN mg/dL  --   --   --   --  1.2   ALK PHOS U/L  --   --   --   --  188*   AST (SGOT) U/L  --   --   --   --  35   ALT (SGPT) U/L  --   --   --   --  8       TROPONIN  Results from last 7 days   Lab Units 08/12/20  0010   TROPONIN T ng/mL 0.050*       CoAg  Results from last 7 days   Lab Units 08/14/20 0126 08/13/20 0755 08/12/20 0442 08/11/20  0826 08/11/20  0114   INR  1.52* 1.41* 1.71* 1.80* 1.72*       Creatinine Clearance  Estimated Creatinine Clearance: 75 mL/min (by C-G formula based on SCr of 0.88 mg/dL).    ABG  Results from last 7 days   Lab Units 08/11/20  0111   PH, ARTERIAL pH units 7.411   PCO2, ARTERIAL mm Hg 45.4   PO2 ART mm Hg 111.4*   O2 SATURATION ART % 98.4*   BASE EXCESS ART mmol/L 3.7*     IMAGING & OTHER STUDIES    Imaging Results (Last 72 Hours)     Procedure Component Value Units Date/Time    XR Chest 1 View [[id number]] Collected:  08/14/20 0733     Updated:  08/14/20 0737    Narrative:          DATE OF EXAM:   8/14/2020 5:28 AM     PROCEDURE:   XR CHEST 1 VW-     INDICATIONS:   Follow-up pleural effusion; R06.03-Acute respiratory distress;  R50.9-Fever, unspecified; J18.9-Pneumonia, unspecified organism;  I50.9-Heart failure, unspecified     COMPARISON:  8/12/2020     TECHNIQUE:   Portable chest radiograph.     FINDINGS:    Left-sided AICD noted. Stable cardiomegaly. Central pulmonary vascular  congestion and findings of pulmonary edema versus multifocal pneumonia  are again noted. There is persistent bibasilar airspace disease.  Moderate right and small left pleural effusions. No pneumothorax.       Impression:       1.  Stable cardiomegaly with findings which could relate to pulmonary  edema or multifocal pneumonia.   2. Moderate right and small left pleural effusions with persistent  bibasilar airspace disease.     Electronically Signed By-Jason Dan On:8/14/2020 7:35 AM  This report was finalized on [id number] by  Jason Dan, .    CT Chest Without Contrast [[id number]] Collected:  08/12/20 2122     Updated:  08/12/20 2329    Narrative:       EXAM: CT SCAN OF THE CHEST WITHOUT CONTRAST    INDICATION: Pleural effusion    TECHNIQUE: CT scan through the chest was performed without the administration of intravenous contrast. CT dose lowering techniques were used, to include: automated exposure control, adjustment for patient size, and / or use of iterative reconstruction.      COMPARISON: None    FINDINGS:  Vasculature: There is no thoracic aortic aneurysm.  There are atherosclerotic calcifications of the thoracic aorta, great vessels and coronary arteries. The heart is enlarged.    Mediastinum / Pleura: A large right and small left pleural effusion are present. There is no mediastinal, hilar or axillary lymphadenopathy. Fluid is present within the esophagus.    Airways / Lungs: The central airways are patent.  There is no pneumothorax. There are patchy regions of consolidative airspace disease in both lung bases. Assessment of fine pulmonary detail is suboptimal due to respiratory motion.    Bones: No destructive osseous lesion is identified. Multiple healed rib fractures are present. There is a remote, healed manubrial fracture as well. There are age-indeterminate fractures of the superior endplate of T11, T12, L1, L3 and L4.    Upper Abdomen: Limited images below the diaphragm demonstrate no acute abnormality.      Impression:         1. Patchy airspace disease in both lung bases, likely representing pneumonia.  2. Large right and small left pleural effusions.  3. Fluid within the esophagus. This could represent evidence of  reflux. Consider aspiration in this context.  4. Cardiomegaly and atherosclerosis.  5. Age-indeterminate superior endplate fractures of T11, T12, L1, L3 and L4.  6. Additional chronic findings as above.    Electronically signed by:  Gonzalo Lipscomb M.D.    8/12/2020 9:28 PM    FL Video Swallow With Speech Single Contrast [[id number]] Collected:  08/12/20 1427     Updated:  08/12/20 1430    Narrative:       DATE OF EXAM:  8/12/2020 11:03 AM     PROCEDURE:  FL VIDEO SWALLOW W SPEECH SINGLE-CONTRAST-     INDICATIONS:  dysphagia; R06.03-Acute respiratory distress; R50.9-Fever, unspecified;  J18.9-Pneumonia, unspecified organism; I50.9-Heart failure, unspecified     COMPARISON:  No Comparisons Available     TECHNIQUE:   This examination was performed in conjunction with speech pathology.  Lateral video fluoroscopic evaluation of the swallowing mechanism was  performed while correlate administering to the patient various  consistency food items mixed with barium.     Fluoroscopic Time:   1.4 minutes     FINDINGS:  Patient was administered multiple consistencies. There is no aspiration  during the exam.        Impression:       Negative for aspiration. Please see the speech report for additional  detailed findings.     Electronically Signed By-Jason Dan On:8/12/2020 2:27 PM  This report was finalized on [id number] by  Jason Dan, .    XR Chest 1 View [[id number]] Collected:  08/12/20 0805     Updated:  08/12/20 0809    Narrative:          DATE OF EXAM:   8/12/2020 6:00 AM     PROCEDURE:   XR CHEST 1 VW-     INDICATIONS:   follow up pleural effusion and pneumonia; R06.03-Acute respiratory  distress; R50.9-Fever, unspecified; J18.9-Pneumonia, unspecified  organism; I50.9-Heart failure, unspecified     COMPARISON:  11/20/2020     TECHNIQUE:   Portable chest radiograph.     FINDINGS:    Left-sided AICD noted. Stable cardiomegaly. Cardiomegaly and findings  of pulmonary edema again noted with persistent bibasilar  airspace  disease. Small right pleural effusion. No pneumothorax. Fractures noted  at the right anterior sixth and seventh ribs.       Impression:       1. Stable cardiomegaly and pulmonary edema.  2. Persistent bibasilar airspace disease with small right pleural  effusion.  3. No pneumothorax.     Electronically Signed By-Jason Dan On:8/12/2020 8:07 AM  This report was finalized on [id number] by  Jason Dan, .    XR Chest 1 View [[id number]] Collected:  08/11/20 1705     Updated:  08/11/20 1710    Narrative:       DATE OF EXAM: 08/11/2020. 5:11 PM     PROCEDURE:  XR CHEST 1 VW-     INDICATIONS:  Post R thoracentesis; R06.03-Acute respiratory distress; R50.9-Fever,  unspecified; J18.9-Pneumonia, unspecified organism; I50.9-Heart failure,  unspecified     COMPARISON:  08/11/2020 at 0129 hours     TECHNIQUE:   Single radiographic view of the chest was obtained.     FINDINGS:  Left-sided pacer device appears unchanged. There is no evidence of  postthoracentesis pneumothorax. There is been a significant decrease in  right pleural fluid status post thoracentesis. There is a small amount  of residual fluid in the base with overlying consolidation of the right  basilar lung parenchyma suggesting residual compressive atelectasis or  infiltrate. Mixed interstitial and alveolar opacities are noted  throughout the perihilar regions bilaterally and in the left base. This  suggests pulmonary edema or multifocal pneumonia. Cardiomegaly is  present. Pulmonary vascular congestion is also suspected. There is a  probable trace left effusion.       Impression:          1. No evidence of postthoracentesis pneumothorax.  2. Small amount of residual right pleural fluid with overlying  infiltrate or atelectasis.  3. Cardiomegaly with pulmonary vascular congestion.  4. Diffuse perihilar and left basilar mixed interstitial and alveolar  opacities suggesting pulmonary edema or atypical infection pattern.  Early clinically.      Electronically Signed ByAngela Lopez On:8/11/2020 5:07 PM  This report was finalized on [id number] by  Chas Lopez, .    US Thoracentesis [[id number]] Collected:  08/11/20 1701    Specimen:  Body Fluid Updated:  08/11/20 1705    Narrative:          DATE OF EXAM:   8/11/2020 3:54 PM     PROCEDURE:   US THORACENTESIS-     INDICATIONS:   large pleural effusion and infiltrate; R06.03-Acute respiratory  distress; R50.9-Fever, unspecified; J18.9-Pneumonia, unspecified  organism; I50.9-Heart failure, unspecified     COMPARISON:  No Comparisons Available     Description procedure: Informed consent was obtained. Patient's  medications were reviewed. The patient was placed in upright seated  position and ultrasound evaluation performed over the right posterior  thorax was demonstrates a large amount of pleural fluid. Area overlying  the posterior sulcus was marked and the area prepped and draped  sterilely. Lidocaine was used for local anesthesia. Access was obtained  in and over the rib fashion using a 4 Bulgarian Yueh needle. 2050 cc of  dark bloody fluid were then aspirated. Fluid samples were sent for all  requested studies. The patient tolerated the procedure well with no  immediate post procedure complication. A sterile dressing was applied.          Impression:       IMPRESSION :   Ultrasound-guided diagnostic and therapeutic right thoracentesis  procedure. 2050 cc of dark bloody fluid obtained.     Electronically Signed ByAngela Lopez On:8/11/2020 5:03 PM  This report was finalized on [id number] by  Chas Lopez, .          ASSESSMENT      Acute respiratory distress    Atrial fibrillation, chronic    Pacemaker    Bilateral leg edema    Iron deficiency anemia due to chronic blood loss    Parkinson's disease (CMS/HCC)    Aortic stenosis    GERD (gastroesophageal reflux disease)    Hyperlipidemia    Essential hypertension    Mitral regurgitation    Nonrheumatic tricuspid valve disorder    Pulmonary hypertension  (CMS/[facility])    Sleep apnea    Anxiety disorder    Overactive bladder    Chronic pain    RLS (restless legs syndrome)    Acute UTI (urinary tract infection)    Acute on chronic combined systolic and diastolic CHF (congestive heart failure) (CMS/[facility])    Pneumonia of right lung due to infectious organism    Weakness    Pleural effusion, right  Acute hypoxemic respiratory failure  Exudative pleural effusion  Bilateral pneumonia  Elevated BNP.  Acute on chronic diastolic congestive heart failure  Pulmonary hypertension.  RVSP 57 mmHg  Valvular heart disease  Urinary tract infection  PLAN    -I reviewed the pleural fluid studies.  Bloody tap.  Exudative per protein and cholesterol criteria.  Likely a combination of parapneumonic and component of congestive heart failure and fluid overload.  -Cultures and cytology is negative  -She remains on antibiotic therapy with Levaquin.  Vancomycin has also been given during this admission.  Cultures remain negative.  -Recommend to continue efforts at diuresis.  -We will wean supplemental oxygen as appropriate.  Evaluate need for supplemental oxygen at discharge.  Patient was not on any oxygen at home.  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  Continue to monitor.  -He may need as needed thoracentesis for respiratory distress.  -Anticoagulation per cardiology  I thank you for this opportunity to take part in this patient's care and will follow the patient along with you.  This document has been electronically signed by  Tristan Cloud MD  3:02 PM

## 2020-08-14 NOTE — PLAN OF CARE
Voiding without difficulty since miller removed. Is incontinent of urine and bowel. On 5l o2 by NC. VSS will monitor.   Problem: Fall Risk (Adult)  Goal: Identify Related Risk Factors and Signs and Symptoms  Outcome: Ongoing (interventions implemented as appropriate)  Goal: Absence of Fall  Outcome: Ongoing (interventions implemented as appropriate)     Problem: Patient Care Overview  Goal: Plan of Care Review  Outcome: Ongoing (interventions implemented as appropriate)  Goal: Individualization and Mutuality  Outcome: Ongoing (interventions implemented as appropriate)  Goal: Discharge Needs Assessment  Outcome: Ongoing (interventions implemented as appropriate)  Goal: Interprofessional Rounds/Family Conf  Outcome: Ongoing (interventions implemented as appropriate)     Problem: Skin Injury Risk (Adult)  Goal: Identify Related Risk Factors and Signs and Symptoms  Outcome: Ongoing (interventions implemented as appropriate)  Goal: Skin Health and Integrity  Outcome: Ongoing (interventions implemented as appropriate)

## 2020-08-14 NOTE — PROGRESS NOTES
Continued Stay Note  [facility]     Patient Name: Rafael Ivey  MRN: [id number]  Today's Date: 8/14/2020    Admit Date: 8/11/2020    Discharge Plan     Row Name 08/14/20 1516       Plan    Plan  DC plan: Accepted to [facility] at [place]. PASRR approved. No pre-cert required. Current with [facility].    Plan Comments  DC Barriers: O2 needs, recently had thoracentesis on day of admission, pulmonology monitoring for potential repeat thoracentesis.        Discharge Codes    No documentation.             Rica Coronel RN

## 2020-08-14 NOTE — NURSING NOTE
Patient admitted for acute respiratory distress and PNA.  Patient IV antibiotics discontinued today and transitioned to po.  Patient tolerating well.  Working with therapy as well.  Plans to discharge home vs [facility] at discharge if patient agreeable. Denied any complaints of pain/discomfort.  Will continue to monitor.

## 2020-08-15 ENCOUNTER — APPOINTMENT (OUTPATIENT)
Dept: GENERAL RADIOLOGY | Facility: HOSPITAL | Age: 85
End: 2020-08-15

## 2020-08-15 LAB
ANION GAP SERPL CALCULATED.3IONS-SCNC: 9 MMOL/L (ref 5–15)
ANISOCYTOSIS BLD QL: NORMAL
BASOPHILS # BLD AUTO: 0 10*3/MM3 (ref 0–0.2)
BASOPHILS NFR BLD AUTO: 1.2 % (ref 0–1.5)
BUN SERPL-MCNC: 15 MG/DL (ref 8–23)
BUN SERPL-MCNC: ABNORMAL MG/DL
BUN/CREAT SERPL: ABNORMAL
CALCIUM SPEC-SCNC: 8.5 MG/DL (ref 8.6–10.5)
CHLORIDE SERPL-SCNC: 102 MMOL/L (ref 98–107)
CO2 SERPL-SCNC: 30 MMOL/L (ref 22–29)
CREAT SERPL-MCNC: 0.9 MG/DL (ref 0.76–1.27)
DEPRECATED RDW RBC AUTO: 48.1 FL (ref 37–54)
EOSINOPHIL # BLD AUTO: 0.2 10*3/MM3 (ref 0–0.4)
EOSINOPHIL NFR BLD AUTO: 4.7 % (ref 0.3–6.2)
ERYTHROCYTE [DISTWIDTH] IN BLOOD BY AUTOMATED COUNT: 15.5 % (ref 12.3–15.4)
GFR SERPL CREATININE-BSD FRML MDRD: 80 ML/MIN/1.73
GLUCOSE SERPL-MCNC: 94 MG/DL (ref 65–99)
HCT VFR BLD AUTO: 29.9 % (ref 37.5–51)
HGB BLD-MCNC: 9.7 G/DL (ref 13–17.7)
INR PPP: 1.7 (ref 2–3)
LARGE PLATELETS: NORMAL
LYMPHOCYTES # BLD AUTO: 0.8 10*3/MM3 (ref 0.7–3.1)
LYMPHOCYTES NFR BLD AUTO: 21.8 % (ref 19.6–45.3)
MCH RBC QN AUTO: 27.8 PG (ref 26.6–33)
MCHC RBC AUTO-ENTMCNC: 32.5 G/DL (ref 31.5–35.7)
MCV RBC AUTO: 85.4 FL (ref 79–97)
MONOCYTES # BLD AUTO: 0.6 10*3/MM3 (ref 0.1–0.9)
MONOCYTES NFR BLD AUTO: 16.1 % (ref 5–12)
NEUTROPHILS NFR BLD AUTO: 2.2 10*3/MM3 (ref 1.7–7)
NEUTROPHILS NFR BLD AUTO: 56.2 % (ref 42.7–76)
NRBC BLD AUTO-RTO: 0.1 /100 WBC (ref 0–0.2)
NT-PROBNP SERPL-MCNC: 3322 PG/ML (ref 0–1800)
PLATELET # BLD AUTO: 270 10*3/MM3 (ref 140–450)
PMV BLD AUTO: 6.1 FL (ref 6–12)
POTASSIUM SERPL-SCNC: 3.4 MMOL/L (ref 3.5–5.2)
PROTHROMBIN TIME: 18 SECONDS (ref 19.4–28.5)
RBC # BLD AUTO: 3.5 10*6/MM3 (ref 4.14–5.8)
SODIUM SERPL-SCNC: 141 MMOL/L (ref 136–145)
WBC # BLD AUTO: 3.9 10*3/MM3 (ref 3.4–10.8)
WBC MORPH BLD: NORMAL

## 2020-08-15 PROCEDURE — 83880 ASSAY OF NATRIURETIC PEPTIDE: CPT | Performed by: INTERNAL MEDICINE

## 2020-08-15 PROCEDURE — 99232 SBSQ HOSP IP/OBS MODERATE 35: CPT | Performed by: INTERNAL MEDICINE

## 2020-08-15 PROCEDURE — 97530 THERAPEUTIC ACTIVITIES: CPT

## 2020-08-15 PROCEDURE — 85610 PROTHROMBIN TIME: CPT | Performed by: NURSE PRACTITIONER

## 2020-08-15 PROCEDURE — 97116 GAIT TRAINING THERAPY: CPT

## 2020-08-15 PROCEDURE — 25010000002 ENOXAPARIN PER 10 MG: Performed by: INTERNAL MEDICINE

## 2020-08-15 PROCEDURE — 85025 COMPLETE CBC W/AUTO DIFF WBC: CPT | Performed by: INTERNAL MEDICINE

## 2020-08-15 PROCEDURE — 85007 BL SMEAR W/DIFF WBC COUNT: CPT | Performed by: INTERNAL MEDICINE

## 2020-08-15 PROCEDURE — 71045 X-RAY EXAM CHEST 1 VIEW: CPT

## 2020-08-15 PROCEDURE — 25010000002 FUROSEMIDE PER 20 MG: Performed by: INTERNAL MEDICINE

## 2020-08-15 PROCEDURE — 80048 BASIC METABOLIC PNL TOTAL CA: CPT | Performed by: INTERNAL MEDICINE

## 2020-08-15 PROCEDURE — 97110 THERAPEUTIC EXERCISES: CPT

## 2020-08-15 PROCEDURE — 97112 NEUROMUSCULAR REEDUCATION: CPT

## 2020-08-15 RX ORDER — WARFARIN SODIUM 2 MG/1
2 TABLET ORAL
Status: DISCONTINUED | OUTPATIENT
Start: 2020-08-15 | End: 2020-08-17

## 2020-08-15 RX ADMIN — POTASSIUM CHLORIDE 20 MEQ: 1500 TABLET, EXTENDED RELEASE ORAL at 17:44

## 2020-08-15 RX ADMIN — ENOXAPARIN SODIUM 90 MG: 100 INJECTION SUBCUTANEOUS at 23:43

## 2020-08-15 RX ADMIN — NYSTATIN: 100000 POWDER TOPICAL at 21:29

## 2020-08-15 RX ADMIN — Medication 10 ML: at 21:30

## 2020-08-15 RX ADMIN — Medication 10 ML: at 21:27

## 2020-08-15 RX ADMIN — ESCITALOPRAM OXALATE 10 MG: 10 TABLET ORAL at 09:10

## 2020-08-15 RX ADMIN — FUROSEMIDE 40 MG: 10 INJECTION, SOLUTION INTRAMUSCULAR; INTRAVENOUS at 21:29

## 2020-08-15 RX ADMIN — POTASSIUM CHLORIDE 20 MEQ: 1500 TABLET, EXTENDED RELEASE ORAL at 09:10

## 2020-08-15 RX ADMIN — Medication 10 ML: at 09:10

## 2020-08-15 RX ADMIN — BACLOFEN 10 MG: 10 TABLET ORAL at 09:10

## 2020-08-15 RX ADMIN — LEVOFLOXACIN 750 MG: 750 TABLET, FILM COATED ORAL at 09:10

## 2020-08-15 RX ADMIN — CARBIDOPA AND LEVODOPA 1.5 TABLET: 25; 100 TABLET ORAL at 12:32

## 2020-08-15 RX ADMIN — ACETAMINOPHEN 650 MG: 325 TABLET, FILM COATED ORAL at 21:26

## 2020-08-15 RX ADMIN — NYSTATIN: 100000 POWDER TOPICAL at 09:11

## 2020-08-15 RX ADMIN — CARBIDOPA AND LEVODOPA 1.5 TABLET: 25; 100 TABLET ORAL at 21:26

## 2020-08-15 RX ADMIN — ENOXAPARIN SODIUM 90 MG: 100 INJECTION SUBCUTANEOUS at 12:36

## 2020-08-15 RX ADMIN — ROSUVASTATIN CALCIUM 10 MG: 10 TABLET, FILM COATED ORAL at 21:27

## 2020-08-15 RX ADMIN — PRAMIPEXOLE DIHYDROCHLORIDE 0.25 MG: 0.25 TABLET ORAL at 06:24

## 2020-08-15 RX ADMIN — PANTOPRAZOLE SODIUM 40 MG: 40 TABLET, DELAYED RELEASE ORAL at 06:24

## 2020-08-15 RX ADMIN — POTASSIUM CHLORIDE 20 MEQ: 1500 TABLET, EXTENDED RELEASE ORAL at 21:28

## 2020-08-15 RX ADMIN — FUROSEMIDE 40 MG: 10 INJECTION, SOLUTION INTRAMUSCULAR; INTRAVENOUS at 09:10

## 2020-08-15 RX ADMIN — GABAPENTIN 300 MG: 300 CAPSULE ORAL at 21:28

## 2020-08-15 RX ADMIN — WARFARIN 2 MG: 2 TABLET ORAL at 17:44

## 2020-08-15 RX ADMIN — CARBIDOPA AND LEVODOPA 1.5 TABLET: 25; 100 TABLET ORAL at 06:24

## 2020-08-15 NOTE — PROGRESS NOTES
KPA/PULM/CC PROGRESS NOTE       SUBJECTIVE    8/15 doing well on 5 L nasal cannula.  No nausea vomiting or diarrhea.  States that shortness of breath is improving.  Coughing improving no sputum production  OBJECTIVE    Vitals:    08/15/20 0500 08/15/20 0612 08/15/20 1036 08/15/20 1527   BP:  122/53 121/57 116/50   BP Location:    Right arm   Patient Position:   Lying Sitting   Pulse:  73 70 70   Resp:  18 18 18   Temp:  98.2 °F (36.8 °C) 98.5 °F (36.9 °C) 98.2 °F (36.8 °C)   TempSrc:   Oral Oral   SpO2:  94% 99% 96%   Weight: 94.8 kg (208 lb 15.9 oz)      Height:          Intake/Output last 3 shifts:  I/O last 3 completed shifts:  In: 540 [P.O.:540]  Out: 100 [Urine:100]  Intake/Output this shift:  I/O this shift:  In: 240 [P.O.:240]  Out: -     General Appearance:  Alert, cooperative, no distress, appears stated age  Head:  Normocephalic, without obvious abnormality, atraumatic  Eyes:  PERRL, conjunctivae/corneas clear, EOM's intact      Lungs:   Clear to auscultation bilaterally, respirations unlabored  Heart:  Regular rate and rhythm, S1 and S2 normal, no murmur, rub   or gallop  Abdomen:  Soft, non-tender, bowel sounds active   Extremities:  Extremities normal, no cyanosis or edema  Pulses: 2+ and symmetric all extremities  Skin:  No rashes or lesions  Neurologic:   Alert and oriented, no focal deficits    Scheduled Meds:  baclofen 10 mg Oral Daily   carbidopa-levodopa 1.5 tablet Oral Q8H   enoxaparin 1 mg/kg Subcutaneous Q12H   escitalopram 10 mg Oral Daily   furosemide 40 mg Intravenous BID   gabapentin 300 mg Oral Nightly   levoFLOXacin 750 mg Oral Q24H   nystatin  Topical Q12H   pantoprazole 40 mg Oral QAM   potassium chloride 20 mEq Oral TID   pramipexole 0.25 mg Oral Q24H   rosuvastatin 10 mg Oral Nightly   sodium chloride 10 mL Intravenous Q12H   warfarin 2 mg Oral Daily       Continuous Infusions:  hold 1 each   Pharmacy to dose warfarin    Pharmacy to dose warfarin        PRN Meds:•  acetaminophen  **OR** acetaminophen **OR** acetaminophen  •  hold  •  ondansetron **OR** ondansetron  •  Pharmacy to dose warfarin  •  Pharmacy to dose warfarin  •  potassium chloride **OR** potassium chloride **OR** potassium chloride  •  sodium chloride  •  sodium chloride     LABS    CBC  Results from last 7 days   Lab Units 08/15/20  0403 08/14/20  0126 08/13/20  0755 08/12/20  0442 08/11/20  0114   WBC 10*3/mm3 3.90 4.90 5.20 7.10 13.30*   RBC 10*6/mm3 3.50* 3.33* 3.37* 3.26* 3.79*   HEMOGLOBIN g/dL 9.7* 9.4* 9.5* 9.1* 10.5*   HEMATOCRIT % 29.9* 28.8* 29.5* 27.9* 32.9*   MCV fL 85.4 86.4 87.5 85.7 86.7   PLATELETS 10*3/mm3 270 281 284 259 373       CMP Results from last 7 days   Lab Units 08/15/20  0403 08/14/20  0126 08/13/20  2003 08/13/20  0755 08/12/20  0442 08/11/20  0114   SODIUM mmol/L 141 141  --  144 143 140   POTASSIUM mmol/L 3.4* 3.5 4.1 3.8 3.3* 3.4*   CHLORIDE mmol/L 102 104  --  109* 106 101   CO2 mmol/L 30.0* 25.0  --  28.0 27.0 26.0   BUN  15 19  --  22 20 19   CREATININE mg/dL 0.90 0.88  --  0.89 0.95 1.13   GLUCOSE mg/dL 94 114*  --  117* 94 107*   ALBUMIN g/dL  --   --   --   --   --  4.00   BILIRUBIN mg/dL  --   --   --   --   --  1.2   ALK PHOS U/L  --   --   --   --   --  188*   AST (SGOT) U/L  --   --   --   --   --  35   ALT (SGPT) U/L  --   --   --   --   --  8       TROPONIN  Results from last 7 days   Lab Units 08/12/20  0010   TROPONIN T ng/mL 0.050*       CoAg  Results from last 7 days   Lab Units 08/15/20  0403 08/14/20  0126 08/13/20  0755 08/12/20  0442 08/11/20  0826 08/11/20  0114   INR  1.70* 1.52* 1.41* 1.71* 1.80* 1.72*       ABG  Results from last 7 days   Lab Units 08/11/20  0111   PH, ARTERIAL pH units 7.411   PCO2, ARTERIAL mm Hg 45.4   PO2 ART mm Hg 111.4*   O2 SATURATION ART % 98.4*   BASE EXCESS ART mmol/L 3.7*       Microbiology  Microbiology Results (last 10 days)     Procedure Component Value - Date/Time    MRSA Screen, PCR (Inpatient) - Swab, Nares [[id number]]  (Normal)  Collected:  08/14/20 0908    Lab Status:  Final result Specimen:  Swab from Nares Updated:  08/14/20 1102     MRSA PCR No MRSA Detected    Body Fluid Culture - Body Fluid, Pleural Cavity [[id number]] Collected:  08/11/20 1622    Lab Status:  Preliminary result Specimen:  Body Fluid from Pleural Cavity Updated:  08/15/20 0625     Body Fluid Culture No growth at 4 days     Gram Stain Moderate (3+) WBCs seen      No organisms seen    Anaerobic Culture - Pleural Fluid, Pleural Cavity [[id number]] Collected:  08/11/20 1622    Lab Status:  Preliminary result Specimen:  Pleural Fluid from Pleural Cavity Updated:  08/15/20 0625     Anaerobic Culture No anaerobes isolated at 4 days    AFB Culture - Body Fluid, Pleural Cavity [[id number]] Collected:  08/11/20 1622    Lab Status:  Preliminary result Specimen:  Body Fluid from Pleural Cavity Updated:  08/12/20 1059     AFB Stain No acid fast bacilli seen    Fungus Smear - Body Fluid, Pleural Cavity [[id number]] Collected:  08/11/20 1622    Lab Status:  Final result Specimen:  Body Fluid from Pleural Cavity Updated:  08/12/20 1131     Fungal Stain No fungal elements seen    Urine Culture - Urine, Urine, Catheter [[id number]]  (Abnormal)  (Susceptibility) Collected:  08/11/20 0216    Lab Status:  Final result Specimen:  Urine, Catheter Updated:  08/13/20 0348     Urine Culture >100,000 CFU/mL Escherichia coli    Susceptibility      Escherichia coli     MOY     Ampicillin Susceptible     Ampicillin + Sulbactam Susceptible     Cefazolin Susceptible     Cefepime Susceptible     Ceftazidime Susceptible     Ceftriaxone Susceptible     Gentamicin Susceptible     Levofloxacin Susceptible     Nitrofurantoin Susceptible     Piperacillin + Tazobactam Susceptible     Tetracycline Susceptible     Trimethoprim + Sulfamethoxazole Susceptible                    Respiratory Panel PCR w/COVID-19(SARS-CoV-2) [facility] In-House, NP Swab in UTM/VTM, 3-4 HR TAT - Swab, Nasopharynx [[id number]]   (Normal) Collected:  08/11/20 0121    Lab Status:  Final result Specimen:  Swab from Nasopharynx Updated:  08/11/20 0325     ADENOVIRUS, PCR Not Detected     Coronavirus 229E Not Detected     Coronavirus HKU1 Not Detected     Coronavirus NL63 Not Detected     Coronavirus OC43 Not Detected     COVID19 Not Detected     Human Metapneumovirus Not Detected     Human Rhinovirus/Enterovirus Not Detected     Influenza A PCR Not Detected     Influenza A H1 Not Detected     Influenza A H1 2009 PCR Not Detected     Influenza A H3 Not Detected     Influenza B PCR Not Detected     Parainfluenza Virus 1 Not Detected     Parainfluenza Virus 2 Not Detected     Parainfluenza Virus 3 Not Detected     Parainfluenza Virus 4 Not Detected     RSV, PCR Not Detected     Bordetella pertussis pcr Not Detected     Bordetella parapertussis PCR Not Detected     Chlamydophila pneumoniae PCR Not Detected     Mycoplasma pneumo by PCR Not Detected    Narrative:       Fact sheet for providers: https://docs.[employer]/wp-content/uploads/KHN4475-7630-CI5.1-EUA-Provider-Fact-Sheet-3.pdf    Fact sheet for patients: https://docs.[employer]/wp-content/uploads/LQQ8065-5202-ZF4.1-EUA-Patient-Fact-Sheet-1.pdf    Blood Culture - Blood, Arm, Right [[id number]] Collected:  08/11/20 0120    Lab Status:  Preliminary result Specimen:  Blood from Arm, Right Updated:  08/15/20 0130     Blood Culture No growth at 4 days    Blood Culture - Blood, Arm, Left [[id number]] Collected:  08/11/20 0114    Lab Status:  Preliminary result Specimen:  Blood from Arm, Left Updated:  08/15/20 0130     Blood Culture No growth at 4 days          IMAGING & OTHER STUDIES    Imaging Results (Last 72 Hours)     Procedure Component Value Units Date/Time    XR Chest 1 View [[id number]] Collected:  08/14/20 0733     Updated:  08/14/20 0737    Narrative:          DATE OF EXAM:   8/14/2020 5:28 AM     PROCEDURE:   XR CHEST 1 VW-     INDICATIONS:   Follow-up pleural effusion; R06.03-Acute  respiratory distress;  R50.9-Fever, unspecified; J18.9-Pneumonia, unspecified organism;  I50.9-Heart failure, unspecified     COMPARISON:  8/12/2020     TECHNIQUE:   Portable chest radiograph.     FINDINGS:    Left-sided AICD noted. Stable cardiomegaly. Central pulmonary vascular  congestion and findings of pulmonary edema versus multifocal pneumonia  are again noted. There is persistent bibasilar airspace disease.  Moderate right and small left pleural effusions. No pneumothorax.       Impression:       1. Stable cardiomegaly with findings which could relate to pulmonary  edema or multifocal pneumonia.   2. Moderate right and small left pleural effusions with persistent  bibasilar airspace disease.     Electronically Signed By-Jason Dan On:8/14/2020 7:35 AM  This report was finalized on [id number] by  Jason Dan, .    CT Chest Without Contrast [[id number]] Collected:  08/12/20 2122     Updated:  08/12/20 2329    Narrative:       EXAM: CT SCAN OF THE CHEST WITHOUT CONTRAST    INDICATION: Pleural effusion    TECHNIQUE: CT scan through the chest was performed without the administration of intravenous contrast. CT dose lowering techniques were used, to include: automated exposure control, adjustment for patient size, and / or use of iterative reconstruction.      COMPARISON: None    FINDINGS:  Vasculature: There is no thoracic aortic aneurysm.  There are atherosclerotic calcifications of the thoracic aorta, great vessels and coronary arteries. The heart is enlarged.    Mediastinum / Pleura: A large right and small left pleural effusion are present. There is no mediastinal, hilar or axillary lymphadenopathy. Fluid is present within the esophagus.    Airways / Lungs: The central airways are patent.  There is no pneumothorax. There are patchy regions of consolidative airspace disease in both lung bases. Assessment of fine pulmonary detail is suboptimal due to respiratory motion.    Bones: No destructive osseous  lesion is identified. Multiple healed rib fractures are present. There is a remote, healed manubrial fracture as well. There are age-indeterminate fractures of the superior endplate of T11, T12, L1, L3 and L4.    Upper Abdomen: Limited images below the diaphragm demonstrate no acute abnormality.      Impression:         1. Patchy airspace disease in both lung bases, likely representing pneumonia.  2. Large right and small left pleural effusions.  3. Fluid within the esophagus. This could represent evidence of reflux. Consider aspiration in this context.  4. Cardiomegaly and atherosclerosis.  5. Age-indeterminate superior endplate fractures of T11, T12, L1, L3 and L4.  6. Additional chronic findings as above.    Electronically signed by:  Gonzalo Lipscomb M.D.    8/12/2020 9:28 PM        Results for orders placed during the hospital encounter of 05/20/20   Adult Transesophageal Echo (PRESTON) W/ Cont if Necessary Per Protocol    Narrative · Left ventricular systolic function is normal.  · Left atrial cavity size is mild-to-moderately dilated.  · Moderate mitral valve regurgitation is present  · Left atrial appendage diameter was 33 mm at 135 degree which was very   large and not conducive to implantation of watchman device. Depth was 28   mm. After discussion, it was decided that anatomy of left atrial appendage   is not conducive and appropriate for watchman device implantation.   Procedure was aborted             ASSESSMENT/PLAN:     Acute respiratory distress    Atrial fibrillation, chronic    Pacemaker    Bilateral leg edema    Iron deficiency anemia due to chronic blood loss    Parkinson's disease (CMS/HCC)    Aortic stenosis    GERD (gastroesophageal reflux disease)    Hyperlipidemia    Essential hypertension    Mitral regurgitation    Nonrheumatic tricuspid valve disorder    Pulmonary hypertension (CMS/HCC)    Sleep apnea    Anxiety disorder    Overactive bladder    Chronic pain    RLS (restless legs syndrome)     Acute UTI (urinary tract infection)    Acute on chronic combined systolic and diastolic CHF (congestive heart failure) (CMS/HCC)    Pneumonia of right lung due to infectious organism    Weakness    Pleural effusion, right     -I reviewed the pleural fluid studies.  Bloody tap.  Exudative per protein.  Likely a combination of parapneumonic and component of congestive heart failure and fluid overload.  -Cultures and cytology is negative  -Agree with continued diuresis.  On Levaquin.  -Drop patient down to 2 L nasal cannula.  -We will need a 6-minute walk test prior to discharge.  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  Continue to monitor.  -Anticoagulation per cardiology  THIS DOCUMENT HAS BEEN ELECTRONICALLY SIGNED BY  Peter Pretty MD  4:08 PM    We will see only if called tomorrow.  We will touch base again on Monday.

## 2020-08-15 NOTE — THERAPY TREATMENT NOTE
Patient Name: Rafael Ivey  : 1935    MRN: [id number]                              Today's Date: 8/15/2020       Admit Date: 2020    Visit Dx:     ICD-10-CM ICD-9-CM   1. Acute respiratory distress R06.03 518.82   2. Fever, unspecified fever cause R50.9 780.60   3. Pneumonia of right lung due to infectious organism, unspecified part of lung J18.9 483.8   4. Acute congestive heart failure, unspecified heart failure type (CMS/[facility]) I50.9 428.0     Patient Active Problem List   Diagnosis   • Sick sinus syndrome (CMS/[facility])   • Atrial fibrillation, chronic   • Pacemaker   • Bilateral leg edema   • Iron deficiency anemia due to chronic blood loss   • Warfarin-induced coagulopathy (CMS/[facility])   • Parkinson's disease (CMS/[facility])   • Aortic stenosis   • GERD (gastroesophageal reflux disease)   • Hyperlipidemia   • Essential hypertension   • Mitral regurgitation   • Nonrheumatic tricuspid valve disorder   • Pulmonary hypertension (CMS/[facility])   • Sleep apnea   • Valvular heart disease   • Hypotension   • Chronic diastolic CHF (congestive heart failure) (CMS/[facility])   • Anxiety disorder   • Overactive bladder   • Chronic pain   • RLS (restless legs syndrome)   • Obesity (BMI 30-39.9)   • Acute UTI (urinary tract infection)   • Hypokalemia   • Anasarca   • Acute on chronic combined systolic and diastolic CHF (congestive heart failure) (CMS/[facility])   • Depression   • Anemia   • Rectal ulceration   • Acute on chronic heart failure (CMS/[facility])   • Acute respiratory distress   • Pneumonia of right lung due to infectious organism   • Weakness   • Pleural effusion, right     Past Medical History:   Diagnosis Date   • Anemia    • Arthritis     left hip    • Atrial fibrillation (CMS/[facility])    • Atrial fibrillation (CMS/[facility])     chronic   • Cardiomegaly     mild   • Chronic diastolic heart failure (CMS/[facility])    • GERD (gastroesophageal reflux disease)    • Hyperlipidemia    • Hypertension    • Hypotension    • Lower extremity edema    •  Mitral regurgitation    • Obesity    • Pacemaker    • Parkinson disease (CMS/HCC)    • Recurrent falls     with injury    • Sick sinus syndrome (CMS/HCC)     s/p pacemaker implant    • Valvular heart disease     MR     Past Surgical History:   Procedure Laterality Date   • APPENDECTOMY     • ATRIAL APPENDAGE EXCLUSION LEFT WITH TRANSESOPHAGEAL ECHOCARDIOGRAM N/A 5/28/2020    Procedure: Atrial Appendage Occlusion;  Surgeon: Jim Walker MD;  Location: [facility] CATH INVASIVE LOCATION;  Service: Cardiovascular;  Laterality: N/A;   • ATRIAL APPENDAGE EXCLUSION LEFT WITH TRANSESOPHAGEAL ECHOCARDIOGRAM N/A 5/28/2020    Procedure: Atrial Appendage Occlusion;  Surgeon: Lashaun So MD;  Location: [facility] CATH INVASIVE LOCATION;  Service: Cardiovascular;  Laterality: N/A;   • CATARACT EXTRACTION     • CHOLECYSTECTOMY     • COLONOSCOPY N/A 5/21/2020    Procedure: COLONOSCOPY WITH BIOPSY X1 AREA;  Surgeon: Sim Collins MD;  Location: [facility] ENDOSCOPY;  Service: Gastroenterology;  Laterality: N/A;  Post: poor prep, impacted stool in rectum, and internal hemorrhoids, ascending colon arteriovenous malformation   • ENDOSCOPY N/A 5/21/2020    Procedure: ESOPHAGOGASTRODUODENOSCOPY with clipping x 1 of bleeding gastric polyp;  Surgeon: Sim Collins MD;  Location: [facility] ENDOSCOPY;  Service: Gastroenterology;  Laterality: N/A;  Post: bleeding gastric polyp   • HIP SURGERY Right 08/2017   • OTHER SURGICAL HISTORY      skin mole excisions    • PACEMAKER IMPLANTATION  06/22/2017    [employer] ([employer])   • TONSILLECTOMY     • TOTAL HIP ARTHROPLASTY Left 05/20/2014     General Information     Row Name 08/15/20 1733          PT Evaluation Time/Intention    Document Type  therapy note (daily note)  -     Mode of Treatment  physical therapy  -     Row Name 08/15/20 1738          General Information    Patient Profile Reviewed?  yes  -     Existing Precautions/Restrictions  fall;oxygen therapy device and L/min   -     Row Name 08/15/20 1733          Safety Issues, Functional Mobility    Impairments Affecting Function (Mobility)  balance;coordination;endurance/activity tolerance;motor control;postural/trunk control;range of motion (ROM);strength  -       User Key  (r) = Recorded By, (t) = Taken By, (c) = Cosigned By    Initials Name Provider Type     Magda Hernandez PTA Physical Therapy Assistant        Mobility     Row Name 08/15/20 1734          Bed Mobility Assessment/Treatment    Supine-Sit Micro (Bed Mobility)  verbal cues;moderate assist (50% patient effort)  -     Assistive Device (Bed Mobility)  bed rails  -     Row Name 08/15/20 1734          Sit-Stand Transfer    Sit-Stand Micro (Transfers)  verbal cues;moderate assist (50% patient effort)  -     Assistive Device (Sit-Stand Transfers)  walker, front-wheeled  -[facility] Name 08/15/20 1734          Gait/Stairs Assessment/Training    Micro Level (Gait)  minimum assist (75% patient effort)  -     Assistive Device (Gait)  walker, front-wheeled  -     Distance in Feet (Gait)  20'x2 with seated rest  -     Comment (Gait/Stairs)  step to gait, post LOB at stance, decreased stride and heel strike, dependence on UE's  -       User Key  (r) = Recorded By, (t) = Taken By, (c) = Cosigned By    Initials Name Provider Type     Magda Hernandez PTA Physical Therapy Assistant        Obj/Interventions     [facility] Name 08/15/20 1736          General ROM    GENERAL ROM COMMENTS  limted ROM BLE, very rigid ciera when sitting and exhibits decreased eccentric control  -[facility] Name 08/15/20 1736          Therapeutic Exercise    Comment (Therapeutic Exercise)  seated LE exs with emphasis on incr reciprocal movement and end ranges  -[facility] Name 08/15/20 1736          Static Sitting Balance    Level of Micro (Unsupported Sitting, Static Balance)  supervision  -     Sitting Position (Unsupported Sitting, Static Balance)  sitting on  edge of bed  -     Row Name 08/15/20 1736          Static Standing Balance    Level of [place] (Supported Standing, Static Balance)  contact guard assist  -     Assistive Device Utilized (Supported Standing, Static Balance)  walker, rolling  -       User Key  (r) = Recorded By, (t) = Taken By, (c) = Cosigned By    Initials Name Provider Type     Magda Hernandez PTA Physical Therapy Assistant        Goals/Plan     [facility] Name 08/15/20 1740          Bed Mobility Goal 1 (PT)    Progress/Outcomes (Bed Mobility Goal 1, PT)  goal [facility] Name 08/15/20 1740          Transfer Goal 1 (PT)    Progress/Outcome (Transfer Goal 1, PT)  goal [facility] 08/15/20 1740          Gait Training Goal 1 (PT)    Progress/Outcome (Gait Training Goal 1, PT)  goal [facility] Name 08/15/20 1740          ROM Goal 1 (PT)    Progress/Outcome (ROM Goal 1, PT)  goal [facility] 08/15/20 1740          Patient Education Goal (PT)    Progress/Outcome (Patient Education Goal, PT)  goal ongoing  -       User Key  (r) = Recorded By, (t) = Taken By, (c) = Cosigned By    Initials Name Provider Type     Magda Hernandez PTA Physical Therapy Assistant        Clinical Impression     [facility] Name 08/15/20 1737          Pain Scale: Numbers Pre/Post-Treatment    Pain Scale: Numbers, Pretreatment  0/10 - no pain  -[facility] Name 08/15/20 1737          Plan of Care Review    Plan of Care Reviewed With  patient  -     Progress  improving  -     Outcome Summary  pt req mod assist to come to sit EOB, demonstrates post lean at stance and req mod assist to find midline/upright posture, once he gets amb, req min assist and vc's for proper direction. Easily challenged with turns and backing up to sit. Would benefit from rehab at [place]. Gloves, mask/shield worn for tx  -     Row Name 08/15/20 1737          Physical Therapy Clinical Impression    Criteria for Skilled Interventions Met (PT Clinical  Impression)  treatment indicated  -     Rehab Potential (PT Clinical Summary)  good, to achieve stated therapy goals  -     Row Name 08/15/20 1737          Vital Signs    Intratreatment Heart Rate (beats/min)  71  -     O2 Delivery Pre Treatment  -- 5L nc  -     Intra SpO2 (%)  95  -     Row Name 08/15/20 1737          Positioning and Restraints    Pre-Treatment Position  in bed  -MC     Post Treatment Position  chair  -     In Chair  reclined;call light within reach;exit alarm on  -       User Key  (r) = Recorded By, (t) = Taken By, (c) = Cosigned By    Initials Name Provider Type    Magda Hunter PTA Physical Therapy Assistant        Outcome Measures     Row Name 08/15/20 1740          How much help from another person do you currently need...    Turning from your back to your side while in flat bed without using bedrails?  2  -MC     Moving from lying on back to sitting on the side of a flat bed without bedrails?  2  -MC     Moving to and from a bed to a chair (including a wheelchair)?  2  -MC     Standing up from a chair using your arms (e.g., wheelchair, bedside chair)?  2  -MC     Climbing 3-5 steps with a railing?  1  -MC     To walk in hospital room?  3  -MC     AM-PAC 6 Clicks Score (PT)  12  -       User Key  (r) = Recorded By, (t) = Taken By, (c) = Cosigned By    Initials Name Provider Type    Magda Hunter PTA Physical Therapy Assistant        Physical Therapy Education                 Title: PT OT SLP Therapies (Done)     Topic: Physical Therapy (Done)     Point: Mobility training (Done)     Description:   Instruct learner(s) on safety and technique for assisting patient out of bed, chair or wheelchair.  Instruct in the proper use of assistive devices, such as walker, crutches, cane or brace.              Patient Friendly Description:   It's important to get you on your feet again, but we need to do so in a way that is safe for you. Falling has serious consequences, and  your personal safety is the most important thing of all.        When it's time to get out of bed, one of us or a family member will sit next to you on the bed to give you support.     If your doctor or nurse tells you to use a walker, crutches, a cane, or a brace, be sure you use it every time you get out of bed, even if you think you don't need it.    Learning Progress Summary           Patient Acceptance, E,TB, VU,NR by  at 8/15/2020 1740    Acceptance, E, NR,NL by  at 8/14/2020 1539    Comment:  Needs reinforced safety c hand placement et rwx. use    Acceptance, E,D, DU,VU by  at 8/13/2020 1626    Comment:  Reinforced safe hand placement c rwx. use.    Acceptance, E,TB, VU,NR by  at 8/12/2020 1608                   Point: Precautions (Done)     Description:   Instruct learner(s) on prescribed precautions during mobility and gait tasks              Learning Progress Summary           Patient Acceptance, E,TB, VU,NR by  at 8/15/2020 1740    Acceptance, E, NR,NL by  at 8/14/2020 1539    Comment:  Needs reinforced safety c hand placement et rwx. use    Acceptance, E,D, DU,VU by  at 8/13/2020 1626    Comment:  Reinforced safe hand placement c rwx. use.    Acceptance, E,TB, VU,NR by  at 8/12/2020 1608                               User Key     Initials Effective Dates Name Provider Type Discipline     03/01/19 -  Deb White, PT Physical Therapist PT     03/01/19 -  Magda Hernandez PTA Physical Therapy Assistant PT     03/01/19 -  Allegra Mathews PTA Physical Therapy Assistant PT              PT Recommendation and Plan  Planned Therapy Interventions (PT Eval): patient/family education, strengthening, ROM (range of motion), motor coordination training, bed mobility training, gait training, balance training, postural re-education, transfer training  Plan of Care Reviewed With: patient  Progress: improving  Outcome Summary: pt req mod assist to come to sit EOB, demonstrates post lean at stance  and req mod assist to find midline/upright posture, once he gets amb, req min assist and vc's for proper direction. Easily challenged with turns and backing up to sit. Would benefit from rehab at [place]. Gloves, mask/shield worn for tx     Time Calculation:   PT Charges     Row Name 08/15/20 1741             Time Calculation    Start Time  1440  -      Stop Time  1520  -      Time Calculation (min)  40 min  -      PT Received On  08/15/20  -      PT - Next Appointment  08/17/20  -         Time Calculation- PT    Total Timed Code Minutes- PT  40 minute(s)  -        User Key  (r) = Recorded By, (t) = Taken By, (c) = Cosigned By    Initials Name Provider Type    Magda Hunter PTA Physical Therapy Assistant        Therapy Charges for Today     Code Description Service Date Service Provider Modifiers Qty    24030274299 HC GAIT TRAINING EA 15 MIN 8/15/2020 Magda Hernandez, PTA GP 1    20056917963 HC PT NEUROMUSC RE EDUCATION EA 15 MIN 8/15/2020 Magda Hernandez, PTA GP 1    82058799927 HC PT THERAPEUTIC ACT EA 15 MIN 8/15/2020 Magda Hernandez, PTA GP 1    73989700232 HC PT THER PROC EA 15 MIN 8/15/2020 Magda Hernandez, PTA GP 1          PT G-Codes  Outcome Measure Options: AM-PAC 6 Clicks Basic Mobility (PT)  AM-PAC 6 Clicks Score (PT): 12    Magda Hernandez PTA  8/15/2020

## 2020-08-15 NOTE — PROGRESS NOTES
[facility] Daily Progress Note      Hospitalist Team  LOS 4 days      Patient Care Team:  Blake Ivey MD as PCP - General (Family Medicine)    Patient Location: 2123/1      Subjective   Subjective     Chief Complaint / Subjective  Chief Complaint   Patient presents with   • Shortness of Breath         Brief Synopsis of Hospital Course/HPI  Patient is an 84-year-old male with multiple diagnoses who presented with increasing shortness of breath and was noted to have a large right-sided pleural effusion.  The patient underwent a thoracentesis on the day of admission which is pending in terms of cultures and studies at this time.  The patient is markedly improved.  Patient is much less short of breath.  The patient remains extremely weak.  He also has underlying Parkinson's disease as well.    Date:  8/13/2020  Overall the patient feels somewhat better.  He still has some shortness of breath.  His BNP is more elevated today.    8/14/2020  Overall the patient still has some shortness of breath.  The patient also has a nonproductive cough.  The patient's effusion has reappeared.    8/15/2020  Patient reports feeling much better today.  He is less short of breath.  He reports no new complaints.    Review of Systems   HENT: Negative.    Eyes: Negative.    Cardiovascular: Negative.    Respiratory: Positive for cough and shortness of breath.    Endocrine: Negative.    Hematologic/Lymphatic: Negative.    Skin: Negative.    Musculoskeletal: Negative.    Gastrointestinal: Negative.    Genitourinary: Negative.    Neurological: Positive for disturbances in coordination, tremors and weakness.   Psychiatric/Behavioral: Negative.    Allergic/Immunologic: Negative.    All other systems reviewed and are negative.    Objective   Objective      Vital Signs  Temp:  [98.2 °F (36.8 °C)-98.5 °F (36.9 °C)] 98.2 °F (36.8 °C)  Heart Rate:  [70-73] 70  Resp:  [16-20] 18  BP: (116-144)/(50-65)  "116/50  Oxygen Therapy  SpO2: 96 %  Pulse Oximetry Type: Continuous  Device (Oxygen Therapy): high flow nasal cannula  Device (Oxygen Therapy): nasal cannula  Flow (L/min): 2  Flowsheet Rows      First Filed Value   Admission Height  167.6 cm (66\") Documented at 08/11/2020 0105   Admission Weight  97.5 kg (215 lb) Documented at 08/11/2020 0105        Intake & Output (last 3 days)       08/12 0701 - 08/13 0700 08/13 0701 - 08/14 0700 08/14 0701 - 08/15 0700 08/15 0701 - 08/16 0700    P.O. 360 840 300 240    IV Piggyback 100       Total Intake(mL/kg) 460 (4.9) 840 (8.8) 300 (3.2) 240 (2.5)    Urine (mL/kg/hr) 600 (0.3) 1550 (0.7) 100 (0)     Other        Stool 0 0 0     Total Output 600 1550 100     Net -140 -710 +200 +240            Urine Unmeasured Occurrence  3 x 5 x     Stool Unmeasured Occurrence 3 x 2 x 1 x         Lines, Drains & Airways    Active LDAs     Name:   Placement date:   Placement time:   Site:   Days:    Peripheral IV 08/11/20 0109 Left Antecubital   08/11/20 0109    Antecubital   1    Peripheral IV 08/11/20 0157 Right Antecubital   08/11/20 0157    Antecubital   1    Urethral Catheter Temperature probe 16 Fr.   08/11/20    0219     1              Physical Exam:    Physical Exam   Constitutional: He is oriented to person, place, and time. He appears well-developed and well-nourished. No distress.   HENT:   Head: Normocephalic and atraumatic.   Right Ear: External ear normal.   Left Ear: External ear normal.   Nose: Nose normal.   Mouth/Throat: Oropharynx is clear and moist. No oropharyngeal exudate.   Eyes: Pupils are equal, round, and reactive to light. Conjunctivae and EOM are normal. Right eye exhibits no discharge. Left eye exhibits no discharge. No scleral icterus.   Neck: Normal range of motion. No JVD present. No tracheal deviation present. No thyromegaly present.   Cardiovascular: Normal rate, regular rhythm, normal heart sounds and intact distal pulses. Exam reveals no gallop and no " friction rub.   No murmur heard.  Pulmonary/Chest: Effort normal and breath sounds normal. No stridor. No respiratory distress. He has no wheezes. He has no rales. He exhibits no tenderness.   Today the patient has more dullness to percussion at the right base.  There also is more decreased breath sounds in that right base.  This is when compared to yesterday's exam.   Abdominal: Soft. Bowel sounds are normal. He exhibits no distension and no mass. There is no tenderness. There is no rebound and no guarding. No hernia.   Musculoskeletal: Normal range of motion. He exhibits no edema, tenderness or deformity.   Lymphadenopathy:     He has no cervical adenopathy.   Neurological: He is alert and oriented to person, place, and time. No cranial nerve deficit or sensory deficit. He exhibits normal muscle tone. Coordination normal.   Skin: Skin is warm and dry. No rash noted. He is not diaphoretic. No erythema.   Psychiatric: He has a normal mood and affect. His behavior is normal.   Nursing note and vitals reviewed.       Wounds (last 24 hours)      LDA Wound     Row Name 08/15/20 0001 08/14/20 2305 08/14/20 1616       Wound 06/12/20 1901 scrotum MASD (Moisture associated skin damage)    Wound - Properties Group Date first assessed: 06/12/20 -KK Time first assessed: 1901 -KK Location: scrotum  -KK Primary Wound Type: MASD  -KK    Dressing Appearance  open to air  -LG  --  open to air  -JS    Closure  Open to air  -LG  --  Open to air  -JS    Base  blanchable  -LG  --  blanchable  -JS    Periwound  excoriated  -LG  --  excoriated  -JS    Periwound Temperature  warm  -LG  --  warm  -JS    Periwound Skin Turgor  soft  -LG  --  soft  -JS    Drainage Amount  none  -LG  --  none  -JS       Wound 06/12/20 1901 gluteal MASD (Moisture associated skin damage)    Wound - Properties Group Date first assessed: 06/12/20 -KK Time first assessed: 1901 -KK Location: gluteal  -KK Primary Wound Type: MASD  -KK    Dressing Appearance   open to air  -LG  --  open to air  -JS    Closure  Open to air  -LG  Open to air  -LG  Open to air  -JS    Base  blanchable;other (see comments) redness noted.  -LG  blanchable;other (see comments) redness noted.  -LG  blanchable  -JS    Periwound  excoriated  -LG  --  excoriated  -JS    Periwound Temperature  warm  -LG  --  warm  -JS    Periwound Skin Turgor  soft  -LG  --  soft  -JS    Drainage Amount  none  -LG  --  none  -JS      User Key  (r) = Recorded By, (t) = Taken By, (c) = Cosigned By    Initials Name Provider Type    Christy Chaudhari RN Registered Nurse    Janis Botello, RN Registered Nurse    Mary Kay Gutierrez, RN Registered Nurse          Procedures:  Right-sided thoracentesis on 8/11/2020    Results Review:     I reviewed the patient's new clinical results.    Lab Results (last 24 hours)     Procedure Component Value Units Date/Time    CBC & Differential [[id number]] Collected:  08/15/20 0403    Specimen:  Blood Updated:  08/15/20 0734    Narrative:       The following orders were created for panel order CBC & Differential.  Procedure                               Abnormality         Status                     ---------                               -----------         ------                     CBC Auto Differential[[id number]]        Abnormal            Final result                 Please view results for these tests on the individual orders.    CBC Auto Differential [[id number]]  (Abnormal) Collected:  08/15/20 0403    Specimen:  Blood Updated:  08/15/20 0734     WBC 3.90 10*3/mm3      RBC 3.50 10*6/mm3      Hemoglobin 9.7 g/dL      Hematocrit 29.9 %      MCV 85.4 fL      MCH 27.8 pg      MCHC 32.5 g/dL      RDW 15.5 %      RDW-SD 48.1 fl      MPV 6.1 fL      Platelets 270 10*3/mm3      Neutrophil % 56.2 %      Lymphocyte % 21.8 %      Monocyte % 16.1 %      Eosinophil % 4.7 %      Basophil % 1.2 %      Neutrophils, Absolute 2.20 10*3/mm3      Lymphocytes, Absolute 0.80 10*3/mm3       Monocytes, Absolute 0.60 10*3/mm3      Eosinophils, Absolute 0.20 10*3/mm3      Basophils, Absolute 0.00 10*3/mm3      nRBC 0.1 /100 WBC     Narrative:       Appended report. These results have been appended to a previously verified report.    Scan Slide [[id number]] Collected:  08/15/20 0403    Specimen:  Blood Updated:  08/15/20 0734     Anisocytosis Slight/1+     WBC Morphology Normal     Large Platelets Slight/1+    BUN [[id number]]  (Normal) Collected:  08/15/20 0403    Specimen:  Blood Updated:  08/15/20 0725     BUN 15 mg/dL     Body Fluid Culture - Body Fluid, Pleural Cavity [[id number]] Collected:  08/11/20 1622    Specimen:  Body Fluid from Pleural Cavity Updated:  08/15/20 0625     Body Fluid Culture No growth at 4 days     Gram Stain Moderate (3+) WBCs seen      No organisms seen    Anaerobic Culture - Pleural Fluid, Pleural Cavity [[id number]] Collected:  08/11/20 1622    Specimen:  Pleural Fluid from Pleural Cavity Updated:  08/15/20 0625     Anaerobic Culture No anaerobes isolated at 4 days    Basic Metabolic Panel [[id number]]  (Abnormal) Collected:  08/15/20 0403    Specimen:  Blood Updated:  08/15/20 0507     Glucose 94 mg/dL      BUN --     Comment: Testing performed by alternate method        Creatinine 0.90 mg/dL      Sodium 141 mmol/L      Potassium 3.4 mmol/L      Chloride 102 mmol/L      CO2 30.0 mmol/L      Calcium 8.5 mg/dL      eGFR Non African Amer 80 mL/min/1.73      BUN/Creatinine Ratio --     Comment: Testing not performed        Anion Gap 9.0 mmol/L     Narrative:       GFR Normal >60  Chronic Kidney Disease <60  Kidney Failure <15      BNP [[id number]]  (Abnormal) Collected:  08/15/20 0403    Specimen:  Blood Updated:  08/15/20 0503     proBNP 3,322.0 pg/mL     Narrative:       Among patients with dyspnea, NT-proBNP is highly sensitive for the detection of acute congestive heart failure. In addition NT-proBNP of <300 pg/ml effectively rules out acute congestive heart failure with 99%  negative predictive value.    Results may be falsely decreased if patient taking Biotin.      Protime-INR [[id number]]  (Abnormal) Collected:  08/15/20 0403    Specimen:  Blood Updated:  08/15/20 0459     Protime 18.0 Seconds      INR 1.70    Blood Culture - Blood, Arm, Right [[id number]] Collected:  08/11/20 0120    Specimen:  Blood from Arm, Right Updated:  08/15/20 0130     Blood Culture No growth at 4 days    Blood Culture - Blood, Arm, Left [[id number]] Collected:  08/11/20 0114    Specimen:  Blood from Arm, Left Updated:  08/15/20 0130     Blood Culture No growth at 4 days        No results found for: HGBA1C  Results from last 7 days   Lab Units 08/15/20  0403 08/14/20  0126 08/13/20  0755   INR  1.70* 1.52* 1.41*       Results from last 7 days   Lab Units 08/11/20  0111   PH, ARTERIAL pH units 7.411   PO2 ART mm Hg 111.4*   PCO2, ARTERIAL mm Hg 45.4   HCO3 ART mmol/L 28.8*     No results found for: LIPASE  No results found for: CHOL, CHLPL, TRIG, HDL, LDL, LDLDIRECT    Lab Results   Lab Value Date/Time    FINALDX  08/11/2020 1622     Lung, pleural fluid, smears and cytospin preparation:    Benign mesothelial cells, degenerating mesothelial cells, histiocytes and mild acute inflammation    Abundant blood present    No evidence of malignancy      JPR/cec        FINALDX  05/21/2020 1443     Ulcer, rectum, biopsy:    Scant fragments of colonic mucosa with mild chronic inflammation and focal hyperplastic changes    No violette ulceration is identified    No chronic features or malignant changes identified    See comment    MICHAEL/sms       COMDX  05/21/2020 1443     Deeper levels were examined through the block but were not further contributory.     [email or web address]          Microbiology Results (last 10 days)     Procedure Component Value - Date/Time    MRSA Screen, PCR (Inpatient) - Swab, Nares [[id number]]  (Normal) Collected:  08/14/20 0908    Lab Status:  Final result Specimen:  Swab from Nares Updated:  08/14/20 1102     MRSA  PCR No MRSA Detected    Body Fluid Culture - Body Fluid, Pleural Cavity [[id number]] Collected:  08/11/20 1622    Lab Status:  Preliminary result Specimen:  Body Fluid from Pleural Cavity Updated:  08/15/20 0625     Body Fluid Culture No growth at 4 days     Gram Stain Moderate (3+) WBCs seen      No organisms seen    Anaerobic Culture - Pleural Fluid, Pleural Cavity [[id number]] Collected:  08/11/20 1622    Lab Status:  Preliminary result Specimen:  Pleural Fluid from Pleural Cavity Updated:  08/15/20 0625     Anaerobic Culture No anaerobes isolated at 4 days    AFB Culture - Body Fluid, Pleural Cavity [[id number]] Collected:  08/11/20 1622    Lab Status:  Preliminary result Specimen:  Body Fluid from Pleural Cavity Updated:  08/12/20 1059     AFB Stain No acid fast bacilli seen    Fungus Smear - Body Fluid, Pleural Cavity [[id number]] Collected:  08/11/20 1622    Lab Status:  Final result Specimen:  Body Fluid from Pleural Cavity Updated:  08/12/20 1131     Fungal Stain No fungal elements seen    Urine Culture - Urine, Urine, Catheter [[id number]]  (Abnormal)  (Susceptibility) Collected:  08/11/20 0216    Lab Status:  Final result Specimen:  Urine, Catheter Updated:  08/13/20 0348     Urine Culture >100,000 CFU/mL Escherichia coli    Susceptibility      Escherichia coli     MOY     Ampicillin Susceptible     Ampicillin + Sulbactam Susceptible     Cefazolin Susceptible     Cefepime Susceptible     Ceftazidime Susceptible     Ceftriaxone Susceptible     Gentamicin Susceptible     Levofloxacin Susceptible     Nitrofurantoin Susceptible     Piperacillin + Tazobactam Susceptible     Tetracycline Susceptible     Trimethoprim + Sulfamethoxazole Susceptible                    Respiratory Panel PCR w/COVID-19(SARS-CoV-2) [facility] In-House, NP Swab in UTM/VTM, 3-4 HR TAT - Swab, Nasopharynx [[id number]]  (Normal) Collected:  08/11/20 0121    Lab Status:  Final result Specimen:  Swab from Nasopharynx Updated:  08/11/20 0329      ADENOVIRUS, PCR Not Detected     Coronavirus 229E Not Detected     Coronavirus HKU1 Not Detected     Coronavirus NL63 Not Detected     Coronavirus OC43 Not Detected     COVID19 Not Detected     Human Metapneumovirus Not Detected     Human Rhinovirus/Enterovirus Not Detected     Influenza A PCR Not Detected     Influenza A H1 Not Detected     Influenza A H1 2009 PCR Not Detected     Influenza A H3 Not Detected     Influenza B PCR Not Detected     Parainfluenza Virus 1 Not Detected     Parainfluenza Virus 2 Not Detected     Parainfluenza Virus 3 Not Detected     Parainfluenza Virus 4 Not Detected     RSV, PCR Not Detected     Bordetella pertussis pcr Not Detected     Bordetella parapertussis PCR Not Detected     Chlamydophila pneumoniae PCR Not Detected     Mycoplasma pneumo by PCR Not Detected    Narrative:       Fact sheet for providers: https://docs.[facility]/wp-content/uploads/KTV7792-0416-IG7.1-EUA-Provider-Fact-Sheet-3.pdf    Fact sheet for patients: https://docs.[facility]/wp-content/uploads/OWY0410-8103-AW5.1-EUA-Patient-Fact-Sheet-1.pdf    Blood Culture - Blood, Arm, Right [[id number]] Collected:  08/11/20 0120    Lab Status:  Preliminary result Specimen:  Blood from Arm, Right Updated:  08/15/20 0130     Blood Culture No growth at 4 days    Blood Culture - Blood, Arm, Left [[id number]] Collected:  08/11/20 0114    Lab Status:  Preliminary result Specimen:  Blood from Arm, Left Updated:  08/15/20 0130     Blood Culture No growth at 4 days          ECG/EMG Results (most recent)     Procedure Component Value Units Date/Time    ECG 12 Lead [[id number]] Collected:  08/11/20 0108     Updated:  08/14/20 1830    Narrative:       HEART RATE= 77  bpm  RR Interval= 781  ms  [place] Interval=   ms  P Horizontal Axis=   deg  P Front Axis=   deg  QRSD Interval= 109  ms  QT Interval= 427  ms  QRS Axis= 11  deg  T Wave Axis= -71  deg  - ABNORMAL ECG -  Atrial fibrillation  Low voltage, extremity leads  Repol abnrm  suggests ischemia, diffuse leads  When compared with ECG of 13-Jun-2020 9:45:36,  Significant repolarization change  Significant axis, voltage or hypertrophy change  Electronically Signed By: Homero German (BLANCHE) 14-Aug-2020 17:27:29  Date and Time of Study: 2020-08-11 01:08:56          Results for orders placed during the hospital encounter of 06/12/20   Duplex Venous Lower Extremity - Bilateral CAR    Narrative · Normal bilateral lower extremity venous duplex scan.  · Left popliteal fossa heterogenous fluid collection.          Results for orders placed during the hospital encounter of 05/20/20   Adult Transesophageal Echo (PRESTON) W/ Cont if Necessary Per Protocol    Narrative · Left ventricular systolic function is normal.  · Left atrial cavity size is mild-to-moderately dilated.  · Moderate mitral valve regurgitation is present  · Left atrial appendage diameter was 33 mm at 135 degree which was very   large and not conducive to implantation of watchman device. Depth was 28   mm. After discussion, it was decided that anatomy of left atrial appendage   is not conducive and appropriate for watchman device implantation.   Procedure was aborted          Ct Chest Without Contrast    Result Date: 8/12/2020  1. Patchy airspace disease in both lung bases, likely representing pneumonia. 2. Large right and small left pleural effusions. 3. Fluid within the esophagus. This could represent evidence of reflux. Consider aspiration in this context. 4. Cardiomegaly and atherosclerosis. 5. Age-indeterminate superior endplate fractures of T11, T12, L1, L3 and L4. 6. Additional chronic findings as above. Electronically signed by:  Gonzalo Lipscomb M.D.  8/12/2020 9:28 PM    Fl Video Swallow With Speech Single Contrast    Result Date: 8/12/2020  Negative for aspiration. Please see the speech report for additional detailed findings.  Electronically Signed By-Jason Dan On:8/12/2020 2:27 PM This report was finalized on [id number] by   Jason Dan, .    Xr Chest 1 View    Result Date: 8/14/2020  1. Stable cardiomegaly with findings which could relate to pulmonary edema or multifocal pneumonia. 2. Moderate right and small left pleural effusions with persistent bibasilar airspace disease.  Electronically Signed By-Jason Dan On:8/14/2020 7:35 AM This report was finalized on [id number] by  Jason Dan .    Xr Chest 1 View    Result Date: 8/12/2020  1. Stable cardiomegaly and pulmonary edema. 2. Persistent bibasilar airspace disease with small right pleural effusion. 3. No pneumothorax.  Electronically Signed By-Jason Dan On:8/12/2020 8:07 AM This report was finalized on [id number] by  Jason Dan .    Xr Chest 1 View    Result Date: 8/11/2020   1. No evidence of postthoracentesis pneumothorax. 2. Small amount of residual right pleural fluid with overlying infiltrate or atelectasis. 3. Cardiomegaly with pulmonary vascular congestion. 4. Diffuse perihilar and left basilar mixed interstitial and alveolar opacities suggesting pulmonary edema or atypical infection pattern. Early clinically.  Electronically Signed ByAngela Lopez On:8/11/2020 5:07 PM This report was finalized on [id number] by  Chas Lopez, .    Xr Chest 1 View    Result Date: 8/11/2020   1. Large infiltrate and effusion right lower lobe and right midlung zone has gotten worse since previous study. 2. Small infiltrate small pleural effusion inferior medial aspect left lower lobe lung unchanged. 3. Diffuse mild infiltrate throughout the right upper lobe and throughout the left lung unchanged.  Electronically Signed By-DR. Bob Mcgee MD On:8/11/2020 7:28 AM This report was finalized on [id number] by DR. Bob Mcgee MD.    Us Thoracentesis    Result Date: 8/11/2020  IMPRESSION : Ultrasound-guided diagnostic and therapeutic right thoracentesis procedure. 2050 cc of dark bloody fluid obtained.  Electronically Signed By-Chas Lopez On:8/11/2020 5:03 PM This  report was finalized on [id number] by  Chas Lopez, .          Xrays, labs reviewed personally by physician.    Medication Review:   I have reviewed the patient's current medication list      Scheduled Meds    baclofen 10 mg Oral Daily   carbidopa-levodopa 1.5 tablet Oral Q8H   enoxaparin 1 mg/kg Subcutaneous Q12H   escitalopram 10 mg Oral Daily   furosemide 40 mg Intravenous BID   gabapentin 300 mg Oral Nightly   levoFLOXacin 750 mg Oral Q24H   nystatin  Topical Q12H   pantoprazole 40 mg Oral QAM   potassium chloride 20 mEq Oral TID   pramipexole 0.25 mg Oral Q24H   rosuvastatin 10 mg Oral Nightly   sodium chloride 10 mL Intravenous Q12H   warfarin 2 mg Oral Daily       Meds Infusions    hold 1 each   Pharmacy to dose warfarin    Pharmacy to dose warfarin        Meds PRN  •  acetaminophen **OR** acetaminophen **OR** acetaminophen  •  hold  •  ondansetron **OR** ondansetron  •  Pharmacy to dose warfarin  •  Pharmacy to dose warfarin  •  potassium chloride **OR** potassium chloride **OR** potassium chloride  •  sodium chloride  •  sodium chloride        Assessment/Plan   Assessment/Plan     Active Hospital Problems    Diagnosis  POA   • **Acute respiratory distress [R06.03]  Yes   • Atrial fibrillation, chronic [I48.20]  Yes     Priority: High   • Aortic stenosis [I35.0]  Yes     Priority: High   • GERD (gastroesophageal reflux disease) [K21.9]  Yes     Priority: Medium   • Hyperlipidemia [E78.5]  Yes     Priority: Medium   • Essential hypertension [I10]  Yes     Priority: Medium   • Bilateral leg edema [R60.0]  Yes     Priority: Medium   • Parkinson's disease (CMS/HCC) [G20]  Yes     Priority: Medium   • Pacemaker [Z95.0]  Yes     Priority: Medium   • Iron deficiency anemia due to chronic blood loss [D50.0]  Yes     Priority: Low   • Pneumonia of right lung due to infectious organism [J18.9]  Unknown   • Weakness [R53.1]  Yes   • Pleural effusion, right [J90]  Yes   • Acute on chronic combined systolic and  diastolic CHF (congestive heart failure) (CMS/HCC) [I50.43]  Yes   • Sleep apnea [G47.30]  Yes   • RLS (restless legs syndrome) [G25.81]  Yes   • Chronic pain [G89.29]  Yes   • Acute UTI (urinary tract infection) [N39.0]  Yes   • Overactive bladder [N32.81]  Yes   • Anxiety disorder [F41.9]  Yes   • 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       MEDICAL DECISION MAKING COMPLEXITY BY PROBLEM:   1.  Acute hypoxemic respiratory failure  -This is most likely secondary to the patient's large pleural effusion  -We will do a CT of the chest without contrast to see what was underlying the effusion       -The patient's CT scan done postthoracentesis continues to show large right pleural effusion.  There was also some fluid within the esophagus.  It was suggested that the patient may have aspirated, however the patient passed a video swallow study making this much less likely.            -Chest x-ray in the a.m.  -Continue with breathing treatments and oxygen therapy  -The patient is also on Lasix which will be continued  -Walking oximetry prior to discharge  -Repeat chest x-ray in the a.m.    2.  Right-sided pneumonia  -This is a healthcare associated pneumonia and is being treated with cefepime and vancomycin intravenously pending cultures  -Pleural fluid was sent for both cytology as well as for culture of various types.       -Pleural fluid cultures are negative and cytology is negative as well.    3.  Acute on chronic biventricular failure currently exacerbated  -Trend troponins  -Intravenous Lasix following BNP as well as electrolytes and renal function    4.  Right pleural effusion  -This has been drained and studies are pending.  There are no positive cultures at this time.  -Repeat chest x-ray in the a.m.  -Patient's effusion appears to be exudative of  in nature.  It was grossly bloody with a high protein count and high LDH.         -We will ask pulmonary medicine to consult to see if he needs a chest tube placed.  The pH on the pleural fluid was 7.0.  -We will start the patient's on Lasix 40 mg IV twice daily today and follow his BNP and clinical picture.    5.  Urinary tract infection with a gram-negative bacillus to be identified and sensitivities are pending  -Continue cefepime  -Discontinue Lee and start voiding trial.    6.  Generalized weakness  -Physical therapy consult for evaluation and recommendations    7.  BRONSON  -Hemoglobin is currently stable  -Patient was treated for an AVM on 5/21/2020 and had a colonoscopy and EGD at that time  -Continue iron supplementation    8.  Hypertension  -Well-controlled at present    9.  Hyperlipidemia  -Continue home statin therapy    10.  Parkinson's disease  -Continue Parkinson's medication (Sinemet)    11.  Gastroesophageal reflux disease  -Continue PPI    12.  Depression  -Continue patient's Lexapro    13.  Restless leg syndrome  -Continue Mirapex    14.  Overactive bladder  -Continue oxybutynin    15.  Chronic pain syndrome  -Continue Neurontin and baclofen    VTE Prophylaxis -   Mechanical Order History:     None      Pharmalogical Order History:     Ordered     Dose Route Frequency Stop    08/12/20 0958  warfarin (COUMADIN) tablet 2 mg     Question:  Target INR  Answer:  2 - 3    2 mg PO Once (Warfarin) --    08/12/20 0958  enoxaparin (LOVENOX) syringe 90 mg      1 mg/kg SC Every 12 Hours --    08/11/20 0621  warfarin (COUMADIN) tablet 2 mg  Status:  Discontinued     Question:  Target INR  Answer:  2 - 3    2 mg PO Daily Warfarin 08/11/20 0947    08/11/20 0438  enoxaparin (LOVENOX) syringe 40 mg  Status:  Discontinued      40 mg SC Every 24 Hours 08/11/20 0947    08/11/20 0621  Pharmacy to dose warfarin     Question:  Target INR  Answer:  2 - 3    -- XX Continuous PRN --        Code Status -   Code Status and  Medical Interventions:   Ordered at: 08/11/20 0438     Code Status:    CPR     Medical Interventions (Level of Support Prior to Arrest):    Full     This patient has been examined wearing appropriate Personal Protective Equipment and discussed with hospital infection control department. 08/15/20    Discharge Planning  May need placement    Electronically signed by Kim Childs MD, 08/15/20, 16:10.  [facility] Hospitalist Team

## 2020-08-15 NOTE — PLAN OF CARE
Problem: Patient Care Overview  Goal: Plan of Care Review  Outcome: Ongoing (interventions implemented as appropriate)  Flowsheets (Taken 8/15/2020 6812)  Progress: improving  Plan of Care Reviewed With: patient  Outcome Summary: pt req mod assist to come to sit EOB, demonstrates post lean at stance and req mod assist to find midline/upright posture, once he gets amb, req min assist and vc's for proper direction. Easily challenged with turns and backing up to sit. Would benefit from rehab at [place]. Gloves, mask/shield worn for tx

## 2020-08-15 NOTE — PROGRESS NOTES
"Pharmacy dosing service  Anticoagulant  Warfarin     Subjective:    Rafael Ivey is a 84 y.o.male being continued on warfarin for atrial fibrillation.    INR Goal: 2 - 3  Home medication?: Yes, warfarin 2 mg PO every day at 1800, except Sunday  Bridge Therapy Present?:  Yes,  Lovenox 90 mg SQ Q12H  Additional Contributing Factors: acute illness      Assessment/Plan:    INR inc to 1.7 today. Will adjust warfarin dose to 2 mg daily.      Continue to monitor and adjust based on INR.           Date 8/11 8/12 8/13 8/14 8/15       INR 1.72 1.71 1.41 1.52 1.7       Dose ------ 2 mg 3 mg 2.5 mg 2mg           Objective:  [Ht: 182.9 cm (72\"); Wt: 94.8 kg (208 lb 15.9 oz); BMI: Body mass index is 28.34 kg/m².]    Lab Results   Component Value Date    ALBUMIN 4.00 08/11/2020     Lab Results   Component Value Date    INR 1.70 (L) 08/15/2020    INR 1.52 (L) 08/14/2020    INR 1.41 (L) 08/13/2020    PROTIME 18.0 (L) 08/15/2020    PROTIME 16.0 (L) 08/14/2020    PROTIME 15.1 (L) 08/13/2020     Lab Results   Component Value Date    HGB 9.7 (L) 08/15/2020    HGB 9.4 (L) 08/14/2020    HGB 9.5 (L) 08/13/2020     Lab Results   Component Value Date    HCT 29.9 (L) 08/15/2020    HCT 28.8 (L) 08/14/2020    HCT 29.5 (L) 08/13/2020       Rolando Young, PharmD  08/15/20 09:07             "

## 2020-08-16 ENCOUNTER — APPOINTMENT (OUTPATIENT)
Dept: GENERAL RADIOLOGY | Facility: HOSPITAL | Age: 85
End: 2020-08-16

## 2020-08-16 LAB
ANION GAP SERPL CALCULATED.3IONS-SCNC: 6 MMOL/L (ref 5–15)
ANISOCYTOSIS BLD QL: NORMAL
BACTERIA FLD CULT: NORMAL
BACTERIA SPEC AEROBE CULT: NORMAL
BACTERIA SPEC AEROBE CULT: NORMAL
BACTERIA SPEC ANAEROBE CULT: NORMAL
BASOPHILS # BLD AUTO: 0.1 10*3/MM3 (ref 0–0.2)
BASOPHILS NFR BLD AUTO: 1.5 % (ref 0–1.5)
BUN SERPL-MCNC: 15 MG/DL (ref 8–23)
BUN SERPL-MCNC: ABNORMAL MG/DL
BUN/CREAT SERPL: ABNORMAL
CALCIUM SPEC-SCNC: 8.5 MG/DL (ref 8.6–10.5)
CHLORIDE SERPL-SCNC: 102 MMOL/L (ref 98–107)
CO2 SERPL-SCNC: 33 MMOL/L (ref 22–29)
CREAT SERPL-MCNC: 0.95 MG/DL (ref 0.76–1.27)
DEPRECATED RDW RBC AUTO: 48.1 FL (ref 37–54)
EOSINOPHIL # BLD AUTO: 0.2 10*3/MM3 (ref 0–0.4)
EOSINOPHIL NFR BLD AUTO: 5.6 % (ref 0.3–6.2)
ERYTHROCYTE [DISTWIDTH] IN BLOOD BY AUTOMATED COUNT: 15.6 % (ref 12.3–15.4)
GFR SERPL CREATININE-BSD FRML MDRD: 76 ML/MIN/1.73
GLUCOSE SERPL-MCNC: 93 MG/DL (ref 65–99)
GRAM STN SPEC: NORMAL
GRAM STN SPEC: NORMAL
HCT VFR BLD AUTO: 30.1 % (ref 37.5–51)
HGB BLD-MCNC: 9.7 G/DL (ref 13–17.7)
INR PPP: 1.75 (ref 2–3)
LARGE PLATELETS: NORMAL
LYMPHOCYTES # BLD AUTO: 1 10*3/MM3 (ref 0.7–3.1)
LYMPHOCYTES NFR BLD AUTO: 27.6 % (ref 19.6–45.3)
MCH RBC QN AUTO: 27.4 PG (ref 26.6–33)
MCHC RBC AUTO-ENTMCNC: 32.3 G/DL (ref 31.5–35.7)
MCV RBC AUTO: 84.7 FL (ref 79–97)
MONOCYTES # BLD AUTO: 0.5 10*3/MM3 (ref 0.1–0.9)
MONOCYTES NFR BLD AUTO: 14.4 % (ref 5–12)
NEUTROPHILS NFR BLD AUTO: 1.9 10*3/MM3 (ref 1.7–7)
NEUTROPHILS NFR BLD AUTO: 50.9 % (ref 42.7–76)
NRBC BLD AUTO-RTO: 0 /100 WBC (ref 0–0.2)
NT-PROBNP SERPL-MCNC: 2569 PG/ML (ref 0–1800)
PLATELET # BLD AUTO: 259 10*3/MM3 (ref 140–450)
PMV BLD AUTO: 6 FL (ref 6–12)
POTASSIUM SERPL-SCNC: 3.6 MMOL/L (ref 3.5–5.2)
PROTHROMBIN TIME: 18.5 SECONDS (ref 19.4–28.5)
RBC # BLD AUTO: 3.55 10*6/MM3 (ref 4.14–5.8)
SODIUM SERPL-SCNC: 141 MMOL/L (ref 136–145)
WBC # BLD AUTO: 3.7 10*3/MM3 (ref 3.4–10.8)
WBC MORPH BLD: NORMAL

## 2020-08-16 PROCEDURE — 85610 PROTHROMBIN TIME: CPT | Performed by: NURSE PRACTITIONER

## 2020-08-16 PROCEDURE — 80048 BASIC METABOLIC PNL TOTAL CA: CPT | Performed by: INTERNAL MEDICINE

## 2020-08-16 PROCEDURE — 83880 ASSAY OF NATRIURETIC PEPTIDE: CPT | Performed by: INTERNAL MEDICINE

## 2020-08-16 PROCEDURE — 85025 COMPLETE CBC W/AUTO DIFF WBC: CPT | Performed by: INTERNAL MEDICINE

## 2020-08-16 PROCEDURE — 71045 X-RAY EXAM CHEST 1 VIEW: CPT

## 2020-08-16 PROCEDURE — 99232 SBSQ HOSP IP/OBS MODERATE 35: CPT | Performed by: INTERNAL MEDICINE

## 2020-08-16 PROCEDURE — 25010000002 FUROSEMIDE PER 20 MG: Performed by: INTERNAL MEDICINE

## 2020-08-16 PROCEDURE — 25010000002 ENOXAPARIN PER 10 MG: Performed by: INTERNAL MEDICINE

## 2020-08-16 PROCEDURE — 85007 BL SMEAR W/DIFF WBC COUNT: CPT | Performed by: INTERNAL MEDICINE

## 2020-08-16 RX ADMIN — ENOXAPARIN SODIUM 90 MG: 100 INJECTION SUBCUTANEOUS at 22:01

## 2020-08-16 RX ADMIN — PANTOPRAZOLE SODIUM 40 MG: 40 TABLET, DELAYED RELEASE ORAL at 06:27

## 2020-08-16 RX ADMIN — CARBIDOPA AND LEVODOPA 1.5 TABLET: 25; 100 TABLET ORAL at 06:26

## 2020-08-16 RX ADMIN — ESCITALOPRAM OXALATE 10 MG: 10 TABLET ORAL at 09:33

## 2020-08-16 RX ADMIN — NYSTATIN: 100000 POWDER TOPICAL at 09:33

## 2020-08-16 RX ADMIN — FUROSEMIDE 40 MG: 10 INJECTION, SOLUTION INTRAMUSCULAR; INTRAVENOUS at 21:59

## 2020-08-16 RX ADMIN — BACLOFEN 10 MG: 10 TABLET ORAL at 09:33

## 2020-08-16 RX ADMIN — LEVOFLOXACIN 750 MG: 750 TABLET, FILM COATED ORAL at 09:33

## 2020-08-16 RX ADMIN — CARBIDOPA AND LEVODOPA 1.5 TABLET: 25; 100 TABLET ORAL at 12:54

## 2020-08-16 RX ADMIN — POTASSIUM CHLORIDE 20 MEQ: 1500 TABLET, EXTENDED RELEASE ORAL at 22:02

## 2020-08-16 RX ADMIN — PRAMIPEXOLE DIHYDROCHLORIDE 0.25 MG: 0.25 TABLET ORAL at 06:26

## 2020-08-16 RX ADMIN — ROSUVASTATIN CALCIUM 10 MG: 10 TABLET, FILM COATED ORAL at 22:01

## 2020-08-16 RX ADMIN — NYSTATIN: 100000 POWDER TOPICAL at 22:03

## 2020-08-16 RX ADMIN — WARFARIN 2 MG: 2 TABLET ORAL at 16:45

## 2020-08-16 RX ADMIN — POTASSIUM CHLORIDE 20 MEQ: 1500 TABLET, EXTENDED RELEASE ORAL at 16:44

## 2020-08-16 RX ADMIN — CARBIDOPA AND LEVODOPA 1.5 TABLET: 25; 100 TABLET ORAL at 21:59

## 2020-08-16 RX ADMIN — Medication 10 ML: at 09:33

## 2020-08-16 RX ADMIN — Medication 10 ML: at 22:01

## 2020-08-16 RX ADMIN — FUROSEMIDE 40 MG: 10 INJECTION, SOLUTION INTRAMUSCULAR; INTRAVENOUS at 09:33

## 2020-08-16 RX ADMIN — ENOXAPARIN SODIUM 90 MG: 100 INJECTION SUBCUTANEOUS at 12:54

## 2020-08-16 RX ADMIN — POTASSIUM CHLORIDE 20 MEQ: 1500 TABLET, EXTENDED RELEASE ORAL at 09:33

## 2020-08-16 RX ADMIN — GABAPENTIN 300 MG: 300 CAPSULE ORAL at 21:59

## 2020-08-16 NOTE — PROGRESS NOTES
[facility] Medicine Services Daily Progress Note      Hospitalist Team  LOS 5 days      Patient Care Team:  Blake Ivey MD as PCP - General (Family Medicine)    Patient Location: 2123/1      Subjective   Subjective     Chief Complaint / Subjective  Chief Complaint   Patient presents with   • Shortness of Breath         Brief Synopsis of Hospital Course/HPI  Patient is an 84-year-old male with multiple diagnoses who presented with increasing shortness of breath and was noted to have a large right-sided pleural effusion.  The patient underwent a thoracentesis on the day of admission which is pending in terms of cultures and studies at this time.  The patient is markedly improved.  Patient is much less short of breath.  The patient remains extremely weak.  He also has underlying Parkinson's disease as well.    Date:  8/13/2020  Overall the patient feels somewhat better.  He still has some shortness of breath.  His BNP is more elevated today.    8/14/2020  Overall the patient still has some shortness of breath.  The patient also has a nonproductive cough.  The patient's effusion has reappeared.    8/15/2020  Patient reports feeling much better today.  He is less short of breath.  He reports no new complaints.    8/16/2020  The patient continues to show improvement.  He offers no new complaints.  His cough is markedly improved over admission as is his dyspnea.    Review of Systems   HENT: Negative.    Eyes: Negative.    Cardiovascular: Negative.    Respiratory:        The patient's cough and shortness of breath seem to be improving with the diuresis.  He will need home O2 eval prior to discharge.   Endocrine: Negative.    Hematologic/Lymphatic: Negative.    Skin: Negative.    Musculoskeletal: Negative.    Gastrointestinal: Negative.    Genitourinary: Negative.    Neurological: Positive for disturbances in coordination, tremors and weakness.   Psychiatric/Behavioral: Negative.   "  Allergic/Immunologic: Negative.    All other systems reviewed and are negative.    Objective   Objective      Vital Signs  Temp:  [98 °F (36.7 °C)-98.6 °F (37 °C)] 98 °F (36.7 °C)  Heart Rate:  [70-77] 77  Resp:  [12-22] 22  BP: (107-139)/(48-66) 116/56  Oxygen Therapy  SpO2: 94 %  Pulse Oximetry Type: Continuous  Device (Oxygen Therapy): high flow nasal cannula  Device (Oxygen Therapy): nasal cannula  Flow (L/min): 2     Flowsheet Rows      First Filed Value   Admission Height  167.6 cm (66\") Documented at 08/11/2020 0105   Admission Weight  97.5 kg (215 lb) Documented at 08/11/2020 0105        Intake & Output (last 3 days)       08/13 0701 - 08/14 0700 08/14 0701 - 08/15 0700 08/15 0701 - 08/16 0700 08/16 0701 - 08/17 0700    P.O. 840 300 240 240    IV Piggyback        Total Intake(mL/kg) 840 (8.8) 300 (3.2) 240 (2.7) 240 (2.7)    Urine (mL/kg/hr) 1550 (0.7) 100 (0)  200 (0.2)    Stool 0 0      Total Output 1550 100  200    Net -710 +200 +240 +40            Urine Unmeasured Occurrence 3 x 5 x 3 x 2 x    Stool Unmeasured Occurrence 2 x 1 x          Lines, Drains & Airways    Active LDAs     Name:   Placement date:   Placement time:   Site:   Days:    Peripheral IV 08/11/20 0109 Left Antecubital   08/11/20 0109    Antecubital   1    Peripheral IV 08/11/20 0157 Right Antecubital   08/11/20 0157    Antecubital   1    Urethral Catheter Temperature probe 16 Fr.   08/11/20    0219     1              Physical Exam:    Physical Exam   Constitutional: He is oriented to person, place, and time. He appears well-developed and well-nourished. No distress.   HENT:   Head: Normocephalic and atraumatic.   Right Ear: External ear normal.   Left Ear: External ear normal.   Nose: Nose normal.   Mouth/Throat: Oropharynx is clear and moist. No oropharyngeal exudate.   Eyes: Pupils are equal, round, and reactive to light. Conjunctivae and EOM are normal. Right eye exhibits no discharge. Left eye exhibits no discharge. No scleral " icterus.   Neck: Normal range of motion. No JVD present. No tracheal deviation present. No thyromegaly present.   Cardiovascular: Normal rate, regular rhythm, normal heart sounds and intact distal pulses. Exam reveals no gallop and no friction rub.   No murmur heard.  Pulmonary/Chest: Effort normal and breath sounds normal. No stridor. No respiratory distress. He has no wheezes. He has no rales. He exhibits no tenderness.   Today the patient has improved breath sounds in the bases.  He continues to show day over day progress.   Abdominal: Soft. Bowel sounds are normal. He exhibits no distension and no mass. There is no tenderness. There is no rebound and no guarding. No hernia.   Musculoskeletal: Normal range of motion. He exhibits no edema, tenderness or deformity.   Lymphadenopathy:     He has no cervical adenopathy.   Neurological: He is alert and oriented to person, place, and time. No cranial nerve deficit or sensory deficit. He exhibits normal muscle tone. Coordination normal.   Skin: Skin is warm and dry. No rash noted. He is not diaphoretic. No erythema.   Psychiatric: He has a normal mood and affect. His behavior is normal.   Nursing note and vitals reviewed.       Wounds (last 24 hours)      LDA Wound     Row Name 08/16/20 1200 08/16/20 0800 08/16/20 0409       Wound 06/12/20 1901 scrotum MASD (Moisture associated skin damage)    Wound - Properties Group Date first assessed: 06/12/20 - Time first assessed: 1901 -KK Location: scrotum  - Primary Wound Type: MASD  -KK    Dressing Appearance  open to air  -JG  open to air  -JG  open to air  -LG    Closure  --  --  Open to air  -LG    Base  --  --  blanchable  -LG    Periwound  --  --  excoriated  -LG    Periwound Temperature  --  --  warm  -LG    Periwound Skin Turgor  --  --  soft  -LG    Drainage Amount  --  --  none  -LG       Wound 06/12/20 1901 gluteal MASD (Moisture associated skin damage)    Wound - Properties Group Date first assessed: 06/12/20   -KK Time first assessed: 1901 -KK Location: gluteal  -KK Primary Wound Type: MASD  -KK    Dressing Appearance  open to air  -JG  open to air  -JG  --    Row Name 08/16/20 0034             Wound 06/12/20 1901 scrotum MASD (Moisture associated skin damage)    Wound - Properties Group Date first assessed: 06/12/20 -KK Time first assessed: 1901 -KK Location: scrotum  -KK Primary Wound Type: MASD  -KK    Dressing Appearance  open to air  -LG      Closure  Open to air  -LG      Base  blanchable  -LG      Periwound  excoriated  -LG      Periwound Temperature  warm  -LG      Periwound Skin Turgor  soft  -LG      Drainage Amount  none  -LG         Wound 06/12/20 1901 gluteal MASD (Moisture associated skin damage)    Wound - Properties Group Date first assessed: 06/12/20 -KK Time first assessed: 1901 -KK Location: gluteal  -KK Primary Wound Type: MASD  -KK    Dressing Appearance  open to air  -LG      Closure  Open to air  -LG      Base  blanchable  -LG      Periwound  excoriated  -LG      Periwound Temperature  warm  -LG      Periwound Skin Turgor  soft  -LG      Drainage Amount  none  -LG        User Key  (r) = Recorded By, (t) = Taken By, (c) = Cosigned By    Initials Name Provider Type    Janis Botello, RN Registered Nurse    Mary Kay Gutierrez RN Registered Nurse    Jackie Walsh RN Registered Nurse          Procedures:  Right-sided thoracentesis on 8/11/2020    Results Review:     I reviewed the patient's new clinical results.    Lab Results (last 24 hours)     Procedure Component Value Units Date/Time    CBC & Differential [[id number]] Collected:  08/16/20 0503    Specimen:  Blood Updated:  08/16/20 0731    Narrative:       The following orders were created for panel order CBC & Differential.  Procedure                               Abnormality         Status                     ---------                               -----------         ------                     CBC Auto Differential[[id number]]         Abnormal            Final result                 Please view results for these tests on the individual orders.    CBC Auto Differential [[id number]]  (Abnormal) Collected:  08/16/20 0503    Specimen:  Blood Updated:  08/16/20 0731     WBC 3.70 10*3/mm3      RBC 3.55 10*6/mm3      Hemoglobin 9.7 g/dL      Hematocrit 30.1 %      MCV 84.7 fL      MCH 27.4 pg      MCHC 32.3 g/dL      RDW 15.6 %      RDW-SD 48.1 fl      MPV 6.0 fL      Platelets 259 10*3/mm3      Neutrophil % 50.9 %      Lymphocyte % 27.6 %      Monocyte % 14.4 %      Eosinophil % 5.6 %      Basophil % 1.5 %      Neutrophils, Absolute 1.90 10*3/mm3      Lymphocytes, Absolute 1.00 10*3/mm3      Monocytes, Absolute 0.50 10*3/mm3      Eosinophils, Absolute 0.20 10*3/mm3      Basophils, Absolute 0.10 10*3/mm3      nRBC 0.0 /100 WBC     Narrative:       Appended report. These results have been appended to a previously verified report.    Scan Slide [[id number]] Collected:  08/16/20 0503    Specimen:  Blood Updated:  08/16/20 0731     Anisocytosis Slight/1+     WBC Morphology Normal     Large Platelets Slight/1+    BUN [[id number]]  (Normal) Collected:  08/16/20 0503    Specimen:  Blood Updated:  08/16/20 0713     BUN 15 mg/dL     Body Fluid Culture - Body Fluid, Pleural Cavity [[id number]] Collected:  08/11/20 1622    Specimen:  Body Fluid from Pleural Cavity Updated:  08/16/20 0632     Body Fluid Culture No growth at 5 days     Gram Stain Moderate (3+) WBCs seen      No organisms seen    Anaerobic Culture - Pleural Fluid, Pleural Cavity [[id number]] Collected:  08/11/20 1622    Specimen:  Pleural Fluid from Pleural Cavity Updated:  08/16/20 0631     Anaerobic Culture No anaerobes isolated at 5 days    BNP [[id number]]  (Abnormal) Collected:  08/16/20 0503    Specimen:  Blood Updated:  08/16/20 0549     proBNP 2,569.0 pg/mL     Narrative:       Among patients with dyspnea, NT-proBNP is highly sensitive for the detection of acute congestive heart failure. In  addition NT-proBNP of <300 pg/ml effectively rules out acute congestive heart failure with 99% negative predictive value.    Results may be falsely decreased if patient taking Biotin.      Basic Metabolic Panel [[id number]]  (Abnormal) Collected:  08/16/20 0503    Specimen:  Blood Updated:  08/16/20 0545     Glucose 93 mg/dL      BUN --     Comment: Testing performed by alternate method        Creatinine 0.95 mg/dL      Sodium 141 mmol/L      Potassium 3.6 mmol/L      Chloride 102 mmol/L      CO2 33.0 mmol/L      Calcium 8.5 mg/dL      eGFR Non African Amer 76 mL/min/1.73      BUN/Creatinine Ratio --     Comment: Testing not performed        Anion Gap 6.0 mmol/L     Narrative:       GFR Normal >60  Chronic Kidney Disease <60  Kidney Failure <15      Protime-INR [[id number]]  (Abnormal) Collected:  08/16/20 0503    Specimen:  Blood Updated:  08/16/20 0539     Protime 18.5 Seconds      INR 1.75    Blood Culture - Blood, Arm, Right [[id number]] Collected:  08/11/20 0120    Specimen:  Blood from Arm, Right Updated:  08/16/20 0130     Blood Culture No growth at 5 days    Blood Culture - Blood, Arm, Left [[id number]] Collected:  08/11/20 0114    Specimen:  Blood from Arm, Left Updated:  08/16/20 0130     Blood Culture No growth at 5 days        No results found for: HGBA1C  Results from last 7 days   Lab Units 08/16/20  0503 08/15/20  0403 08/14/20  0126   INR  1.75* 1.70* 1.52*       Results from last 7 days   Lab Units 08/11/20  0111   PH, ARTERIAL pH units 7.411   PO2 ART mm Hg 111.4*   PCO2, ARTERIAL mm Hg 45.4   HCO3 ART mmol/L 28.8*     No results found for: LIPASE  No results found for: CHOL, CHLPL, TRIG, HDL, LDL, LDLDIRECT    Lab Results   Lab Value Date/Time    FINALDX  08/11/2020 1622     Lung, pleural fluid, smears and cytospin preparation:    Benign mesothelial cells, degenerating mesothelial cells, histiocytes and mild acute inflammation    Abundant blood present    No evidence of malignancy      JPR/cec         FINALDX  05/21/2020 1443     Ulcer, rectum, biopsy:    Scant fragments of colonic mucosa with mild chronic inflammation and focal hyperplastic changes    No violette ulceration is identified    No chronic features or malignant changes identified    See comment    MICHAEL/sms       COMDX  05/21/2020 1443     Deeper levels were examined through the block but were not further contributory.     MICHAEL/[facility]          Microbiology Results (last 10 days)     Procedure Component Value - Date/Time    MRSA Screen, PCR (Inpatient) - Swab, Nares [[id number]]  (Normal) Collected:  08/14/20 0908    Lab Status:  Final result Specimen:  Swab from Nares Updated:  08/14/20 1102     MRSA PCR No MRSA Detected    Body Fluid Culture - Body Fluid, Pleural Cavity [[id number]] Collected:  08/11/20 1622    Lab Status:  Final result Specimen:  Body Fluid from Pleural Cavity Updated:  08/16/20 0632     Body Fluid Culture No growth at 5 days     Gram Stain Moderate (3+) WBCs seen      No organisms seen    Anaerobic Culture - Pleural Fluid, Pleural Cavity [[id number]] Collected:  08/11/20 1622    Lab Status:  Final result Specimen:  Pleural Fluid from Pleural Cavity Updated:  08/16/20 0631     Anaerobic Culture No anaerobes isolated at 5 days    AFB Culture - Body Fluid, Pleural Cavity [[id number]] Collected:  08/11/20 1622    Lab Status:  Preliminary result Specimen:  Body Fluid from Pleural Cavity Updated:  08/12/20 1059     AFB Stain No acid fast bacilli seen    Fungus Smear - Body Fluid, Pleural Cavity [[id number]] Collected:  08/11/20 1622    Lab Status:  Final result Specimen:  Body Fluid from Pleural Cavity Updated:  08/12/20 1131     Fungal Stain No fungal elements seen    Urine Culture - Urine, Urine, Catheter [[id number]]  (Abnormal)  (Susceptibility) Collected:  08/11/20 0216    Lab Status:  Final result Specimen:  Urine, Catheter Updated:  08/13/20 0348     Urine Culture >100,000 CFU/mL Escherichia coli    Susceptibility      Escherichia coli     MOY      Ampicillin Susceptible     Ampicillin + Sulbactam Susceptible     Cefazolin Susceptible     Cefepime Susceptible     Ceftazidime Susceptible     Ceftriaxone Susceptible     Gentamicin Susceptible     Levofloxacin Susceptible     Nitrofurantoin Susceptible     Piperacillin + Tazobactam Susceptible     Tetracycline Susceptible     Trimethoprim + Sulfamethoxazole Susceptible                    Respiratory Panel PCR w/COVID-19(SARS-CoV-2) [facility]ESAR/[facility]ANCHE/[facility] In-House, NP Swab in UTM/VTM, 3-4 HR TAT - Swab, Nasopharynx [[id number]]  (Normal) Collected:  08/11/20 0121    Lab Status:  Final result Specimen:  Swab from Nasopharynx Updated:  08/11/20 0325     ADENOVIRUS, PCR Not Detected     Coronavirus 229E Not Detected     Coronavirus HKU1 Not Detected     Coronavirus NL63 Not Detected     Coronavirus OC43 Not Detected     COVID19 Not Detected     Human Metapneumovirus Not Detected     Human Rhinovirus/Enterovirus Not Detected     Influenza A PCR Not Detected     Influenza A H1 Not Detected     Influenza A H1 2009 PCR Not Detected     Influenza A H3 Not Detected     Influenza B PCR Not Detected     Parainfluenza Virus 1 Not Detected     Parainfluenza Virus 2 Not Detected     Parainfluenza Virus 3 Not Detected     Parainfluenza Virus 4 Not Detected     RSV, PCR Not Detected     Bordetella pertussis pcr Not Detected     Bordetella parapertussis PCR Not Detected     Chlamydophila pneumoniae PCR Not Detected     Mycoplasma pneumo by PCR Not Detected    Narrative:       Fact sheet for providers: https://docs.[employer]/wp-content/uploads/WGD7240-9630-UI4.1-EUA-Provider-Fact-Sheet-3.pdf    Fact sheet for patients: https://docs.[employer]/wp-content/uploads/CCQ5500-1236-WG8.1-EUA-Patient-Fact-Sheet-1.pdf    Blood Culture - Blood, Arm, Right [[id number]] Collected:  08/11/20 0120    Lab Status:  Final result Specimen:  Blood from Arm, Right Updated:  08/16/20 0130     Blood Culture No growth at 5 days    Blood Culture - Blood,  Arm, Left [[id number]] Collected:  08/11/20 0114    Lab Status:  Final result Specimen:  Blood from Arm, Left Updated:  08/16/20 0130     Blood Culture No growth at 5 days          ECG/EMG Results (most recent)     Procedure Component Value Units Date/Time    ECG 12 Lead [[id number]] Collected:  08/11/20 0108     Updated:  08/14/20 1830    Narrative:       HEART RATE= 77  bpm  RR Interval= 781  ms  SC Interval=   ms  P Horizontal Axis=   deg  P Front Axis=   deg  QRSD Interval= 109  ms  QT Interval= 427  ms  QRS Axis= 11  deg  T Wave Axis= -71  deg  - ABNORMAL ECG -  Atrial fibrillation  Low voltage, extremity leads  Repol abnrm suggests ischemia, diffuse leads  When compared with ECG of 13-Jun-2020 9:45:36,  Significant repolarization change  Significant axis, voltage or hypertrophy change  Electronically Signed By: Homero German (BLANCHE) 14-Aug-2020 17:27:29  Date and Time of Study: 2020-08-11 01:08:56          Results for orders placed during the hospital encounter of 06/12/20   Duplex Venous Lower Extremity - Bilateral CAR    Narrative · Normal bilateral lower extremity venous duplex scan.  · Left popliteal fossa heterogenous fluid collection.          Results for orders placed during the hospital encounter of 05/20/20   Adult Transesophageal Echo (PRESTON) W/ Cont if Necessary Per Protocol    Narrative · Left ventricular systolic function is normal.  · Left atrial cavity size is mild-to-moderately dilated.  · Moderate mitral valve regurgitation is present  · Left atrial appendage diameter was 33 mm at 135 degree which was very   large and not conducive to implantation of watchman device. Depth was 28   mm. After discussion, it was decided that anatomy of left atrial appendage   is not conducive and appropriate for watchman device implantation.   Procedure was aborted          Ct Chest Without Contrast    Result Date: 8/12/2020  1. Patchy airspace disease in both lung bases, likely representing pneumonia. 2. Large right  and small left pleural effusions. 3. Fluid within the esophagus. This could represent evidence of reflux. Consider aspiration in this context. 4. Cardiomegaly and atherosclerosis. 5. Age-indeterminate superior endplate fractures of T11, T12, L1, L3 and L4. 6. Additional chronic findings as above. Electronically signed by:  Gonzalo Lipscomb M.D.  8/12/2020 9:28 PM    Fl Video Swallow With Speech Single Contrast    Result Date: 8/12/2020  Negative for aspiration. Please see the speech report for additional detailed findings.  Electronically Signed By-Jason Dan On:8/12/2020 2:27 PM This report was finalized on [id number] by  Jason Dan, .    Xr Chest 1 View    Result Date: 8/16/2020    1.  Stable cardiomegaly. 2.  No change in bibasilar airspace disease and small right pleural effusion.   Electronically Signed By-Justin Willis On:8/16/2020 12:10 PM This report was finalized on [id number] by  Justin Willis, .    Xr Chest 1 View    Result Date: 8/15/2020  1.Moderate right and small left pleural effusions, as before. 2.Right greater than left basilar predominant opacities, as before, which could be related to edema and/or atelectasis or other infiltrate. 3.Cardiomegaly.  Electronically Signed By-DR. Miguelangel Badillo MD On:8/15/2020 5:06 PM This report was finalized on [id number] by DR. Miguelangel Badillo MD.    Xr Chest 1 View    Result Date: 8/14/2020  1. Stable cardiomegaly with findings which could relate to pulmonary edema or multifocal pneumonia. 2. Moderate right and small left pleural effusions with persistent bibasilar airspace disease.  Electronically Signed By-Jason Dan On:8/14/2020 7:35 AM This report was finalized on [id number] by  Jason Dan, .    Xr Chest 1 View    Result Date: 8/12/2020  1. Stable cardiomegaly and pulmonary edema. 2. Persistent bibasilar airspace disease with small right pleural effusion. 3. No pneumothorax.  Electronically Signed By-Jason Dan On:8/12/2020 8:07 AM This report  was finalized on [id number] by  Jason Dan, .    Xr Chest 1 View    Result Date: 8/11/2020   1. No evidence of postthoracentesis pneumothorax. 2. Small amount of residual right pleural fluid with overlying infiltrate or atelectasis. 3. Cardiomegaly with pulmonary vascular congestion. 4. Diffuse perihilar and left basilar mixed interstitial and alveolar opacities suggesting pulmonary edema or atypical infection pattern. Early clinically.  Electronically Signed By-Chas Lopez On:8/11/2020 5:07 PM This report was finalized on [id number] by  Chas Lopez, .    Xr Chest 1 View    Result Date: 8/11/2020   1. Large infiltrate and effusion right lower lobe and right midlung zone has gotten worse since previous study. 2. Small infiltrate small pleural effusion inferior medial aspect left lower lobe lung unchanged. 3. Diffuse mild infiltrate throughout the right upper lobe and throughout the left lung unchanged.  Electronically Signed By-DR. Bob Mcgee MD On:8/11/2020 7:28 AM This report was finalized on [id number] by DR. Bob Mcgee MD.    Us Thoracentesis    Result Date: 8/11/2020  IMPRESSION : Ultrasound-guided diagnostic and therapeutic right thoracentesis procedure. 2050 cc of dark bloody fluid obtained.  Electronically Signed By-Chas Lopez On:8/11/2020 5:03 PM This report was finalized on [id number] by  Chas Lopez .          Xrays, labs reviewed personally by physician.    Medication Review:   I have reviewed the patient's current medication list      Scheduled Meds    baclofen 10 mg Oral Daily   carbidopa-levodopa 1.5 tablet Oral Q8H   enoxaparin 1 mg/kg Subcutaneous Q12H   escitalopram 10 mg Oral Daily   furosemide 40 mg Intravenous BID   gabapentin 300 mg Oral Nightly   levoFLOXacin 750 mg Oral Q24H   nystatin  Topical Q12H   pantoprazole 40 mg Oral QAM   potassium chloride 20 mEq Oral TID   pramipexole 0.25 mg Oral Q24H   rosuvastatin 10 mg Oral Nightly   sodium chloride 10 mL  Intravenous Q12H   warfarin 2 mg Oral Daily       Meds Infusions    hold 1 each   Pharmacy to dose warfarin    Pharmacy to dose warfarin        Meds PRN  •  acetaminophen **OR** acetaminophen **OR** acetaminophen  •  hold  •  ondansetron **OR** ondansetron  •  Pharmacy to dose warfarin  •  Pharmacy to dose warfarin  •  potassium chloride **OR** potassium chloride **OR** potassium chloride  •  sodium chloride  •  sodium chloride        Assessment/Plan   Assessment/Plan     Active Hospital Problems    Diagnosis  POA   • **Acute respiratory distress [R06.03]  Yes   • Atrial fibrillation, chronic [I48.20]  Yes     Priority: High   • Aortic stenosis [I35.0]  Yes     Priority: High   • GERD (gastroesophageal reflux disease) [K21.9]  Yes     Priority: Medium   • Hyperlipidemia [E78.5]  Yes     Priority: Medium   • Essential hypertension [I10]  Yes     Priority: Medium   • Bilateral leg edema [R60.0]  Yes     Priority: Medium   • Parkinson's disease (CMS/HCC) [G20]  Yes     Priority: Medium   • Pacemaker [Z95.0]  Yes     Priority: Medium   • Iron deficiency anemia due to chronic blood loss [D50.0]  Yes     Priority: Low   • Pneumonia of right lung due to infectious organism [J18.9]  Unknown   • Weakness [R53.1]  Yes   • Pleural effusion, right [J90]  Yes   • Acute on chronic combined systolic and diastolic CHF (congestive heart failure) (CMS/HCC) [I50.43]  Yes   • Sleep apnea [G47.30]  Yes   • RLS (restless legs syndrome) [G25.81]  Yes   • Chronic pain [G89.29]  Yes   • Acute UTI (urinary tract infection) [N39.0]  Yes   • Overactive bladder [N32.81]  Yes   • Anxiety disorder [F41.9]  Yes   • 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       MEDICAL DECISION MAKING COMPLEXITY BY PROBLEM:   1.  Acute hypoxemic respiratory failure  -This is most likely secondary to  the patient's large pleural effusion  -We will do a CT of the chest without contrast to see what was underlying the effusion       -The patient's CT scan done postthoracentesis continues to show large right pleural effusion.  There was also some fluid within the esophagus.  It was suggested that the patient may have aspirated, however the patient passed a video swallow study making this much less likely.            -Chest x-ray in the a.m.  -Continue with breathing treatments and oxygen therapy  -The patient is also on Lasix which will be continued  -Walking oximetry prior to discharge  -Repeat chest x-ray this a.m. shows only a small right pleural effusion indicating improvement with diuresis.    2.  Right-sided pneumonia  -This is a healthcare associated pneumonia and is being treated with cefepime and vancomycin intravenously pending cultures  -Pleural fluid was sent for both cytology as well as for culture of various types.       -Pleural fluid cultures are negative and cytology is negative as well.  -Today's chest x-ray continues to show some bibasilar airspace disease but the effusions have markedly improved.    3.  Acute on chronic biventricular failure currently exacerbated  -Trend troponins  -Intravenous Lasix following BNP as well as electrolytes and renal function    4.  Right pleural effusion  -This has been drained and studies are pending.  There are no positive cultures at this time.  -Repeat chest x-ray in the a.m.  -Patient's effusion appears to be exudative of in nature.  It was grossly bloody with a high protein count and high LDH.         -We will ask pulmonary medicine to consult to see if he needs a chest tube placed.  The pH on the pleural fluid was 7.0.  -We will start the patient's on Lasix 40 mg IV twice daily today and follow his BNP and clinical picture.            -The diuresis seems to be helping the effusion tremendously.  We will continue and monitor his renal function and electrolytes  closely.    5.  Urinary tract infection with a gram-negative bacillus to be identified and sensitivities are pending  -Continue cefepime  -Discontinue Lee and start voiding trial.    6.  Generalized weakness  -Physical therapy consult for evaluation and recommendations    7.  BRONSON  -Hemoglobin is currently stable  -Patient was treated for an AVM on 5/21/2020 and had a colonoscopy and EGD at that time  -Continue iron supplementation    8.  Hypertension  -Well-controlled at present    9.  Hyperlipidemia  -Continue home statin therapy    10.  Parkinson's disease  -Continue Parkinson's medication (Sinemet)    11.  Gastroesophageal reflux disease  -Continue PPI    12.  Depression  -Continue patient's Lexapro    13.  Restless leg syndrome  -Continue Mirapex    14.  Overactive bladder  -Continue oxybutynin    15.  Chronic pain syndrome  -Continue Neurontin and baclofen    The patient's precertification to enter [facility] has still not been approved.  This is the barrier to his discharge.    VTE Prophylaxis -   Mechanical Order History:     None      Pharmalogical Order History:     Ordered     Dose Route Frequency Stop    08/12/20 0958  warfarin (COUMADIN) tablet 2 mg     Question:  Target INR  Answer:  2 - 3    2 mg PO Once (Warfarin) --    08/12/20 0958  enoxaparin (LOVENOX) syringe 90 mg      1 mg/kg SC Every 12 Hours --    08/11/20 0621  warfarin (COUMADIN) tablet 2 mg  Status:  Discontinued     Question:  Target INR  Answer:  2 - 3    2 mg PO Daily Warfarin 08/11/20 0947    08/11/20 0438  enoxaparin (LOVENOX) syringe 40 mg  Status:  Discontinued      40 mg SC Every 24 Hours 08/11/20 0947    08/11/20 0621  Pharmacy to dose warfarin     Question:  Target INR  Answer:  2 - 3    -- XX Continuous PRN --        Code Status -   Code Status and Medical Interventions:   Ordered at: 08/11/20 0438     Code Status:    CPR     Medical Interventions (Level of Support Prior to Arrest):    Full     This patient has been examined  wearing appropriate Personal Protective Equipment and discussed with hospital infection control department. 08/16/20    Discharge Planning  May need placement    Electronically signed by Kim Childs MD, 08/16/20, 16:24.  [facility] Hospitalist Team

## 2020-08-16 NOTE — PROGRESS NOTES
"Pharmacy dosing service  Anticoagulant  Warfarin     Subjective:    Rafael Ivey is a 84 y.o.male being continued on warfarin for atrial fibrillation.    INR Goal: 2 - 3  Home medication?: Yes, warfarin 2 mg PO every day at 1800, except Sunday  Bridge Therapy Present?:  Yes,  Lovenox 90 mg SQ Q12H  Additional Contributing Factors: acute illness      Assessment/Plan:    INR inc to 1.75 today. Will continue warfarin dose to 2 mg daily.      Continue to monitor and adjust based on INR.           Date 8/11 8/12 8/13 8/14 8/15 8/16      INR 1.72 1.71 1.41 1.52 1.7 1.75      Dose ------ 2 mg 3 mg 2.5 mg 2mg 2mg          Objective:  [Ht: 182.9 cm (72\"); Wt: 89.9 kg (198 lb 3.1 oz); BMI: Body mass index is 26.88 kg/m².]    Lab Results   Component Value Date    ALBUMIN 4.00 08/11/2020     Lab Results   Component Value Date    INR 1.75 (L) 08/16/2020    INR 1.70 (L) 08/15/2020    INR 1.52 (L) 08/14/2020    PROTIME 18.5 (L) 08/16/2020    PROTIME 18.0 (L) 08/15/2020    PROTIME 16.0 (L) 08/14/2020     Lab Results   Component Value Date    HGB 9.7 (L) 08/16/2020    HGB 9.7 (L) 08/15/2020    HGB 9.4 (L) 08/14/2020     Lab Results   Component Value Date    HCT 30.1 (L) 08/16/2020    HCT 29.9 (L) 08/15/2020    HCT 28.8 (L) 08/14/2020       Rolando Young, PharmD  08/16/20 15:36               "

## 2020-08-17 VITALS
RESPIRATION RATE: 21 BRPM | HEART RATE: 80 BPM | HEIGHT: 72 IN | TEMPERATURE: 98.1 F | DIASTOLIC BLOOD PRESSURE: 56 MMHG | SYSTOLIC BLOOD PRESSURE: 118 MMHG | BODY MASS INDEX: 27.59 KG/M2 | OXYGEN SATURATION: 96 % | WEIGHT: 203.71 LBS

## 2020-08-17 LAB
ANION GAP SERPL CALCULATED.3IONS-SCNC: 6 MMOL/L (ref 5–15)
BASOPHILS # BLD AUTO: 0.1 10*3/MM3 (ref 0–0.2)
BASOPHILS NFR BLD AUTO: 1.2 % (ref 0–1.5)
BUN SERPL-MCNC: 16 MG/DL (ref 8–23)
BUN SERPL-MCNC: ABNORMAL MG/DL
BUN/CREAT SERPL: ABNORMAL
CALCIUM SPEC-SCNC: 9 MG/DL (ref 8.6–10.5)
CHLORIDE SERPL-SCNC: 100 MMOL/L (ref 98–107)
CO2 SERPL-SCNC: 34 MMOL/L (ref 22–29)
CREAT SERPL-MCNC: 1.03 MG/DL (ref 0.76–1.27)
DEPRECATED RDW RBC AUTO: 47.7 FL (ref 37–54)
EOSINOPHIL # BLD AUTO: 0.2 10*3/MM3 (ref 0–0.4)
EOSINOPHIL NFR BLD AUTO: 3.7 % (ref 0.3–6.2)
ERYTHROCYTE [DISTWIDTH] IN BLOOD BY AUTOMATED COUNT: 15.5 % (ref 12.3–15.4)
GFR SERPL CREATININE-BSD FRML MDRD: 69 ML/MIN/1.73
GLUCOSE SERPL-MCNC: 90 MG/DL (ref 65–99)
HCT VFR BLD AUTO: 30 % (ref 37.5–51)
HGB BLD-MCNC: 10 G/DL (ref 13–17.7)
INR PPP: 2.03 (ref 2–3)
LYMPHOCYTES # BLD AUTO: 0.9 10*3/MM3 (ref 0.7–3.1)
LYMPHOCYTES NFR BLD AUTO: 20.1 % (ref 19.6–45.3)
MCH RBC QN AUTO: 28.1 PG (ref 26.6–33)
MCHC RBC AUTO-ENTMCNC: 33.3 G/DL (ref 31.5–35.7)
MCV RBC AUTO: 84.6 FL (ref 79–97)
MONOCYTES # BLD AUTO: 0.7 10*3/MM3 (ref 0.1–0.9)
MONOCYTES NFR BLD AUTO: 14.1 % (ref 5–12)
NEUTROPHILS NFR BLD AUTO: 2.8 10*3/MM3 (ref 1.7–7)
NEUTROPHILS NFR BLD AUTO: 60.9 % (ref 42.7–76)
NRBC BLD AUTO-RTO: 0.1 /100 WBC (ref 0–0.2)
NT-PROBNP SERPL-MCNC: 2297 PG/ML (ref 0–1800)
PLAT MORPH BLD: NORMAL
PLATELET # BLD AUTO: 260 10*3/MM3 (ref 140–450)
PMV BLD AUTO: 6.4 FL (ref 6–12)
POIKILOCYTOSIS BLD QL SMEAR: NORMAL
POLYCHROMASIA BLD QL SMEAR: NORMAL
POTASSIUM SERPL-SCNC: 3.5 MMOL/L (ref 3.5–5.2)
PROTHROMBIN TIME: 21.1 SECONDS (ref 19.4–28.5)
RBC # BLD AUTO: 3.54 10*6/MM3 (ref 4.14–5.8)
SODIUM SERPL-SCNC: 140 MMOL/L (ref 136–145)
WBC # BLD AUTO: 4.7 10*3/MM3 (ref 3.4–10.8)
WBC MORPH BLD: NORMAL

## 2020-08-17 PROCEDURE — 85610 PROTHROMBIN TIME: CPT | Performed by: NURSE PRACTITIONER

## 2020-08-17 PROCEDURE — 85007 BL SMEAR W/DIFF WBC COUNT: CPT | Performed by: INTERNAL MEDICINE

## 2020-08-17 PROCEDURE — 25010000002 FUROSEMIDE PER 20 MG: Performed by: INTERNAL MEDICINE

## 2020-08-17 PROCEDURE — 80048 BASIC METABOLIC PNL TOTAL CA: CPT | Performed by: INTERNAL MEDICINE

## 2020-08-17 PROCEDURE — 99239 HOSP IP/OBS DSCHRG MGMT >30: CPT | Performed by: HOSPITALIST

## 2020-08-17 PROCEDURE — 85025 COMPLETE CBC W/AUTO DIFF WBC: CPT | Performed by: INTERNAL MEDICINE

## 2020-08-17 PROCEDURE — 92526 ORAL FUNCTION THERAPY: CPT

## 2020-08-17 PROCEDURE — 83880 ASSAY OF NATRIURETIC PEPTIDE: CPT | Performed by: INTERNAL MEDICINE

## 2020-08-17 RX ORDER — LEVOFLOXACIN 750 MG/1
750 TABLET ORAL EVERY 24 HOURS
Qty: 3 TABLET | Refills: 0 | Status: SHIPPED | OUTPATIENT
Start: 2020-08-18 | End: 2020-08-17

## 2020-08-17 RX ORDER — LEVOFLOXACIN 750 MG/1
750 TABLET ORAL EVERY 24 HOURS
Qty: 3 TABLET | Refills: 0
Start: 2020-08-18 | End: 2020-08-21

## 2020-08-17 RX ORDER — WARFARIN SODIUM 2 MG/1
2 TABLET ORAL NIGHTLY
Start: 2020-08-17

## 2020-08-17 RX ORDER — WARFARIN SODIUM 3 MG/1
1.5 TABLET ORAL
Status: DISCONTINUED | OUTPATIENT
Start: 2020-08-17 | End: 2020-08-17 | Stop reason: HOSPADM

## 2020-08-17 RX ADMIN — POTASSIUM CHLORIDE 20 MEQ: 1500 TABLET, EXTENDED RELEASE ORAL at 08:50

## 2020-08-17 RX ADMIN — ESCITALOPRAM OXALATE 10 MG: 10 TABLET ORAL at 08:50

## 2020-08-17 RX ADMIN — LEVOFLOXACIN 750 MG: 750 TABLET, FILM COATED ORAL at 10:54

## 2020-08-17 RX ADMIN — CARBIDOPA AND LEVODOPA 1.5 TABLET: 25; 100 TABLET ORAL at 05:23

## 2020-08-17 RX ADMIN — PRAMIPEXOLE DIHYDROCHLORIDE 0.25 MG: 0.25 TABLET ORAL at 08:50

## 2020-08-17 RX ADMIN — FUROSEMIDE 40 MG: 10 INJECTION, SOLUTION INTRAMUSCULAR; INTRAVENOUS at 08:51

## 2020-08-17 RX ADMIN — Medication 10 ML: at 08:52

## 2020-08-17 RX ADMIN — PANTOPRAZOLE SODIUM 40 MG: 40 TABLET, DELAYED RELEASE ORAL at 08:50

## 2020-08-17 RX ADMIN — CARBIDOPA AND LEVODOPA 1.5 TABLET: 25; 100 TABLET ORAL at 14:47

## 2020-08-17 RX ADMIN — BACLOFEN 10 MG: 10 TABLET ORAL at 08:50

## 2020-08-17 RX ADMIN — NYSTATIN: 100000 POWDER TOPICAL at 08:52

## 2020-08-17 NOTE — THERAPY TREATMENT NOTE
Acute Care - Speech Language Pathology   Swallow Treatment Note  Gian     Patient Name: Rafael Ivey  : 1935  MRN: [id number]  Today's Date: 2020               Admit Date: 2020    Visit Dx:      ICD-10-CM ICD-9-CM   1. Acute respiratory distress R06.03 518.82   2. Fever, unspecified fever cause R50.9 780.60   3. Pneumonia of right lung due to infectious organism, unspecified part of lung J18.9 483.8   4. Acute congestive heart failure, unspecified heart failure type (CMS/HCC) I50.9 428.0     Patient Active Problem List   Diagnosis   • Sick sinus syndrome (CMS/[facility])   • Atrial fibrillation, chronic   • Pacemaker   • Bilateral leg edema   • Iron deficiency anemia due to chronic blood loss   • Warfarin-induced coagulopathy (CMS/[facility])   • Parkinson's disease (CMS/[facility])   • Aortic stenosis   • GERD (gastroesophageal reflux disease)   • Hyperlipidemia   • Essential hypertension   • Mitral regurgitation   • Nonrheumatic tricuspid valve disorder   • Pulmonary hypertension (CMS/[facility])   • Sleep apnea   • Valvular heart disease   • Hypotension   • Chronic diastolic CHF (congestive heart failure) (CMS/[facility])   • Anxiety disorder   • Overactive bladder   • Chronic pain   • RLS (restless legs syndrome)   • Obesity (BMI 30-39.9)   • Acute UTI (urinary tract infection)   • Hypokalemia   • Anasarca   • Acute on chronic combined systolic and diastolic CHF (congestive heart failure) (CMS/[facility])   • Depression   • Anemia   • Rectal ulceration   • Acute on chronic heart failure (CMS/[facility])   • Acute respiratory distress   • Pneumonia of right lung due to infectious organism   • Weakness   • Pleural effusion, right       Therapy Treatment  Rehabilitation Treatment Summary     Row Name 20 1204       Treatment Time/Intention    Discipline  speech language pathologist  -LF    Document Type  therapy note (daily note)  -LF    Subjective Information  no complaints  -LF    Mode of Treatment  individual therapy;speech-language  pathology  -LF    Patient/Family Observations  Pt awake and alert with lunch tray upon entry  -LF    Care Plan Review  care plan/treatment goals reviewed;patient/other agree to care plan  -LF    Patient Effort  good  -LF    Treatment Considerations/Comments  PPE worn: gloves, mask, and glasses  -LF    Recorded by [LF] Liliana Bright SLP 08/17/20 1256    Row Name 08/17/20 1204       Outcome Summary/Treatment Plan (SLP)    Daily Summary of Progress (SLP)  progress toward functional goals is good  -LF    Plan for Continued Treatment (SLP)  Recomend pt continue mechanical soft and thin liquid diet (no straws).  -LF    Recorded by [LF] Liliana Bright SLP 08/17/20 1256      User Key  (r) = Recorded By, (t) = Taken By, (c) = Cosigned By    Initials Name Effective Dates Discipline    KK Janis Rocha RN 03/01/19 -  Nurse    LF Liliana Bright SLP 01/02/20 -  SLP          Outcome Summary  Outcome Summary/Treatment Plan (SLP)  Daily Summary of Progress (SLP): progress toward functional goals is good (08/17/20 1204 : Liliana Bright, SLP)  Plan for Continued Treatment (SLP): Recomend pt continue mechanical soft and thin liquid diet (08/17/20 1204 : Liliana Bright, SLP)      SLP GOALS     Row Name 08/17/20 1200       Oral Nutrition/Hydration Goal 1 (SLP)    Oral Nutrition/Hydration Goal 1, SLP  Pt will have VFSS with 24-48 hous.   -LF    Progress/Outcomes (Oral Nutrition/Hydration Goal 1, SLP)  goal met  -LF       Oral Nutrition/Hydration Goal 2 (SLP)    Oral Nutrition/Hydration Goal 2, SLP  Pt will tolerate safest and L/R diet/liquids with no complications of aspiration.   -LF    Time Frame (Oral Nutrition/Hydration Goal 2, SLP)  by discharge  -LF    Barriers (Oral Nutrition/Hydration Goal 2, SLP)  Pt seen with lunch tray this date to assess diet tolerance. Lunch tray included ground turkey and gravy, mashed potatoes, stuffing, fruit, strawberry pie, and cranberry juice by cup. Pt displayed extended but functional  mastication. No anterior labial spillage, pocketing, or residue noted. No overt s/s of aspiration observed at any time. Pt self fed and independently demonstrated safe swallow strategies  -LF    Progress/Outcomes (Oral Nutrition/Hydration Goal 2, SLP)  goal ongoing;good progress toward goal  -LF      User Key  (r) = Recorded By, (t) = Taken By, (c) = Cosigned By    Initials Name Provider Type    LF Liliana Bright SLP Speech and Language Pathologist          EDUCATION  The patient has been educated in the following areas:   Modified Diet Instruction.    SLP Recommendation and Plan  Daily Summary of Progress (SLP): progress toward functional goals is good     Plan for Continued Treatment (SLP): Recomend pt continue mechanical soft and thin liquid diet                       Time Calculation:       Therapy Charges for Today     Code Description Service Date Service Provider Modifiers Qty    [id number]  ST TREATMENT SWALLOW 3 8/17/2020 Liliana Bright, SLP GN 1                 RONDA Lauren  8/17/2020

## 2020-08-17 NOTE — NURSING NOTE
Called wife family is coming to transport pt to [facility] they have O2 for pt they said it will be around 5 pm

## 2020-08-17 NOTE — DISCHARGE SUMMARY
[facility] Medicine Services  DISCHARGE SUMMARY        Prepared For PCP:  Blake Ivey MD    Patient Name: Rafael Ivey  : 1935  MRN: [id number]      Date of Admission:   2020    Date of Discharge:  2020    Length of stay:  LOS: 6 days     Hospital Course     Presenting Problem:   Acute respiratory distress [R06.03]  Fever, unspecified fever cause [R50.9]  Pneumonia of right lung due to infectious organism, unspecified part of lung [J18.9]  Acute congestive heart failure, unspecified heart failure type (CMS/HCC) [I50.9]      Active Hospital Problems    Diagnosis  POA   • **Acute respiratory distress [R06.03]  Yes   • Acute on chronic combined systolic and diastolic CHF (congestive heart failure) (CMS/HCC) [I50.43]  Yes     Priority: High   • Hyperlipidemia [E78.5]  Yes     Priority: High   • Sleep apnea [G47.30]  Yes     Priority: High   • Bilateral leg edema [R60.0]  Yes     Priority: High   • Pulmonary hypertension (CMS/HCC) [I27.20]  Yes     Priority: High   • Iron deficiency anemia due to chronic blood loss [D50.0]  Yes     Priority: Medium   • Pneumonia of right lung due to infectious organism [J18.9]  Yes   • Weakness [R53.1]  Yes   • Pleural effusion, right [J90]  Yes   • GERD (gastroesophageal reflux disease) [K21.9]  Yes   • Essential hypertension [I10]  Yes   • RLS (restless legs syndrome) [G25.81]  Yes   • Chronic pain [G89.29]  Yes   • Acute UTI (urinary tract infection) [N39.0]  Yes   • Overactive bladder [N32.81]  Yes   • Anxiety disorder [F41.9]  Yes   • Parkinson's disease (CMS/HCC) [G20]  Yes   • Atrial fibrillation, chronic [I48.20]  Yes   • Pacemaker [Z95.0]  Yes   • Aortic stenosis [I35.0]  Yes   • 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       1.  Acute hypoxemic respiratory failure  -This is most likely secondary to the  patient's large pleural effusion  -We will do a CT of the chest without contrast to see what was underlying the effusion       -The patient's CT scan done postthoracentesis continues to show large right pleural effusion.  There was also some fluid within the esophagus.  It was suggested that the patient may have aspirated, however the patient passed a video swallow study making this much less likely.            -Chest x-ray in the a.m.  -Continue with breathing treatments and oxygen therapy  -The patient is also on Lasix which will be continued  -Walking oximetry prior to discharge  -Repeat chest x-ray this a.m. shows only a small right pleural effusion indicating improvement with diuresis.     2.  Right-sided pneumonia  -This is a healthcare associated pneumonia and is being treated with cefepime and vancomycin intravenously pending cultures  -Pleural fluid was sent for both cytology as well as for culture of various types.       -Pleural fluid cultures are negative and cytology is negative as well.  -Today's chest x-ray continues to show some bibasilar airspace disease but the effusions have markedly improved.     3.  Acute on chronic biventricular failure currently exacerbated  -Trend troponins  -Intravenous Lasix following BNP as well as electrolytes and renal function     4.  Right pleural effusion  -This has been drained and studies are negative for infection or malignancy.   -Patient's effusion appears to be exudative in nature.  It was grossly bloody with a high protein count and high LDH.  The pH on the pleural fluid was 7.0.  -Patient diuresed well on Lasix 40 mg IV twice daily      5.  Urinary tract infection due to a pansensitive E. Coli  -cefepime changed to Levaquin  -Discontinue Lee     6.  Generalized weakness  -Physical therapy consulted and recommended inpatient rehab     7.  BRONSON  -Hemoglobin is currently stable  -Patient was treated for an AVM on 5/21/2020 and had a colonoscopy and EGD at that  time  -Continue iron supplementation     8.  Essential Hypertension, chronic  -Well-controlled at present     9.  Hyperlipidemia  -Continue home statin therapy     10.  Parkinson's disease  -Continue Parkinson's medication (Sinemet)     11.  Gastroesophageal reflux disease  -Continue PPI     12.  Depression  -Continue Lexapro     13.  Restless leg syndrome  -Continue Mirapex     14.  Overactive bladder  -Continue oxybutynin     15.  Chronic pain syndrome  -Continue Neurontin and baclofen    Hospital Course:  Rafael Ivey is a 84 y.o. male with multiple diagnoses who presented with increasing shortness of breath and was noted to have a large right-sided pleural effusion.  The patient underwent a thoracentesis on the day of admission which is pending in terms of cultures and studies at this time.  The patient is markedly improved.  Patient is much less short of breath.  The patient remains extremely weak.  He also has underlying Parkinson's disease as well.     Date:  8/13/2020  Overall the patient feels somewhat better.  He still has some shortness of breath.  His BNP is more elevated today.     8/14/2020  Overall the patient still has some shortness of breath.  The patient also has a nonproductive cough.  The patient's effusion has reappeared.     8/15/2020  Patient reports feeling much better today.  He is less short of breath.  He reports no new complaints.     8/16/2020  The patient continues to show improvement.  He offers no new complaints.  His cough is markedly improved over admission as is his dyspnea.     8/17/2020  Patient reports feeling better.  He denies shortness of breath or cough.  He denies chest pain, GI or  complaints.  He is anxious to discharge to rehab and is felt to be stable for discharge.        Day of Discharge     HPI: Patient reports feeling better and he is anxious to discharge to rehab.      Vital Signs:   Temp:  [97.9 °F (36.6 °C)-99 °F (37.2 °C)] 98.4 °F (36.9 °C)  Heart Rate:   [70-77] 71  Resp:  [13-22] 13  BP: (115-129)/(56-68) 115/56     Physical Exam:  Physical Exam  Well-developed well-nourished elderly gentleman comfortable on nasal cannula O2 in no acute distress sitting up in bed awake and alert; mucous membranes moist; sclerae anicteric; lungs with scattered soft rhonchi and decreased air entry bilaterally; CV regular rate and rhythm; abdomen soft nontender nondistended with active bowel sounds; extremities with no edema, cyanosis or calf tenderness; palpable pedal pulses bilaterally; no Lee catheter.  Pertinent  and/or Most Recent Results     Results from last 7 days   Lab Units 08/17/20 0440 08/16/20  0503 08/15/20  0403 08/14/20  0126 08/13/20 2003 08/13/20  0755 08/12/20 0442 08/11/20  0114   WBC 10*3/mm3 4.70 3.70 3.90 4.90  --  5.20 7.10 13.30*   HEMOGLOBIN g/dL 10.0* 9.7* 9.7* 9.4*  --  9.5* 9.1* 10.5*   HEMATOCRIT % 30.0* 30.1* 29.9* 28.8*  --  29.5* 27.9* 32.9*   PLATELETS 10*3/mm3 260 259 270 281  --  284 259 373   SODIUM mmol/L 140 141 141 141  --  144 143 140   POTASSIUM mmol/L 3.5 3.6 3.4* 3.5 4.1 3.8 3.3* 3.4*   CHLORIDE mmol/L 100 102 102 104  --  109* 106 101   CO2 mmol/L 34.0* 33.0* 30.0* 25.0  --  28.0 27.0 26.0   BUN  16 15 15 19  --  22 20 19   CREATININE mg/dL 1.03 0.95 0.90 0.88  --  0.89 0.95 1.13   GLUCOSE mg/dL 90 93 94 114*  --  117* 94 107*   CALCIUM mg/dL 9.0 8.5* 8.5* 8.1*  --  8.8 8.3* 9.3     Results from last 7 days   Lab Units 08/17/20  0440 08/16/20  0503 08/15/20  0403 08/14/20  0126 08/13/20  0755 08/12/20  0442 08/11/20  0826 08/11/20  0114   BILIRUBIN mg/dL  --   --   --   --   --   --   --  1.2   ALK PHOS U/L  --   --   --   --   --   --   --  188*   ALT (SGPT) U/L  --   --   --   --   --   --   --  8   AST (SGOT) U/L  --   --   --   --   --   --   --  35   PROTIME Seconds 21.1 18.5* 18.0* 16.0* 15.1* 17.9* 19.0* 18.2*   INR  2.03 1.75* 1.70* 1.52* 1.41* 1.71* 1.80* 1.72*           Invalid input(s): TG, LDLCALC, LDLREALC  Results from  last 7 days   Lab Units 08/17/20  0440 08/16/20  0503 08/15/20  0403 08/14/20  1510  08/12/20  0010 08/11/20  1718 08/11/20  0116 08/11/20  0114   PROBNP pg/mL 2,297.0* 2,569.0* 3,322.0*  --    < > 2,754.0*  --   --  2,415.0*   TROPONIN T ng/mL  --   --   --   --   --  0.050* 0.053*  --  0.052*   PROCALCITONIN ng/mL  --   --   --  0.17  --   --   --   --   --    LACTATE mmol/L  --   --   --   --   --   --   --  1.9  --     < > = values in this interval not displayed.       Brief Urine Lab Results  (Last result in the past 365 days)      Color   Clarity   Blood   Leuk Est   Nitrite   Protein   CREAT   Urine HCG        08/11/20 0216 Yellow Clear Trace Small (1+) Positive Negative               Microbiology Results Abnormal     Procedure Component Value - Date/Time    AFB Culture - Body Fluid, Pleural Cavity [[id number]] Collected:  08/11/20 1622    Lab Status:  Preliminary result Specimen:  Body Fluid from Pleural Cavity Updated:  08/16/20 1715     AFB Culture No AFB isolated at less than 1 week     AFB Stain No acid fast bacilli seen    Fungus Culture - Body Fluid, Pleural Cavity [[id number]] Collected:  08/11/20 1622    Lab Status:  Preliminary result Specimen:  Body Fluid from Pleural Cavity Updated:  08/16/20 1715     Fungus Culture No fungus isolated at less than 1 week    Body Fluid Culture - Body Fluid, Pleural Cavity [[id number]] Collected:  08/11/20 1622    Lab Status:  Final result Specimen:  Body Fluid from Pleural Cavity Updated:  08/16/20 0632     Body Fluid Culture No growth at 5 days     Gram Stain Moderate (3+) WBCs seen      No organisms seen    Anaerobic Culture - Pleural Fluid, Pleural Cavity [[id number]] Collected:  08/11/20 1622    Lab Status:  Final result Specimen:  Pleural Fluid from Pleural Cavity Updated:  08/16/20 0631     Anaerobic Culture No anaerobes isolated at 5 days    Blood Culture - Blood, Arm, Right [[id number]] Collected:  08/11/20 0120    Lab Status:  Final result Specimen:  Blood from  Arm, Right Updated:  08/16/20 0130     Blood Culture No growth at 5 days    Blood Culture - Blood, Arm, Left [[id number]] Collected:  08/11/20 0114    Lab Status:  Final result Specimen:  Blood from Arm, Left Updated:  08/16/20 0130     Blood Culture No growth at 5 days    MRSA Screen, PCR (Inpatient) - Swab, Nares [[id number]]  (Normal) Collected:  08/14/20 0908    Lab Status:  Final result Specimen:  Swab from Nares Updated:  08/14/20 1102     MRSA PCR No MRSA Detected    Urine Culture - Urine, Urine, Catheter [[id number]]  (Abnormal)  (Susceptibility) Collected:  08/11/20 0216    Lab Status:  Final result Specimen:  Urine, Catheter Updated:  08/13/20 0348     Urine Culture >100,000 CFU/mL Escherichia coli    Susceptibility      Escherichia coli     MOY     Ampicillin Susceptible     Ampicillin + Sulbactam Susceptible     Cefazolin Susceptible     Cefepime Susceptible     Ceftazidime Susceptible     Ceftriaxone Susceptible     Gentamicin Susceptible     Levofloxacin Susceptible     Nitrofurantoin Susceptible     Piperacillin + Tazobactam Susceptible     Tetracycline Susceptible     Trimethoprim + Sulfamethoxazole Susceptible                    Fungus Smear - Body Fluid, Pleural Cavity [[id number]] Collected:  08/11/20 1622    Lab Status:  Final result Specimen:  Body Fluid from Pleural Cavity Updated:  08/12/20 1131     Fungal Stain No fungal elements seen    Respiratory Panel PCR w/COVID-19(SARS-CoV-2) [facility] In-House, NP Swab in UTM/VTM, 3-4 HR TAT - Swab, Nasopharynx [[id number]]  (Normal) Collected:  08/11/20 0121    Lab Status:  Final result Specimen:  Swab from Nasopharynx Updated:  08/11/20 0325     ADENOVIRUS, PCR Not Detected     Coronavirus 229E Not Detected     Coronavirus HKU1 Not Detected     Coronavirus NL63 Not Detected     Coronavirus OC43 Not Detected     COVID19 Not Detected     Human Metapneumovirus Not Detected     Human Rhinovirus/Enterovirus Not Detected     Influenza A PCR Not Detected      Influenza A H1 Not Detected     Influenza A H1 2009 PCR Not Detected     Influenza A H3 Not Detected     Influenza B PCR Not Detected     Parainfluenza Virus 1 Not Detected     Parainfluenza Virus 2 Not Detected     Parainfluenza Virus 3 Not Detected     Parainfluenza Virus 4 Not Detected     RSV, PCR Not Detected     Bordetella pertussis pcr Not Detected     Bordetella parapertussis PCR Not Detected     Chlamydophila pneumoniae PCR Not Detected     Mycoplasma pneumo by PCR Not Detected    Narrative:       Fact sheet for providers: https://docs.[employer]/wp-content/uploads/THK1258-0762-RG5.1-EUA-Provider-Fact-Sheet-3.pdf    Fact sheet for patients: https://docs.[employer]/wp-content/uploads/PYW4646-3541-AW6.1-EUA-Patient-Fact-Sheet-1.pdf          Ct Chest Without Contrast    Result Date: 8/12/2020  Impression: 1. Patchy airspace disease in both lung bases, likely representing pneumonia. 2. Large right and small left pleural effusions. 3. Fluid within the esophagus. This could represent evidence of reflux. Consider aspiration in this context. 4. Cardiomegaly and atherosclerosis. 5. Age-indeterminate superior endplate fractures of T11, T12, L1, L3 and L4. 6. Additional chronic findings as above. Electronically signed by:  Gonzalo Lipscomb M.D.  8/12/2020 9:28 PM    Fl Video Swallow With Speech Single Contrast    Result Date: 8/12/2020  Impression: Negative for aspiration. Please see the speech report for additional detailed findings.  Electronically Signed By-Jason Dan On:8/12/2020 2:27 PM This report was finalized on [id number] by  Jason Dan .    Xr Chest 1 View    Result Date: 8/16/2020  Impression:   1.  Stable cardiomegaly. 2.  No change in bibasilar airspace disease and small right pleural effusion.   Electronically Signed By-Justin Willis On:8/16/2020 12:10 PM This report was finalized on [id number] by  Justin Willis, .    Xr Chest 1 View    Result Date: 8/15/2020  Impression: 1.Moderate  right and small left pleural effusions, as before. 2.Right greater than left basilar predominant opacities, as before, which could be related to edema and/or atelectasis or other infiltrate. 3.Cardiomegaly.  Electronically Signed By-DR. Miguelangel Badillo MD On:8/15/2020 5:06 PM This report was finalized on [id number] by DR. Miguelangel Badillo MD.    Xr Chest 1 View    Result Date: 8/14/2020  Impression: 1. Stable cardiomegaly with findings which could relate to pulmonary edema or multifocal pneumonia. 2. Moderate right and small left pleural effusions with persistent bibasilar airspace disease.  Electronically Signed By-Jason Dan On:8/14/2020 7:35 AM This report was finalized on [id number] by  Jason Dan .    Xr Chest 1 View    Result Date: 8/12/2020  Impression: 1. Stable cardiomegaly and pulmonary edema. 2. Persistent bibasilar airspace disease with small right pleural effusion. 3. No pneumothorax.  Electronically Signed By-Jason Dan On:8/12/2020 8:07 AM This report was finalized on [id number] by  Jason Dan, .    Xr Chest 1 View    Result Date: 8/11/2020  Impression:  1. No evidence of postthoracentesis pneumothorax. 2. Small amount of residual right pleural fluid with overlying infiltrate or atelectasis. 3. Cardiomegaly with pulmonary vascular congestion. 4. Diffuse perihilar and left basilar mixed interstitial and alveolar opacities suggesting pulmonary edema or atypical infection pattern. Early clinically.  Electronically Signed By-Chas Lopez On:8/11/2020 5:07 PM This report was finalized on [id number] by  Chas Lopez, .    Xr Chest 1 View    Result Date: 8/11/2020  Impression:  1. Large infiltrate and effusion right lower lobe and right midlung zone has gotten worse since previous study. 2. Small infiltrate small pleural effusion inferior medial aspect left lower lobe lung unchanged. 3. Diffuse mild infiltrate throughout the right upper lobe and throughout the left lung unchanged.   Electronically Signed By-DR. Bob Mcgee MD On:8/11/2020 7:28 AM This report was finalized on [id number] by DR. Bob Mcgee MD.    Us Thoracentesis    Result Date: 8/11/2020  Impression: IMPRESSION : Ultrasound-guided diagnostic and therapeutic right thoracentesis procedure. 2050 cc of dark bloody fluid obtained.  Electronically Signed By-Chas Lopez On:8/11/2020 5:03 PM This report was finalized on [id number] by  Chas Lopez, .      Results for orders placed during the hospital encounter of 06/12/20   Duplex Venous Lower Extremity - Bilateral CAR    Narrative · Normal bilateral lower extremity venous duplex scan.  · Left popliteal fossa heterogenous fluid collection.          Results for orders placed during the hospital encounter of 06/12/20   Duplex Venous Lower Extremity - Bilateral CAR    Narrative · Normal bilateral lower extremity venous duplex scan.  · Left popliteal fossa heterogenous fluid collection.          Results for orders placed during the hospital encounter of 05/20/20   Adult Transesophageal Echo (PRESTON) W/ Cont if Necessary Per Protocol    Narrative · Left ventricular systolic function is normal.  · Left atrial cavity size is mild-to-moderately dilated.  · Moderate mitral valve regurgitation is present  · Left atrial appendage diameter was 33 mm at 135 degree which was very   large and not conducive to implantation of watchman device. Depth was 28   mm. After discussion, it was decided that anatomy of left atrial appendage   is not conducive and appropriate for watchman device implantation.   Procedure was aborted                  Test Results Pending at Discharge   Order Current Status    AFB Culture - Body Fluid, Pleural Cavity Preliminary result    Fungus Culture - Body Fluid, Pleural Cavity Preliminary result            Procedures Performed           Consults:   Consults     Date and Time Order Name Status Description    8/14/2020 0919 Inpatient Pulmonology Consult  Completed     8/11/2020 0216 Hospitalist (on-call MD unless specified) Completed             Discharge Details        Discharge Medications      New Medications      Instructions Start Date   levoFLOXacin 750 MG tablet  Commonly known as:  LEVAQUIN   750 mg, Oral, Every 24 Hours   Start Date:  August 18, 2020        Changes to Medications      Instructions Start Date   potassium chloride 20 MEQ CR tablet  Commonly known as:  K-DUR,KLOR-CON  What changed:  additional instructions   20 mEq, Oral, 3 Times Daily      warfarin 2 MG tablet  Commonly known as:  COUMADIN  What changed:    · how much to take  · how to take this  · when to take this   2 mg, Oral, Nightly, Taking every day but Sunday      PHARMACY TO DOSE WARFARIN  What changed:  You were already taking a medication with the same name, and this prescription was added. Make sure you understand how and when to take each.   Does not apply, Continuous PRN         Continue These Medications      Instructions Start Date   acetaminophen 325 MG tablet  Commonly known as:  TYLENOL   650 mg, Oral, Every 6 Hours PRN      baclofen 10 MG tablet  Commonly known as:  LIORESAL   10 mg, Oral, Daily      carbidopa-levodopa  MG per tablet  Commonly known as:  SINEMET   1.5 tablets, Oral, Every 8 Hours      escitalopram 10 MG tablet  Commonly known as:  LEXAPRO   10 mg, Oral, Daily      ferrous sulfate 325 (65 FE) MG tablet   1 tablet, Oral, Every 12 Hours      FISH OIL PO   2 capsules, Oral, Daily      furosemide 40 MG tablet  Commonly known as:  LASIX   40 mg, Oral, 2 Times Daily      gabapentin 300 MG capsule  Commonly known as:  NEURONTIN   300 mg, Oral, Every Night at Bedtime      Mirapex 0.25 MG tablet  Generic drug:  pramipexole   1 tablet, Oral, Every 24 Hours      multivitamin-minerals tablet   1 tablet, Oral, Daily      nitroglycerin 0.4 MG SL tablet  Commonly known as:  NITROSTAT   0.4 mg, Sublingual, Every 5 Minutes PRN, Take no more than 3 doses in 15  minutes.      nystatin 786271 UNIT/GM powder  Commonly known as:  MYCOSTATIN   Topical, Every 12 Hours Scheduled      ondansetron 4 MG tablet  Commonly known as:  ZOFRAN   4 mg, Oral, Every 6 Hours PRN      oxybutynin 5 MG tablet  Commonly known as:  DITROPAN   5 mg, Oral, Daily      PrilOSEC 20 MG capsule  Generic drug:  omeprazole   1 capsule, Oral, Daily      rosuvastatin 10 MG tablet  Commonly known as:  CRESTOR   1 tablet, Oral, Every Night at Bedtime         Stop These Medications    clonazePAM 0.5 MG tablet  Commonly known as:  KlonoPIN            Allergies   Allergen Reactions   • Amoxicillin Diarrhea   • Cephalexin Diarrhea and Nausea And Vomiting   • Penicillins Other (See Comments)   • Amoxicillin-Pot Clavulanate Nausea And Vomiting   • Clarithromycin Nausea And Vomiting         Discharge Disposition:  Rehab Facility or Unit (DC - External)    Diet:  Hospital:  Diet Order   Procedures   • Diet Texture; Mechanical Ground; No Straws         Discharge Activity:   Activity Instructions     Activity as Tolerated              CODE STATUS:    Code Status and Medical Interventions:   Ordered at: 08/11/20 0433     Code Status:    CPR     Medical Interventions (Level of Support Prior to Arrest):    Full         Follow-up Appointments  No future appointments.    Additional Instructions for the Follow-ups that You Need to Schedule     Call MD With Problems / Concerns   As directed      Instructions: Call [phone] or email [email or web address] for problems or concerns.    Order Comments:  Instructions: Call [phone] or email [email or web address] for problems or concerns.          Discharge Follow-up with PCP   As directed       Currently Documented PCP:    Blake Ivey MD    PCP Phone Number:    [phone]     Follow Up Details:  After released from rehab                 Condition on Discharge:      Stable      This patient has been examined wearing appropriate Personal Protective  Equipment  08/17/20      Electronically signed by Sylvia Rowan MD, 08/17/20, 1:45 PM.      Time: I spent  40  minutes on this discharge activity which included face-to-face encounter with the patient/reviewing the data in the system/coordination of the care with the nursing staff as well as consultants/documentation/entering orders.

## 2020-08-17 NOTE — PROGRESS NOTES
[facility]/PULM/CC PROGRESS NOTE       SUBJECTIVE    8/15 doing well on 5 L nasal cannula.  No nausea vomiting or diarrhea.  States that shortness of breath is improving.  Coughing improving no sputum production  8/17: Feels better. O2 requirement improved  OBJECTIVE    Vitals:    08/16/20 2209 08/17/20 0224 08/17/20 0500 08/17/20 0515   BP: 129/57 127/68  117/66   Patient Position:       Pulse: 72 72  70   Resp: 18 21  16   Temp: 99 °F (37.2 °C) 98.4 °F (36.9 °C)  97.9 °F (36.6 °C)   TempSrc: Oral Oral  Oral   SpO2: 100% 92%     Weight:   92.4 kg (203 lb 11.3 oz)    Height:          Intake/Output last 3 shifts:  I/O last 3 completed shifts:  In: 240 [P.O.:240]  Out: 400 [Urine:400]  Intake/Output this shift:  No intake/output data recorded.    General Appearance:  Alert, cooperative, no distress, appears stated age  Head:  Normocephalic, without obvious abnormality, atraumatic  Eyes:  PERRL, conjunctivae/corneas clear, EOM's intact      Lungs:   Clear to auscultation bilaterally, respirations unlabored  Heart:  Regular rate and rhythm, S1 and S2 normal, no murmur, rub   or gallop  Abdomen:  Soft, non-tender, bowel sounds active   Extremities:  Extremities normal, no cyanosis or edema  Pulses: 2+ and symmetric all extremities  Skin:  No rashes or lesions  Neurologic:   Alert and oriented, no focal deficits    Scheduled Meds:    baclofen 10 mg Oral Daily   carbidopa-levodopa 1.5 tablet Oral Q8H   escitalopram 10 mg Oral Daily   furosemide 40 mg Intravenous BID   gabapentin 300 mg Oral Nightly   levoFLOXacin 750 mg Oral Q24H   nystatin  Topical Q12H   pantoprazole 40 mg Oral QAM   potassium chloride 20 mEq Oral TID   pramipexole 0.25 mg Oral Q24H   rosuvastatin 10 mg Oral Nightly   sodium chloride 10 mL Intravenous Q12H   warfarin 1.5 mg Oral Once       Continuous Infusions:    hold 1 each   Pharmacy to dose warfarin    Pharmacy to dose warfarin        PRN Meds:•  acetaminophen **OR** acetaminophen **OR** acetaminophen  •   hold  •  ondansetron **OR** ondansetron  •  Pharmacy to dose warfarin  •  Pharmacy to dose warfarin  •  potassium chloride **OR** potassium chloride **OR** potassium chloride  •  sodium chloride  •  sodium chloride     LABS    CBC  Results from last 7 days   Lab Units 08/17/20 0440 08/16/20  0503 08/15/20  0403 08/14/20  0126 08/13/20  0755 08/12/20  0442 08/11/20  0114   WBC 10*3/mm3 4.70 3.70 3.90 4.90 5.20 7.10 13.30*   RBC 10*6/mm3 3.54* 3.55* 3.50* 3.33* 3.37* 3.26* 3.79*   HEMOGLOBIN g/dL 10.0* 9.7* 9.7* 9.4* 9.5* 9.1* 10.5*   HEMATOCRIT % 30.0* 30.1* 29.9* 28.8* 29.5* 27.9* 32.9*   MCV fL 84.6 84.7 85.4 86.4 87.5 85.7 86.7   PLATELETS 10*3/mm3 260 259 270 281 284 259 373       CMP   Results from last 7 days   Lab Units 08/17/20 0440 08/16/20  0503 08/15/20  0403 08/14/20  0126 08/13/20  2003 08/13/20  0755 08/12/20  0442 08/11/20  0114   SODIUM mmol/L 140 141 141 141  --  144 143 140   POTASSIUM mmol/L 3.5 3.6 3.4* 3.5 4.1 3.8 3.3* 3.4*   CHLORIDE mmol/L 100 102 102 104  --  109* 106 101   CO2 mmol/L 34.0* 33.0* 30.0* 25.0  --  28.0 27.0 26.0   BUN  16 15 15 19  --  22 20 19   CREATININE mg/dL 1.03 0.95 0.90 0.88  --  0.89 0.95 1.13   GLUCOSE mg/dL 90 93 94 114*  --  117* 94 107*   ALBUMIN g/dL  --   --   --   --   --   --   --  4.00   BILIRUBIN mg/dL  --   --   --   --   --   --   --  1.2   ALK PHOS U/L  --   --   --   --   --   --   --  188*   AST (SGOT) U/L  --   --   --   --   --   --   --  35   ALT (SGPT) U/L  --   --   --   --   --   --   --  8       TROPONIN  Results from last 7 days   Lab Units 08/12/20  0010   TROPONIN T ng/mL 0.050*       CoAg  Results from last 7 days   Lab Units 08/17/20  0440 08/16/20  0503 08/15/20  0403 08/14/20  0126 08/13/20  0755 08/12/20  0442 08/11/20  0826   INR  2.03 1.75* 1.70* 1.52* 1.41* 1.71* 1.80*       ABG  Results from last 7 days   Lab Units 08/11/20  0111   PH, ARTERIAL pH units 7.411   PCO2, ARTERIAL mm Hg 45.4   PO2 ART mm Hg 111.4*   O2 SATURATION ART %  98.4*   BASE EXCESS ART mmol/L 3.7*       Microbiology  Microbiology Results (last 10 days)     Procedure Component Value - Date/Time    MRSA Screen, PCR (Inpatient) - Swab, Nares [[id number]]  (Normal) Collected:  08/14/20 0908    Lab Status:  Final result Specimen:  Swab from Nares Updated:  08/14/20 1102     MRSA PCR No MRSA Detected    Body Fluid Culture - Body Fluid, Pleural Cavity [[id number]] Collected:  08/11/20 1622    Lab Status:  Final result Specimen:  Body Fluid from Pleural Cavity Updated:  08/16/20 0632     Body Fluid Culture No growth at 5 days     Gram Stain Moderate (3+) WBCs seen      No organisms seen    Anaerobic Culture - Pleural Fluid, Pleural Cavity [[id number]] Collected:  08/11/20 1622    Lab Status:  Final result Specimen:  Pleural Fluid from Pleural Cavity Updated:  08/16/20 0631     Anaerobic Culture No anaerobes isolated at 5 days    AFB Culture - Body Fluid, Pleural Cavity [[id number]] Collected:  08/11/20 1622    Lab Status:  Preliminary result Specimen:  Body Fluid from Pleural Cavity Updated:  08/16/20 1715     AFB Culture No AFB isolated at less than 1 week     AFB Stain No acid fast bacilli seen    Fungus Culture - Body Fluid, Pleural Cavity [[id number]] Collected:  08/11/20 1622    Lab Status:  Preliminary result Specimen:  Body Fluid from Pleural Cavity Updated:  08/16/20 1715     Fungus Culture No fungus isolated at less than 1 week    Fungus Smear - Body Fluid, Pleural Cavity [[id number]] Collected:  08/11/20 1622    Lab Status:  Final result Specimen:  Body Fluid from Pleural Cavity Updated:  08/12/20 1131     Fungal Stain No fungal elements seen    Urine Culture - Urine, Urine, Catheter [[id number]]  (Abnormal)  (Susceptibility) Collected:  08/11/20 0216    Lab Status:  Final result Specimen:  Urine, Catheter Updated:  08/13/20 0348     Urine Culture >100,000 CFU/mL Escherichia coli    Susceptibility      Escherichia coli     MOY     Ampicillin Susceptible     Ampicillin +  Sulbactam Susceptible     Cefazolin Susceptible     Cefepime Susceptible     Ceftazidime Susceptible     Ceftriaxone Susceptible     Gentamicin Susceptible     Levofloxacin Susceptible     Nitrofurantoin Susceptible     Piperacillin + Tazobactam Susceptible     Tetracycline Susceptible     Trimethoprim + Sulfamethoxazole Susceptible                    Respiratory Panel PCR w/COVID-19(SARS-CoV-2) [facility] In-House, NP Swab in UTM/VTM, 3-4 HR TAT - Swab, Nasopharynx [[id number]]  (Normal) Collected:  08/11/20 0121    Lab Status:  Final result Specimen:  Swab from Nasopharynx Updated:  08/11/20 0325     ADENOVIRUS, PCR Not Detected     Coronavirus 229E Not Detected     Coronavirus HKU1 Not Detected     Coronavirus NL63 Not Detected     Coronavirus OC43 Not Detected     COVID19 Not Detected     Human Metapneumovirus Not Detected     Human Rhinovirus/Enterovirus Not Detected     Influenza A PCR Not Detected     Influenza A H1 Not Detected     Influenza A H1 2009 PCR Not Detected     Influenza A H3 Not Detected     Influenza B PCR Not Detected     Parainfluenza Virus 1 Not Detected     Parainfluenza Virus 2 Not Detected     Parainfluenza Virus 3 Not Detected     Parainfluenza Virus 4 Not Detected     RSV, PCR Not Detected     Bordetella pertussis pcr Not Detected     Bordetella parapertussis PCR Not Detected     Chlamydophila pneumoniae PCR Not Detected     Mycoplasma pneumo by PCR Not Detected    Narrative:       Fact sheet for providers: https://docs.[employer]/wp-content/uploads/GQF3036-7814-XR0.1-EUA-Provider-Fact-Sheet-3.pdf    Fact sheet for patients: https://docs.[employer]/wp-content/uploads/ZCO3579-9697-IE8.1-EUA-Patient-Fact-Sheet-1.pdf    Blood Culture - Blood, Arm, Right [[id number]] Collected:  08/11/20 0120    Lab Status:  Final result Specimen:  Blood from Arm, Right Updated:  08/16/20 0130     Blood Culture No growth at 5 days    Blood Culture - Blood, Arm, Left [[id number]] Collected:  08/11/20  0114    Lab Status:  Final result Specimen:  Blood from Arm, Left Updated:  08/16/20 0130     Blood Culture No growth at 5 days          IMAGING & OTHER STUDIES    Imaging Results (Last 72 Hours)     Procedure Component Value Units Date/Time    XR Chest 1 View [[id number]] Collected:  08/16/20 1209     Updated:  08/16/20 1212    Narrative:       XR CHEST 1 VW-     Date of Exam: 8/16/2020 11:59 AM     Indication: Follow-up CHF; R06.03-Acute respiratory distress;  R50.9-Fever, unspecified; J18.9-Pneumonia, unspecified organism;  I50.9-Heart failure, unspecified.     Comparison: 08/15/2020     Technique: A single view of the chest was obtained.     FINDINGS:      Left subclavian transvenous pacemaker remains in place unchanged in  position.  Cardiomegaly is stable.  Pulmonary vessels are within normal  limits.  There is persistent bibasilar airspace disease, unchanged from  prior study.  There is a small right pleural effusion which is stable.   No definite left pleural effusion identified.  Multiple right rib  fractures are again noted.             Impression:             1.  Stable cardiomegaly.  2.  No change in bibasilar airspace disease and small right pleural  effusion.        Electronically Signed By-Justin Willis On:8/16/2020 12:10 PM  This report was finalized on [id number] by  Justin Willis, .    XR Chest 1 View [[id number]] Collected:  08/15/20 1703     Updated:  08/15/20 1708    Narrative:       DATE OF EXAM:  8/15/2020 4:30 PM     PROCEDURE:  XR CHEST 1 VW-     INDICATIONS:  Follow-up pleural effusion; R06.03-Acute respiratory distress;  R50.9-Fever, unspecified; J18.9-Pneumonia, unspecified organism;  I50.9-Heart failure, unspecified     COMPARISON:  08/14/2020     TECHNIQUE:   Single radiographic view of the chest was obtained.     FINDINGS:  The heart is enlarged. Pacemaker appears in similar position. There is  suspicion for a moderate right pleural effusion and possible small left  effusion, as  before. There are right greater than left basilar  predominant opacities, as before. There appear to be interstitial  opacities with some septal thickening and thickening along the minor  fissure. No pneumothorax is seen.  Mediastinal contour appears grossly  unchanged.       Impression:       1.Moderate right and small left pleural effusions, as before.  2.Right greater than left basilar predominant opacities, as before,  which could be related to edema and/or atelectasis or other infiltrate.  3.Cardiomegaly.     Electronically Signed By-DR. Miguelangel Badillo MD On:8/15/2020 5:06 PM  This report was finalized on [id number] by DR. Miguelangel Badillo MD.        Results for orders placed during the hospital encounter of 05/20/20   Adult Transesophageal Echo ([facility]) W/ Cont if Necessary Per Protocol    Narrative · Left ventricular systolic function is normal.  · Left atrial cavity size is mild-to-moderately dilated.  · Moderate mitral valve regurgitation is present  · Left atrial appendage diameter was 33 mm at 135 degree which was very   large and not conducive to implantation of watchman device. Depth was 28   mm. After discussion, it was decided that anatomy of left atrial appendage   is not conducive and appropriate for watchman device implantation.   Procedure was aborted             ASSESSMENT/PLAN:     Acute respiratory distress    Atrial fibrillation, chronic    Pacemaker    Bilateral leg edema    Iron deficiency anemia due to chronic blood loss    Parkinson's disease (CMS/HCC)    Aortic stenosis    GERD (gastroesophageal reflux disease)    Hyperlipidemia    Essential hypertension    Mitral regurgitation    Nonrheumatic tricuspid valve disorder    Pulmonary hypertension (CMS/HCC)    Sleep apnea    Anxiety disorder    Overactive bladder    Chronic pain    RLS (restless legs syndrome)    Acute UTI (urinary tract infection)    Acute on chronic combined systolic and diastolic CHF (congestive heart failure) (CMS/HCC)     Pneumonia of right lung due to infectious organism    Weakness    Pleural effusion, right     -I reviewed the pleural fluid studies.  Bloody tap.  Exudative per protein.  Likely a combination of parapneumonic and component of congestive heart failure and fluid overload.  -Cultures and cytology is negative  -Agree with continued diuresis.  On Levaquin. Recommend 2 week of Abx therapy. Repeat CXR in 4-6 weeks. Follow up in Pulmonary clinic in 6 weeks.  -Stable on 2 L nasal cannula.  -We will need a 6-minute walk test prior to discharge if going home.  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  Continue to monitor.  -Anticoagulation per cardiology    DC planning    THIS DOCUMENT HAS BEEN ELECTRONICALLY SIGNED BY  Ramon Alonso MD  4:08 PM

## 2020-08-17 NOTE — PROGRESS NOTES
"Pharmacy dosing service  Anticoagulant  Warfarin     Subjective:    Rafael Ivey is a 84 y.o.male being continued on warfarin for atrial fibrillation.    INR Goal: 2 - 3  Home medication?: Yes, warfarin 2 mg PO every day at 1800, except Sunday  Bridge Therapy Present?:  No  Additional Contributing Factors: acute illness, Levofloxacin daily (8/15-8/20)      Assessment/Plan:    INR increased to 2.03  today. INR therapeutic will discontinue Lovenox. Due to drug-drug interaction with Levaquin will decrease to warfarin 1.5 mg today.      Continue to monitor and adjust based on INR.           Date 8/11 8/12 8/13 8/14 8/15 8/16 8/17     INR 1.72 1.71 1.41 1.52 1.7 1.75 2.03     Dose ------ 2 mg 3 mg 2.5 mg 2mg 2mg 1.5mg         Objective:  [Ht: 182.9 cm (72\"); Wt: 92.4 kg (203 lb 11.3 oz); BMI: Body mass index is 27.63 kg/m².]    Lab Results   Component Value Date    ALBUMIN 4.00 08/11/2020     Lab Results   Component Value Date    INR 2.03 08/17/2020    INR 1.75 (L) 08/16/2020    INR 1.70 (L) 08/15/2020    PROTIME 21.1 08/17/2020    PROTIME 18.5 (L) 08/16/2020    PROTIME 18.0 (L) 08/15/2020     Lab Results   Component Value Date    HGB 10.0 (L) 08/17/2020    HGB 9.7 (L) 08/16/2020    HGB 9.7 (L) 08/15/2020     Lab Results   Component Value Date    HCT 30.0 (L) 08/17/2020    HCT 30.1 (L) 08/16/2020    HCT 29.9 (L) 08/15/2020       Radha Clemons, [facility]  08/17/20 07:43                 "

## 2020-08-17 NOTE — PLAN OF CARE
Pt v/s stable. Pt continues to run afib. Pt has no complaints or issues noted. Pt seemed to of rested well during my shift. Will continue to monitor.

## 2020-08-18 NOTE — PROGRESS NOTES
Case Management Discharge Note           Provided Post Acute Provider List?: Yes  Post Acute Provider List: Home Health  Delivered To: Patient  Method of Delivery: Telephone    Destination - Selection Complete      Service Provider Request Status Selected Services Address Phone Number Fax Number    [facility] Selected Skilled Nursing [address] [place] [address] [phone]

## 2020-08-28 ENCOUNTER — TELEPHONE (OUTPATIENT)
Dept: CARDIOLOGY | Facility: CLINIC | Age: 85
End: 2020-08-28

## 2020-08-28 NOTE — TELEPHONE ENCOUNTER
Spoke with nurse at facility and gave instructions.  She states she understands and will call with any concerns.

## 2020-08-28 NOTE — TELEPHONE ENCOUNTER
----- Message from Jim HAWK MD sent at 8/27/2020  4:22 PM EDT -----  Increase lasix to twice daily for 3 days and than once daily    Dr DOWNEY  ----- Message -----  From: Soledad Mcdermott MA  Sent: 8/27/2020   3:34 PM EDT  To: Jim HAWK MD    The pt nursing facility called and stated the pt has had some weight gain in the last week.  He was at 197.6 he is now at 208.2.  They would like to know what to do for him?

## 2020-09-02 ENCOUNTER — OFFICE VISIT (OUTPATIENT)
Dept: CARDIOLOGY | Facility: CLINIC | Age: 85
End: 2020-09-02

## 2020-09-02 VITALS
DIASTOLIC BLOOD PRESSURE: 65 MMHG | WEIGHT: 211 LBS | OXYGEN SATURATION: 98 % | HEIGHT: 72 IN | BODY MASS INDEX: 28.58 KG/M2 | HEART RATE: 67 BPM | SYSTOLIC BLOOD PRESSURE: 124 MMHG

## 2020-09-02 DIAGNOSIS — R53.1 WEAKNESS: ICD-10-CM

## 2020-09-02 DIAGNOSIS — I34.0 MITRAL VALVE INSUFFICIENCY, UNSPECIFIED ETIOLOGY: ICD-10-CM

## 2020-09-02 DIAGNOSIS — I50.32 CHRONIC DIASTOLIC CHF (CONGESTIVE HEART FAILURE) (HCC): Chronic | ICD-10-CM

## 2020-09-02 DIAGNOSIS — D50.0 IRON DEFICIENCY ANEMIA DUE TO CHRONIC BLOOD LOSS: ICD-10-CM

## 2020-09-02 DIAGNOSIS — I10 ESSENTIAL HYPERTENSION: ICD-10-CM

## 2020-09-02 DIAGNOSIS — I27.20 PULMONARY HYPERTENSION (HCC): ICD-10-CM

## 2020-09-02 DIAGNOSIS — I49.5 SICK SINUS SYNDROME (HCC): Primary | Chronic | ICD-10-CM

## 2020-09-02 DIAGNOSIS — T45.515A WARFARIN-INDUCED COAGULOPATHY (HCC): Chronic | ICD-10-CM

## 2020-09-02 DIAGNOSIS — D68.32 WARFARIN-INDUCED COAGULOPATHY (HCC): Chronic | ICD-10-CM

## 2020-09-02 DIAGNOSIS — I36.9 NONRHEUMATIC TRICUSPID VALVE DISORDER: ICD-10-CM

## 2020-09-02 DIAGNOSIS — I48.20 ATRIAL FIBRILLATION, CHRONIC (HCC): Chronic | ICD-10-CM

## 2020-09-02 DIAGNOSIS — I35.0 AORTIC VALVE STENOSIS, ETIOLOGY OF CARDIAC VALVE DISEASE UNSPECIFIED: ICD-10-CM

## 2020-09-02 DIAGNOSIS — E78.2 MIXED HYPERLIPIDEMIA: Chronic | ICD-10-CM

## 2020-09-02 DIAGNOSIS — Z95.0 PACEMAKER: Chronic | ICD-10-CM

## 2020-09-02 PROCEDURE — 99214 OFFICE O/P EST MOD 30 MIN: CPT | Performed by: NURSE PRACTITIONER

## 2020-09-02 NOTE — PROGRESS NOTES
[facility] CARDIOLOGY      REASON FOR FOLLOW-UP:  Persistent atrial fibrillation  Single-chamber permanent pacemaker in situ  Hypertension  Dyslipidemia  Anticoagulation therapy          Chief Complaint   Patient presents with   • Congestive Heart Failure     c/o swelling (Wt 2 wks ago was 197-today 211#   • Hypertension   • Atrial Fibrillation     Chronic         Dear Dr. Ivey,        History of Present Illness     It was my pleasure to see Rafael Ivey in follow-up today.  As you are aware, he is a very pleasant 84-year-old gentleman with no known history of coronary occlusive disease.  Patient does have known history of persistent atrial fibrillation, chronic diastolic heart failure, dyslipidemia, hypertension, mitral valve insufficiency, sick sinus syndrome with permanent pacemaker in situ anticoagulation with warfarin and history of frequent falls.  Patient was evaluated for left atrial appendage device implant, however based on the measurements both on left atrial appendage venography and PRESTON, left atrial appendage neck was felt to be too large for current maximal size device with watchman.  The procedure was aborted.  Transesophageal echocardiogram 5/28/2020 with normal LV systolic function, moderate mitral valve insufficiency.  The patient presents today in follow-up for the above-mentioned diagnoses.    Today, the patient reports that he feels well.  Specifically, he denies any complaints of chest pains, pressure, tightness or palpitations.  He denies any shortness of breath at rest but does have some dyspnea with exertion.  He has had no dizziness or lightheadedness.  No presyncopal or syncopal episodes.  He reports some chronic edema of his lower extremities.  He does have some bruising of upper extremities, but no melena or hematochezia.  His blood pressures under excellent control at 124/65.    Assessment:  Chronic and permanent atrial fibrillation  History of severe and  profound iron deficiency anemia  Sick sinus syndrome  Single-chamber permanent pacemaker in situ  Aortic/tricuspid valve disease  History of hypertension  Dyslipidemia  Long-term anticoagulation use    Plan:  Continue current medical regimen  Monitor for signs/symptoms of bleeding  Device check as scheduled  Follow-up in 3 months or sooner if needed        The following portions of the patient's history were reviewed and updated as appropriate: allergies, current medications, past family history, past medical history, past social history, past surgical history and problem list.    REVIEW OF SYSTEMS:    Review of Systems   Cardiovascular: Positive for leg swelling.   Skin:        Bruising to upper extremities   All other systems reviewed and are negative.      Vitals:    09/02/20 1442   BP: 124/65   Pulse: 67   SpO2: 98%         PHYSICAL EXAM:    General: Alert, cooperative, no distress, appears stated age  Head:  Normocephalic, atraumatic, mucous membranes moist  Eyes:  Conjunctiva/corneas clear, EOM's intact     Neck:  Supple,  no JVD or bruit     Lungs: Clear to auscultation bilaterally, no wheezes rhonchi rales are noted  Chest wall: No tenderness  Musculoskeletal:   Arrived via wheelchair  Heart::  Regular rate and rhythm, S1 and S2 normal, no murmur, rub or gallop  Abdomen: Soft, non-tender, nondistended, bowel sounds active, no abdominal bruit  Extremities: Patient wearing compression stockings  Pulses: 2+ and symmetric all extremities  Skin:  Scattered bruising to upper extremities   neuro/psych: A&O x3. CN II through XII are grossly intact with appropriate affect        Past Medical History:   Diagnosis Date   • Anemia    • Arthritis     left hip    • Atrial fibrillation (CMS/HCC)    • Atrial fibrillation (CMS/HCC)     chronic   • Cardiomegaly     mild   • Chronic diastolic heart failure (CMS/HCC)    • GERD (gastroesophageal reflux disease)    • Hyperlipidemia    • Hypertension    • Hypotension    • Lower  extremity edema    • Mitral regurgitation    • Obesity    • Pacemaker    • Parkinson disease (CMS/HCC)    • Recurrent falls     with injury    • Sick sinus syndrome (CMS/HCC)     s/p pacemaker implant    • Valvular heart disease     MR       Past Surgical History:   Procedure Laterality Date   • APPENDECTOMY     • ATRIAL APPENDAGE EXCLUSION LEFT WITH TRANSESOPHAGEAL ECHOCARDIOGRAM N/A 5/28/2020    Procedure: Atrial Appendage Occlusion;  Surgeon: Jim Walker MD;  Location: [facility] CATH INVASIVE LOCATION;  Service: Cardiovascular;  Laterality: N/A;   • ATRIAL APPENDAGE EXCLUSION LEFT WITH TRANSESOPHAGEAL ECHOCARDIOGRAM N/A 5/28/2020    Procedure: Atrial Appendage Occlusion;  Surgeon: Lashaun So MD;  Location: [facility] CATH INVASIVE LOCATION;  Service: Cardiovascular;  Laterality: N/A;   • CATARACT EXTRACTION     • CHOLECYSTECTOMY     • COLONOSCOPY N/A 5/21/2020    Procedure: COLONOSCOPY WITH BIOPSY X1 AREA;  Surgeon: Sim Collins MD;  Location: [facility] ENDOSCOPY;  Service: Gastroenterology;  Laterality: N/A;  Post: poor prep, impacted stool in rectum, and internal hemorrhoids, ascending colon arteriovenous malformation   • ENDOSCOPY N/A 5/21/2020    Procedure: ESOPHAGOGASTRODUODENOSCOPY with clipping x 1 of bleeding gastric polyp;  Surgeon: Sim Collins MD;  Location: [facility] ENDOSCOPY;  Service: Gastroenterology;  Laterality: N/A;  Post: bleeding gastric polyp   • HIP SURGERY Right 08/2017   • OTHER SURGICAL HISTORY      skin mole excisions    • PACEMAKER IMPLANTATION  06/22/2017    [employer] ([employer])   • TONSILLECTOMY     • TOTAL HIP ARTHROPLASTY Left 05/20/2014         Current Outpatient Medications:   •  acetaminophen (TYLENOL) 325 MG tablet, Take 650 mg by mouth Every 6 (Six) Hours As Needed for Mild Pain ., Disp: , Rfl:   •  baclofen (LIORESAL) 10 MG tablet, Take 10 mg by mouth Daily., Disp: , Rfl:   •  carbidopa-levodopa (SINEMET)  MG per tablet, Take 1.5 tablets by mouth Every 8  (Eight) Hours., Disp: , Rfl:   •  escitalopram (LEXAPRO) 10 MG tablet, Take 10 mg by mouth Daily., Disp: , Rfl:   •  ferrous sulfate 325 (65 FE) MG tablet, Take 1 tablet by mouth Every 12 (Twelve) Hours., Disp: , Rfl:   •  furosemide (LASIX) 40 MG tablet, Take 1 tablet by mouth 2 (Two) Times a Day., Disp: 60 tablet, Rfl: 0  •  gabapentin (NEURONTIN) 300 MG capsule, Take 300 mg by mouth every night at bedtime., Disp: , Rfl:   •  multivitamin-minerals (CENTRUM) tablet, Take 1 tablet by mouth Daily., Disp: , Rfl:   •  nitroglycerin (NITROSTAT) 0.4 MG SL tablet, Place 1 tablet under the tongue Every 5 (Five) Minutes As Needed for Chest Pain (Only if SBP Greater Than 100). Take no more than 3 doses in 15 minutes., Disp: 25 tablet, Rfl: 0  •  nystatin (MYCOSTATIN) 231850 UNIT/GM powder, Apply  topically to the appropriate area as directed Every 12 (Twelve) Hours., Disp: 1 each, Rfl: 0  •  Omega-3 Fatty Acids (FISH OIL PO), Take 2 capsules by mouth Daily., Disp: , Rfl:   •  omeprazole (PRILOSEC) 20 MG capsule, Take 1 capsule by mouth Daily., Disp: , Rfl:   •  ondansetron (ZOFRAN) 4 MG tablet, Take 1 tablet by mouth Every 6 (Six) Hours As Needed for Nausea or Vomiting., Disp: 30 tablet, Rfl: 0  •  oxybutynin (DITROPAN) 5 MG tablet, Take 5 mg by mouth Daily., Disp: , Rfl:   •  potassium chloride (K-DUR,KLOR-CON) 20 MEQ CR tablet, Take 1 tablet by mouth 3 (Three) Times a Day. (Patient taking differently: Take 20 mEq by mouth 3 (Three) Times a Day. Pt takes this 5 times a day), Disp: 60 tablet, Rfl: 2  •  pramipexole (MIRAPEX) 0.25 MG tablet, Take 1 tablet by mouth Daily., Disp: , Rfl:   •  rosuvastatin (CRESTOR) 10 MG tablet, Take 1 tablet by mouth every night at bedtime., Disp: , Rfl:   •  warfarin (COUMADIN) 2 MG tablet, Take 1 tablet by mouth Every Night. Taking every day but Sunday, Disp: , Rfl:   •  Warfarin Sodium (PHARMACY TO DOSE WARFARIN), Continuous As Needed (Atrial fibrillation). Indications: Atrial Fibrillation,  "Disp: , Rfl:     Allergies   Allergen Reactions   • Amoxicillin Diarrhea   • Cephalexin Diarrhea and Nausea And Vomiting   • Penicillins Other (See Comments)   • Amoxicillin-Pot Clavulanate Nausea And Vomiting   • Clarithromycin Nausea And Vomiting       Family History   Problem Relation Age of Onset   • Heart failure Mother    • Stroke Maternal Grandfather        Social History     Tobacco Use   • Smoking status: Former Smoker     Packs/day: 1.00     Years: 40.00     Pack years: 40.00     Types: Cigarettes   • Smokeless tobacco: Never Used   • Tobacco comment: quit 1970's    Substance Use Topics   • Alcohol use: Yes     Frequency: Never           Current Electrocardiogram:  Procedures        EMR Dragon/Transcription:   \"Dictated utilizing Dragon dictation\".         "

## 2020-09-08 LAB — FUNGUS WND CULT: NORMAL

## 2020-09-22 LAB
MYCOBACTERIUM SPEC CULT: NORMAL
NIGHT BLUE STAIN TISS: NORMAL

## 2020-11-04 ENCOUNTER — OFFICE VISIT (OUTPATIENT)
Dept: CARDIOLOGY | Facility: CLINIC | Age: 85
End: 2020-11-04

## 2020-11-04 VITALS
OXYGEN SATURATION: 97 % | DIASTOLIC BLOOD PRESSURE: 64 MMHG | BODY MASS INDEX: 24.82 KG/M2 | SYSTOLIC BLOOD PRESSURE: 106 MMHG | WEIGHT: 183 LBS | HEART RATE: 82 BPM

## 2020-11-04 DIAGNOSIS — T45.515A WARFARIN-INDUCED COAGULOPATHY (HCC): Chronic | ICD-10-CM

## 2020-11-04 DIAGNOSIS — I34.0 MITRAL VALVE INSUFFICIENCY, UNSPECIFIED ETIOLOGY: ICD-10-CM

## 2020-11-04 DIAGNOSIS — I35.0 AORTIC VALVE STENOSIS, ETIOLOGY OF CARDIAC VALVE DISEASE UNSPECIFIED: ICD-10-CM

## 2020-11-04 DIAGNOSIS — I27.20 PULMONARY HYPERTENSION (HCC): ICD-10-CM

## 2020-11-04 DIAGNOSIS — E78.2 MIXED HYPERLIPIDEMIA: Chronic | ICD-10-CM

## 2020-11-04 DIAGNOSIS — I48.20 ATRIAL FIBRILLATION, CHRONIC (HCC): Chronic | ICD-10-CM

## 2020-11-04 DIAGNOSIS — I10 ESSENTIAL HYPERTENSION: ICD-10-CM

## 2020-11-04 DIAGNOSIS — I49.5 SICK SINUS SYNDROME (HCC): Primary | Chronic | ICD-10-CM

## 2020-11-04 DIAGNOSIS — I50.32 CHRONIC DIASTOLIC CHF (CONGESTIVE HEART FAILURE) (HCC): Chronic | ICD-10-CM

## 2020-11-04 DIAGNOSIS — Z95.0 PACEMAKER: Chronic | ICD-10-CM

## 2020-11-04 DIAGNOSIS — D68.32 WARFARIN-INDUCED COAGULOPATHY (HCC): Chronic | ICD-10-CM

## 2020-11-04 PROCEDURE — 99214 OFFICE O/P EST MOD 30 MIN: CPT | Performed by: NURSE PRACTITIONER

## 2020-11-04 NOTE — PROGRESS NOTES
[facility] CARDIOLOGY      REASON FOR FOLLOW-UP:  Persistent atrial fibrillation  Single-chamber permanent pacemaker in situ  Hypertension  Dyslipidemia  Anticoagulation therapy          Chief Complaint   Patient presents with   • Atrial Fibrillation     3 month f/u   Doing better than 3 months ago.   • Hypertension         Dear Dr. Ivey        History of Present Illness     It was my pleasure to see Rafael Ivey in follow-up today.  As you are aware, he is a very pleasant 85-year-old gentleman with no known history of coronary occlusive disease.  Patient does have known history of persistent atrial fibrillation, chronic diastolic heart failure, dyslipidemia, hypertension, mitral valve insufficiency, sick sinus syndrome with permanent pacemaker in situ, anticoagulation with warfarin and history of frequent falls.  Patient was evaluated for left atrial appendage device implant, however based on the measurements both on left atrial appendage venography and PRESTON 5/28/2020, left atrial appendage neck was felt to be too large for current maximal size device with watchman.  The procedure was aborted.    PRESTON showed normal LV systolic function, moderate mitral valve insufficiency.  The patient presents today in follow-up for the above-mentioned diagnoses.     Today, the patient reports that he feels well.  Specifically, he denies any complaints of chest pains, pressure, tightness or palpitations.  He denies any shortness of breath at rest but does have some dyspnea with exertion.    His mobility is very limited and he ambulates with assistance of a walker.  He has had no dizziness or lightheadedness. No presyncopal or syncopal episodes.  He reports no abnormal bleeding.  He does have some bruising of upper extremities, but no melena or hematochezia.    He has some chronic lower extremity edema.  He is wearing compression stockings today and appears to be well controlled.  His blood pressures under  good control at 106/64.       Assessment:  Chronic and permanent atrial fibrillation  History of severe and profound iron deficiency anemia  Sick sinus syndrome  Single-chamber permanent pacemaker in situ  Aortic/tricuspid valve disease  History of hypertension  Dyslipidemia  Long-term anticoagulation use     Plan:  Continue current medical regimen  Monitor for signs/symptoms of bleeding  Device check as scheduled  Follow-up in 6 months or sooner if needed    The following portions of the patient's history were reviewed and updated as appropriate: allergies, current medications, past family history, past medical history, past social history, past surgical history and problem list.    REVIEW OF SYSTEMS:    Review of Systems   Cardiovascular: Positive for leg swelling.   Neurological: Positive for loss of balance.        History of parkinsonism   All other systems reviewed and are negative.      Vitals:    11/04/20 1326   BP: 106/64   Pulse: 82   SpO2: 97%         PHYSICAL EXAM:    General: Well-developed, feeble 85-year-old  male who is alert, cooperative, no distress, appears stated age  Head:  Normocephalic, atraumatic, mucous membranes moist  Eyes:  Conjunctiva/corneas clear, EOM's intact     Neck:  Supple,  no JVD or bruit     Lungs: Clear to auscultation bilaterally, no wheezes rhonchi rales are noted  Chest wall: No tenderness  Musculoskeletal:   Ambulates very slowly with assistance of a walker  Heart::  Regular rate and rhythm, S1 and S2 normal, 2/6 holosystolic murmur  Abdomen: Soft, non-tender, nondistended, bowel sounds active, no abdominal bruit  Extremities: Compression stockings, trace edema to lower extremities.  Pulses: 2+ and symmetric all extremities  Skin:  No rashes or lesions  Neuro/psych: A&O x3. CN II through XII are grossly intact with appropriate affect        Past Medical History:   Diagnosis Date   • Anemia    • Arthritis     left hip    • Atrial fibrillation (CMS/HCC)    • Atrial  fibrillation (CMS/HCC)     chronic   • Cardiomegaly     mild   • Chronic diastolic heart failure (CMS/HCC)    • GERD (gastroesophageal reflux disease)    • Hyperlipidemia    • Hypertension    • Hypotension    • Lower extremity edema    • Mitral regurgitation    • Obesity    • Pacemaker    • Parkinson disease (CMS/HCC)    • Recurrent falls     with injury    • Sick sinus syndrome (CMS/[facility])     s/p pacemaker implant    • Valvular heart disease     MR       Past Surgical History:   Procedure Laterality Date   • APPENDECTOMY     • ATRIAL APPENDAGE EXCLUSION LEFT WITH TRANSESOPHAGEAL ECHOCARDIOGRAM N/A 5/28/2020    Procedure: Atrial Appendage Occlusion;  Surgeon: Jim Walker MD;  Location: [facility] CATH INVASIVE LOCATION;  Service: Cardiovascular;  Laterality: N/A;   • ATRIAL APPENDAGE EXCLUSION LEFT WITH TRANSESOPHAGEAL ECHOCARDIOGRAM N/A 5/28/2020    Procedure: Atrial Appendage Occlusion;  Surgeon: Lashaun So MD;  Location: [facility] CATH INVASIVE LOCATION;  Service: Cardiovascular;  Laterality: N/A;   • CATARACT EXTRACTION     • CHOLECYSTECTOMY     • COLONOSCOPY N/A 5/21/2020    Procedure: COLONOSCOPY WITH BIOPSY X1 AREA;  Surgeon: Sim Collins MD;  Location: [facility] ENDOSCOPY;  Service: Gastroenterology;  Laterality: N/A;  Post: poor prep, impacted stool in rectum, and internal hemorrhoids, ascending colon arteriovenous malformation   • ENDOSCOPY N/A 5/21/2020    Procedure: ESOPHAGOGASTRODUODENOSCOPY with clipping x 1 of bleeding gastric polyp;  Surgeon: Sim Collins MD;  Location: [facility] ENDOSCOPY;  Service: Gastroenterology;  Laterality: N/A;  Post: bleeding gastric polyp   • HIP SURGERY Right 08/2017   • OTHER SURGICAL HISTORY      skin mole excisions    • PACEMAKER IMPLANTATION  06/22/2017    [facility] ([employer])   • TONSILLECTOMY     • TOTAL HIP ARTHROPLASTY Left 05/20/2014         Current Outpatient Medications:   •  acetaminophen (TYLENOL) 325 MG tablet, Take 650 mg by mouth Every 6  (Six) Hours As Needed for Mild Pain ., Disp: , Rfl:   •  baclofen (LIORESAL) 10 MG tablet, Take 10 mg by mouth Daily., Disp: , Rfl:   •  carbidopa-levodopa (SINEMET)  MG per tablet, Take 1.5 tablets by mouth Every 8 (Eight) Hours., Disp: , Rfl:   •  escitalopram (LEXAPRO) 10 MG tablet, Take 10 mg by mouth Daily. Take one & one-half tablets daily, Disp: , Rfl:   •  ferrous sulfate 325 (65 FE) MG tablet, Take 1 tablet by mouth Every 12 (Twelve) Hours., Disp: , Rfl:   •  furosemide (LASIX) 40 MG tablet, Take 1 tablet by mouth 2 (Two) Times a Day., Disp: 60 tablet, Rfl: 0  •  gabapentin (NEURONTIN) 300 MG capsule, Take 300 mg by mouth every night at bedtime., Disp: , Rfl:   •  multivitamin-minerals (CENTRUM) tablet, Take 1 tablet by mouth Daily., Disp: , Rfl:   •  nitroglycerin (NITROSTAT) 0.4 MG SL tablet, Place 1 tablet under the tongue Every 5 (Five) Minutes As Needed for Chest Pain (Only if SBP Greater Than 100). Take no more than 3 doses in 15 minutes., Disp: 25 tablet, Rfl: 0  •  nystatin (MYCOSTATIN) 792491 UNIT/GM powder, Apply  topically to the appropriate area as directed Every 12 (Twelve) Hours., Disp: 1 each, Rfl: 0  •  Omega-3 Fatty Acids (FISH OIL PO), Take 2 capsules by mouth Daily., Disp: , Rfl:   •  omeprazole (PRILOSEC) 20 MG capsule, Take 1 capsule by mouth Daily., Disp: , Rfl:   •  ondansetron (ZOFRAN) 4 MG tablet, Take 1 tablet by mouth Every 6 (Six) Hours As Needed for Nausea or Vomiting., Disp: 30 tablet, Rfl: 0  •  oxybutynin (DITROPAN) 5 MG tablet, Take 5 mg by mouth Daily., Disp: , Rfl:   •  potassium chloride (K-DUR,KLOR-CON) 20 MEQ CR tablet, Take 1 tablet by mouth 3 (Three) Times a Day. (Patient taking differently: Take 20 mEq by mouth 3 (Three) Times a Day. Pt takes this 5 times a day), Disp: 60 tablet, Rfl: 2  •  pramipexole (MIRAPEX) 0.25 MG tablet, Take 1 tablet by mouth Daily., Disp: , Rfl:   •  rosuvastatin (CRESTOR) 10 MG tablet, Take 1 tablet by mouth every night at bedtime.,  "Disp: , Rfl:   •  warfarin (COUMADIN) 2 MG tablet, Take 1 tablet by mouth Every Night. Taking every day but Sunday, Disp: , Rfl:   •  Warfarin Sodium (PHARMACY TO DOSE WARFARIN), Continuous As Needed (Atrial fibrillation). Indications: Atrial Fibrillation, Disp: , Rfl:     Allergies   Allergen Reactions   • Amoxicillin Diarrhea   • Cephalexin Diarrhea and Nausea And Vomiting   • Penicillins Other (See Comments)   • Amoxicillin-Pot Clavulanate Nausea And Vomiting   • Clarithromycin Nausea And Vomiting       Family History   Problem Relation Age of Onset   • Heart failure Mother    • Stroke Maternal Grandfather        Social History     Tobacco Use   • Smoking status: Former Smoker     Packs/day: 1.00     Years: 40.00     Pack years: 40.00     Types: Cigarettes   • Smokeless tobacco: Never Used   • Tobacco comment: quit 1970's    Substance Use Topics   • Alcohol use: Yes     Frequency: Never           Current Electrocardiogram:  Procedures        EMR Dragon/Transcription:   \"Dictated utilizing Dragon dictation\".         "

## 2021-05-19 ENCOUNTER — OFFICE VISIT (OUTPATIENT)
Dept: CARDIOLOGY | Facility: CLINIC | Age: 86
End: 2021-05-19

## 2021-05-19 VITALS
WEIGHT: 204 LBS | BODY MASS INDEX: 27.67 KG/M2 | DIASTOLIC BLOOD PRESSURE: 76 MMHG | SYSTOLIC BLOOD PRESSURE: 136 MMHG | HEART RATE: 70 BPM

## 2021-05-19 DIAGNOSIS — I49.5 SICK SINUS SYNDROME (HCC): Primary | Chronic | ICD-10-CM

## 2021-05-19 DIAGNOSIS — Z95.0 PACEMAKER: Chronic | ICD-10-CM

## 2021-05-19 DIAGNOSIS — I50.32 CHRONIC DIASTOLIC CHF (CONGESTIVE HEART FAILURE) (HCC): Chronic | ICD-10-CM

## 2021-05-19 DIAGNOSIS — D68.32 WARFARIN-INDUCED COAGULOPATHY (HCC): Chronic | ICD-10-CM

## 2021-05-19 DIAGNOSIS — I34.0 NONRHEUMATIC MITRAL VALVE REGURGITATION: ICD-10-CM

## 2021-05-19 DIAGNOSIS — T45.515A WARFARIN-INDUCED COAGULOPATHY (HCC): Chronic | ICD-10-CM

## 2021-05-19 DIAGNOSIS — I36.9 NONRHEUMATIC TRICUSPID VALVE DISORDER: ICD-10-CM

## 2021-05-19 DIAGNOSIS — I10 ESSENTIAL HYPERTENSION: ICD-10-CM

## 2021-05-19 DIAGNOSIS — E78.2 MIXED HYPERLIPIDEMIA: Chronic | ICD-10-CM

## 2021-05-19 DIAGNOSIS — I48.20 ATRIAL FIBRILLATION, CHRONIC (HCC): Chronic | ICD-10-CM

## 2021-05-19 PROCEDURE — 99213 OFFICE O/P EST LOW 20 MIN: CPT | Performed by: NURSE PRACTITIONER

## 2021-05-19 PROCEDURE — 93000 ELECTROCARDIOGRAM COMPLETE: CPT | Performed by: NURSE PRACTITIONER

## 2021-06-11 ENCOUNTER — APPOINTMENT (OUTPATIENT)
Dept: CT IMAGING | Facility: HOSPITAL | Age: 86
End: 2021-06-11

## 2021-06-11 ENCOUNTER — HOSPITAL ENCOUNTER (INPATIENT)
Facility: HOSPITAL | Age: 86
LOS: 4 days | Discharge: HOME HEALTH | End: 2021-06-15
Attending: EMERGENCY MEDICINE | Admitting: FAMILY MEDICINE

## 2021-06-11 DIAGNOSIS — I47.20 VENTRICULAR TACHYCARDIA (HCC): ICD-10-CM

## 2021-06-11 DIAGNOSIS — T42.8X1A BACLOFEN OVERDOSE, ACCIDENTAL OR UNINTENTIONAL, INITIAL ENCOUNTER: Primary | ICD-10-CM

## 2021-06-11 LAB
AMPHET+METHAMPHET UR QL: NEGATIVE
ANION GAP SERPL CALCULATED.3IONS-SCNC: 11 MMOL/L (ref 5–15)
APAP SERPL-MCNC: <5 MCG/ML (ref 0–30)
ARTERIAL PATENCY WRIST A: POSITIVE
ATMOSPHERIC PRESS: NORMAL MM[HG]
BARBITURATES UR QL SCN: NEGATIVE
BASE EXCESS BLDA CALC-SCNC: 2.6 MMOL/L (ref 0–3)
BASOPHILS # BLD AUTO: 0.1 10*3/MM3 (ref 0–0.2)
BASOPHILS NFR BLD AUTO: 1.1 % (ref 0–1.5)
BDY SITE: NORMAL
BENZODIAZ UR QL SCN: NEGATIVE
BILIRUB UR QL STRIP: NEGATIVE
BUN SERPL-MCNC: 23 MG/DL (ref 8–23)
BUN/CREAT SERPL: 23.2 (ref 7–25)
CALCIUM SPEC-SCNC: 9.4 MG/DL (ref 8.6–10.5)
CANNABINOIDS SERPL QL: NEGATIVE
CHLORIDE SERPL-SCNC: 104 MMOL/L (ref 98–107)
CK SERPL-CCNC: 73 U/L (ref 20–200)
CK SERPL-CCNC: 76 U/L (ref 20–200)
CLARITY UR: CLEAR
CO2 BLDA-SCNC: 28.3 MMOL/L (ref 22–29)
CO2 SERPL-SCNC: 27 MMOL/L (ref 22–29)
COCAINE UR QL: NEGATIVE
COLOR UR: YELLOW
CORTIS SERPL-MCNC: 14.16 MCG/DL
CREAT SERPL-MCNC: 0.99 MG/DL (ref 0.76–1.27)
DEPRECATED RDW RBC AUTO: 51.2 FL (ref 37–54)
EOSINOPHIL # BLD AUTO: 0 10*3/MM3 (ref 0–0.4)
EOSINOPHIL NFR BLD AUTO: 0.6 % (ref 0.3–6.2)
ERYTHROCYTE [DISTWIDTH] IN BLOOD BY AUTOMATED COUNT: 16.9 % (ref 12.3–15.4)
ETHANOL UR QL: <0.01 %
GFR SERPL CREATININE-BSD FRML MDRD: 72 ML/MIN/1.73
GLUCOSE SERPL-MCNC: 105 MG/DL (ref 65–99)
GLUCOSE UR STRIP-MCNC: NEGATIVE MG/DL
HCO3 BLDA-SCNC: 27.1 MMOL/L (ref 21–28)
HCT VFR BLD AUTO: 29.6 % (ref 37.5–51)
HEMODILUTION: NO
HGB BLD-MCNC: 9.6 G/DL (ref 13–17.7)
HGB UR QL STRIP.AUTO: NEGATIVE
INHALED O2 CONCENTRATION: 33 %
INR PPP: 2.22 (ref 2–3)
KETONES UR QL STRIP: NEGATIVE
LEUKOCYTE ESTERASE UR QL STRIP.AUTO: NEGATIVE
LYMPHOCYTES # BLD AUTO: 0.6 10*3/MM3 (ref 0.7–3.1)
LYMPHOCYTES NFR BLD AUTO: 12.7 % (ref 19.6–45.3)
MCH RBC QN AUTO: 27.7 PG (ref 26.6–33)
MCHC RBC AUTO-ENTMCNC: 32.4 G/DL (ref 31.5–35.7)
MCV RBC AUTO: 85.5 FL (ref 79–97)
METHADONE UR QL SCN: NEGATIVE
MODALITY: NORMAL
MONOCYTES # BLD AUTO: 0.4 10*3/MM3 (ref 0.1–0.9)
MONOCYTES NFR BLD AUTO: 8.2 % (ref 5–12)
NEUTROPHILS NFR BLD AUTO: 3.8 10*3/MM3 (ref 1.7–7)
NEUTROPHILS NFR BLD AUTO: 77.4 % (ref 42.7–76)
NITRITE UR QL STRIP: NEGATIVE
NRBC BLD AUTO-RTO: 0.1 /100 WBC (ref 0–0.2)
OPIATES UR QL: NEGATIVE
OXYCODONE UR QL SCN: NEGATIVE
PCO2 BLDA: 40.4 MM HG (ref 35–48)
PH BLDA: 7.43 PH UNITS (ref 7.35–7.45)
PH UR STRIP.AUTO: <=5 [PH] (ref 5–8)
PLATELET # BLD AUTO: 279 10*3/MM3 (ref 140–450)
PMV BLD AUTO: 5.8 FL (ref 6–12)
PO2 BLDA: 91.7 MM HG (ref 83–108)
POTASSIUM SERPL-SCNC: 3.7 MMOL/L (ref 3.5–5.2)
PROT UR QL STRIP: NEGATIVE
PROTHROMBIN TIME: 23.5 SECONDS (ref 19.4–28.5)
QT INTERVAL: 469 MS
QT INTERVAL: 473 MS
RBC # BLD AUTO: 3.46 10*6/MM3 (ref 4.14–5.8)
SALICYLATES SERPL-MCNC: <0.3 MG/DL
SAO2 % BLDCOA: 97.4 % (ref 94–98)
SODIUM SERPL-SCNC: 142 MMOL/L (ref 136–145)
SP GR UR STRIP: 1.01 (ref 1–1.03)
TROPONIN T SERPL-MCNC: 0.02 NG/ML (ref 0–0.03)
TSH SERPL DL<=0.05 MIU/L-ACNC: 2.69 UIU/ML (ref 0.27–4.2)
UROBILINOGEN UR QL STRIP: NORMAL
WBC # BLD AUTO: 4.9 10*3/MM3 (ref 3.4–10.8)

## 2021-06-11 PROCEDURE — 82533 TOTAL CORTISOL: CPT | Performed by: NURSE PRACTITIONER

## 2021-06-11 PROCEDURE — 84443 ASSAY THYROID STIM HORMONE: CPT | Performed by: NURSE PRACTITIONER

## 2021-06-11 PROCEDURE — 80053 COMPREHEN METABOLIC PANEL: CPT | Performed by: EMERGENCY MEDICINE

## 2021-06-11 PROCEDURE — 25010000002 HEPARIN (PORCINE) PER 1000 UNITS: Performed by: NURSE PRACTITIONER

## 2021-06-11 PROCEDURE — 36600 WITHDRAWAL OF ARTERIAL BLOOD: CPT

## 2021-06-11 PROCEDURE — 80179 DRUG ASSAY SALICYLATE: CPT | Performed by: NURSE PRACTITIONER

## 2021-06-11 PROCEDURE — P9612 CATHETERIZE FOR URINE SPEC: HCPCS

## 2021-06-11 PROCEDURE — 82803 BLOOD GASES ANY COMBINATION: CPT

## 2021-06-11 PROCEDURE — 82550 ASSAY OF CK (CPK): CPT | Performed by: NURSE PRACTITIONER

## 2021-06-11 PROCEDURE — 84100 ASSAY OF PHOSPHORUS: CPT | Performed by: NURSE PRACTITIONER

## 2021-06-11 PROCEDURE — 93005 ELECTROCARDIOGRAM TRACING: CPT | Performed by: EMERGENCY MEDICINE

## 2021-06-11 PROCEDURE — 99285 EMERGENCY DEPT VISIT HI MDM: CPT

## 2021-06-11 PROCEDURE — 70450 CT HEAD/BRAIN W/O DYE: CPT

## 2021-06-11 PROCEDURE — 80307 DRUG TEST PRSMV CHEM ANLYZR: CPT | Performed by: NURSE PRACTITIONER

## 2021-06-11 PROCEDURE — 83735 ASSAY OF MAGNESIUM: CPT | Performed by: NURSE PRACTITIONER

## 2021-06-11 PROCEDURE — 84484 ASSAY OF TROPONIN QUANT: CPT | Performed by: NURSE PRACTITIONER

## 2021-06-11 PROCEDURE — 82077 ASSAY SPEC XCP UR&BREATH IA: CPT | Performed by: NURSE PRACTITIONER

## 2021-06-11 PROCEDURE — 80143 DRUG ASSAY ACETAMINOPHEN: CPT | Performed by: NURSE PRACTITIONER

## 2021-06-11 PROCEDURE — 85610 PROTHROMBIN TIME: CPT | Performed by: EMERGENCY MEDICINE

## 2021-06-11 PROCEDURE — 81003 URINALYSIS AUTO W/O SCOPE: CPT | Performed by: EMERGENCY MEDICINE

## 2021-06-11 PROCEDURE — 85025 COMPLETE CBC W/AUTO DIFF WBC: CPT | Performed by: EMERGENCY MEDICINE

## 2021-06-11 RX ORDER — ACETAMINOPHEN 650 MG/1
650 SUPPOSITORY RECTAL EVERY 4 HOURS PRN
Status: DISCONTINUED | OUTPATIENT
Start: 2021-06-11 | End: 2021-06-15 | Stop reason: HOSPADM

## 2021-06-11 RX ORDER — POTASSIUM CHLORIDE 20 MEQ/1
20 TABLET, EXTENDED RELEASE ORAL 3 TIMES DAILY
COMMUNITY

## 2021-06-11 RX ORDER — POTASSIUM CHLORIDE 7.45 MG/ML
10 INJECTION INTRAVENOUS
Status: DISCONTINUED | OUTPATIENT
Start: 2021-06-11 | End: 2021-06-15 | Stop reason: HOSPADM

## 2021-06-11 RX ORDER — POTASSIUM CHLORIDE 1.5 G/1.77G
40 POWDER, FOR SOLUTION ORAL AS NEEDED
Status: DISCONTINUED | OUTPATIENT
Start: 2021-06-11 | End: 2021-06-15 | Stop reason: HOSPADM

## 2021-06-11 RX ORDER — ONDANSETRON 4 MG/1
4 TABLET, FILM COATED ORAL EVERY 6 HOURS PRN
Status: DISCONTINUED | OUTPATIENT
Start: 2021-06-11 | End: 2021-06-15 | Stop reason: HOSPADM

## 2021-06-11 RX ORDER — POTASSIUM CHLORIDE 20 MEQ/1
40 TABLET, EXTENDED RELEASE ORAL AS NEEDED
Status: DISCONTINUED | OUTPATIENT
Start: 2021-06-11 | End: 2021-06-15 | Stop reason: HOSPADM

## 2021-06-11 RX ORDER — IPRATROPIUM BROMIDE AND ALBUTEROL SULFATE 2.5; .5 MG/3ML; MG/3ML
3 SOLUTION RESPIRATORY (INHALATION)
Status: DISCONTINUED | OUTPATIENT
Start: 2021-06-11 | End: 2021-06-15 | Stop reason: HOSPADM

## 2021-06-11 RX ORDER — ONDANSETRON 2 MG/ML
4 INJECTION INTRAMUSCULAR; INTRAVENOUS EVERY 6 HOURS PRN
Status: DISCONTINUED | OUTPATIENT
Start: 2021-06-11 | End: 2021-06-15 | Stop reason: HOSPADM

## 2021-06-11 RX ORDER — MAGNESIUM SULFATE 1 G/100ML
1 INJECTION INTRAVENOUS AS NEEDED
Status: DISCONTINUED | OUTPATIENT
Start: 2021-06-11 | End: 2021-06-15 | Stop reason: HOSPADM

## 2021-06-11 RX ORDER — FAMOTIDINE 10 MG/ML
20 INJECTION, SOLUTION INTRAVENOUS DAILY
Status: DISCONTINUED | OUTPATIENT
Start: 2021-06-11 | End: 2021-06-12

## 2021-06-11 RX ORDER — METOLAZONE 2.5 MG/1
2.5 TABLET ORAL DAILY PRN
COMMUNITY

## 2021-06-11 RX ORDER — NITROGLYCERIN 0.4 MG/1
0.4 TABLET SUBLINGUAL
Status: DISCONTINUED | OUTPATIENT
Start: 2021-06-11 | End: 2021-06-15 | Stop reason: HOSPADM

## 2021-06-11 RX ORDER — ALUMINA, MAGNESIA, AND SIMETHICONE 2400; 2400; 240 MG/30ML; MG/30ML; MG/30ML
15 SUSPENSION ORAL EVERY 6 HOURS PRN
Status: DISCONTINUED | OUTPATIENT
Start: 2021-06-11 | End: 2021-06-15 | Stop reason: HOSPADM

## 2021-06-11 RX ORDER — CLONAZEPAM 0.5 MG/1
0.5 TABLET ORAL NIGHTLY
COMMUNITY
End: 2021-06-15 | Stop reason: HOSPADM

## 2021-06-11 RX ORDER — HEPARIN SODIUM 5000 [USP'U]/ML
5000 INJECTION, SOLUTION INTRAVENOUS; SUBCUTANEOUS EVERY 8 HOURS SCHEDULED
Status: DISCONTINUED | OUTPATIENT
Start: 2021-06-11 | End: 2021-06-12

## 2021-06-11 RX ORDER — SODIUM CHLORIDE 9 MG/ML
75 INJECTION, SOLUTION INTRAVENOUS CONTINUOUS
Status: DISCONTINUED | OUTPATIENT
Start: 2021-06-11 | End: 2021-06-13

## 2021-06-11 RX ORDER — MAGNESIUM SULFATE HEPTAHYDRATE 40 MG/ML
2 INJECTION, SOLUTION INTRAVENOUS AS NEEDED
Status: DISCONTINUED | OUTPATIENT
Start: 2021-06-11 | End: 2021-06-15 | Stop reason: HOSPADM

## 2021-06-11 RX ORDER — FUROSEMIDE 40 MG/1
40 TABLET ORAL DAILY
COMMUNITY

## 2021-06-11 RX ORDER — SODIUM CHLORIDE 0.9 % (FLUSH) 0.9 %
10 SYRINGE (ML) INJECTION AS NEEDED
Status: DISCONTINUED | OUTPATIENT
Start: 2021-06-11 | End: 2021-06-15 | Stop reason: HOSPADM

## 2021-06-11 RX ORDER — SODIUM CHLORIDE 0.9 % (FLUSH) 0.9 %
10 SYRINGE (ML) INJECTION EVERY 12 HOURS SCHEDULED
Status: DISCONTINUED | OUTPATIENT
Start: 2021-06-11 | End: 2021-06-15 | Stop reason: HOSPADM

## 2021-06-11 RX ORDER — ACETAMINOPHEN 325 MG/1
650 TABLET ORAL EVERY 4 HOURS PRN
Status: DISCONTINUED | OUTPATIENT
Start: 2021-06-11 | End: 2021-06-15 | Stop reason: HOSPADM

## 2021-06-11 RX ADMIN — HEPARIN SODIUM 5000 UNITS: 5000 INJECTION INTRAVENOUS; SUBCUTANEOUS at 22:44

## 2021-06-11 RX ADMIN — Medication 10 ML: at 20:23

## 2021-06-11 RX ADMIN — HEPARIN SODIUM 5000 UNITS: 5000 INJECTION INTRAVENOUS; SUBCUTANEOUS at 18:22

## 2021-06-11 RX ADMIN — SODIUM CHLORIDE 75 ML/HR: 9 INJECTION, SOLUTION INTRAVENOUS at 18:39

## 2021-06-11 RX ADMIN — FAMOTIDINE 20 MG: 10 INJECTION INTRAVENOUS at 18:22

## 2021-06-11 NOTE — ED PROVIDER NOTES
Subjective   Patient is a an 85-year-old male who family states his wife has been acting given 3 times his normal baclofen dose from 50 mg a day to 45 mg a day. They noted that he has been increasingly less responsive over the past several days. Family offers no other complaints. Patient is unable offer complaints due to his condition          Review of Systems  Unable to be obtained from the patient due to his condition  Past Medical History:   Diagnosis Date   • Anemia    • Arthritis     left hip    • Atrial fibrillation (CMS/HCC)    • Atrial fibrillation (CMS/HCC)     chronic   • Cardiomegaly     mild   • Chronic diastolic heart failure (CMS/HCC)    • GERD (gastroesophageal reflux disease)    • Hyperlipidemia    • Hypertension    • Hypotension    • Lower extremity edema    • Mitral regurgitation    • Obesity    • Pacemaker    • Parkinson disease (CMS/HCC)    • Recurrent falls     with injury    • Sick sinus syndrome (CMS/HCC)     s/p pacemaker implant    • Valvular heart disease     MR       Allergies   Allergen Reactions   • Amoxicillin Diarrhea   • Cephalexin Diarrhea and Nausea And Vomiting   • Penicillins Other (See Comments)   • Amoxicillin-Pot Clavulanate Nausea And Vomiting   • Clarithromycin Nausea And Vomiting       Past Surgical History:   Procedure Laterality Date   • APPENDECTOMY     • ATRIAL APPENDAGE EXCLUSION LEFT WITH TRANSESOPHAGEAL ECHOCARDIOGRAM N/A 5/28/2020    Procedure: Atrial Appendage Occlusion;  Surgeon: Jim Walker MD;  Location:  [facility];  Service: Cardiovascular;  Laterality: N/A;   • ATRIAL APPENDAGE EXCLUSION LEFT WITH TRANSESOPHAGEAL ECHOCARDIOGRAM N/A 5/28/2020    Procedure: Atrial Appendage Occlusion;  Surgeon: Lashaun So MD;  Location:  [facility];  Service: Cardiovascular;  Laterality: N/A;   • CATARACT EXTRACTION     • CHOLECYSTECTOMY     • COLONOSCOPY N/A 5/21/2020    Procedure: COLONOSCOPY WITH BIOPSY X1 AREA;   Surgeon: Sim Collins MD;  Location: [facility] ENDOSCOPY;  Service: Gastroenterology;  Laterality: N/A;  Post: poor prep, impacted stool in rectum, and internal hemorrhoids, ascending colon arteriovenous malformation   • ENDOSCOPY N/A 5/21/2020    Procedure: ESOPHAGOGASTRODUODENOSCOPY with clipping x 1 of bleeding gastric polyp;  Surgeon: Sim Collins MD;  Location: [facility] ENDOSCOPY;  Service: Gastroenterology;  Laterality: N/A;  Post: bleeding gastric polyp   • HIP SURGERY Right 08/2017   • OTHER SURGICAL HISTORY      skin mole excisions    • PACEMAKER IMPLANTATION  06/22/2017    [employer] ([employer])   • TONSILLECTOMY     • TOTAL HIP ARTHROPLASTY Left 05/20/2014       Family History   Problem Relation Age of Onset   • Heart failure Mother    • Stroke Maternal Grandfather        Social History     Socioeconomic History   • Marital status:      Spouse name: Not on file   • Number of children: Not on file   • Years of education: Not on file   • Highest education level: Not on file   Tobacco Use   • Smoking status: Former Smoker     Packs/day: 1.00     Years: 40.00     Pack years: 40.00     Types: Cigarettes   • Smokeless tobacco: Never Used   • Tobacco comment: quit 1970's    Vaping Use   • Vaping Use: Never used   Substance and Sexual Activity   • Alcohol use: Yes   • Drug use: Never   • Sexual activity: Defer           Objective   Physical Exam  Neurologic exam shows patient be obtunded. He has no focal neurologic deficits. HEENT exam shows TMs to be clear. Oropharynx comers but sclera nonicteric. Neck has no adenopathy JVD or bruits. Lungs are clear. Heart has regular rate rhythm without murmur rub or gallop. Chest is nontender. Abdomen is soft nontender. Extremity exam is no cyanosis or edema.  Procedures     My EKG interpretation shows a paced rhythm      ED Course            Results for orders placed or performed during the hospital encounter of 06/11/21   Basic Metabolic Panel    Specimen: Blood    Result Value Ref Range    Glucose 105 (H) 65 - 99 mg/dL    BUN 23 8 - 23 mg/dL    Creatinine 0.99 0.76 - 1.27 mg/dL    Sodium 142 136 - 145 mmol/L    Potassium 3.7 3.5 - 5.2 mmol/L    Chloride 104 98 - 107 mmol/L    CO2 27.0 22.0 - 29.0 mmol/L    Calcium 9.4 8.6 - 10.5 mg/dL    eGFR Non African Amer 72 >60 mL/min/1.73    BUN/Creatinine Ratio 23.2 7.0 - 25.0    Anion Gap 11.0 5.0 - 15.0 mmol/L   Urinalysis With Microscopic If Indicated (No Culture) - Urine, Catheter    Specimen: Urine, Catheter   Result Value Ref Range    Color, UA Yellow Yellow, Straw    Appearance, UA Clear Clear    pH, UA <=5.0 5.0 - 8.0    Specific Gravity, UA 1.010 1.005 - 1.030    Glucose, UA Negative Negative    Ketones, UA Negative Negative    Bilirubin, UA Negative Negative    Blood, UA Negative Negative    Protein, UA Negative Negative    Leuk Esterase, UA Negative Negative    Nitrite, UA Negative Negative    Urobilinogen, UA 1.0 E.U./dL 0.2 - 1.0 E.U./dL   Protime-INR    Specimen: Blood   Result Value Ref Range    Protime 23.5 19.4 - 28.5 Seconds    INR 2.22 2.00 - 3.00   CBC Auto Differential    Specimen: Blood   Result Value Ref Range    WBC 4.90 3.40 - 10.80 10*3/mm3    RBC 3.46 (L) 4.14 - 5.80 10*6/mm3    Hemoglobin 9.6 (L) 13.0 - 17.7 g/dL    Hematocrit 29.6 (L) 37.5 - 51.0 %    MCV 85.5 79.0 - 97.0 fL    MCH 27.7 26.6 - 33.0 pg    MCHC 32.4 31.5 - 35.7 g/dL    RDW 16.9 (H) 12.3 - 15.4 %    RDW-SD 51.2 37.0 - 54.0 fl    MPV 5.8 (L) 6.0 - 12.0 fL    Platelets 279 140 - 450 10*3/mm3    Neutrophil % 77.4 (H) 42.7 - 76.0 %    Lymphocyte % 12.7 (L) 19.6 - 45.3 %    Monocyte % 8.2 5.0 - 12.0 %    Eosinophil % 0.6 0.3 - 6.2 %    Basophil % 1.1 0.0 - 1.5 %    Neutrophils, Absolute 3.80 1.70 - 7.00 10*3/mm3    Lymphocytes, Absolute 0.60 (L) 0.70 - 3.10 10*3/mm3    Monocytes, Absolute 0.40 0.10 - 0.90 10*3/mm3    Eosinophils, Absolute 0.00 0.00 - 0.40 10*3/mm3    Basophils, Absolute 0.10 0.00 - 0.20 10*3/mm3    nRBC 0.1 0.0 - 0.2 /100 WBC    ECG 12 Lead   Result Value Ref Range    QT Interval 473 ms   ECG 12 Lead   Result Value Ref Range    QT Interval 469 ms     CT Head Without Contrast    Result Date: 6/11/2021  1. No acute intracranial abnormality. 2. Generalized cerebral atrophy.  Electronically Signed By-Jason Dan MD On:6/11/2021 3:28 PM This report was finalized on [id number] by  Jason Dan MD.                                      MDM  Number of Diagnoses or Management Options  Diagnosis management comments: Patient is somnolent most likely secondary to the baclofen overdose which was unintentional. Patient also had an episode in the ED where he had ventricular tachycardia lasting approximately 1 minute. At that time patient became unresponsive. Patient returned back to a paced rhythm prior to any intervention. The patient is more obtunded now than he was on initial examination. Patient will be admitted to the intensive care unit for further cardiac monitoring and evaluation as well as supportive care. Total critical care time for 0 minutes       Amount and/or Complexity of Data Reviewed  Clinical lab tests: reviewed  Tests in the radiology section of CPT®: reviewed  Tests in the medicine section of CPT®: reviewed    Risk of Complications, Morbidity, and/or Mortality  Presenting problems: high  Diagnostic procedures: high  Management options: high    Critical Care  Total time providing critical care: 30-74 minutes      Final diagnoses:   Baclofen overdose, accidental or unintentional, initial encounter   Ventricular tachycardia (CMS/HCC)       ED Disposition  ED Disposition     ED Disposition Condition Comment    Decision to Admit  Level of Care: Critical Care [6]   Diagnosis: Baclofen overdose, accidental or unintentional, initial encounter [[id number]]   Admitting Physician: CURTIS BUSTOS [[id number]]   Attending Physician: CURTIS BUSTOS [[id number]]   Isolate for COVID?: No [0]   Certification: I Certify That Inpatient Hospital Services Are  Medically Necessary For Greater Than 2 Midnights            No follow-up provider specified.       Medication List      No changes were made to your prescriptions during this visit.          Chas Burton MD  06/11/21 [id number]

## 2021-06-11 NOTE — ED NOTES
Daughter reports that over the last week pt has unintentionally been receiving three times his normal baclofen dose. Pt is prescribed 15mg at night and has been getting 45mg throughout the day. Pt fell Wednesday evening and has become more somnolent since that time. Large bruise noted to chest, yellow/green in color.     Manisha Fine, RN  06/11/21 [id number]

## 2021-06-11 NOTE — H&P
PULMONARY/CRITICAL CARE HISTORY & PHYSICAL       PATIENT NAME:     Rafael Ivey  :     1935    MRN:     [id number]       ROOM:     [facility] ED      PRIMARY CARE PHYSICIAN:  Blake Ivey MD    SUBJECTIVE     CHIEF COMPLAINT:   Altered mental status    HISTORY OF PRESENT ILLNESS:  Rafael Ivey is a 85 y.o. male  has a past medical history of Anemia, Arthritis, Atrial fibrillation (CMS/HCC), Atrial fibrillation (CMS/HCC), Cardiomegaly, Chronic diastolic heart failure (CMS/HCC), GERD (gastroesophageal reflux disease), Hyperlipidemia, Hypertension, Hypotension, Lower extremity edema, Mitral regurgitation, Obesity, Pacemaker, Parkinson disease (CMS/HCC), Recurrent falls, Sick sinus syndrome (CMS/HCC), and Valvular heart disease.   Patient presented to [facility] ED on 2021 with complaint by patient's family that patient has been increasingly less responsive over the prior several days.  Patient is confused and unable to contribute information, and in the ED he was noted as being obtunded, but seemingly able to protect his airway.  It was then discovered that patient's wife, who manages patient's medications, had been accidentally giving this patient 3 times his normal dose of daily baclofen.  Patient, per family is prescribed 15 mg daily, but patient had been receiving 45 mg daily for the past week.  Patient was also given last of his Sinemet, as patient's wife had reversed the dosing administrations between the medications.  Subsequently, patient also had an episode of ventricular tachycardia that lasted approximately 1 minute, and at that time patient became unresponsive, in which he received per nursing 1 round of CPR, and patient returned back to a paced rhythm before any further intervention.  Patient's neuro status was further obtunded than his initial presentation following the ventricular tachycardia.    In the ED, labs were obtained with abnormalities as follows: Alkaline  phosphatase 136, troponin 0.017, magnesium 1.8, hemoglobin 9.6, hematocrit 29.6.  UA was negative, acetaminophen <5.0, ethanol <0.010, salicylate <0.3, and urine drug screen negative.  CT of the head was obtained and revealed no acute intracranial abnormality, generalized cerebral atrophy.  EKG was obtained revealing a paced rhythm.  Due to patient's altered mental status, ED contacted the intensivist service to monitor patient overnight, in case if his neuro status continued to decline.  It is suspected that patient's presentation is consistent with an unintentional baclofen overdose.  Upon admission to ICU, patient is now more awake, confused, yelling at times.  It is unknown as to what patient's baseline is.  Patient denies general complaints, does not remember how he came to be at this facility, and at times just refuses to participate in conversation, making this a very difficult information discovery.    Pulmonary/Intensivist service was contacted for admission to ICU and further evaluation and treatment.      REVIEW OF SYSTEMS:  Review of systems could not be obtained due to   patient confusion.      ASSESSMENT & PLAN     Principal Problem:    Baclofen overdose  Active Problems:    Ventricular tachycardia    Acute encephalopathy, related to baclofen overdose    Sick sinus syndrome (CMS/HCC)    Pacemaker    Pulmonary hypertension (CMS/[facility])    Sleep apnea    Chronic diastolic CHF (congestive heart failure) (CMS/HCC)    Parkinson's disease (CMS/HCC)    GERD (gastroesophageal reflux disease)    RLS (restless legs syndrome)    Depression     PLAN:  Accidental Baclofen overdose  -UDS, EtOH, acetaminophen, salicylate--all negative  -Patient received 3 times his dose of baclofen, which he takes nightly; and received 1 dose of his Sinemet which he takes 3 times a day  -Holding baclofen until medication has been able to clear.  -Nursing instructed to call poison control for recommendations.  -Closely monitor patient's  neurologic status, currently protecting airway, but neuro status seems to wax and wane.  -We will monitor overnight in ICU.    Acute encephalopathy, related to baclofen overdose  -Secondary to above  -Hold all LOC altering medications, until medications have had more time to clear.  -CT of the head negative for acute intracranial abnormalities.  -Further work-up as indicated.    Episodic ventricular tachycardia, spontaneously resolved, questionable cardiopulmonary arrest with ROSC  -In the ED, patient was in V. tach for approximately 1 minute, in which compressions were initiated, patient was unresponsive  -EKG reviewed, in a paced rhythm  -Troponin negative, monitor serial troponins  -Cardiology consulted in a.m.    Hyperlipidemia  -On Crestor, continue  -Check lipid panel in a.m.    PAF  Sick sinus syndrome, S/P pacemaker in situ  Chronic anticoagulation with warfarin  -Continue warfarin, pharmacy to dose  -No rate altering or antihypertensive medications on home medication profile.  -Cardiology consulted.    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 (4/30/20)  -Cardiology consulted.    Sleep apnea  -Unable to assess due to confusion.  -CPAP if needed    Iron deficiency anemia, stable  -Continue ferrous sulfate per home regimen    Parkinson's disease  -Resume patient's home medication of Sinemet    GERD (gastroesophageal reflux disease)  -Omeprazole substituted with pantoprazole per hospital formulary    RLS (restless legs syndrome)  -Home regimen of baclofen on hold, as patient received accidental overdose, as noted above  -Home  "regimen of gabapentin on hold    OAB  -Continue oxybutynin per home regimen    Depression  -Continue Lexapro per home regimen.  -Home regimen of Klonopin on hold, due to acute encephalopathy.    Monitor in ICU overnight, if patient remains neurologically improved, consider downgrade to medical monitor, but patient should be evaluated by cardiology prior to discharge.  If downgrade, will consult hospitalist.    Code Status: CPR, full interventions  VTE Prophylaxis: Coumadin  PUD Prophylaxis: PPI      HOSPITAL MEDICATIONS     SCHEDULED MEDICATIONS:  carbidopa-levodopa, 1 tablet, Oral, TID  escitalopram, 10 mg, Oral, Daily  ferrous sulfate, 324 mg, Oral, Daily With Breakfast  furosemide, 40 mg, Oral, Daily  multivitamin with minerals, 1 tablet, Oral, Daily  [START ON 6/13/2021] oxybutynin, 5 mg, Oral, Nightly  pantoprazole, 40 mg, Oral, QAM  potassium chloride, 20 mEq, Oral, TID  rosuvastatin, 10 mg, Oral, Nightly  sodium chloride, 10 mL, Intravenous, Q12H  warfarin, 2 mg, Oral, Once per day on Mon Tue Wed Thu Fri Sat         CONTINUOUS INFUSIONS:    sodium chloride, 75 mL/hr         PRN MEDICATIONS:     acetaminophen **OR** acetaminophen    aluminum-magnesium hydroxide-simethicone    ipratropium-albuterol    magnesium sulfate **OR** magnesium sulfate in D5W 1g/100mL (PREMIX)    nitroglycerin    ondansetron **OR** ondansetron    potassium chloride **OR** potassium chloride **OR** potassium chloride    potassium phosphate infusion greater than 15 mMoles **OR** potassium phosphate infusion greater than 15 mMoles **OR** potassium phosphate **OR** sodium phosphate IVPB **OR** sodium phosphate IVPB **OR** sodium phosphate IVPB    sodium chloride       OBJECTIVE     VITAL SIGNS:  /83 (BP Location: Left arm, Patient Position: Lying)   Pulse 70   Temp 98.3 °F (36.8 °C) (Oral)   Resp 16   Ht 182.9 cm (72\")   Wt 91.2 kg (201 lb)   SpO2 99%   BMI 27.26 kg/m²     Wt Readings from Last 3 Encounters:   06/11/21 91.2 kg " (201 lb)   05/19/21 92.5 kg (204 lb)   11/04/20 83 kg (183 lb)       INTAKE/OUTPUT:  No intake or output data in the 24 hours ending 06/11/21 1605    PHYSICAL EXAM:   Constitutional:  Well developed, well nourished, no acute distress, non-toxic appearance, chronically ill-appearing  Eyes:  PERRL, conjunctiva normal, EOMI   HENT:  Atraumatic, external ears normal, nose normal, oropharynx moist, no pharyngeal exudates. Neck-normal range of motion, no tenderness, supple, trachea midline  Respiratory: Diminished bibasilar breath sounds, without wheezes, rhonchi, or rales, non-labored respirations without accessory muscle use  Cardiovascular: Paced rhythm, no murmurs, no gallops, no rubs   GI:  Soft, nondistended, normal bowel sounds, nontender, no organomegaly, no mass, no rebound, no guarding   :  No costovertebral angle tenderness   Musculoskeletal:  No edema, no tenderness, no deformities  Integument:  Well hydrated, no rash   Lymphatic:  No lymphadenopathy noted   Neurologic: Confused, oriented x1-to person, drowsy at times, protecting airway, no focal deficits noted   Psychiatric: Confused, restless, agitated      HISTORY     HISTORY:  Past Medical History:   Diagnosis Date    Anemia     Arthritis     left hip     Atrial fibrillation (CMS/HCC)     Atrial fibrillation (CMS/HCC)     chronic    Cardiomegaly     mild    Chronic diastolic heart failure (CMS/HCC)     GERD (gastroesophageal reflux disease)     Hyperlipidemia     Hypertension     Hypotension     Lower extremity edema     Mitral regurgitation     Obesity     Pacemaker     Parkinson disease (CMS/HCC)     Recurrent falls     with injury     Sick sinus syndrome (CMS/HCC)     s/p pacemaker implant     Valvular heart disease     MR     Past Surgical History:   Procedure Laterality Date    APPENDECTOMY      ATRIAL APPENDAGE EXCLUSION LEFT WITH TRANSESOPHAGEAL ECHOCARDIOGRAM N/A 5/28/2020    Procedure: Atrial Appendage Occlusion;  Surgeon: Jim Walker,  MD;  Location: [facility] CATH INVASIVE LOCATION;  Service: Cardiovascular;  Laterality: N/A;    ATRIAL APPENDAGE EXCLUSION LEFT WITH TRANSESOPHAGEAL ECHOCARDIOGRAM N/A 5/28/2020    Procedure: Atrial Appendage Occlusion;  Surgeon: Lashaun So MD;  Location: [facility] CATH INVASIVE LOCATION;  Service: Cardiovascular;  Laterality: N/A;    CATARACT EXTRACTION      CHOLECYSTECTOMY      COLONOSCOPY N/A 5/21/2020    Procedure: COLONOSCOPY WITH BIOPSY X1 AREA;  Surgeon: Sim Collins MD;  Location: [facility] ENDOSCOPY;  Service: Gastroenterology;  Laterality: N/A;  Post: poor prep, impacted stool in rectum, and internal hemorrhoids, ascending colon arteriovenous malformation    ENDOSCOPY N/A 5/21/2020    Procedure: ESOPHAGOGASTRODUODENOSCOPY with clipping x 1 of bleeding gastric polyp;  Surgeon: Sim Collins MD;  Location: [facility] ENDOSCOPY;  Service: Gastroenterology;  Laterality: N/A;  Post: bleeding gastric polyp    HIP SURGERY Right 08/2017    OTHER SURGICAL HISTORY      skin mole excisions     PACEMAKER IMPLANTATION  06/22/2017    [employer] ([employer])    TONSILLECTOMY      TOTAL HIP ARTHROPLASTY Left 05/20/2014     Family History   Problem Relation Age of Onset    Heart failure Mother     Stroke Maternal Grandfather      Social History     Socioeconomic History    Marital status:      Spouse name: Not on file    Number of children: Not on file    Years of education: Not on file    Highest education level: Not on file   Tobacco Use    Smoking status: Former Smoker     Packs/day: 1.00     Years: 40.00     Pack years: 40.00     Types: Cigarettes    Smokeless tobacco: Never Used    Tobacco comment: quit 1970's    Vaping Use    Vaping Use: Never used   Substance and Sexual Activity    Alcohol use: Yes    Drug use: Never    Sexual activity: Defer        HOME MEDICATIONS:   Prior to Admission medications    Medication Sig Start Date End Date Taking? Authorizing Provider   furosemide (LASIX) 40 MG tablet Take 1  tablet by mouth 2 (Two) Times a Day. 6/16/20 6/11/21 Yes Kim Childs MD   acetaminophen (TYLENOL) 325 MG tablet Take 650 mg by mouth Every 6 (Six) Hours As Needed for Mild Pain .    Lisa Gunter MD   baclofen (LIORESAL) 10 MG tablet Take 10 mg by mouth Daily.    Lisa Gunter MD   carbidopa-levodopa (SINEMET)  MG per tablet Take 1.5 tablets by mouth Every 8 (Eight) Hours. 1/4/19   Lisa Gunter MD   escitalopram (LEXAPRO) 10 MG tablet Take 10 mg by mouth Daily. Take one & one-half tablets daily    Lisa Gunter MD   ferrous sulfate 325 (65 FE) MG tablet Take 1 tablet by mouth Every 12 (Twelve) Hours. 1/4/19   Lisa Gunter MD   gabapentin (NEURONTIN) 300 MG capsule Take 300 mg by mouth every night at bedtime. 6/26/19   Lisa Gunter MD   multivitamin-minerals (CENTRUM) tablet Take 1 tablet by mouth Daily.    Lisa Gunter MD   nitroglycerin (NITROSTAT) 0.4 MG SL tablet Place 1 tablet under the tongue Every 5 (Five) Minutes As Needed for Chest Pain (Only if SBP Greater Than 100). Take no more than 3 doses in 15 minutes. 6/16/20   Kim Childs MD   Omega-3 Fatty Acids (FISH OIL PO) Take 2 capsules by mouth Daily.    Lisa Gunter MD   omeprazole (PRILOSEC) 20 MG capsule Take 1 capsule by mouth Daily.    Lisa Gunter MD   ondansetron (ZOFRAN) 4 MG tablet Take 1 tablet by mouth Every 6 (Six) Hours As Needed for Nausea or Vomiting. 6/16/20   Kim Childs MD   oxybutynin (DITROPAN) 5 MG tablet Take 5 mg by mouth Daily.    Lisa Gunter MD   rosuvastatin (CRESTOR) 10 MG tablet Take 1 tablet by mouth every night at bedtime. 6/27/19   Lisa Gunter MD   warfarin (COUMADIN) 2 MG tablet Take 1 tablet by mouth Every Night. Taking every day but Sunday 8/17/20   Sylvia Rowan MD   nystatin (MYCOSTATIN) 090000 UNIT/GM powder Apply  topically to the appropriate area as directed Every 12 (Twelve) Hours. 6/16/20 6/11/21  Amadeo,  MD Kim   potassium chloride (K-DUR,KLOR-CON) 20 MEQ CR tablet Take 1 tablet by mouth 3 (Three) Times a Day.  Patient taking differently: Take 20 mEq by mouth 3 (Three) Times a Day. Pt takes this 5 times a day 3/9/20 6/11/21  Jose Gonzalez DO   pramipexole (MIRAPEX) 0.25 MG tablet Take 1 tablet by mouth Daily. 1/4/19 6/11/21  Provider, MD Lisa   Warfarin Sodium (PHARMACY TO DOSE WARFARIN) Continuous As Needed (Atrial fibrillation). Indications: Atrial Fibrillation 8/17/20 6/11/21  Sylvia Rowan MD   Home medications reconciled, as noted above in plan.  Most LOC altering medications were held due to baclofen overdose.    ALLERGIES:  Amoxicillin, Cephalexin, Penicillins, Amoxicillin-pot clavulanate, and Clarithromycin      RESULTS     LABS:  Lab Results (last 24 hours)       Procedure Component Value Units Date/Time    CBC & Differential [[id number]]  (Abnormal) Collected: 06/11/21 1309    Specimen: Blood Updated: 06/11/21 1324    Narrative:      The following orders were created for panel order CBC & Differential.  Procedure                               Abnormality         Status                     ---------                               -----------         ------                     CBC Auto Differential[[id number]]        Abnormal            Final result                 Please view results for these tests on the individual orders.    Basic Metabolic Panel [[id number]]  (Abnormal) Collected: 06/11/21 1309    Specimen: Blood Updated: 06/11/21 1340     Glucose 105 mg/dL      BUN 23 mg/dL      Creatinine 0.99 mg/dL      Sodium 142 mmol/L      Potassium 3.7 mmol/L      Chloride 104 mmol/L      CO2 27.0 mmol/L      Calcium 9.4 mg/dL      eGFR Non African Amer 72 mL/min/1.73      BUN/Creatinine Ratio 23.2     Anion Gap 11.0 mmol/L     Narrative:      GFR Normal >60  Chronic Kidney Disease <60  Kidney Failure <15      Protime-INR [[id number]]  (Normal) Collected: 06/11/21 1309    Specimen: Blood  Updated: 06/11/21 1337     Protime 23.5 Seconds      INR 2.22    CBC Auto Differential [[id number]]  (Abnormal) Collected: 06/11/21 1309    Specimen: Blood Updated: 06/11/21 1324     WBC 4.90 10*3/mm3      RBC 3.46 10*6/mm3      Hemoglobin 9.6 g/dL      Hematocrit 29.6 %      MCV 85.5 fL      MCH 27.7 pg      MCHC 32.4 g/dL      RDW 16.9 %      RDW-SD 51.2 fl      MPV 5.8 fL      Platelets 279 10*3/mm3      Neutrophil % 77.4 %      Lymphocyte % 12.7 %      Monocyte % 8.2 %      Eosinophil % 0.6 %      Basophil % 1.1 %      Neutrophils, Absolute 3.80 10*3/mm3      Lymphocytes, Absolute 0.60 10*3/mm3      Monocytes, Absolute 0.40 10*3/mm3      Eosinophils, Absolute 0.00 10*3/mm3      Basophils, Absolute 0.10 10*3/mm3      nRBC 0.1 /100 WBC     Salicylate Level [[id number]] Collected: 06/11/21 1309    Specimen: Blood Updated: 06/11/21 1553    Acetaminophen Level [[id number]] Collected: 06/11/21 1309    Specimen: Blood Updated: 06/11/21 1553    Troponin [[id number]] Collected: 06/11/21 1309    Specimen: Blood Updated: 06/11/21 1553    CK [[id number]] Collected: 06/11/21 1309    Specimen: Blood Updated: 06/11/21 1553    Urinalysis With Microscopic If Indicated (No Culture) - Urine, Catheter [[id number]]  (Normal) Collected: 06/11/21 1315    Specimen: Urine, Catheter Updated: 06/11/21 1324     Color, UA Yellow     Appearance, UA Clear     pH, UA <=5.0     Specific Gravity, UA 1.010     Glucose, UA Negative     Ketones, UA Negative     Bilirubin, UA Negative     Blood, UA Negative     Protein, UA Negative     Leuk Esterase, UA Negative     Nitrite, UA Negative     Urobilinogen, UA 1.0 E.U./dL    Narrative:      Urine microscopic not indicated.    Urine Drug Screen - Urine, Catheter [[id number]] Collected: 06/11/21 1315    Specimen: Urine, Catheter Updated: 06/11/21 1556              MICRO:  Microbiology Results (last 10 days)       ** No results found for the last 240 hours. **              RADIOLOGY STUDIES:  Imaging Results  (Last 72 Hours)       Procedure Component Value Units Date/Time    CT Head Without Contrast [[id number]] Collected: 06/11/21 1522     Updated: 06/11/21 1531    Narrative:         DATE OF EXAM:  6/11/2021 3:10 PM     PROCEDURE:   CT HEAD WO CONTRAST-     INDICATIONS:   Mental status change, unknown cause     COMPARISON:  CT head without contrast 11/23/2018     TECHNIQUE:   Routine transaxial cuts were obtained through the head without the  administration of contrast. Automated exposure control and iterative  reconstruction methods were used.      FINDINGS:  There is no acute intracranial hemorrhage. No mass effect or midline  shift. Prominence of the ventricles and cortical sulci similar to prior  study most consistent with generalized cerebral atrophy. Posterior fossa  is without acute abnormality. Globes are symmetric. There is thinning of  the lens bilaterally. Posterior fossa without acute abnormality. The  mastoid air cells are well-aerated. There is leftward deviation of the  nasal septum. Retention cysts noted in the left maxillary sinus  measuring 11 mm. Calvarium intact. Asymmetric right temporomandibular  osteoarthritis.       Impression:      1. No acute intracranial abnormality.  2. Generalized cerebral atrophy.     Electronically Signed By-Jason Dan MD On:6/11/2021 3:28 PM  This report was finalized on [id number] by  Jason Dan MD.            Results for orders placed during the hospital encounter of 06/12/20    Duplex Venous Lower Extremity - Bilateral CAR    Interpretation Summary  · Normal bilateral lower extremity venous duplex scan.  · Left popliteal fossa heterogenous fluid collection.          ECHOCARDIOGRAM:  Results for orders placed during the hospital encounter of 05/20/20    Adult Transesophageal Echo (PRESTON) W/ Cont if Necessary Per Protocol    Interpretation Summary  · Left ventricular systolic function is normal.  · Left atrial cavity size is mild-to-moderately dilated.  · Moderate  mitral valve regurgitation is present  · Left atrial appendage diameter was 33 mm at 135 degree which was very large and not conducive to implantation of watchman device. Depth was 28 mm. After discussion, it was decided that anatomy of left atrial appendage is not conducive and appropriate for watchman device implantation. Procedure was aborted         I reviewed the patient's new clinical results.    I discussed the patient's findings and my recommendations with nursing staff and consulting provider.  I have discussed plan with on-call attending physician, who agrees with this plan of care.    Appropriate PPE worn during assessment of patient per established guidelines.      Electronically signed by STEPHANY Rodriguez, 06/11/21, 4:05 PM EDT.     I personally have examined  and interviewed the patient. I have reviewed the history, data, problems, assessment and plan with our NP.  Critical care time in direct medical management (   ) minutes  Electronically signed by Sal Post MD, D,ABS, 06/11/21,

## 2021-06-12 ENCOUNTER — APPOINTMENT (OUTPATIENT)
Dept: CARDIOLOGY | Facility: HOSPITAL | Age: 86
End: 2021-06-12

## 2021-06-12 PROBLEM — G93.40 ACUTE ENCEPHALOPATHY: Status: ACTIVE | Noted: 2021-06-11

## 2021-06-12 PROBLEM — I47.20 VENTRICULAR TACHYCARDIA (HCC): Status: ACTIVE | Noted: 2021-06-11

## 2021-06-12 LAB
ALBUMIN SERPL-MCNC: 3.7 G/DL (ref 3.5–5.2)
ALBUMIN/GLOB SERPL: 1.3 G/DL
ALP SERPL-CCNC: 136 U/L (ref 39–117)
ALT SERPL W P-5'-P-CCNC: 21 U/L (ref 1–41)
ANION GAP SERPL CALCULATED.3IONS-SCNC: 13 MMOL/L (ref 5–15)
AST SERPL-CCNC: 26 U/L (ref 1–40)
BASOPHILS # BLD AUTO: 0.1 10*3/MM3 (ref 0–0.2)
BASOPHILS NFR BLD AUTO: 1.5 % (ref 0–1.5)
BH CV ECHO MEAS - AO MAX PG (FULL): 25.6 MMHG
BH CV ECHO MEAS - AO MAX PG: 27.8 MMHG
BH CV ECHO MEAS - AO MEAN PG (FULL): 15.6 MMHG
BH CV ECHO MEAS - AO MEAN PG: 16.9 MMHG
BH CV ECHO MEAS - AO ROOT AREA (BSA CORRECTED): 1.7
BH CV ECHO MEAS - AO ROOT AREA: 9.9 CM^2
BH CV ECHO MEAS - AO ROOT DIAM: 3.5 CM
BH CV ECHO MEAS - AO V2 MAX: 263.8 CM/SEC
BH CV ECHO MEAS - AO V2 MEAN: 193 CM/SEC
BH CV ECHO MEAS - AO V2 VTI: 57.3 CM
BH CV ECHO MEAS - ASC AORTA: 3.7 CM
BH CV ECHO MEAS - AVA(I,A): 1.4 CM^2
BH CV ECHO MEAS - AVA(I,D): 1.4 CM^2
BH CV ECHO MEAS - AVA(V,A): 1.3 CM^2
BH CV ECHO MEAS - AVA(V,D): 1.3 CM^2
BH CV ECHO MEAS - BSA(HAYCOCK): 2.2 M^2
BH CV ECHO MEAS - BSA: 2.1 M^2
BH CV ECHO MEAS - BZI_BMI: 27.5 KILOGRAMS/M^2
BH CV ECHO MEAS - BZI_METRIC_HEIGHT: 182.9 CM
BH CV ECHO MEAS - BZI_METRIC_WEIGHT: 92.1 KG
BH CV ECHO MEAS - EDV(CUBED): 125.2 ML
BH CV ECHO MEAS - EDV(MOD-SP4): 130.9 ML
BH CV ECHO MEAS - EDV(TEICH): 118.4 ML
BH CV ECHO MEAS - EF(CUBED): 67 %
BH CV ECHO MEAS - EF(MOD-BP): 70 %
BH CV ECHO MEAS - EF(MOD-SP4): 74.2 %
BH CV ECHO MEAS - EF(TEICH): 58.3 %
BH CV ECHO MEAS - ESV(CUBED): 41.3 ML
BH CV ECHO MEAS - ESV(MOD-SP4): 33.8 ML
BH CV ECHO MEAS - ESV(TEICH): 49.4 ML
BH CV ECHO MEAS - FS: 30.9 %
BH CV ECHO MEAS - IVS/LVPW: 0.72
BH CV ECHO MEAS - IVSD: 0.83 CM
BH CV ECHO MEAS - LA DIMENSION(2D): 5.8 CM
BH CV ECHO MEAS - LV DIASTOLIC VOL/BSA (35-75): 61.1 ML/M^2
BH CV ECHO MEAS - LV MASS(C)D: 180 GRAMS
BH CV ECHO MEAS - LV MASS(C)DI: 84 GRAMS/M^2
BH CV ECHO MEAS - LV MAX PG: 2.3 MMHG
BH CV ECHO MEAS - LV MEAN PG: 1.3 MMHG
BH CV ECHO MEAS - LV SYSTOLIC VOL/BSA (12-30): 15.8 ML/M^2
BH CV ECHO MEAS - LV V1 MAX: 75.3 CM/SEC
BH CV ECHO MEAS - LV V1 MEAN: 52 CM/SEC
BH CV ECHO MEAS - LV V1 VTI: 17 CM
BH CV ECHO MEAS - LVIDD: 5 CM
BH CV ECHO MEAS - LVIDS: 3.5 CM
BH CV ECHO MEAS - LVOT AREA: 4.7 CM^2
BH CV ECHO MEAS - LVOT DIAM: 2.5 CM
BH CV ECHO MEAS - LVPWD: 1.2 CM
BH CV ECHO MEAS - MR MAX PG: 121.8 MMHG
BH CV ECHO MEAS - MR MAX VEL: 551.7 CM/SEC
BH CV ECHO MEAS - MV A MAX VEL: 51.5 CM/SEC
BH CV ECHO MEAS - MV DEC SLOPE: 616 CM/SEC^2
BH CV ECHO MEAS - MV DEC TIME: 0.21 SEC
BH CV ECHO MEAS - MV E MAX VEL: 131.5 CM/SEC
BH CV ECHO MEAS - MV E/A: 2.6
BH CV ECHO MEAS - MV MAX PG: 8.7 MMHG
BH CV ECHO MEAS - MV MEAN PG: 2.8 MMHG
BH CV ECHO MEAS - MV V2 MAX: 147.3 CM/SEC
BH CV ECHO MEAS - MV V2 MEAN: 75.3 CM/SEC
BH CV ECHO MEAS - MV V2 VTI: 33.3 CM
BH CV ECHO MEAS - MVA(VTI): 2.4 CM^2
BH CV ECHO MEAS - PA ACC TIME: 0.11 SEC
BH CV ECHO MEAS - PA MAX PG (FULL): 0.23 MMHG
BH CV ECHO MEAS - PA MAX PG: 2.6 MMHG
BH CV ECHO MEAS - PA MEAN PG (FULL): 0.62 MMHG
BH CV ECHO MEAS - PA MEAN PG: 1.3 MMHG
BH CV ECHO MEAS - PA PR(ACCEL): 29.3 MMHG
BH CV ECHO MEAS - PA V2 MAX: 80.4 CM/SEC
BH CV ECHO MEAS - PA V2 MEAN: 53.2 CM/SEC
BH CV ECHO MEAS - PA V2 VTI: 15 CM
BH CV ECHO MEAS - PULM DIAS VEL: 52 CM/SEC
BH CV ECHO MEAS - PULM S/D: 0.56
BH CV ECHO MEAS - PULM SYS VEL: 29.2 CM/SEC
BH CV ECHO MEAS - PVA(I,A): 13.7 CM^2
BH CV ECHO MEAS - PVA(I,D): 13.7 CM^2
BH CV ECHO MEAS - PVA(V,A): 15.2 CM^2
BH CV ECHO MEAS - PVA(V,D): 15.2 CM^2
BH CV ECHO MEAS - QP/QS: 2.6
BH CV ECHO MEAS - RAP SYSTOLE: 3 MMHG
BH CV ECHO MEAS - RV MAX PG: 2.3 MMHG
BH CV ECHO MEAS - RV MEAN PG: 0.73 MMHG
BH CV ECHO MEAS - RV V1 MAX: 76.6 CM/SEC
BH CV ECHO MEAS - RV V1 MEAN: 36 CM/SEC
BH CV ECHO MEAS - RV V1 VTI: 12.9 CM
BH CV ECHO MEAS - RVDD: 3.8 CM
BH CV ECHO MEAS - RVOT AREA: 16 CM^2
BH CV ECHO MEAS - RVOT DIAM: 4.5 CM
BH CV ECHO MEAS - RVSP: 43.1 MMHG
BH CV ECHO MEAS - SI(AO): 264.1 ML/M^2
BH CV ECHO MEAS - SI(CUBED): 39.1 ML/M^2
BH CV ECHO MEAS - SI(LVOT): 37.5 ML/M^2
BH CV ECHO MEAS - SI(MOD-SP4): 45.3 ML/M^2
BH CV ECHO MEAS - SI(TEICH): 32.2 ML/M^2
BH CV ECHO MEAS - SV(AO): 566.2 ML
BH CV ECHO MEAS - SV(CUBED): 83.9 ML
BH CV ECHO MEAS - SV(LVOT): 80.4 ML
BH CV ECHO MEAS - SV(MOD-SP4): 97.1 ML
BH CV ECHO MEAS - SV(RVOT): 205.6 ML
BH CV ECHO MEAS - SV(TEICH): 69 ML
BH CV ECHO MEAS - TR MAX VEL: 316.6 CM/SEC
BILIRUB SERPL-MCNC: 1 MG/DL (ref 0–1.2)
BUN SERPL-MCNC: 22 MG/DL (ref 8–23)
BUN/CREAT SERPL: 22.2 (ref 7–25)
CALCIUM SPEC-SCNC: 9.2 MG/DL (ref 8.6–10.5)
CHLORIDE SERPL-SCNC: 106 MMOL/L (ref 98–107)
CHOLEST SERPL-MCNC: 103 MG/DL (ref 0–200)
CO2 SERPL-SCNC: 26 MMOL/L (ref 22–29)
CREAT SERPL-MCNC: 0.99 MG/DL (ref 0.76–1.27)
DEPRECATED RDW RBC AUTO: 49 FL (ref 37–54)
EOSINOPHIL # BLD AUTO: 0.1 10*3/MM3 (ref 0–0.4)
EOSINOPHIL NFR BLD AUTO: 1.6 % (ref 0.3–6.2)
ERYTHROCYTE [DISTWIDTH] IN BLOOD BY AUTOMATED COUNT: 16.4 % (ref 12.3–15.4)
GFR SERPL CREATININE-BSD FRML MDRD: 72 ML/MIN/1.73
GLOBULIN UR ELPH-MCNC: 2.8 GM/DL
GLUCOSE BLDC GLUCOMTR-MCNC: 91 MG/DL (ref 70–105)
GLUCOSE SERPL-MCNC: 100 MG/DL (ref 65–99)
HCT VFR BLD AUTO: 28.6 % (ref 37.5–51)
HDLC SERPL-MCNC: 42 MG/DL (ref 40–60)
HGB BLD-MCNC: 9.3 G/DL (ref 13–17.7)
INR PPP: 2.34 (ref 2–3)
INR PPP: 2.49 (ref 2–3)
LDLC SERPL CALC-MCNC: 49 MG/DL (ref 0–100)
LDLC/HDLC SERPL: 1.21 {RATIO}
LV EF 2D ECHO EST: 65 %
LYMPHOCYTES # BLD AUTO: 0.8 10*3/MM3 (ref 0.7–3.1)
LYMPHOCYTES NFR BLD AUTO: 16.2 % (ref 19.6–45.3)
MAGNESIUM SERPL-MCNC: 1.8 MG/DL (ref 1.6–2.4)
MCH RBC QN AUTO: 27.6 PG (ref 26.6–33)
MCHC RBC AUTO-ENTMCNC: 32.5 G/DL (ref 31.5–35.7)
MCV RBC AUTO: 85 FL (ref 79–97)
MONOCYTES # BLD AUTO: 0.5 10*3/MM3 (ref 0.1–0.9)
MONOCYTES NFR BLD AUTO: 10.6 % (ref 5–12)
NEUTROPHILS NFR BLD AUTO: 3.6 10*3/MM3 (ref 1.7–7)
NEUTROPHILS NFR BLD AUTO: 70.1 % (ref 42.7–76)
NRBC BLD AUTO-RTO: 0.1 /100 WBC (ref 0–0.2)
PHOSPHATE SERPL-MCNC: 3.7 MG/DL (ref 2.5–4.5)
PLATELET # BLD AUTO: 250 10*3/MM3 (ref 140–450)
PMV BLD AUTO: 6.2 FL (ref 6–12)
POTASSIUM SERPL-SCNC: 3.7 MMOL/L (ref 3.5–5.2)
PROT SERPL-MCNC: 6.5 G/DL (ref 6–8.5)
PROTHROMBIN TIME: 24.7 SECONDS (ref 19.4–28.5)
PROTHROMBIN TIME: 26.2 SECONDS (ref 19.4–28.5)
RBC # BLD AUTO: 3.36 10*6/MM3 (ref 4.14–5.8)
SODIUM SERPL-SCNC: 145 MMOL/L (ref 136–145)
TRIGL SERPL-MCNC: 50 MG/DL (ref 0–150)
TROPONIN T SERPL-MCNC: 0.03 NG/ML (ref 0–0.03)
TROPONIN T SERPL-MCNC: 0.04 NG/ML (ref 0–0.03)
TROPONIN T SERPL-MCNC: 0.04 NG/ML (ref 0–0.03)
VLDLC SERPL-MCNC: 12 MG/DL (ref 5–40)
WBC # BLD AUTO: 5.1 10*3/MM3 (ref 3.4–10.8)

## 2021-06-12 PROCEDURE — 25010000002 SULFUR HEXAFLUORIDE MICROSPH 60.7-25 MG RECONSTITUTED SUSPENSION: Performed by: FAMILY MEDICINE

## 2021-06-12 PROCEDURE — 99222 1ST HOSP IP/OBS MODERATE 55: CPT | Performed by: INTERNAL MEDICINE

## 2021-06-12 PROCEDURE — 84484 ASSAY OF TROPONIN QUANT: CPT | Performed by: NURSE PRACTITIONER

## 2021-06-12 PROCEDURE — 92610 EVALUATE SWALLOWING FUNCTION: CPT

## 2021-06-12 PROCEDURE — 85025 COMPLETE CBC W/AUTO DIFF WBC: CPT | Performed by: NURSE PRACTITIONER

## 2021-06-12 PROCEDURE — 93306 TTE W/DOPPLER COMPLETE: CPT

## 2021-06-12 PROCEDURE — 93306 TTE W/DOPPLER COMPLETE: CPT | Performed by: INTERNAL MEDICINE

## 2021-06-12 PROCEDURE — 82962 GLUCOSE BLOOD TEST: CPT

## 2021-06-12 PROCEDURE — 99233 SBSQ HOSP IP/OBS HIGH 50: CPT | Performed by: FAMILY MEDICINE

## 2021-06-12 PROCEDURE — 80061 LIPID PANEL: CPT | Performed by: NURSE PRACTITIONER

## 2021-06-12 PROCEDURE — 85610 PROTHROMBIN TIME: CPT | Performed by: NURSE PRACTITIONER

## 2021-06-12 PROCEDURE — 36415 COLL VENOUS BLD VENIPUNCTURE: CPT | Performed by: NURSE PRACTITIONER

## 2021-06-12 RX ORDER — POTASSIUM CHLORIDE 20 MEQ/1
20 TABLET, EXTENDED RELEASE ORAL 3 TIMES DAILY
Status: DISCONTINUED | OUTPATIENT
Start: 2021-06-12 | End: 2021-06-15 | Stop reason: HOSPADM

## 2021-06-12 RX ORDER — ROSUVASTATIN CALCIUM 10 MG/1
10 TABLET, COATED ORAL NIGHTLY
Status: DISCONTINUED | OUTPATIENT
Start: 2021-06-12 | End: 2021-06-15 | Stop reason: HOSPADM

## 2021-06-12 RX ORDER — FERROUS SULFATE TAB EC 324 MG (65 MG FE EQUIVALENT) 324 (65 FE) MG
324 TABLET DELAYED RESPONSE ORAL
Status: DISCONTINUED | OUTPATIENT
Start: 2021-06-12 | End: 2021-06-15 | Stop reason: HOSPADM

## 2021-06-12 RX ORDER — FUROSEMIDE 40 MG/1
40 TABLET ORAL DAILY
Status: DISCONTINUED | OUTPATIENT
Start: 2021-06-12 | End: 2021-06-15 | Stop reason: HOSPADM

## 2021-06-12 RX ORDER — MULTIPLE VITAMINS W/ MINERALS TAB 9MG-400MCG
1 TAB ORAL DAILY
Status: DISCONTINUED | OUTPATIENT
Start: 2021-06-12 | End: 2021-06-15 | Stop reason: HOSPADM

## 2021-06-12 RX ORDER — OXYBUTYNIN CHLORIDE 5 MG/1
5 TABLET ORAL NIGHTLY
Status: DISCONTINUED | OUTPATIENT
Start: 2021-06-13 | End: 2021-06-15 | Stop reason: HOSPADM

## 2021-06-12 RX ORDER — PANTOPRAZOLE SODIUM 40 MG/1
40 TABLET, DELAYED RELEASE ORAL EVERY MORNING
Status: DISCONTINUED | OUTPATIENT
Start: 2021-06-12 | End: 2021-06-15 | Stop reason: HOSPADM

## 2021-06-12 RX ORDER — WARFARIN SODIUM 2 MG/1
2 TABLET ORAL
Status: DISCONTINUED | OUTPATIENT
Start: 2021-06-12 | End: 2021-06-15 | Stop reason: HOSPADM

## 2021-06-12 RX ORDER — ESCITALOPRAM OXALATE 10 MG/1
10 TABLET ORAL DAILY
Status: DISCONTINUED | OUTPATIENT
Start: 2021-06-12 | End: 2021-06-15 | Stop reason: HOSPADM

## 2021-06-12 RX ADMIN — FUROSEMIDE 40 MG: 40 TABLET ORAL at 08:28

## 2021-06-12 RX ADMIN — POTASSIUM CHLORIDE 20 MEQ: 1500 TABLET, EXTENDED RELEASE ORAL at 20:45

## 2021-06-12 RX ADMIN — FERROUS SULFATE TAB EC 324 MG (65 MG FE EQUIVALENT) 324 MG: 324 (65 FE) TABLET DELAYED RESPONSE at 08:29

## 2021-06-12 RX ADMIN — PANTOPRAZOLE SODIUM 40 MG: 40 TABLET, DELAYED RELEASE ORAL at 06:29

## 2021-06-12 RX ADMIN — SULFUR HEXAFLUORIDE 2 ML: KIT at 14:46

## 2021-06-12 RX ADMIN — CARBIDOPA AND LEVODOPA 1 TABLET: 25; 100 TABLET ORAL at 20:45

## 2021-06-12 RX ADMIN — Medication 10 ML: at 22:32

## 2021-06-12 RX ADMIN — CARBIDOPA AND LEVODOPA 1 TABLET: 25; 100 TABLET ORAL at 15:56

## 2021-06-12 RX ADMIN — POTASSIUM CHLORIDE 20 MEQ: 1500 TABLET, EXTENDED RELEASE ORAL at 08:28

## 2021-06-12 RX ADMIN — DICLOFENAC SODIUM 2 G: 10 GEL TOPICAL at 18:39

## 2021-06-12 RX ADMIN — ROSUVASTATIN CALCIUM 10 MG: 10 TABLET, FILM COATED ORAL at 03:30

## 2021-06-12 RX ADMIN — POTASSIUM CHLORIDE 20 MEQ: 1500 TABLET, EXTENDED RELEASE ORAL at 15:56

## 2021-06-12 RX ADMIN — WARFARIN 2 MG: 2 TABLET ORAL at 18:39

## 2021-06-12 RX ADMIN — MULTIPLE VITAMINS W/ MINERALS TAB 1 TABLET: TAB at 08:28

## 2021-06-12 RX ADMIN — ROSUVASTATIN CALCIUM 10 MG: 10 TABLET, FILM COATED ORAL at 22:25

## 2021-06-12 RX ADMIN — ESCITALOPRAM OXALATE 10 MG: 10 TABLET ORAL at 08:28

## 2021-06-12 RX ADMIN — CARBIDOPA AND LEVODOPA 1 TABLET: 25; 100 TABLET ORAL at 08:28

## 2021-06-12 NOTE — THERAPY EVALUATION
Acute Care - Speech Language Pathology   Swallow Initial Evaluation  Gian     Patient Name: Rafael Ivey  : 1935  MRN: [id number]  Today's Date: 2021               Admit Date: 2021    Visit Dx:     ICD-10-CM ICD-9-CM   1. Baclofen overdose, accidental or unintentional, initial encounter  T42.8X1A 968.0     E855.1   2. Ventricular tachycardia (CMS/HCC)  I47.2 427.1     Patient Active Problem List   Diagnosis   • Sick sinus syndrome (CMS/HCC)   • Atrial fibrillation, chronic   • Pacemaker   • Bilateral leg edema   • Iron deficiency anemia due to chronic blood loss   • Warfarin-induced coagulopathy (CMS/HCC)   • Parkinson's disease (CMS/HCC)   • Aortic stenosis   • GERD (gastroesophageal reflux disease)   • Hyperlipidemia   • Essential hypertension   • Mitral regurgitation   • Nonrheumatic tricuspid valve disorder   • Pulmonary hypertension (CMS/HCC)   • Sleep apnea   • Valvular heart disease   • Hypotension   • Chronic diastolic CHF (congestive heart failure) (CMS/HCC)   • Anxiety disorder   • Overactive bladder   • Chronic pain   • RLS (restless legs syndrome)   • Obesity (BMI 30-39.9)   • Acute UTI (urinary tract infection)   • Hypokalemia   • Anasarca   • Acute on chronic combined systolic and diastolic CHF (congestive heart failure) (CMS/HCC)   • Depression   • Anemia   • Rectal ulceration   • Acute on chronic heart failure (CMS/HCC)   • Acute respiratory distress   • Pneumonia of right lung due to infectious organism   • Weakness   • Pleural effusion, right   • Baclofen overdose   • Ventricular tachycardia   • Acute encephalopathy, related to baclofen overdose     Past Medical History:   Diagnosis Date   • Anemia    • Arthritis     left hip    • Atrial fibrillation (CMS/HCC)    • Atrial fibrillation (CMS/HCC)     chronic   • Cardiomegaly     mild   • Chronic diastolic heart failure (CMS/HCC)    • GERD (gastroesophageal reflux disease)    • Hyperlipidemia    • Hypertension    • Hypotension     • Lower extremity edema    • Mitral regurgitation    • Obesity    • Pacemaker    • Parkinson disease (CMS/HCC)    • Recurrent falls     with injury    • Sick sinus syndrome (CMS/HCC)     s/p pacemaker implant    • Valvular heart disease     MR     Past Surgical History:   Procedure Laterality Date   • APPENDECTOMY     • ATRIAL APPENDAGE EXCLUSION LEFT WITH TRANSESOPHAGEAL ECHOCARDIOGRAM N/A 5/28/2020    Procedure: Atrial Appendage Occlusion;  Surgeon: Jim Walker MD;  Location: [facility] CATH INVASIVE LOCATION;  Service: Cardiovascular;  Laterality: N/A;   • ATRIAL APPENDAGE EXCLUSION LEFT WITH TRANSESOPHAGEAL ECHOCARDIOGRAM N/A 5/28/2020    Procedure: Atrial Appendage Occlusion;  Surgeon: Lashaun So MD;  Location: [facility] CATH INVASIVE LOCATION;  Service: Cardiovascular;  Laterality: N/A;   • CATARACT EXTRACTION     • CHOLECYSTECTOMY     • COLONOSCOPY N/A 5/21/2020    Procedure: COLONOSCOPY WITH BIOPSY X1 AREA;  Surgeon: Sim Collins MD;  Location: [facility] ENDOSCOPY;  Service: Gastroenterology;  Laterality: N/A;  Post: poor prep, impacted stool in rectum, and internal hemorrhoids, ascending colon arteriovenous malformation   • ENDOSCOPY N/A 5/21/2020    Procedure: ESOPHAGOGASTRODUODENOSCOPY with clipping x 1 of bleeding gastric polyp;  Surgeon: Sim Collins MD;  Location: [facility] ENDOSCOPY;  Service: Gastroenterology;  Laterality: N/A;  Post: bleeding gastric polyp   • HIP SURGERY Right 08/2017   • OTHER SURGICAL HISTORY      skin mole excisions    • PACEMAKER IMPLANTATION  06/22/2017    [employer] ([employer])   • TONSILLECTOMY     • TOTAL HIP ARTHROPLASTY Left 05/20/2014        SWALLOW EVALUATION (last 72 hours)      SLP Adult Swallow Evaluation     Row Name 06/12/21 1000       Rehab Evaluation    Document Type  evaluation  -LF    Subjective Information  no complaints  -LF    Patient Observations  alert;cooperative;agree to therapy  -LF    Care Plan Review  evaluation/treatment results  reviewed;care plan/treatment goals reviewed;risks/benefits reviewed;current/potential barriers reviewed;patient/other agree to care plan  -LF    Care Plan Review, Other Participant(s)  daughter  -LF    Patient Effort  good  -LF    Symptoms Noted During/After Treatment  none  -LF       General Information    Patient Profile Reviewed  yes  -LF    Pertinent History Of Current Problem  Pt is an 86 y/o male who presented to [facility] d/t pt's family reporting pt becoming less responsive over the last several days. In the ED, pt was obtunded and it was discovered pt's wife had been accidentally giving pt 3 times his normal dose of daily baclofen. Pt's PMH significant for Parkinson's Disease. Pt seen by ST department on 8/17/2020 with recommendation of mechanical ground and thin liquids.  -LF    Current Method of Nutrition  NPO  -LF    Prior Level of Function-Swallowing  mechanical soft textures;thin liquids  -LF    Plans/Goals Discussed with  patient and family;agreed upon  -LF       Pain    Additional Documentation  Pain Scale: Numbers Pre/Post-Treatment (Group)  -LF       Pain Scale: Numbers Pre/Post-Treatment    Pretreatment Pain Rating  0/10 - no pain  -LF       Oral Motor Structure and Function    Dentition Assessment  upper dentures/partial in place;lower dentures/partial in place  -LF    Secretion Management  WNL/WFL  -LF    Mucosal Quality  dry;cracked  -LF       Oral Musculature and Cranial Nerve Assessment    Oral Motor General Assessment  generalized oral motor weakness  -LF       General Eating/Swallowing Observations    Respiratory Support Currently in Use  nasal cannula 2L  -LF    Eating/Swallowing Skills  fed by SLP  -LF    Positioning During Eating  upright 90 degree;upright in bed  -LF    Utensils Used  spoon;cup;straw  -LF    Consistencies Trialed  pureed;ice chips;thin liquids  -LF       Clinical Swallow Eval    Oral Prep Phase  impaired  -LF    Oral Transit  impaired  -LF    Oral Residue  WFL  -LF     Pharyngeal Phase  suspected pharyngeal impairment  -LF    Clinical Swallow Evaluation Summary Pt given trials of ice chips, water by all presentations, and puree. Oral phase characterized by adequate labial seal. Anterior loss x2 with water by cup. Slow oral transit of puree. Pharyngeal phase characterized by slow and reduced laryngeal elevation per palpation. Cough x1 observed with thins by cup. Recommend pt remain NPO. Pt may have ice chips and small sips of water from nursing staff following oral care for oral gratification. ST will continue to follow for ongoing evaluation.   -LF       Oral Prep Concerns    Oral Prep Concerns  anterior loss  -LF    Anterior Loss  thin  -LF       Oral Transit Concerns    Oral Transit Concerns  delayed initiation of bolus transit  -LF    Delayed Intiation of Bolus Transit  pudding  -LF       Pharyngeal Phase Concerns    Pharyngeal Phase Concerns  cough  -LF    Cough  thin  -LF       Clinical Impression    SLP Swallowing Diagnosis  oral dysphagia;suspected pharyngeal dysphagia  -LF    Functional Impact  risk of aspiration/pneumonia;risk of malnutrition;risk of dehydration  -LF    Rehab Potential/Prognosis, Swallowing  good, to achieve stated therapy goals  -LF    Swallow Criteria for Skilled Therapeutic Interventions Met  demonstrates skilled criteria  -LF       Recommendations    Therapy Frequency (Swallow)  PRN  -LF    Predicted Duration Therapy Intervention (Days)  until discharge  -LF    SLP Diet Recommendation  NPO  -LF    Recommended Diagnostics  reassess via clinical swallow evaluation  -LF    Oral Care Recommendations  Oral Care BID/PRN  -LF    SLP Rec. for Method of Medication Administration  meds via alternate route  -LF       Swallow Goals (SLP)    Oral Nutrition/Hydration Goal Selection (SLP)  oral nutrition/hydration, SLP goal 1;oral nutrition/hydration, SLP goal 2  -LF       Oral Nutrition/Hydration Goal 1 (SLP)    Oral Nutrition/Hydration Goal 1, SLP  Pt will  initiate a PO diet and tolerate diet with no complications from aspiration  -LF    Time Frame (Oral Nutrition/Hydration Goal 1, SLP)  by discharge  -       Oral Nutrition/Hydration Goal 2 (SLP)    Oral Nutrition/Hydration Goal 2, SLP  Pt will participate in a swallow re-eval to assess swallow funcation and determine safest and least restrictive diet  -LF    Time Frame (Oral Nutrition/Hydration Goal 2, SLP)  1 day  -      User Key  (r) = Recorded By, (t) = Taken By, (c) = Cosigned By    Initials Name Effective Dates    Liliana Nava, RONDA 01/02/20 -           EDUCATION  The patient has been educated in the following areas:   Dysphagia (Swallowing Impairment) Oral Care/Hydration NPO rationale.    SLP Recommendation and Plan  SLP Swallowing Diagnosis: oral dysphagia, suspected pharyngeal dysphagia  SLP Diet Recommendation: NPO     SLP Rec. for Method of Medication Administration: meds via alternate route        Recommended Diagnostics: reassess via clinical swallow evaluation  Swallow Criteria for Skilled Therapeutic Interventions Met: demonstrates skilled criteria     Rehab Potential/Prognosis, Swallowing: good, to achieve stated therapy goals  Therapy Frequency (Swallow): PRN  Predicted Duration Therapy Intervention (Days): until discharge            SLP GOALS     Row Name 06/12/21 1000       Oral Nutrition/Hydration Goal 1 (SLP)    Oral Nutrition/Hydration Goal 1, SLP  Pt will initiate a PO diet and tolerate diet with no complications from aspiration  -LF    Time Frame (Oral Nutrition/Hydration Goal 1, SLP)  by discharge  -       Oral Nutrition/Hydration Goal 2 (SLP)    Oral Nutrition/Hydration Goal 2, SLP  Pt will participate in a swallow re-eval to assess swallow funcation and determine safest and least restrictive diet  -LF    Time Frame (Oral Nutrition/Hydration Goal 2, SLP)  1 day  -      User Key  (r) = Recorded By, (t) = Taken By, (c) = Cosigned By    Initials Name Provider Type    LAI Bright  RONDA Ford Speech and Language Pathologist         Patient was not wearing a face mask during this therapy encounter. Therapist used appropriate personal protective equipment including mask, eye protection and gloves.  Mask used was standard procedure mask. Appropriate PPE was worn during the entire therapy session. Hand hygiene was completed before and after therapy session. Patient is not in enhanced droplet precautions.                Time Calculation:                RONDA Lauren  6/12/2021

## 2021-06-12 NOTE — PLAN OF CARE
Goal Outcome Evaluation:  Plan of Care Reviewed With: patient        Progress: no change  Outcome Summary: Pt remains confused, and is oriented x4. No neurological changes this shift. VSS without gtts.  No significant changes. Dr. Brito consulted.

## 2021-06-12 NOTE — CONSULTS
[facility] Medicine Services      Patient Name: Rafael Ivey  : 1935  MRN: [id number]  Primary Care Physician: Blake Ivey MD  Date of admission: 2021    Patient Care Team:  Blake Ivey MD as PCP - General (Family Medicine)          Subjective   History Present Illness     Chief Complaint:   Chief Complaint   Patient presents with   • given to much Baclofen over the last week       HPI taken from pulmonary note and edited as patient cannot provide any history due to confusion  Mr. Ivey is a 85 y.o. male with past medical history of afib, pacemaker, chronic diastolic heart failure, valvular heart disease, hypotension, hyperlipidemia, parkinson's, anemia, arthritis, GERD, and obesity who presented to [facility] ED 21 for decreased responsiveness over the past several days. It was then discovered that patient's wife, who manages patient's medications, had been accidentally giving this patient 3 times his normal dose of daily baclofen.  Patient, per family is prescribed 15 mg daily, but patient had been receiving 45 mg daily for the past week.  Patient was also given less of his Sinemet, as patient's wife had reversed the dosing administrations between the medications.  Subsequently, patient had an episode of ventricular tachycardia in the ED that lasted approximately 1 minute, and at that time patient became unresponsive, in which he received per nursing 1 round of CPR, and patient returned back to a paced rhythm before any further intervention.  Patient's neuro status was further obtunded than his initial presentation following the ventricular tachycardia.    In the ED, labs were obtained with abnormalities as follows: Alkaline phosphatase 136, troponin 0.017, magnesium 1.8, hemoglobin 9.6, hematocrit 29.6.  UA was negative, acetaminophen <5.0, ethanol <0.010, salicylate <0.3, and urine drug screen negative.  CT of the head was obtained and revealed no acute  intracranial abnormality, generalized cerebral atrophy.  EKG was obtained revealing a paced rhythm.  Due to patient's altered mental status, the patient was admitted to the ICU overnight for suspected altered mental status secondary to unintentional baclofen overdose.      Date:  6/12/2021 patient's mentation improved and vital signs stable.  Patient downgraded out of ICU. Hospitalist group was consulted for medical management  Upon exam patient is alert and oriented x3. Still remains weak. Daughter at bedside. Pt lives at home with wife and has lift chair. He also has caregivers. Does not want to go to rehab at discharge.   PT/ST ordered       Review of Systems   Constitutional: Negative.   HENT: Negative.    Eyes: Negative.    Cardiovascular: Negative.    Respiratory: Negative.    Endocrine: Negative.    Hematologic/Lymphatic: Negative.    Skin: Negative.    Musculoskeletal: Negative.    Gastrointestinal: Negative.    Genitourinary: Negative.    Neurological: Negative.    Psychiatric/Behavioral: Negative.    Allergic/Immunologic: Negative.    All other systems reviewed and are negative.          Personal History     Past Medical History:   Past Medical History:   Diagnosis Date   • Anemia    • Arthritis     left hip    • Atrial fibrillation (CMS/HCC)    • Atrial fibrillation (CMS/HCC)     chronic   • Cardiomegaly     mild   • Chronic diastolic heart failure (CMS/HCC)    • GERD (gastroesophageal reflux disease)    • Hyperlipidemia    • Hypertension    • Hypotension    • Lower extremity edema    • Mitral regurgitation    • Obesity    • Pacemaker    • Parkinson disease (CMS/HCC)    • Recurrent falls     with injury    • Sick sinus syndrome (CMS/HCC)     s/p pacemaker implant    • Valvular heart disease     MR       Surgical History:      Past Surgical History:   Procedure Laterality Date   • APPENDECTOMY     • ATRIAL APPENDAGE EXCLUSION LEFT WITH TRANSESOPHAGEAL ECHOCARDIOGRAM N/A 5/28/2020    Procedure: Atrial  Appendage Occlusion;  Surgeon: Jim Walker MD;  Location: [facility] CATH INVASIVE LOCATION;  Service: Cardiovascular;  Laterality: N/A;   • ATRIAL APPENDAGE EXCLUSION LEFT WITH TRANSESOPHAGEAL ECHOCARDIOGRAM N/A 5/28/2020    Procedure: Atrial Appendage Occlusion;  Surgeon: Lashaun So MD;  Location: [facility] CATH INVASIVE LOCATION;  Service: Cardiovascular;  Laterality: N/A;   • CATARACT EXTRACTION     • CHOLECYSTECTOMY     • COLONOSCOPY N/A 5/21/2020    Procedure: COLONOSCOPY WITH BIOPSY X1 AREA;  Surgeon: Sim Collins MD;  Location: [facility] ENDOSCOPY;  Service: Gastroenterology;  Laterality: N/A;  Post: poor prep, impacted stool in rectum, and internal hemorrhoids, ascending colon arteriovenous malformation   • ENDOSCOPY N/A 5/21/2020    Procedure: ESOPHAGOGASTRODUODENOSCOPY with clipping x 1 of bleeding gastric polyp;  Surgeon: Sim Collins MD;  Location: [facility] ENDOSCOPY;  Service: Gastroenterology;  Laterality: N/A;  Post: bleeding gastric polyp   • HIP SURGERY Right 08/2017   • OTHER SURGICAL HISTORY      skin mole excisions    • PACEMAKER IMPLANTATION  06/22/2017    [employer] ([employer])   • TONSILLECTOMY     • TOTAL HIP ARTHROPLASTY Left 05/20/2014           Family History: family history includes Heart failure in his mother; Stroke in his maternal grandfather. Family History was reviewed.     Social History:  reports that he has quit smoking. His smoking use included cigarettes. He has a 40.00 pack-year smoking history. He has never used smokeless tobacco. He reports current alcohol use. He reports that he does not use drugs.      Medications:  Prior to Admission medications    Medication Sig Start Date End Date Taking? Authorizing Provider   baclofen (LIORESAL) 10 MG tablet Take 15 mg by mouth Every Night.   Yes Lisa Gunter MD   carbidopa-levodopa (SINEMET)  MG per tablet Take 1.5 tablets by mouth 3 (Three) Times a Day. 1/4/19  Yes Lisa Gunter MD   clonazePAM  (KlonoPIN) 0.5 MG tablet Take 0.5 mg by mouth Every Night.   Yes Lisa Gunter MD   escitalopram (LEXAPRO) 10 MG tablet Take 10 mg by mouth Daily.   Yes Lisa Gunter MD   ferrous sulfate 325 (65 FE) MG tablet Take 1 tablet by mouth Daily With Breakfast. 1/4/19  Yes Lisa Gunter MD   furosemide (LASIX) 40 MG tablet Take 40 mg by mouth Daily.   Yes Lisa Gunter MD   gabapentin (NEURONTIN) 300 MG capsule Take 600 mg by mouth every night at bedtime. 6/26/19  Yes Lisa Gunter MD   metOLazone (ZAROXOLYN) 2.5 MG tablet Take 2.5 mg by mouth Daily As Needed.   Yes Lisa Gunter MD   multivitamin-minerals (CENTRUM) tablet Take 1 tablet by mouth Daily.   Yes Lisa Gunter MD   Omega-3 Fatty Acids (FISH OIL PO) Take 2 capsules by mouth Daily.   Yes Lisa Gunter MD   omeprazole (PRILOSEC) 20 MG capsule Take 1 capsule by mouth Daily.   Yes Lisa Gunter MD   oxybutynin (DITROPAN) 5 MG tablet Take 5 mg by mouth Every Night.   Yes Lisa Gunter MD   potassium chloride (K-DUR,KLOR-CON) 20 MEQ CR tablet Take 20 mEq by mouth 3 (Three) Times a Day.   Yes Lisa Gunter MD   rosuvastatin (CRESTOR) 10 MG tablet Take 1 tablet by mouth every night at bedtime. 6/27/19  Yes Lisa Gunter MD   warfarin (COUMADIN) 2 MG tablet Take 1 tablet by mouth Every Night. Taking every day but Sunday 8/17/20  Yes Sylvia Rowan MD   acetaminophen (TYLENOL) 325 MG tablet Take 650 mg by mouth Every 6 (Six) Hours As Needed for Mild Pain .    ProviderLisa MD   nitroglycerin (NITROSTAT) 0.4 MG SL tablet Place 1 tablet under the tongue Every 5 (Five) Minutes As Needed for Chest Pain (Only if SBP Greater Than 100). Take no more than 3 doses in 15 minutes. 6/16/20   Kim Childs MD   ondansetron (ZOFRAN) 4 MG tablet Take 1 tablet by mouth Every 6 (Six) Hours As Needed for Nausea or Vomiting. 6/16/20   Kim Childs MD       Allergies:    Allergies    Allergen Reactions   • Amoxicillin Diarrhea   • Cephalexin Diarrhea and Nausea And Vomiting   • Penicillins Other (See Comments)   • Amoxicillin-Pot Clavulanate Nausea And Vomiting   • Clarithromycin Nausea And Vomiting       Objective   Objective     Vital Signs  Temp:  [98.3 °F (36.8 °C)-99.3 °F (37.4 °C)] 98.5 °F (36.9 °C)  Heart Rate:  [66-97] 69  Resp:  [14-16] 16  BP: (123-151)/(63-83) 147/75  SpO2:  [91 %-100 %] 95 %  on  Flow (L/min):  [2-3] 2;   Device (Oxygen Therapy): nasal cannula  Body mass index is 27.66 kg/m².    Physical Exam  Vitals and nursing note reviewed.   Constitutional:       Appearance: He is well-developed.   HENT:      Head: Normocephalic and atraumatic.   Eyes:      Extraocular Movements: Extraocular movements intact.      Pupils: Pupils are equal, round, and reactive to light.   Neck:      Thyroid: No thyromegaly.      Vascular: No JVD.      Trachea: No tracheal deviation.   Cardiovascular:      Rate and Rhythm: Normal rate and regular rhythm.      Pulses: Normal pulses.      Heart sounds: Normal heart sounds.   Pulmonary:      Effort: Pulmonary effort is normal.      Breath sounds: Normal breath sounds.   Abdominal:      General: Bowel sounds are normal.      Palpations: Abdomen is soft.      Tenderness: There is no abdominal tenderness.   Musculoskeletal:         General: Normal range of motion.      Cervical back: Normal range of motion. No muscular tenderness.   Skin:     General: Skin is warm and dry.   Neurological:      General: No focal deficit present.      Mental Status: He is alert and oriented to person, place, and time.      Motor: No abnormal muscle tone.   Psychiatric:         Mood and Affect: Mood normal.         Results Review:  I have personally reviewed most recent cardiac tracings, lab results and radiology images and interpretations and agree with findings    Results from last 7 days   Lab Units 06/12/21  0642 06/12/21  0126   WBC 10*3/mm3  --  5.10   HEMOGLOBIN  g/dL  --  9.3*   HEMATOCRIT %  --  28.6*   PLATELETS 10*3/mm3  --  250   INR  2.34 2.49     Results from last 7 days   Lab Units 06/12/21  0642 06/12/21  0126 06/11/21  1607 06/11/21  1309   SODIUM mmol/L  --   --  145 142   POTASSIUM mmol/L  --   --  3.7 3.7   CHLORIDE mmol/L  --   --  106 104   CO2 mmol/L  --   --  26.0 27.0   BUN mg/dL  --   --  22 23   CREATININE mg/dL  --   --  0.99 0.99   GLUCOSE mg/dL  --   --  100* 105*   CALCIUM mg/dL  --   --  9.2 9.4   ALT (SGPT) U/L  --   --  21  --    AST (SGOT) U/L  --   --  26  --    TROPONIN T ng/mL 0.034* 0.036*  --  0.017     Estimated Creatinine Clearance: 71.4 mL/min (by C-G formula based on SCr of 0.99 mg/dL).  Brief Urine Lab Results  (Last result in the past 365 days)      Color   Clarity   Blood   Leuk Est   Nitrite   Protein   CREAT   Urine HCG        06/11/21 1315 Yellow Clear Negative Negative Negative Negative               Microbiology Results (last 10 days)     ** No results found for the last 240 hours. **          ECG/EMG Results (most recent)     Procedure Component Value Units Date/Time    ECG 12 Lead [[id number]] Collected: 06/11/21 1303     Updated: 06/11/21 1304     QT Interval 473 ms     Narrative:      HEART RATE= 70  bpm  RR Interval= 852  ms  AR Interval= 230  ms  P Horizontal Axis= 188  deg  P Front Axis= 0  deg  QRSD Interval= 177  ms  QT Interval= 473  ms  QRS Axis= -85  deg  T Wave Axis= 82  deg  - ABNORMAL ECG -  Sinus rhythm  Prolonged AR interval  IVCD, consider RBBB  ST elevation secondary to IVCD  When compared with ECG of 11-Aug-2020 1:08:56,  Significant change in rhythm: previously atrial fibrillation  New conduction abnormality  Significant repolarization change  Significant axis, voltage or hypertrophy change  Electronically Signed By:   Date and Time of Study: 2021-06-11 13:03:05    ECG 12 Lead [[id number]] Collected: 06/11/21 1500     Updated: 06/11/21 1516     QT Interval 469 ms     Narrative:      HEART RATE= 76  bpm  RR  Interval= 788  ms  OR Interval= 230  ms  P Horizontal Axis= 187  deg  P Front Axis= 54  deg  QRSD Interval= 176  ms  QT Interval= 469  ms  QRS Axis= -77  deg  T Wave Axis= 78  deg  - ABNORMAL ECG -  Sinus rhythm  Atrial premature complex  Prolonged OR interval  IVCD, consider RBBB  ST elevation secondary to IVCD  When compared with ECG of 11-Jun-2021 13:03:05,  No significant change  Electronically Signed By:   Date and Time of Study: 2021-06-11 15:00:10          Results for orders placed during the hospital encounter of 06/12/20    Duplex Venous Lower Extremity - Bilateral CAR    Interpretation Summary  · Normal bilateral lower extremity venous duplex scan.  · Left popliteal fossa heterogenous fluid collection.      Results for orders placed during the hospital encounter of 05/20/20    Adult Transesophageal Echo (PRESTON) W/ Cont if Necessary Per Protocol    Interpretation Summary  · Left ventricular systolic function is normal.  · Left atrial cavity size is mild-to-moderately dilated.  · Moderate mitral valve regurgitation is present  · Left atrial appendage diameter was 33 mm at 135 degree which was very large and not conducive to implantation of watchman device. Depth was 28 mm. After discussion, it was decided that anatomy of left atrial appendage is not conducive and appropriate for watchman device implantation. Procedure was aborted      CT Head Without Contrast    Result Date: 6/11/2021  1. No acute intracranial abnormality. 2. Generalized cerebral atrophy.  Electronically Signed By-Jason Dan MD On:6/11/2021 3:28 PM This report was finalized on [id number] by  Jason Dan MD.        Estimated Creatinine Clearance: 71.4 mL/min (by C-G formula based on SCr of 0.99 mg/dL).    Assessment/Plan   Assessment/Plan       Active Hospital Problems    Diagnosis  POA   • **Baclofen overdose [T42.8X1A]  Yes   • Ventricular tachycardia [I47.2]  Yes   • Acute encephalopathy, related to baclofen overdose [G93.40]  Yes   •  Depression [F32.9]  Yes   • Chronic diastolic CHF (congestive heart failure) (CMS/[facility]) [I50.32]  Yes   • GERD (gastroesophageal reflux disease) [K21.9]  Yes   • Sleep apnea [G47.30]  Yes   • RLS (restless legs syndrome) [G25.81]  Yes   • Parkinson's disease (CMS/[facility]) [G20]  Yes   • Sick sinus syndrome (CMS/[facility]) [I49.5]  Yes   • Pacemaker [Z95.0]  Yes   • Pulmonary hypertension (CMS/[facility]) [I27.20]  Yes      Resolved Hospital Problems   No resolved problems to display.       Accidental Baclofen overdose  -UDS, EtOH, acetaminophen, salicylate--all negative  -Patient received 3 times his dose of baclofen, which he takes nightly; and received 1 dose of his Sinemet which he takes 3 times a day  -Holding baclofen until medication has been able to clear.  -Nursing instructed to call poison control for recommendations.    Acute encephalopathy, related to baclofen overdose  -Secondary to above  -Hold all LOC altering medications, until medications have had more time to clear.  -CT of the head negative for acute intracranial abnormalities.  -ABG normal  -Closely monitor patient's neurologic status. Pt currently alert and oriented, moves extremities with weakness   -PT, ST ordered      Episodic ventricular tachycardia, spontaneously resolved, questionable cardiopulmonary arrest with ROSC  -In the ED, patient was in V. tach for approximately 1 minute, in which compressions were initiated, patient was unresponsive  -EKG reviewed, in a paced rhythm  -Troponin 0.017 >0.036 > 0.034  -Cardiology consulted      Hyperlipidemia  -On Crestor, continue  -lipid panel normal      PAF  Sick sinus syndrome, S/P pacemaker in situ  Chronic anticoagulation with warfarin  -Continue warfarin, pharmacy to dose  -No rate altering or antihypertensive medications on home medication profile.  -Cardiology consulted.     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 (4/30/20)  -Cardiology consulted.     Sleep apnea  -Unable to assess due to confusion.  -CPAP if needed     Iron deficiency anemia, stable  -Continue ferrous sulfate per home regimen     Parkinson's disease  -Resume patient's home medication of Sinemet     GERD (gastroesophageal reflux disease)  -Omeprazole substituted with pantoprazole per hospital formulary     RLS (restless legs syndrome)  -Home regimen of baclofen on hold, as patient received accidental overdose, as noted above  -Home regimen of gabapentin on hold     OAB  -Continue oxybutynin per home regimen     Depression  -Continue Lexapro per home regimen.  -Home regimen of Klonopin on hold, due to acute encephalopathy.      VTE Prophylaxis -   Mechanical Order History:      Ordered        06/11/21 1603  Place Sequential Compression Device  Once         06/11/21 1603  Maintain Sequential Compression Device  Continuous                 Pharmalogical Order History:      Ordered     Dose Route Frequency Stop    06/12/21 0106  warfarin (COUMADIN) tablet 2 mg     Question:  Target INR  Answer:  2 - 3    2 mg PO Once per day on Mon Tue Wed Thu Fri Sat --    06/12/21 0056  Pharmacy to dose warfarin     Question:  Target INR  Answer:  2 - 3    -- XX Continuous PRN --    06/11/21 1603  heparin (porcine) 5000 UNIT/ML injection 5,000 Units  Status:  Discontinued      5,000 Units SC Every 8 Hours Scheduled 06/12/21 0114                CODE STATUS:    Code Status and Medical Interventions:   Ordered at: 06/11/21 1605     Code Status:    CPR     Medical Interventions (Level of Support Prior to Arrest):    Full       This patient has been vaccinated for Covid-19      I discussed  the patient's findings and my recommendations with patient and family.      Signature: Electronically signed by STEPHANY Faulkner, 06/12/21, 10:13 AM EDT.    [facility] Hospitalist Team    Attending attestation:    I performed a history and physical examination of the patient. I reviewed the nurse practitioner's note and agree with the documented findings and plan of care.    S:    Patient is a 85-year-old male with history of atrial fibrillation chronic pain presenting for evaluation of accidental overdose of baclofen.  Patient with initial ventricular tachycardia that is since been stabilized patient able to be transferred out of ICU and hospitalist consulted for medical management.    O:    Vital signs reviewed    General: Elderly male sitting up in bed breathing comfortably on 2 L via nasal cannula no acute distress  HEENT: NC/AT, EOMI, mucosa moist  Heart: Regular, rate controlled  Chest: Normal work of breathing, moving air well no wheezing  Abdominal: Soft. NT/ND.   Musculoskeletal: Normal ROM.  No cyanosis. No calf tenderness.  Neurological: Awake and alert, no focal deficits  Skin: Skin is warm and dry. No rash  Psychiatric: Cheerful and conversational.    A/P    Baclofen overdose-accidental due to mixing up medication administration at home  -Urine drug screen appropriate  -Tylenol and aspirin levels unremarkable  -Holding baclofen until metabolically cleared  -Poison control to be called  -Family at bedside patient appearing essentially back to baseline mentally    Altered mental status-due to medication overdose  -As above  -No signs of superimposed infection  -CT head unremarkable  -Patient work with physical therapy and speech therapy    Ventricular tachycardia-noted in the emergency department lasting for a minute with initiation of chest compressions as patient unresponsive, resolved  -Cardiology consulted  -Cardiac monitoring  -Troponins being monitored  -Patient with  pacemaker    Anemia-patient with underlying chronic component  -Monitor daily    Acute respiratory failure-patient with component likely due to accidental overdose patient now improving  -Intensivist consulted  -Wean oxygen as tolerated  -Speech therapy consulted to monitor for signs of aspiration    Atrial fibrillation-patient with history of pacemaker and sick sinus syndrome and is on warfarin anticoagulated  -Continue anticoagulation pharmacy to dose  -Cardiology consulted  -Cardiac monitoring    Heart failure-chronic diastolic no signs of volume overload at this time  -Monitor volume status  -Check weights    Parkinson's disease-patient on medication for symptom control  -Resume Sinemet    RLS/GERD/depression/OAB-chronic in nature  -Resume home medication as appropriate    FOUZIA-monitor for nocturnal hypoxia    Electronically signed by Kaden Funk MD, 06/12/21, 12:03 PM EDT.

## 2021-06-12 NOTE — PROGRESS NOTES
"PULMONARY CRITICAL CARE Progress  NOTE      PATIENT IDENTIFICATION:  Name: Rafael Ivey  MRN: [id number]  :  1935     Age: 85 y.o.  Sex: male    DATE OF Note:  2021   Referring Physician: Kaden Funk MD                  Subjective:   Feeling better, no new issue, no SOB no chest pain, no nausea or vomiting, no change in bowel habit, no dysuria,  no new  skin rash or itching.      Objective:  tMax 24 hrs: Temp (24hrs), Av.7 °F (37.1 °C), Min:97.7 °F (36.5 °C), Max:99.3 °F (37.4 °C)      Vitals Ranges:   Temp:  [97.7 °F (36.5 °C)-99.3 °F (37.4 °C)] 97.7 °F (36.5 °C)  Heart Rate:  [66-97] 68  Resp:  [16-20] 20  BP: (123-147)/(63-83) 137/75    Intake and Output Last 3 Shifts:   I/O last 3 completed shifts:  In: 729 [I.V.:729]  Out: -     Exam:  /75   Pulse 68   Temp 97.7 °F (36.5 °C) (Oral)   Resp 20   Ht 182.9 cm (72\")   Wt 92.1 kg (203 lb)   SpO2 96%   BMI 27.53 kg/m²     General Appearance:     HEENT:  Normocephalic, without obvious abnormality, Conjunctiva/corneas clear,.  Normal external ear canals, Nares normal, no drainage     Neck:  Supple, symmetrical, trachea midline. No JVD.  Lungs /Chest wall:   Bilateral basal rhonchi, respirations unlabored symmetrical wall movement.     Heart:  Regular rate and rhythm, systolic murmur PMI left sternal border  Abdomen: Soft, non-tender, no masses, no organomegaly.    Extremities: Trace edema no clubbing or Cyanosis        Medications:    Current Facility-Administered Medications:   •  acetaminophen (TYLENOL) tablet 650 mg, 650 mg, Oral, Q4H PRN **OR** acetaminophen (TYLENOL) suppository 650 mg, 650 mg, Rectal, Q4H PRN, Lui Arthur, STEPHANY  •  aluminum-magnesium hydroxide-simethicone (MAALOX MAX) 400-400-40 MG/5ML suspension 15 mL, 15 mL, Oral, Q6H PRN, Lui Arthur APRN  •  carbidopa-levodopa (SINEMET)  MG per tablet 1 tablet, 1 tablet, Oral, TID, Lui Arthur APRN, 1 tablet at 21 0828  •  Diclofenac Sodium " (VOLTAREN) 1 % gel 2 g, 2 g, Topical, TID PRN, Kaden Funk MD  •  escitalopram (LEXAPRO) tablet 10 mg, 10 mg, Oral, Daily, Lui Arthur, APRN, 10 mg at 06/12/21 0828  •  ferrous sulfate EC tablet 324 mg, 324 mg, Oral, Daily With Breakfast, Lui Arthur, APRN, 324 mg at 06/12/21 0829  •  furosemide (LASIX) tablet 40 mg, 40 mg, Oral, Daily, Lui Arthur, APRN, 40 mg at 06/12/21 0828  •  ipratropium-albuterol (DUO-NEB) nebulizer solution 3 mL, 3 mL, Nebulization, Q2H PRN, Lui Arthur, APRN  •  Magnesium Sulfate 2 gram infusion - Mg less than or equal to 1.5 mg/dL, 2 g, Intravenous, PRN **OR** Magnesium Sulfate 1 gram infusion - Mg 1.6-1.9 mg/dL, 1 g, Intravenous, PRN, Lui Arthur E, APRN  •  multivitamin with minerals 1 tablet, 1 tablet, Oral, Daily, Lui Arthur E, APRN, 1 tablet at 06/12/21 0828  •  nitroglycerin (NITROSTAT) SL tablet 0.4 mg, 0.4 mg, Sublingual, Q5 Min PRN, Lui Arthur E, APRN  •  ondansetron (ZOFRAN) tablet 4 mg, 4 mg, Oral, Q6H PRN **OR** ondansetron (ZOFRAN) injection 4 mg, 4 mg, Intravenous, Q6H PRN, Lui Arthur, APRN  •  [START ON 6/13/2021] oxybutynin (DITROPAN) tablet 5 mg, 5 mg, Oral, Nightly, Lui Arthur E, APRN  •  pantoprazole (PROTONIX) EC tablet 40 mg, 40 mg, Oral, QAM, Lui Arthur E, APRN, 40 mg at 06/12/21 0629  •  Pharmacy to dose warfarin, , Does not apply, Continuous PRN, Lui Arthur E, APRN  •  potassium chloride (K-DUR,KLOR-CON) CR tablet 20 mEq, 20 mEq, Oral, TID, Lui Arthur, APRN, 20 mEq at 06/12/21 0828  •  potassium chloride (K-DUR,KLOR-CON) CR tablet 40 mEq, 40 mEq, Oral, PRN **OR** potassium chloride (KLOR-CON) packet 40 mEq, 40 mEq, Oral, PRN **OR** potassium chloride 10 mEq in 100 mL IVPB, 10 mEq, Intravenous, Q1H PRN, Lui Arthur APRN  •  potassium phosphate 45 mmol in sodium chloride 0.9 % 500 mL infusion, 45 mmol, Intravenous, PRN **OR** potassium phosphate 30 mmol in sodium chloride 0.9 % 250 mL infusion, 30 mmol,  Intravenous, PRN **OR** potassium phosphate 15 mmol in sodium chloride 0.9 % 100 mL infusion, 15 mmol, Intravenous, PRN **OR** sodium phosphates 45 mmol in sodium chloride 0.9 % 500 mL IVPB, 45 mmol, Intravenous, PRN **OR** sodium phosphates 30 mmol in sodium chloride 0.9 % 250 mL IVPB, 30 mmol, Intravenous, PRN **OR** sodium phosphates 15 mmol in sodium chloride 0.9 % 250 mL IVPB, 15 mmol, Intravenous, PRN, Lui Arthur, APRN  •  rosuvastatin (CRESTOR) tablet 10 mg, 10 mg, Oral, Nightly, Lui Arthur APRN, 10 mg at 06/12/21 0330  •  sodium chloride 0.9 % flush 10 mL, 10 mL, Intravenous, Q12H, Lui Arthur, APRN, 10 mL at 06/11/21 2023  •  sodium chloride 0.9 % flush 10 mL, 10 mL, Intravenous, PRN, Lui Arthur, APRN  •  sodium chloride 0.9 % infusion, 75 mL/hr, Intravenous, Continuous, Lui Arthur, APRN, Last Rate: 75 mL/hr at 06/11/21 1839, 75 mL/hr at 06/11/21 1839  •  warfarin (COUMADIN) tablet 2 mg, 2 mg, Oral, Once per day on Mon Tue Wed Thu Fri Sat, Lui Arthur APRN    Data Review:  All labs (24hrs):   Recent Results (from the past 24 hour(s))   Ethanol    Collection Time: 06/11/21  4:07 PM    Specimen: Blood   Result Value Ref Range    Ethanol % <0.010 %   Cortisol    Collection Time: 06/11/21  4:07 PM    Specimen: Blood   Result Value Ref Range    Cortisol 14.16   mcg/dL   TSH    Collection Time: 06/11/21  4:07 PM    Specimen: Blood   Result Value Ref Range    TSH 2.690 0.270 - 4.200 uIU/mL   CK    Collection Time: 06/11/21  4:07 PM    Specimen: Blood   Result Value Ref Range    Creatine Kinase 73 20 - 200 U/L   Magnesium    Collection Time: 06/11/21  4:07 PM    Specimen: Blood   Result Value Ref Range    Magnesium 1.8 1.6 - 2.4 mg/dL   Phosphorus    Collection Time: 06/11/21  4:07 PM    Specimen: Blood   Result Value Ref Range    Phosphorus 3.7 2.5 - 4.5 mg/dL   Comprehensive Metabolic Panel    Collection Time: 06/11/21  4:07 PM    Specimen: Blood   Result Value Ref Range    Glucose  100 (H) 65 - 99 mg/dL    BUN 22 8 - 23 mg/dL    Creatinine 0.99 0.76 - 1.27 mg/dL    Sodium 145 136 - 145 mmol/L    Potassium 3.7 3.5 - 5.2 mmol/L    Chloride 106 98 - 107 mmol/L    CO2 26.0 22.0 - 29.0 mmol/L    Calcium 9.2 8.6 - 10.5 mg/dL    Total Protein 6.5 6.0 - 8.5 g/dL    Albumin 3.70 3.50 - 5.20 g/dL    ALT (SGPT) 21 1 - 41 U/L    AST (SGOT) 26 1 - 40 U/L    Alkaline Phosphatase 136 (H) 39 - 117 U/L    Total Bilirubin 1.0 0.0 - 1.2 mg/dL    eGFR Non African Amer 72 >60 mL/min/1.73    Globulin 2.8 gm/dL    A/G Ratio 1.3 g/dL    BUN/Creatinine Ratio 22.2 7.0 - 25.0    Anion Gap 13.0 5.0 - 15.0 mmol/L   Blood Gas, Arterial -    Collection Time: 06/11/21  4:11 PM    Specimen: Arterial Blood   Result Value Ref Range    Site Right Radial     See's Test Positive     pH, Arterial 7.435 7.350 - 7.450 pH units    pCO2, Arterial 40.4 35.0 - 48.0 mm Hg    pO2, Arterial 91.7 83.0 - 108.0 mm Hg    HCO3, Arterial 27.1 21.0 - 28.0 mmol/L    Base Excess, Arterial 2.6 0.0 - 3.0 mmol/L    O2 Saturation, Arterial 97.4 94.0 - 98.0 %    CO2 Content 28.3 22 - 29 mmol/L    Barometric Pressure for Blood Gas      Modality Cannula     FIO2 33 %    Hemodilution No    Lipid Panel    Collection Time: 06/12/21  1:26 AM    Specimen: Blood   Result Value Ref Range    Total Cholesterol 103 0 - 200 mg/dL    Triglycerides 50 0 - 150 mg/dL    HDL Cholesterol 42 40 - 60 mg/dL    LDL Cholesterol  49 0 - 100 mg/dL    VLDL Cholesterol 12 5 - 40 mg/dL    LDL/HDL Ratio 1.21    CBC Auto Differential    Collection Time: 06/12/21  1:26 AM    Specimen: Blood   Result Value Ref Range    WBC 5.10 3.40 - 10.80 10*3/mm3    RBC 3.36 (L) 4.14 - 5.80 10*6/mm3    Hemoglobin 9.3 (L) 13.0 - 17.7 g/dL    Hematocrit 28.6 (L) 37.5 - 51.0 %    MCV 85.0 79.0 - 97.0 fL    MCH 27.6 26.6 - 33.0 pg    MCHC 32.5 31.5 - 35.7 g/dL    RDW 16.4 (H) 12.3 - 15.4 %    RDW-SD 49.0 37.0 - 54.0 fl    MPV 6.2 6.0 - 12.0 fL    Platelets 250 140 - 450 10*3/mm3    Neutrophil % 70.1  42.7 - 76.0 %    Lymphocyte % 16.2 (L) 19.6 - 45.3 %    Monocyte % 10.6 5.0 - 12.0 %    Eosinophil % 1.6 0.3 - 6.2 %    Basophil % 1.5 0.0 - 1.5 %    Neutrophils, Absolute 3.60 1.70 - 7.00 10*3/mm3    Lymphocytes, Absolute 0.80 0.70 - 3.10 10*3/mm3    Monocytes, Absolute 0.50 0.10 - 0.90 10*3/mm3    Eosinophils, Absolute 0.10 0.00 - 0.40 10*3/mm3    Basophils, Absolute 0.10 0.00 - 0.20 10*3/mm3    nRBC 0.1 0.0 - 0.2 /100 WBC   Troponin    Collection Time: 06/12/21  1:26 AM    Specimen: Blood   Result Value Ref Range    Troponin T 0.036 (C) 0.000 - 0.030 ng/mL   Protime-INR    Collection Time: 06/12/21  1:26 AM    Specimen: Blood   Result Value Ref Range    Protime 26.2 19.4 - 28.5 Seconds    INR 2.49 2.00 - 3.00   POC Glucose Once    Collection Time: 06/12/21  5:15 AM    Specimen: Blood   Result Value Ref Range    Glucose 91 70 - 105 mg/dL   Troponin    Collection Time: 06/12/21  6:42 AM    Specimen: Blood   Result Value Ref Range    Troponin T 0.034 (C) 0.000 - 0.030 ng/mL   Protime-INR    Collection Time: 06/12/21  6:42 AM    Specimen: Blood   Result Value Ref Range    Protime 24.7 19.4 - 28.5 Seconds    INR 2.34 2.00 - 3.00   Troponin    Collection Time: 06/12/21 11:58 AM    Specimen: Blood   Result Value Ref Range    Troponin T 0.038 (C) 0.000 - 0.030 ng/mL   Adult Transthoracic Echo Complete W/ Cont if Necessary Per Protocol    Collection Time: 06/12/21  2:45 PM   Result Value Ref Range    BSA 2.1 m^2    RVIDd 3.8 cm    IVSd 0.83 cm    LVIDd 5.0 cm    LVIDs 3.5 cm    LVPWd 1.2 cm    IVS/LVPW 0.72     FS 30.9 %    EDV(Teich) 118.4 ml    ESV(Teich) 49.4 ml    EF(Teich) 58.3 %    EDV(cubed) 125.2 ml    ESV(cubed) 41.3 ml    EF(cubed) 67.0 %    LV mass(C)d 180.0 grams    LV mass(C)dI 84.0 grams/m^2    SV(Teich) 69.0 ml    SI(Teich) 32.2 ml/m^2    SV(cubed) 83.9 ml    SI(cubed) 39.1 ml/m^2    Ao root diam 3.5 cm    Ao root area 9.9 cm^2    asc Aorta Diam 3.7 cm    LVOT diam 2.5 cm    LVOT area 4.7 cm^2    RVOT diam  4.5 cm    RVOT area 16.0 cm^2    EDV(MOD-sp4) 130.9 ml    ESV(MOD-sp4) 33.8 ml    EF(MOD-sp4) 74.2 %    SV(MOD-sp4) 97.1 ml    SI(MOD-sp4) 45.3 ml/m^2    Ao root area (BSA corrected) 1.7     LV Dowling Vol (BSA corrected) 61.1 ml/m^2    LV Sys Vol (BSA corrected) 15.8 ml/m^2    MV E max roman 131.5 cm/sec    MV A max roman 51.5 cm/sec    MV E/A 2.6     MV V2 max 147.3 cm/sec    MV max PG 8.7 mmHg    MV V2 mean 75.3 cm/sec    MV mean PG 2.8 mmHg    MV V2 VTI 33.3 cm    MVA(VTI) 2.4 cm^2    MV dec slope 616.0 cm/sec^2    MV dec time 0.21 sec    Ao pk roman 263.8 cm/sec    Ao max PG 27.8 mmHg    Ao max PG (full) 25.6 mmHg    Ao V2 mean 193.0 cm/sec    Ao mean PG 16.9 mmHg    Ao mean PG (full) 15.6 mmHg    Ao V2 VTI 57.3 cm    JUSTIN(I,A) 1.4 cm^2    JUSTIN(I,D) 1.4 cm^2    JUSTIN(V,A) 1.3 cm^2    JUSTIN(V,D) 1.3 cm^2    LV V1 max PG 2.3 mmHg    LV V1 mean PG 1.3 mmHg    LV V1 max 75.3 cm/sec    LV V1 mean 52.0 cm/sec    LV V1 VTI 17.0 cm    MR max roman 551.7 cm/sec    MR max .8 mmHg    SV(Ao) 566.2 ml    SI(Ao) 264.1 ml/m^2    SV(LVOT) 80.4 ml    SV(RVOT) 205.6 ml    SI(LVOT) 37.5 ml/m^2    PA V2 max 80.4 cm/sec    PA max PG 2.6 mmHg    PA max PG (full) 0.23 mmHg    PA V2 mean 53.2 cm/sec    PA mean PG 1.3 mmHg    PA mean PG (full) 0.62 mmHg    PA V2 VTI 15.0 cm    PVA(I,A) 13.7 cm^2    BH CV ECHO YADIRA - PVA(I,D) 13.7 cm^2     CV ECHO YADIRA - PVA(V,A) 15.2 cm^2     CV ECHO YADIRA - PVA(V,D) 15.2 cm^2    PA acc time 0.11 sec    RV V1 max PG 2.3 mmHg    RV V1 mean PG 0.73 mmHg    RV V1 max 76.6 cm/sec    RV V1 mean 36.0 cm/sec    RV V1 VTI 12.9 cm    TR max roman 316.6 cm/sec    RVSP(TR) 43.1 mmHg    RAP systole 3.0 mmHg    PA pr(Accel) 29.3 mmHg    Pulm Sys Roman 29.2 cm/sec    Pulm Dowling Roman 52.0 cm/sec    Pulm S/D 0.56     Qp/Qs 2.6      CV ECHO YADIRA - BZI_BMI 27.5 kilograms/m^2     CV ECHO YADIRA - BSA(HAYCOCK) 2.2 m^2     CV ECHO YADIRA - BZI_METRIC_WEIGHT 92.1 kg     CV ECHO YADIRA - BZI_METRIC_HEIGHT 182.9 cm    EF(MOD-bp) 74.0 %     LA dimension(2D) 5.8 cm        Imaging:  CT Head Without Contrast  Narrative:    DATE OF EXAM:  6/11/2021 3:10 PM     PROCEDURE:   CT HEAD WO CONTRAST-     INDICATIONS:   Mental status change, unknown cause     COMPARISON:  CT head without contrast 11/23/2018     TECHNIQUE:   Routine transaxial cuts were obtained through the head without the  administration of contrast. Automated exposure control and iterative  reconstruction methods were used.      FINDINGS:  There is no acute intracranial hemorrhage. No mass effect or midline  shift. Prominence of the ventricles and cortical sulci similar to prior  study most consistent with generalized cerebral atrophy. Posterior fossa  is without acute abnormality. Globes are symmetric. There is thinning of  the lens bilaterally. Posterior fossa without acute abnormality. The  mastoid air cells are well-aerated. There is leftward deviation of the  nasal septum. Retention cysts noted in the left maxillary sinus  measuring 11 mm. Calvarium intact. Asymmetric right temporomandibular  osteoarthritis.     Impression: 1. No acute intracranial abnormality.  2. Generalized cerebral atrophy.     Electronically Signed By-Jason Dan MD On:6/11/2021 3:28 PM  This report was finalized on [id number] by  Jason Dan MD.       ASSESSMENT:  Baclofen overdose with ? Cardiac arrest  Pulmonary hypertension   FOUZIA  SSS s/p pacemaker  Parkinson's disease   GERD     Chronic diastolic CHF     RLS  Depression  Ventricular tachycardia  Acute encephalopathy, related to baclofen overdose     PLAN:  Hold LOC altering medications  Cardiology following  Diuresis  Incentive spirometer  Electrolytes/ glycemic control  DVT and GI prophylaxis    Discussed with Dr Sincere Hernandez, APRN   6/12/2021  15:19 EDT       I personally have examined  and interviewed the patient. I have reviewed the history, data, problems, assessment and plan with our NP.  Critical care time in direct medical management (    ) minutes  Electronically signed by Sal Post MD, D,ABSM, 06/12/21, 11:02 PM EDT.

## 2021-06-12 NOTE — PLAN OF CARE
Goal Outcome Evaluation:  Pt is an 86 y/o male who presented to [facility] d/t pt's family reporting pt becoming less responsive over the last several days. In the ED, pt was obtunded and it was discovered pt's wife had been accidentally giving pt 3 times his normal dose of daily baclofen. Pt's PMH significant for Parkinson's Disease. Pt seen by ST department on 8/17/2020 with recommendation of mechanical ground and thin liquids.       Pt given trials of ice chips, water by all presentations, and puree. Oral phase characterized by adequate labial seal. Anterior loss x2 with water by cup. Slow oral transit of puree. Pharyngeal phase characterized by slow and reduced laryngeal elevation per palpation. Cough x1 observed with thins by cup. Pt's vocal quality remained clear during entire evaluation. Recommend pt remain NPO d/t oral/pharyngeal phase deficits and overall weakness. Pt may have ice chips and small sips of water from nursing staff following oral care for oral gratification. ST will continue to follow for ongoing evaluation.

## 2021-06-12 NOTE — PROGRESS NOTES
"Pharmacy dosing service  Anticoagulant  Warfarin     Subjective:    Rafael Ivey is a 85 y.o.male being continued on warfarin for atrial fibrillation and sick sinus syndrome .    INR Goal: 2 - 3  Home medication?:  warfarin 2 mg nightly except Sundays   Bridge Therapy Present?:  No  Interacting Medications Evaluation (New/Present/Discontinued): n/a  Additional Contributing Factors: n/a      Assessment/Plan:    INR therapeutic on admission. Continue home dose of warfarin 2 mg daily except Sunday nights.     Continue to monitor and adjust based on INR.         Date 6/12           INR 2.34           Dose 2 mg               Objective:  [Ht: 182.9 cm (72\"); Wt: 92.5 kg (203 lb 14.8 oz); BMI: Body mass index is 27.66 kg/m².]    Lab Results   Component Value Date    ALBUMIN 3.70 06/11/2021     Lab Results   Component Value Date    INR 2.34 06/12/2021    INR 2.49 06/12/2021    INR 2.22 06/11/2021    PROTIME 24.7 06/12/2021    PROTIME 26.2 06/12/2021    PROTIME 23.5 06/11/2021     Lab Results   Component Value Date    HGB 9.3 (L) 06/12/2021    HGB 9.6 (L) 06/11/2021    HGB 10.0 (L) 08/17/2020     Lab Results   Component Value Date    HCT 28.6 (L) 06/12/2021    HCT 29.6 (L) 06/11/2021    HCT 30.0 (L) 08/17/2020       Pamela Aguilar, PharmSTEPHEN  06/12/21 12:04 EDT     "

## 2021-06-13 ENCOUNTER — APPOINTMENT (OUTPATIENT)
Dept: GENERAL RADIOLOGY | Facility: HOSPITAL | Age: 86
End: 2021-06-13

## 2021-06-13 LAB
ALBUMIN SERPL-MCNC: 3.3 G/DL (ref 3.5–5.2)
ALBUMIN/GLOB SERPL: 1.3 G/DL
ALP SERPL-CCNC: 136 U/L (ref 39–117)
ALT SERPL W P-5'-P-CCNC: <5 U/L (ref 1–41)
ANION GAP SERPL CALCULATED.3IONS-SCNC: 12 MMOL/L (ref 5–15)
AST SERPL-CCNC: 24 U/L (ref 1–40)
BASOPHILS # BLD AUTO: 0.1 10*3/MM3 (ref 0–0.2)
BASOPHILS NFR BLD AUTO: 1.4 % (ref 0–1.5)
BILIRUB SERPL-MCNC: 1.3 MG/DL (ref 0–1.2)
BUN SERPL-MCNC: 25 MG/DL (ref 8–23)
BUN/CREAT SERPL: 26.6 (ref 7–25)
CALCIUM SPEC-SCNC: 8.8 MG/DL (ref 8.6–10.5)
CHLORIDE SERPL-SCNC: 106 MMOL/L (ref 98–107)
CO2 SERPL-SCNC: 25 MMOL/L (ref 22–29)
CREAT SERPL-MCNC: 0.94 MG/DL (ref 0.76–1.27)
DEPRECATED RDW RBC AUTO: 49 FL (ref 37–54)
EOSINOPHIL # BLD AUTO: 0.1 10*3/MM3 (ref 0–0.4)
EOSINOPHIL NFR BLD AUTO: 2.3 % (ref 0.3–6.2)
ERYTHROCYTE [DISTWIDTH] IN BLOOD BY AUTOMATED COUNT: 16.5 % (ref 12.3–15.4)
GFR SERPL CREATININE-BSD FRML MDRD: 76 ML/MIN/1.73
GLOBULIN UR ELPH-MCNC: 2.5 GM/DL
GLUCOSE BLDC GLUCOMTR-MCNC: 84 MG/DL (ref 70–105)
GLUCOSE SERPL-MCNC: 77 MG/DL (ref 65–99)
HCT VFR BLD AUTO: 27.3 % (ref 37.5–51)
HGB BLD-MCNC: 9 G/DL (ref 13–17.7)
INR PPP: 2.61 (ref 2–3)
LYMPHOCYTES # BLD AUTO: 0.8 10*3/MM3 (ref 0.7–3.1)
LYMPHOCYTES NFR BLD AUTO: 16.3 % (ref 19.6–45.3)
MAGNESIUM SERPL-MCNC: 1.8 MG/DL (ref 1.6–2.4)
MCH RBC QN AUTO: 28 PG (ref 26.6–33)
MCHC RBC AUTO-ENTMCNC: 32.8 G/DL (ref 31.5–35.7)
MCV RBC AUTO: 85.2 FL (ref 79–97)
MONOCYTES # BLD AUTO: 0.5 10*3/MM3 (ref 0.1–0.9)
MONOCYTES NFR BLD AUTO: 10.3 % (ref 5–12)
NEUTROPHILS NFR BLD AUTO: 3.4 10*3/MM3 (ref 1.7–7)
NEUTROPHILS NFR BLD AUTO: 69.7 % (ref 42.7–76)
NRBC BLD AUTO-RTO: 0.1 /100 WBC (ref 0–0.2)
PHOSPHATE SERPL-MCNC: 3.6 MG/DL (ref 2.5–4.5)
PLATELET # BLD AUTO: 234 10*3/MM3 (ref 140–450)
PMV BLD AUTO: 6.5 FL (ref 6–12)
POTASSIUM SERPL-SCNC: 3.5 MMOL/L (ref 3.5–5.2)
PROT SERPL-MCNC: 5.8 G/DL (ref 6–8.5)
PROTHROMBIN TIME: 27.4 SECONDS (ref 19.4–28.5)
RBC # BLD AUTO: 3.2 10*6/MM3 (ref 4.14–5.8)
SODIUM SERPL-SCNC: 143 MMOL/L (ref 136–145)
WBC # BLD AUTO: 4.9 10*3/MM3 (ref 3.4–10.8)

## 2021-06-13 PROCEDURE — 36415 COLL VENOUS BLD VENIPUNCTURE: CPT | Performed by: NURSE PRACTITIONER

## 2021-06-13 PROCEDURE — 97162 PT EVAL MOD COMPLEX 30 MIN: CPT

## 2021-06-13 PROCEDURE — 83735 ASSAY OF MAGNESIUM: CPT | Performed by: NURSE PRACTITIONER

## 2021-06-13 PROCEDURE — 84100 ASSAY OF PHOSPHORUS: CPT | Performed by: NURSE PRACTITIONER

## 2021-06-13 PROCEDURE — 99233 SBSQ HOSP IP/OBS HIGH 50: CPT | Performed by: FAMILY MEDICINE

## 2021-06-13 PROCEDURE — 82962 GLUCOSE BLOOD TEST: CPT

## 2021-06-13 PROCEDURE — 99233 SBSQ HOSP IP/OBS HIGH 50: CPT | Performed by: INTERNAL MEDICINE

## 2021-06-13 PROCEDURE — 80053 COMPREHEN METABOLIC PANEL: CPT | Performed by: NURSE PRACTITIONER

## 2021-06-13 PROCEDURE — 25010000002 FUROSEMIDE PER 20 MG: Performed by: INTERNAL MEDICINE

## 2021-06-13 PROCEDURE — 85025 COMPLETE CBC W/AUTO DIFF WBC: CPT | Performed by: NURSE PRACTITIONER

## 2021-06-13 PROCEDURE — 85610 PROTHROMBIN TIME: CPT | Performed by: NURSE PRACTITIONER

## 2021-06-13 PROCEDURE — 97530 THERAPEUTIC ACTIVITIES: CPT

## 2021-06-13 PROCEDURE — 92526 ORAL FUNCTION THERAPY: CPT

## 2021-06-13 PROCEDURE — 71045 X-RAY EXAM CHEST 1 VIEW: CPT

## 2021-06-13 RX ORDER — FUROSEMIDE 10 MG/ML
40 INJECTION INTRAMUSCULAR; INTRAVENOUS ONCE
Status: COMPLETED | OUTPATIENT
Start: 2021-06-13 | End: 2021-06-13

## 2021-06-13 RX ORDER — POTASSIUM CHLORIDE 20 MEQ/1
40 TABLET, EXTENDED RELEASE ORAL ONCE
Status: COMPLETED | OUTPATIENT
Start: 2021-06-13 | End: 2021-06-13

## 2021-06-13 RX ADMIN — POTASSIUM CHLORIDE 20 MEQ: 1500 TABLET, EXTENDED RELEASE ORAL at 16:20

## 2021-06-13 RX ADMIN — Medication 10 ML: at 20:48

## 2021-06-13 RX ADMIN — FUROSEMIDE 40 MG: 40 TABLET ORAL at 16:20

## 2021-06-13 RX ADMIN — FERROUS SULFATE TAB EC 324 MG (65 MG FE EQUIVALENT) 324 MG: 324 (65 FE) TABLET DELAYED RESPONSE at 08:53

## 2021-06-13 RX ADMIN — POTASSIUM CHLORIDE 40 MEQ: 1500 TABLET, EXTENDED RELEASE ORAL at 20:47

## 2021-06-13 RX ADMIN — CARBIDOPA AND LEVODOPA 1 TABLET: 25; 100 TABLET ORAL at 08:53

## 2021-06-13 RX ADMIN — DICLOFENAC SODIUM 2 G: 10 GEL TOPICAL at 19:21

## 2021-06-13 RX ADMIN — PANTOPRAZOLE SODIUM 40 MG: 40 TABLET, DELAYED RELEASE ORAL at 08:53

## 2021-06-13 RX ADMIN — POTASSIUM CHLORIDE 20 MEQ: 1500 TABLET, EXTENDED RELEASE ORAL at 08:54

## 2021-06-13 RX ADMIN — ROSUVASTATIN CALCIUM 10 MG: 10 TABLET, FILM COATED ORAL at 20:47

## 2021-06-13 RX ADMIN — OXYBUTYNIN CHLORIDE 5 MG: 5 TABLET ORAL at 20:47

## 2021-06-13 RX ADMIN — FUROSEMIDE 40 MG: 10 INJECTION, SOLUTION INTRAMUSCULAR; INTRAVENOUS at 20:47

## 2021-06-13 RX ADMIN — MULTIPLE VITAMINS W/ MINERALS TAB 1 TABLET: TAB at 08:54

## 2021-06-13 RX ADMIN — CARBIDOPA AND LEVODOPA 1 TABLET: 25; 100 TABLET ORAL at 20:47

## 2021-06-13 RX ADMIN — CARBIDOPA AND LEVODOPA 1 TABLET: 25; 100 TABLET ORAL at 16:20

## 2021-06-13 RX ADMIN — Medication 10 ML: at 08:54

## 2021-06-13 RX ADMIN — POTASSIUM CHLORIDE 20 MEQ: 1500 TABLET, EXTENDED RELEASE ORAL at 20:48

## 2021-06-13 RX ADMIN — ESCITALOPRAM OXALATE 10 MG: 10 TABLET ORAL at 08:54

## 2021-06-13 NOTE — PLAN OF CARE
Continue to monitor pt.  Assess pt and have speech reassess his swallowing.  Problem: Fall Injury Risk  Goal: Absence of Fall and Fall-Related Injury  Outcome: Ongoing, Progressing  Intervention: Promote Injury-Free Environment  Recent Flowsheet Documentation  Taken 6/13/2021 0702 by Miya Welsh RN  Safety Promotion/Fall Prevention:   assistive device/personal items within reach   clutter free environment maintained   fall prevention program maintained   safety round/check completed     Problem: Skin Injury Risk Increased  Goal: Skin Health and Integrity  Outcome: Ongoing, Progressing     Problem: Adult Inpatient Plan of Care  Goal: Plan of Care Review  Outcome: Ongoing, Progressing  Flowsheets  Taken 6/13/2021 0702 by Miya Welsh RN  Plan of Care Reviewed With:   patient   spouse   daughter   grandchild(justino)  Taken 6/13/2021 0316 by Anushka Moulton RN  Outcome Summary: Pt resting well throughout shift with no complaints. Speech is to see again today about advancing diet. Will continue to monitor.  Taken 6/12/2021 0652 by Kayla Vela RN  Progress: no change  Goal: Patient-Specific Goal (Individualized)  Outcome: Ongoing, Progressing  Goal: Absence of Hospital-Acquired Illness or Injury  Outcome: Ongoing, Progressing  Intervention: Identify and Manage Fall Risk  Flowsheets (Taken 6/13/2021 0702)  Safety Promotion/Fall Prevention:   assistive device/personal items within reach   clutter free environment maintained   fall prevention program maintained   safety round/check completed  Intervention: Prevent Skin Injury  Flowsheets  Taken 6/13/2021 0350 by Farida Maurice PCT  Body Position:   turned   side-lying, left  Taken 6/12/2021 0915 by Miya Welsh RN  Skin Protection:   incontinence pads utilized   skin-to-device areas padded  Intervention: Prevent and Manage VTE (venous thromboembolism) Risk  Flowsheets (Taken 6/12/2021 0915)  VTE Prevention/Management:   sequential compression devices on    bilateral  Intervention: Prevent Infection  Flowsheets (Taken 6/13/2021 0000 by Farida Maurice, PCT)  Infection Prevention:   personal protective equipment utilized   single patient room provided  Goal: Optimal Comfort and Wellbeing  Outcome: Ongoing, Progressing  Intervention: Provide Person-Centered Care  Flowsheets (Taken 6/12/2021 2050 by Anushka Moulton, RN)  Trust Relationship/Rapport: care explained  Goal: Readiness for Transition of Care  Outcome: Ongoing, Progressing  Intervention: Mutually Develop Transition Plan  Flowsheets  Taken 6/12/2021 0344 by Kayla Vela, RN  Equipment Currently Used at Home:   shower chair   grab bar   walker, rolling  Taken 6/11/2021 1811 by Nancy Rankin, RN  Transportation Anticipated: family or friend will provide  Patient/Family Anticipated Services at Transition: home health care  Patient/Family Anticipates Transition to: home   Goal Outcome Evaluation:  Plan of Care Reviewed With: patient, spouse, daughter, grandchild(justino)

## 2021-06-13 NOTE — CONSULTS
Nutrition Services    Patient Name: Rafael Ivey  YOB: 1935  MRN: [id number]  Admission date: 6/11/2021    Comment:     Starting Boost Plus BID (provides 720 kcals, 28 g protein if consumed)     PPE Documentation        PPE Worn By Provider Mask, Eye protection, gloves   PPE Worn By Patient  None      CLINICAL NUTRITION ASSESSMENT      Reason for Assessment 6/13: Nursing Admission Screen, [facility] 2      H&P:    Past Medical History:   Diagnosis Date   • Anemia    • Arthritis     left hip    • Atrial fibrillation (CMS/HCC)    • Atrial fibrillation (CMS/HCC)     chronic   • Cardiomegaly     mild   • Chronic diastolic heart failure (CMS/HCC)    • GERD (gastroesophageal reflux disease)    • Hyperlipidemia    • Hypertension    • Hypotension    • Lower extremity edema    • Mitral regurgitation    • Obesity    • Pacemaker    • Parkinson disease (CMS/HCC)    • Recurrent falls     with injury    • Sick sinus syndrome (CMS/HCC)     s/p pacemaker implant    • Valvular heart disease     MR       Past Surgical History:   Procedure Laterality Date   • APPENDECTOMY     • ATRIAL APPENDAGE EXCLUSION LEFT WITH TRANSESOPHAGEAL ECHOCARDIOGRAM N/A 5/28/2020    Procedure: Atrial Appendage Occlusion;  Surgeon: Jim Walker MD;  Location: [facility] CATH INVASIVE LOCATION;  Service: Cardiovascular;  Laterality: N/A;   • ATRIAL APPENDAGE EXCLUSION LEFT WITH TRANSESOPHAGEAL ECHOCARDIOGRAM N/A 5/28/2020    Procedure: Atrial Appendage Occlusion;  Surgeon: Lashaun So MD;  Location: [facility] CATH INVASIVE LOCATION;  Service: Cardiovascular;  Laterality: N/A;   • CATARACT EXTRACTION     • CHOLECYSTECTOMY     • COLONOSCOPY N/A 5/21/2020    Procedure: COLONOSCOPY WITH BIOPSY X1 AREA;  Surgeon: Sim Collins MD;  Location: [facility] ENDOSCOPY;  Service: Gastroenterology;  Laterality: N/A;  Post: poor prep, impacted stool in rectum, and internal hemorrhoids, ascending colon arteriovenous malformation   • ENDOSCOPY N/A  "5/21/2020    Procedure: ESOPHAGOGASTRODUODENOSCOPY with clipping x 1 of bleeding gastric polyp;  Surgeon: Sim Collins MD;  Location: [facility] ENDOSCOPY;  Service: Gastroenterology;  Laterality: N/A;  Post: bleeding gastric polyp   • HIP SURGERY Right 08/2017   • OTHER SURGICAL HISTORY      skin mole excisions    • PACEMAKER IMPLANTATION  06/22/2017    Levin's ([employer])   • TONSILLECTOMY     • TOTAL HIP ARTHROPLASTY Left 05/20/2014        Current Problems:   Baclofen overdose    AMS    Ventricular tachycardia    Anemia    Acute respiratory failure     A fib    Heart failure    Parkinson's          Nutrition/Diet History         Narrative     6/13: Visited patient today with patient's sister & brother-in-law in the room. Patient gave consent to proceed with interview. Patient reports his appetite has been very good, eating 3 good sized meals/day however has been losing weight (Per EMR patient is down ~50 lbs since last summer). NFPE completed however did not think was consistent with malnutrition. Spoke about disease states that may be increasing caloric needs. Patient is open to oral nutritional supplements to help prevent further weight loss.      Functional Status Patient reports using a rollator at baseline for mobility      Food Allergies NKFA        Anthropometrics        Current Height, Weight Height: 182.9 cm (72\")  Weight: 93.8 kg (206 lb 12.7 oz) (06/13/21 0348)        Admit Height, Weight -    Flowsheet Rows      First Filed Value   Admission Height  182.9 cm (72\") Documented at 06/11/2021 1233   Admission Weight  91.2 kg (201 lb) Documented at 06/11/2021 1233             Ideal Body Weight (IBW) 178 lb    % Ideal Body Weight 116%        Usual Body Weight 250 lb    % Usual Body Weight 82%    Wt Change Observation Current Admission:    6/13: Weight is up 2.4% this admit    PTA:  Weight stable x 1 month  Down 17.6% x 1 year, not yet significant    Weight Hx    Wt Readings from Last 30 Encounters: "   06/13/21 0348 93.8 kg (206 lb 12.7 oz)   06/12/21 1406 92.1 kg (203 lb)   06/12/21 0530 92.5 kg (203 lb 14.8 oz)   06/11/21 1735 88.8 kg (195 lb 12.3 oz)   06/11/21 1233 91.2 kg (201 lb)   05/19/21 1321 92.5 kg (204 lb)   11/04/20 1326 83 kg (183 lb)   09/02/20 1442 95.7 kg (211 lb)   08/17/20 0500 92.4 kg (203 lb 11.3 oz)   08/16/20 0500 89.9 kg (198 lb 3.1 oz)   08/15/20 0500 94.8 kg (208 lb 15.9 oz)   08/14/20 0500 95.9 kg (211 lb 6.7 oz)   08/13/20 0500 94.6 kg (208 lb 8.9 oz)   08/12/20 0437 92.6 kg (204 lb 2.3 oz)   08/11/20 0449 95.6 kg (210 lb 12.2 oz)   08/11/20 0105 97.5 kg (215 lb)   06/16/20 0100 101 kg (223 lb 8 oz)   06/15/20 0500 107 kg (235 lb 10.8 oz)   06/14/20 0300 107 kg (235 lb 3.2 oz)   06/13/20 0239 111 kg (245 lb 4.8 oz)   06/12/20 1425 113 kg (250 lb)   05/29/20 0629 112 kg (246 lb 7.6 oz)   05/28/20 0457 112 kg (246 lb 7.6 oz)   05/27/20 0457 111 kg (244 lb 7.8 oz)   05/26/20 0500 109 kg (240 lb 11.9 oz)   05/25/20 0500 109 kg (239 lb 3.2 oz)   05/24/20 0456 113 kg (250 lb)   05/23/20 0500 113 kg (248 lb 3.8 oz)   05/22/20 0500 113 kg (249 lb 1.9 oz)   05/21/20 0906 115 kg (254 lb 3.1 oz)   05/20/20 1144 101 kg (223 lb 12.3 oz)   04/30/20 1408 103 kg (228 lb)   04/10/20 1004 103 kg (228 lb)   03/13/20 1449 102 kg (224 lb)   01/20/20 1330 103 kg (228 lb)   08/21/19 1619 131 kg (288 lb)   08/21/19 1047 103 kg (228 lb)   01/04/19 1057 107 kg (235 lb 8 oz)   07/02/18 1450 124 kg (274 lb)   05/31/18 1119 125 kg (274 lb 12.8 oz)   12/18/17 1530 117 kg (257 lb 9.6 oz)   11/30/17 1035 115 kg (253 lb 12.8 oz)   05/25/17 1022 119 kg (263 lb 6.4 oz)   11/10/16 1045 113 kg (250 lb)   08/04/16 1043 116 kg (255 lb)   07/18/16 1145 118 kg (260 lb)   06/23/16 1105 118 kg (260 lb)        BMI kg/m2 Body mass index is 28.05 kg/m².       Labs/Medications         Pertinent Labs -   Results from last 7 days   Lab Units 06/13/21  0535 06/11/21  1607 06/11/21  1309   SODIUM mmol/L 143 145 142   POTASSIUM mmol/L  3.5 3.7 3.7   CHLORIDE mmol/L 106 106 104   CO2 mmol/L 25.0 26.0 27.0   BUN mg/dL 25* 22 23   CREATININE mg/dL 0.94 0.99 0.99   CALCIUM mg/dL 8.8 9.2 9.4   BILIRUBIN mg/dL 1.3* 1.0  --    ALK PHOS U/L 136* 136*  --    ALT (SGPT) U/L <5 21  --    AST (SGOT) U/L 24 26  --    GLUCOSE mg/dL 77 100* 105*     Results from last 7 days   Lab Units 06/13/21  0535 06/12/21  0126 06/11/21  1607 06/11/21  1607 06/11/21  1309   MAGNESIUM mg/dL 1.8  --   --  1.8  --    PHOSPHORUS mg/dL 3.6  --   --  3.7   < >   HEMOGLOBIN g/dL 9.0* 9.3*   < >  --   --    HEMATOCRIT % 27.3* 28.6*   < >  --   --    TRIGLYCERIDES mg/dL  --  50  --   --   --     < > = values in this interval not displayed.     COVID19   Date Value Ref Range Status   08/11/2020 Not Detected Not Detected - Ref. Range Final     No results found for: HGBA1C      Pertinent Medications Sinemet, lexapro, fe sulfate, lasix, MVI with minerals, protonix, k-dur, crestor, coumadin,      Physical Findings        Overall Physical   Appearance, [facility] 6/13: NFPE complete, temporal wasting noted however may be consistent with advanced age of 84 y/o. All other areas well nourished. Calfs/ankles were edematous which may have been masking muscle loss.    --  Edema  2+ to ankles/feet      Gastrointestinal + BM 6/12      Tubes No feeding tubes      Oral/Mouth Cavity ST cleared patient for regular diet today      Skin MASD to scrotum      --  Current Nutrition Orders & Evaluation of Intake       Oral Nutrition     Current PO Diet Diet Regular   Supplement None    PO Evaluation     % PO Intake 6/13: 0% as patient was previously NPO and diet just upgraded    --  Clinical Course    Nutrition Course Details PO Diet:    6/11-6/12 NPO  6/13 Regular       Nutrition Support:  None      Nutritional Risk Screening        NRS-2002 Score   -       Nutrition Diagnosis         Nutrition Dx Problem 1 Involuntary weight loss r/t likely increased energy expenditure from chronic disease (especially  parkinson's disease) AEB gradual decline of 17.6% x 1 year       Nutrition Dx Problem 2 -       Intervention Goal         Intervention Goal(s) PO intakes to be > 50% of meals    Patient to be accepting of oral nutritional supplements      Nutrition Intervention        RD/Tech Action Starting oral nutritional supplements, recommend to cont'd use at discharge      Nutrition Prescription          Diet Prescription Regular   Supplement Prescription Boost Plus BID (provides 720 kcals, 28 g protein if consumed, prefers strawberry)    --  Monitor/Evaluation        Monitor Weights, intakes, labs, BM and skin     Electronically signed by:  Harmony Christianson RD  06/13/21 15:21 EDT

## 2021-06-13 NOTE — THERAPY RE-EVALUATION
Acute Care - Speech Language Pathology   Swallow Re-Evaluation  Gian     Patient Name: Rafael Ivey  : 1935  MRN: [id number]  Today's Date: 2021               Admit Date: 2021  Patient was not wearing a face mask during this therapy encounter. Therapist used appropriate personal protective equipment including mask, eye protection and gloves.  Mask used was standard procedure mask. Appropriate PPE was worn during the entire therapy session. Hand hygiene was completed before and after therapy session. Patient is not in enhanced droplet precautions.         Visit Dx:     ICD-10-CM ICD-9-CM   1. Baclofen overdose, accidental or unintentional, initial encounter  T42.8X1A 968.0     E855.1   2. Ventricular tachycardia (CMS/[facility])  I47.2 427.1     Patient Active Problem List   Diagnosis   • Sick sinus syndrome (CMS/HCC)   • Atrial fibrillation, chronic   • Pacemaker   • Bilateral leg edema   • Iron deficiency anemia due to chronic blood loss   • Warfarin-induced coagulopathy (CMS/[facility])   • Parkinson's disease (CMS/[facility])   • Aortic stenosis   • GERD (gastroesophageal reflux disease)   • Hyperlipidemia   • Essential hypertension   • Mitral regurgitation   • Nonrheumatic tricuspid valve disorder   • Pulmonary hypertension (CMS/[facility])   • Sleep apnea   • Valvular heart disease   • Hypotension   • Chronic diastolic CHF (congestive heart failure) (CMS/[facility])   • Anxiety disorder   • Overactive bladder   • Chronic pain   • RLS (restless legs syndrome)   • Obesity (BMI 30-39.9)   • Acute UTI (urinary tract infection)   • Hypokalemia   • Anasarca   • Acute on chronic combined systolic and diastolic CHF (congestive heart failure) (CMS/[facility])   • Depression   • Anemia   • Rectal ulceration   • Acute on chronic heart failure (CMS/HCC)   • Acute respiratory distress   • Pneumonia of right lung due to infectious organism   • Weakness   • Pleural effusion, right   • Baclofen overdose   • Ventricular tachycardia   • Acute  encephalopathy, related to baclofen overdose     Past Medical History:   Diagnosis Date   • Anemia    • Arthritis     left hip    • Atrial fibrillation (CMS/HCC)    • Atrial fibrillation (CMS/HCC)     chronic   • Cardiomegaly     mild   • Chronic diastolic heart failure (CMS/HCC)    • GERD (gastroesophageal reflux disease)    • Hyperlipidemia    • Hypertension    • Hypotension    • Lower extremity edema    • Mitral regurgitation    • Obesity    • Pacemaker    • Parkinson disease (CMS/HCC)    • Recurrent falls     with injury    • Sick sinus syndrome (CMS/HCC)     s/p pacemaker implant    • Valvular heart disease     MR     Past Surgical History:   Procedure Laterality Date   • APPENDECTOMY     • ATRIAL APPENDAGE EXCLUSION LEFT WITH TRANSESOPHAGEAL ECHOCARDIOGRAM N/A 5/28/2020    Procedure: Atrial Appendage Occlusion;  Surgeon: Jim Walker MD;  Location: [facility] CATH INVASIVE LOCATION;  Service: Cardiovascular;  Laterality: N/A;   • ATRIAL APPENDAGE EXCLUSION LEFT WITH TRANSESOPHAGEAL ECHOCARDIOGRAM N/A 5/28/2020    Procedure: Atrial Appendage Occlusion;  Surgeon: Lashaun So MD;  Location: [facility] CATH INVASIVE LOCATION;  Service: Cardiovascular;  Laterality: N/A;   • CATARACT EXTRACTION     • CHOLECYSTECTOMY     • COLONOSCOPY N/A 5/21/2020    Procedure: COLONOSCOPY WITH BIOPSY X1 AREA;  Surgeon: Sim Collins MD;  Location: [facility] ENDOSCOPY;  Service: Gastroenterology;  Laterality: N/A;  Post: poor prep, impacted stool in rectum, and internal hemorrhoids, ascending colon arteriovenous malformation   • ENDOSCOPY N/A 5/21/2020    Procedure: ESOPHAGOGASTRODUODENOSCOPY with clipping x 1 of bleeding gastric polyp;  Surgeon: Sim Collins MD;  Location: [facility] ENDOSCOPY;  Service: Gastroenterology;  Laterality: N/A;  Post: bleeding gastric polyp   • HIP SURGERY Right 08/2017   • OTHER SURGICAL HISTORY      skin mole excisions    • PACEMAKER IMPLANTATION  06/22/2017    Levin's ([employer])   •  TONSILLECTOMY     • TOTAL HIP ARTHROPLASTY Left 05/20/2014        SWALLOW EVALUATION (last 72 hours)      SLP Adult Swallow Evaluation     Row Name 06/13/21 1811          Document Type  re-evaluation  -CP    Subjective Information  no complaints  -CP    Patient Observations  alert;cooperative  -CP    Patient/Family/Caregiver Comments/Observations  Pt was alert and responsive. he was up in chair and able to follow all commands and answer all questions appropriately. Pt is close to baseline per family in the room.   -CP    Care Plan Review  --    Care Plan Review, Other Participant(s)  --    Patient Effort  good  -CP    Comment  Pt was seen for re-eval of swallow. Pt was up in chair and alert and close to baseline per family report. pt was given trials of  water by straw, applesauce and a Fig hall. Pt had functional mastication and timely oral transit. There was no pocketing or oral residual. Swallow was timely per palpation. After the swallow, pt had clear vocal quality and no cough or other overt s/s of aspiration. It is rec that pt initiate a regular diet with thin liquids. Education provided to pt and family on the rationale for diet recs. They verbalized understanding. ST will follow to assure tolerance of diet and make further recs as indicated.   -CP      User Key  (r) = Recorded By, (t) = Taken By, (c) = Cosigned By    Initials Name Effective Dates    CP Zo Monique SLP 03/01/19 -           EDUCATION  The patient has been educated in the following areas:   Dysphagia (Swallowing Impairment).         SLP GOALS     Row Name 06/13/21 1800 06/12/21 1000          Oral Nutrition/Hydration Goal 1 (SLP)    Oral Nutrition/Hydration Goal 1, SLP  Pt will initiate a PO diet and tolerate diet with no complications from aspiration  -CP  Pt will initiate a PO diet and tolerate diet with no complications from aspiration  -LF     Time Frame (Oral Nutrition/Hydration Goal 1, SLP)  by discharge  -CP  by discharge  -LF      Barriers (Oral Nutrition/Hydration Goal 1, SLP)  See above note. Pt put on a regular diet.   -CP  --     Progress/Outcomes (Oral Nutrition/Hydration Goal 1, SLP)  goal ongoing  -CP  --        Oral Nutrition/Hydration Goal 2 (SLP)    Oral Nutrition/Hydration Goal 2, SLP  Pt will participate in a swallow re-eval to assess swallow funcation and determine safest and least restrictive diet  -CP  Pt will participate in a swallow re-eval to assess swallow funcation and determine safest and least restrictive diet  -LF     Time Frame (Oral Nutrition/Hydration Goal 2, SLP)  1 day  -CP  1 day  -LF     Barriers (Oral Nutrition/Hydration Goal 2, SLP)  See above re-eval.   -CP  --     Progress/Outcomes (Oral Nutrition/Hydration Goal 2, SLP)  goal met  -CP  --        Oral Nutrition/Hydration Goal (SLP)    Oral Nutrition/Hydration Goal, SLP  Pt will have full meal assessment within 48 hours.   -CP  --     Time Frame (Oral Nutrition/Hydration Goal, SLP)  2 days  -CP  --       User Key  (r) = Recorded By, (t) = Taken By, (c) = Cosigned By    Initials Name Provider Type    CP Zo Monique, SLP Speech and Language Pathologist    LF Liliana Bright, SLP Speech and Language Pathologist             Time Calculation:                RONDA Serrano  6/13/2021

## 2021-06-13 NOTE — PROGRESS NOTES
"Pharmacy dosing service  Anticoagulant  Warfarin     Subjective:    Rafael Ivey is a 85 y.o.male being continued on warfarin for atrial fibrillation and sick sinus syndrome .    INR Goal: 2 - 3  Home medication?:  warfarin 2 mg nightly except Sundays   Bridge Therapy Present?:  No  Interacting Medications Evaluation (New/Present/Discontinued): n/a  Additional Contributing Factors: n/a      Assessment/Plan:    INR therapeutic (goal 2-3) Continuing home regimen - no dose on Sunday night.     Continue to monitor and adjust based on INR.         Date 6/12 6/13          INR 2.34 2.61          Dose 2 mg Held on sundays              Objective:  [Ht: 182.9 cm (72\"); Wt: 93.8 kg (206 lb 12.7 oz); BMI: Body mass index is 28.05 kg/m².]    Lab Results   Component Value Date    ALBUMIN 3.30 (L) 06/13/2021     Lab Results   Component Value Date    INR 2.61 06/13/2021    INR 2.34 06/12/2021    INR 2.49 06/12/2021    PROTIME 27.4 06/13/2021    PROTIME 24.7 06/12/2021    PROTIME 26.2 06/12/2021     Lab Results   Component Value Date    HGB 9.0 (L) 06/13/2021    HGB 9.3 (L) 06/12/2021    HGB 9.6 (L) 06/11/2021     Lab Results   Component Value Date    HCT 27.3 (L) 06/13/2021    HCT 28.6 (L) 06/12/2021    HCT 29.6 (L) 06/11/2021       Rica Fraser [facility]  06/13/21 15:45 EDT       "

## 2021-06-13 NOTE — PLAN OF CARE
Goal Outcome Evaluation:    Pt was seen for re-eval of swallow. Pt was up in chair and alert and close to baseline per family report. pt was given trials of  water by straw, applesauce and a Fig hall. Pt had functional mastication and timely oral transit. There was no pocketing or oral residual. Swallow was timely per palpation. After the swallow, pt had clear vocal quality and no cough or other overt s/s of aspiration. It is rec that pt initiate a regular diet with thin liquids. Education provided to pt and family on the rationale for diet recs. They verbalized understanding. ST will follow to assure tolerance of diet and make further recs as indicated.

## 2021-06-13 NOTE — PROGRESS NOTES
"PULMONARY CRITICAL CARE Progress  NOTE      PATIENT IDENTIFICATION:  Name: Rafael Ivey  MRN: [id number]  :  1935     Age: 85 y.o.  Sex: male    DATE OF Note:  2021   Referring Physician: Kaden Funk MD                  Subjective:   Feeling better, no SOB  No chest or abd pain, no bowel or bladder issues  No new issues       Objective:  tMax 24 hrs: Temp (24hrs), Av.1 °F (36.7 °C), Min:97.9 °F (36.6 °C), Max:98.4 °F (36.9 °C)      Vitals Ranges:   Temp:  [97.9 °F (36.6 °C)-98.4 °F (36.9 °C)] 98.4 °F (36.9 °C)  Heart Rate:  [68-70] 70  Resp:  [16-18] 18  BP: (131-138)/(69-75) 137/74    Intake and Output Last 3 Shifts:   I/O last 3 completed shifts:  In: 240 [P.O.:240]  Out: -     Exam:  /74   Pulse 70   Temp 98.4 °F (36.9 °C)   Resp 18   Ht 182.9 cm (72\")   Wt 93.8 kg (206 lb 12.7 oz)   SpO2 97%   BMI 28.05 kg/m²     General Appearance:  AAO  HEENT:  Normocephalic, without obvious abnormality, Conjunctiva/corneas clear,.  Normal external ear canals, Nares normal, no drainage     Neck:  Supple, symmetrical, trachea midline. No JVD.  Lungs /Chest wall:   Bilateral basal rhonchi, respirations unlabored symmetrical wall movement.     Heart:  Regular rate and rhythm, systolic murmur PMI left sternal border  Abdomen: Soft, non-tender, no masses, no organomegaly.    Extremities: Trace edema no clubbing or Cyanosis        Medications:    Current Facility-Administered Medications:   •  acetaminophen (TYLENOL) tablet 650 mg, 650 mg, Oral, Q4H PRN **OR** acetaminophen (TYLENOL) suppository 650 mg, 650 mg, Rectal, Q4H PRN, Lui Arthur APRN  •  aluminum-magnesium hydroxide-simethicone (MAALOX MAX) 400-400-40 MG/5ML suspension 15 mL, 15 mL, Oral, Q6H PRN, Lui Arthur APRN  •  carbidopa-levodopa (SINEMET)  MG per tablet 1 tablet, 1 tablet, Oral, TID, Lui Arthur APRN, 1 tablet at 21 1620  •  Diclofenac Sodium (VOLTAREN) 1 % gel 2 g, 2 g, Topical, TID PRN, " Kaden Funk MD, 2 g at 06/13/21 1921  •  escitalopram (LEXAPRO) tablet 10 mg, 10 mg, Oral, Daily, Lui Arthur APRN, 10 mg at 06/13/21 0854  •  ferrous sulfate EC tablet 324 mg, 324 mg, Oral, Daily With Breakfast, Lui Arthur, APRN, 324 mg at 06/13/21 0853  •  furosemide (LASIX) tablet 40 mg, 40 mg, Oral, Daily, Lui Arthur, APRN, 40 mg at 06/13/21 1620  •  ipratropium-albuterol (DUO-NEB) nebulizer solution 3 mL, 3 mL, Nebulization, Q2H PRN, Lui Arthur APRN  •  Magnesium Sulfate 2 gram infusion - Mg less than or equal to 1.5 mg/dL, 2 g, Intravenous, PRN **OR** Magnesium Sulfate 1 gram infusion - Mg 1.6-1.9 mg/dL, 1 g, Intravenous, PRN, Lui Arthur, APRN  •  multivitamin with minerals 1 tablet, 1 tablet, Oral, Daily, Lui Arthur, APRN, 1 tablet at 06/13/21 0854  •  nitroglycerin (NITROSTAT) SL tablet 0.4 mg, 0.4 mg, Sublingual, Q5 Min PRN, Lui Arthur APRN  •  ondansetron (ZOFRAN) tablet 4 mg, 4 mg, Oral, Q6H PRN **OR** ondansetron (ZOFRAN) injection 4 mg, 4 mg, Intravenous, Q6H PRN, Lui Arthur, APRN  •  oxybutynin (DITROPAN) tablet 5 mg, 5 mg, Oral, Nightly, Lui Arthur, APRN  •  pantoprazole (PROTONIX) EC tablet 40 mg, 40 mg, Oral, QAM, Lui Arthur, APRN, 40 mg at 06/13/21 0853  •  Pharmacy to dose warfarin, , Does not apply, Continuous PRN, Lui Arthur, APRN  •  potassium chloride (K-DUR,KLOR-CON) CR tablet 20 mEq, 20 mEq, Oral, TID, Lui Arthur, APRN, 20 mEq at 06/13/21 1620  •  potassium chloride (K-DUR,KLOR-CON) CR tablet 40 mEq, 40 mEq, Oral, PRN **OR** potassium chloride (KLOR-CON) packet 40 mEq, 40 mEq, Oral, PRN **OR** potassium chloride 10 mEq in 100 mL IVPB, 10 mEq, Intravenous, Q1H PRN, Lui Arthur, STEPHANY  •  potassium phosphate 45 mmol in sodium chloride 0.9 % 500 mL infusion, 45 mmol, Intravenous, PRN **OR** potassium phosphate 30 mmol in sodium chloride 0.9 % 250 mL infusion, 30 mmol, Intravenous, PRN **OR** potassium phosphate 15  mmol in sodium chloride 0.9 % 100 mL infusion, 15 mmol, Intravenous, PRN **OR** sodium phosphates 45 mmol in sodium chloride 0.9 % 500 mL IVPB, 45 mmol, Intravenous, PRN **OR** sodium phosphates 30 mmol in sodium chloride 0.9 % 250 mL IVPB, 30 mmol, Intravenous, PRN **OR** sodium phosphates 15 mmol in sodium chloride 0.9 % 250 mL IVPB, 15 mmol, Intravenous, PRN, Jericho Arthurip E, APRN  •  rosuvastatin (CRESTOR) tablet 10 mg, 10 mg, Oral, Nightly, Jericho Arthurip E, APRN, 10 mg at 06/12/21 2225  •  sodium chloride 0.9 % flush 10 mL, 10 mL, Intravenous, Q12H, Jericho Arthurip E, APRN, 10 mL at 06/13/21 0854  •  sodium chloride 0.9 % flush 10 mL, 10 mL, Intravenous, PRN, Sandhya, Lui E, APRN  •  sodium chloride 0.9 % infusion, 75 mL/hr, Intravenous, Continuous, Jericho Arthurip E, APRN, Last Rate: 75 mL/hr at 06/11/21 1839, 75 mL/hr at 06/11/21 1839  •  warfarin (COUMADIN) tablet 2 mg, 2 mg, Oral, Once per day on Mon Tue Wed Thu Fri Sat, Lui Arthur, APRN, 2 mg at 06/12/21 1839    Data Review:  All labs (24hrs):   Recent Results (from the past 24 hour(s))   Magnesium    Collection Time: 06/13/21  5:35 AM    Specimen: Blood   Result Value Ref Range    Magnesium 1.8 1.6 - 2.4 mg/dL   Phosphorus    Collection Time: 06/13/21  5:35 AM    Specimen: Blood   Result Value Ref Range    Phosphorus 3.6 2.5 - 4.5 mg/dL   Comprehensive Metabolic Panel    Collection Time: 06/13/21  5:35 AM    Specimen: Blood   Result Value Ref Range    Glucose 77 65 - 99 mg/dL    BUN 25 (H) 8 - 23 mg/dL    Creatinine 0.94 0.76 - 1.27 mg/dL    Sodium 143 136 - 145 mmol/L    Potassium 3.5 3.5 - 5.2 mmol/L    Chloride 106 98 - 107 mmol/L    CO2 25.0 22.0 - 29.0 mmol/L    Calcium 8.8 8.6 - 10.5 mg/dL    Total Protein 5.8 (L) 6.0 - 8.5 g/dL    Albumin 3.30 (L) 3.50 - 5.20 g/dL    ALT (SGPT) <5 1 - 41 U/L    AST (SGOT) 24 1 - 40 U/L    Alkaline Phosphatase 136 (H) 39 - 117 U/L    Total Bilirubin 1.3 (H) 0.0 - 1.2 mg/dL    eGFR Non  Amer 76 >60  mL/min/1.73    Globulin 2.5 gm/dL    A/G Ratio 1.3 g/dL    BUN/Creatinine Ratio 26.6 (H) 7.0 - 25.0    Anion Gap 12.0 5.0 - 15.0 mmol/L   Protime-INR    Collection Time: 06/13/21  5:35 AM    Specimen: Blood   Result Value Ref Range    Protime 27.4 19.4 - 28.5 Seconds    INR 2.61 2.00 - 3.00   CBC Auto Differential    Collection Time: 06/13/21  5:35 AM    Specimen: Blood   Result Value Ref Range    WBC 4.90 3.40 - 10.80 10*3/mm3    RBC 3.20 (L) 4.14 - 5.80 10*6/mm3    Hemoglobin 9.0 (L) 13.0 - 17.7 g/dL    Hematocrit 27.3 (L) 37.5 - 51.0 %    MCV 85.2 79.0 - 97.0 fL    MCH 28.0 26.6 - 33.0 pg    MCHC 32.8 31.5 - 35.7 g/dL    RDW 16.5 (H) 12.3 - 15.4 %    RDW-SD 49.0 37.0 - 54.0 fl    MPV 6.5 6.0 - 12.0 fL    Platelets 234 140 - 450 10*3/mm3    Neutrophil % 69.7 42.7 - 76.0 %    Lymphocyte % 16.3 (L) 19.6 - 45.3 %    Monocyte % 10.3 5.0 - 12.0 %    Eosinophil % 2.3 0.3 - 6.2 %    Basophil % 1.4 0.0 - 1.5 %    Neutrophils, Absolute 3.40 1.70 - 7.00 10*3/mm3    Lymphocytes, Absolute 0.80 0.70 - 3.10 10*3/mm3    Monocytes, Absolute 0.50 0.10 - 0.90 10*3/mm3    Eosinophils, Absolute 0.10 0.00 - 0.40 10*3/mm3    Basophils, Absolute 0.10 0.00 - 0.20 10*3/mm3    nRBC 0.1 0.0 - 0.2 /100 WBC   POC Glucose Once    Collection Time: 06/13/21 11:07 AM    Specimen: Blood   Result Value Ref Range    Glucose 84 70 - 105 mg/dL        Imaging:  Adult Transthoracic Echo Complete W/ Cont if Necessary Per Protocol  · Left ventricular wall thickness is consistent with mild concentric   hypertrophy.  · Estimated left ventricular EF = 65% Estimated left ventricular EF was in   agreement with the calculated left ventricular EF. Left ventricular   systolic function is normal.  · Moderate mitral valve regurgitation is present.  · There is mainly affecting the non-coronary and right coronary cusp(s).  · Severe tricuspid valve regurgitation is present.  · Estimated right ventricular systolic pressure from tricuspid   regurgitation is moderately  elevated (45-55 mmHg).  · Moderate pulmonary hypertension is present.  · The right ventricular cavity is moderately dilated.  · Moderately reduced right ventricular systolic function noted.  · Left ventricular diastolic function is consistent with (grade III w/high   LAP) reversible restrictive pattern.  · Mild dilation of the aortic root is present.  · The right atrial cavity is moderate to severely dilated.  · Moderate aortic valve stenosis is present.  · Likely underestimation of the severity of the aortic stenosis secondary   to poor quality of the images and the technical difficulties consider a   PRESTON if there is a clinical concern for significant aortic stenosis based   on physical exam          ASSESSMENT:  Baclofen overdose with ? Cardiac arrest  Pulmonary hypertension   FOUZIA  SSS s/p pacemaker  Parkinson's disease   GERD     Chronic diastolic CHF     RLS  Depression  Ventricular tachycardia  Acute encephalopathy, related to baclofen overdose     PLAN:  Needs swallow study still NPO  Hold LOC altering medications  Cardiology following  Diuresis  Incentive spirometer  Electrolytes/ glycemic control  DVT and GI prophylaxis    Discussed with Dr Sincere Hernandez, APRN   6/13/2021  19:29 EDT     I personally have examined  and interviewed the patient. I have reviewed the history, data, problems, assessment and plan with our NP.  Critical care time in direct medical management (   ) minutes  Electronically signed by Sal Post MD, D,ABSM, 06/13/21, 8:17 PM EDT.

## 2021-06-13 NOTE — THERAPY EVALUATION
Patient Name: Rafael Ivey  : 1935    MRN: [id number]                              Today's Date: 2021       Admit Date: 2021    Visit Dx:     ICD-10-CM ICD-9-CM   1. Baclofen overdose, accidental or unintentional, initial encounter  T42.8X1A 968.0     E855.1   2. Ventricular tachycardia (CMS/[facility])  I47.2 427.1     Patient Active Problem List   Diagnosis   • Sick sinus syndrome (CMS/[facility])   • Atrial fibrillation, chronic   • Pacemaker   • Bilateral leg edema   • Iron deficiency anemia due to chronic blood loss   • Warfarin-induced coagulopathy (CMS/[facility])   • Parkinson's disease (CMS/[facility])   • Aortic stenosis   • GERD (gastroesophageal reflux disease)   • Hyperlipidemia   • Essential hypertension   • Mitral regurgitation   • Nonrheumatic tricuspid valve disorder   • Pulmonary hypertension (CMS/[facility])   • Sleep apnea   • Valvular heart disease   • Hypotension   • Chronic diastolic CHF (congestive heart failure) (CMS/[facility])   • Anxiety disorder   • Overactive bladder   • Chronic pain   • RLS (restless legs syndrome)   • Obesity (BMI 30-39.9)   • Acute UTI (urinary tract infection)   • Hypokalemia   • Anasarca   • Acute on chronic combined systolic and diastolic CHF (congestive heart failure) (CMS/[facility])   • Depression   • Anemia   • Rectal ulceration   • Acute on chronic heart failure (CMS/[facility])   • Acute respiratory distress   • Pneumonia of right lung due to infectious organism   • Weakness   • Pleural effusion, right   • Baclofen overdose   • Ventricular tachycardia   • Acute encephalopathy, related to baclofen overdose     Past Medical History:   Diagnosis Date   • Anemia    • Arthritis     left hip    • Atrial fibrillation (CMS/[facility])    • Atrial fibrillation (CMS/[facility])     chronic   • Cardiomegaly     mild   • Chronic diastolic heart failure (CMS/[facility])    • GERD (gastroesophageal reflux disease)    • Hyperlipidemia    • Hypertension    • Hypotension    • Lower extremity edema    • Mitral regurgitation    •  Obesity    • Pacemaker    • Parkinson disease (CMS/HCC)    • Recurrent falls     with injury    • Sick sinus syndrome (CMS/HCC)     s/p pacemaker implant    • Valvular heart disease     MR     Past Surgical History:   Procedure Laterality Date   • APPENDECTOMY     • ATRIAL APPENDAGE EXCLUSION LEFT WITH TRANSESOPHAGEAL ECHOCARDIOGRAM N/A 5/28/2020    Procedure: Atrial Appendage Occlusion;  Surgeon: Jim Walker MD;  Location: [facility] CATH INVASIVE LOCATION;  Service: Cardiovascular;  Laterality: N/A;   • ATRIAL APPENDAGE EXCLUSION LEFT WITH TRANSESOPHAGEAL ECHOCARDIOGRAM N/A 5/28/2020    Procedure: Atrial Appendage Occlusion;  Surgeon: Lashaun So MD;  Location: [facility] CATH INVASIVE LOCATION;  Service: Cardiovascular;  Laterality: N/A;   • CATARACT EXTRACTION     • CHOLECYSTECTOMY     • COLONOSCOPY N/A 5/21/2020    Procedure: COLONOSCOPY WITH BIOPSY X1 AREA;  Surgeon: Sim Collins MD;  Location: [facility] ENDOSCOPY;  Service: Gastroenterology;  Laterality: N/A;  Post: poor prep, impacted stool in rectum, and internal hemorrhoids, ascending colon arteriovenous malformation   • ENDOSCOPY N/A 5/21/2020    Procedure: ESOPHAGOGASTRODUODENOSCOPY with clipping x 1 of bleeding gastric polyp;  Surgeon: Sim Collins MD;  Location: [facility] ENDOSCOPY;  Service: Gastroenterology;  Laterality: N/A;  Post: bleeding gastric polyp   • HIP SURGERY Right 08/2017   • OTHER SURGICAL HISTORY      skin mole excisions    • PACEMAKER IMPLANTATION  06/22/2017    [employer] ([employer])   • TONSILLECTOMY     • TOTAL HIP ARTHROPLASTY Left 05/20/2014     General Information     Row Name 06/13/21 0816          Physical Therapy Time and Intention    Document Type  evaluation  -     Mode of Treatment  physical therapy  -     Row Name 06/13/21 0816          General Information    Patient Profile Reviewed  yes  -JH     Prior Level of Function  min assist:;ADL's;all household mobility using rollator  -     Existing  Precautions/Restrictions  fall  -[facility] Name 06/13/21 0816          Living Environment    Lives With  spouse has paid caregiver  -JH     Row Name 06/13/21 0816          Home Main Entrance    Number of Stairs, Main Entrance  none  -JH     Row Name 06/13/21 0816          Stairs Within Home, Primary    Stairs, Within Home, Primary  to shower  -     Number of Stairs, Within Home, Primary  one  -JH     Row Name 06/13/21 0816          Cognition    Orientation Status (Cognition)  oriented to;person;place;verbal cues/prompts needed for orientation disoriented to year and month  -JH     Row Name 06/13/21 0816          Safety Issues, Functional Mobility    Impairments Affecting Function (Mobility)  balance;cognition;strength;motor control;range of motion (ROM);postural/trunk control  -       User Key  (r) = Recorded By, (t) = Taken By, (c) = Cosigned By    Initials Name Provider Type     Caitlin Logan PT Physical Therapist        Mobility     Row Name 06/13/21 0819          Bed Mobility    Bed Mobility  supine-sit  -     Supine-Sit [place] (Bed Mobility)  minimum assist (75% patient effort);1 person assist  -     Assistive Device (Bed Mobility)  head of bed elevated;bed rails  -JH     Row Name 06/13/21 0819          Transfers    Comment (Transfers)  bed to BSC  -[facility] 06/13/21 0819          Bed-Chair Transfer    Bed-Chair [place] (Transfers)  moderate assist (50% patient effort);1 person assist  -     Assistive Device (Bed-Chair Transfers)  walker, front-wheeled  -JH     Row Name 06/13/21 0819          Sit-Stand Transfer    Sit-Stand [place] (Transfers)  moderate assist (50% patient effort);1 person assist;verbal cues  -     Assistive Device (Sit-Stand Transfers)  walker, front-wheeled  -JH     Row Name 06/13/21 0819          Gait/Stairs (Locomotion)    [place] Level (Gait)  moderate assist (50% patient effort);1 person assist  -     Assistive Device (Gait)  walker,  front-wheeled  -     Distance in Feet (Gait)  5'  commode to chair  -     Deviations/Abnormal Patterns (Gait)  festinating/shuffling;gait speed decreased;base of support, narrow  -     Bilateral Gait Deviations  heel strike decreased  -       User Key  (r) = Recorded By, (t) = Taken By, (c) = Cosigned By    Initials Name Provider Type     Caitlin Logan, WILBERT Physical Therapist        Obj/Interventions     [facility] Name 06/13/21 0821          Range of Motion Comprehensive    General Range of Motion  bilateral upper extremity ROM WFL  -     Comment, General Range of Motion  RLE with rigidity, stiffness in knee and ankle.  AROM LLE WFLs  -[facility] Name 06/13/21 0821          Strength Comprehensive (MMT)    Comment, General Manual Muscle Testing (MMT) Assessment  UEs 3+/5, RLE 3-/5, LLE 4/5  -[facility] Name 06/13/21 0821          Motor Skills    Motor Skills  muscle tone  -     Muscle Tone  right;lower extremity(s);cogwheel rigidity  -JH     Row Name 06/13/21 0821          Balance    Balance Assessment  sitting static balance;sitting dynamic balance;standing static balance;standing dynamic balance  -     Static Sitting Balance  mild impairment  -     Dynamic Sitting Balance  mild impairment  -     Static Standing Balance  moderate impairment;supported;standing  -     Dynamic Standing Balance  moderate impairment;severe impairment;supported;standing  -JH     Row Name 06/13/21 0821          Sensory Assessment (Somatosensory)    Sensory Assessment (Somatosensory)  sensation intact  -       User Key  (r) = Recorded By, (t) = Taken By, (c) = Cosigned By    Initials Name Provider Type     Caitlin Logan, PT Physical Therapist        Goals/Plan     Row Name 06/13/21 0831          Bed Mobility Goal 1 (PT)    Activity/Assistive Device (Bed Mobility Goal 1, PT)  bed mobility activities, all  -     [place] Level/Cues Needed (Bed Mobility Goal 1, PT)  modified independence  -     Time Frame (Bed  Mobility Goal 1, PT)  long term goal (LTG);2 weeks  -[facility] Name 06/13/21 0831          Transfer Goal 1 (PT)    Activity/Assistive Device (Transfer Goal 1, PT)  sit-to-stand/stand-to-sit;bed-to-chair/chair-to-bed  -     [place] Level/Cues Needed (Transfer Goal 1, PT)  minimum assist (75% or more patient effort);1 person assist  -     Time Frame (Transfer Goal 1, PT)  long term goal (LTG);2 weeks  -[facility] Name 06/13/21 0831          Gait Training Goal 1 (PT)    Activity/Assistive Device (Gait Training Goal 1, PT)  gait (walking locomotion);assistive device use;walker, rolling  -     [place] Level (Gait Training Goal 1, PT)  contact guard assist;1 person assist  -     Distance (Gait Training Goal 1, PT)  50' with his rollator  -     Time Frame (Gait Training Goal 1, PT)  long term goal (LTG);2 weeks  -       User Key  (r) = Recorded By, (t) = Taken By, (c) = Cosigned By    Initials Name Provider Type     Caitlin Logan, PT Physical Therapist        Clinical Impression     [facility] Name 06/13/21 0823          Pain    Additional Documentation  Pain Scale: FACES Pre/Post-Treatment (Group)  -[facility] Name 06/13/21 0823          Pain Scale: FACES Pre/Post-Treatment    Pain: FACES Scale, Pretreatment  0-->no hurt  -     Posttreatment Pain Rating  0-->no hurt  -[facility] Name 06/13/21 0823          Plan of Care Review    Plan of Care Reviewed With  patient  -     Outcome Summary  86 yo male admitted with MS changes, found to have accidental Baclofen overdose.  Pt is from home with spouse, has caregivers and uses a rollator he uses in the home, has A for dressing and bathing as needed.  Pt is feeling better today.  Required mod A to transfer OOB to commode, few steps to chair with mod A 1-2.  Pt is not back to baseline but making progress.  Will follow 5x/week  with recommendation of IP rehab at d/c.  Pt hopeful he will improve enough to return home with caregivers.  -[facility] Name 06/13/21  0823          Therapy Assessment/Plan (PT)    Rehab Potential (PT)  good, to achieve stated therapy goals  -     Criteria for Skilled Interventions Met (PT)  yes;skilled treatment is necessary  -     Row Name 06/13/21 0823          Vital Signs    O2 Delivery Pre Treatment  nasal cannula  -     Intra SpO2 (%)  93  -     O2 Delivery Intra Treatment  room air  -     Post SpO2 (%)  95  -     O2 Delivery Post Treatment  room air  -     Row Name 06/13/21 0823          Positioning and Restraints    Pre-Treatment Position  in bed  -     Post Treatment Position  chair  -     In Chair  notified nsg;reclined;call light within reach;encouraged to call for assist;exit alarm on  -       User Key  (r) = Recorded By, (t) = Taken By, (c) = Cosigned By    Initials Name Provider Type     Caitlin Logan PT Physical Therapist        Outcome Measures    No documentation.       Physical Therapy Education                 Title: PT OT SLP Therapies (In Progress)     Topic: Physical Therapy (In Progress)     Point: Mobility training (In Progress)     Learning Progress Summary           Patient Acceptance, E, NR by  at 6/13/2021 0833                   Point: Precautions (In Progress)     Learning Progress Summary           Patient Acceptance, E, NR by  at 6/13/2021 0833                               User Key     Initials Effective Dates Name Provider Type [facility] 03/01/19 -  Caitlin Logan PT Physical Therapist PT              PT Recommendation and Plan  Planned Therapy Interventions (PT): balance training, bed mobility training, gait training, transfer training, postural re-education, ROM (range of motion), strengthening, patient/family education  Plan of Care Reviewed With: patient  Outcome Summary: 86 yo male admitted with MS changes, found to have accidental Baclofen overdose.  Pt is from home with spouse, has caregivers and uses a rollator he uses in the home, has A for dressing and bathing as needed.   Pt is feeling better today.  Required mod A to transfer OOB to commode, few steps to chair with mod A 1-2.  Pt is not back to baseline but making progress.  Will follow 5x/week  with recommendation of IP rehab at d/c.  Pt hopeful he will improve enough to return home with caregivers.     Time Calculation:   PT Charges     Row Name 06/13/21 0834             Time Calculation    Start Time  0745  -      Stop Time  0830  -      Time Calculation (min)  45 min  -      PT Received On  06/13/21  -      PT - Next Appointment  06/14/21  -      PT Goal Re-Cert Due Date  06/27/21  -         Time Calculation- PT    Total Timed Code Minutes- PT  20 minute(s)  -        User Key  (r) = Recorded By, (t) = Taken By, (c) = Cosigned By    Initials Name Provider Type    Caitlin Sky, WILBERT Physical Therapist        Therapy Charges for Today     Code Description Service Date Service Provider Modifiers Qty    01839635069 HC PT EVAL MOD COMPLEXITY 3 6/13/2021 Caitlin Logan, PT GP 1    [id number]  PT THERAPEUTIC ACT EA 15 MIN 6/13/2021 Caitlin Logan, PT GP 1               Caitlin Logan PT  6/13/2021

## 2021-06-13 NOTE — PROGRESS NOTES
[facility] Medicine Services Daily Progress Note      Hospitalist Team  LOS 2 days      Patient Care Team:  Blake Ivey MD as PCP - General (Family Medicine)    Patient Location: 301/1      Subjective   Subjective     Chief Complaint / Subjective  Chief Complaint   Patient presents with   • given to much Baclofen over the last week     Patient much more awake and alert today.  Family at bedside reporting patient appearing near baseline.  Awaiting reevaluation with speech therapy to advance diet.  No chest pain or shortness of breath.  Neck pain improving.  Cardiology evaluated patient regarding transient VT do not feel ischemic work-up warranted at this time.  Recommending discontinuing baclofen.    Brief Synopsis of Hospital Course/HPI  HPI taken from pulmonary note and edited as patient cannot provide any history due to confusion  Mr. Ivey is a 85 y.o. male with past medical history of afib, pacemaker, chronic diastolic heart failure, valvular heart disease, hypotension, hyperlipidemia, parkinson's, anemia, arthritis, GERD, and obesity who presented to [facility] ED 6/11/21 for decreased responsiveness over the past several days. It was then discovered that patient's wife, who manages patient's medications, had been accidentally giving this patient 3 times his normal dose of daily baclofen.  Patient, per family is prescribed 15 mg daily, but patient had been receiving 45 mg daily for the past week.  Patient was also given less of his Sinemet, as patient's wife had reversed the dosing administrations between the medications.  Subsequently, patient had an episode of ventricular tachycardia in the ED that lasted approximately 1 minute, and at that time patient became unresponsive, in which he received per nursing 1 round of CPR, and patient returned back to a paced rhythm before any further intervention.  Patient's neuro status was further obtunded than his initial presentation  following the ventricular tachycardia.     In the ED, labs were obtained with abnormalities as follows: Alkaline phosphatase 136, troponin 0.017, magnesium 1.8, hemoglobin 9.6, hematocrit 29.6.  UA was negative, acetaminophen <5.0, ethanol <0.010, salicylate <0.3, and urine drug screen negative.  CT of the head was obtained and revealed no acute intracranial abnormality, generalized cerebral atrophy.  EKG was obtained revealing a paced rhythm.  Due to patient's altered mental status, the patient was admitted to the ICU overnight for suspected altered mental status secondary to unintentional baclofen overdose.       Date:  6/12/2021 patient's mentation improved and vital signs stable.  Patient downgraded out of ICU. Hospitalist group was consulted for medical management  Upon exam patient is alert and oriented x3. Still remains weak. Daughter at bedside. Pt lives at home with wife and has lift chair. He also has caregivers. Does not want to go to rehab at discharge.   PT/ST ordered       Date::          Review of Systems   Constitutional: Negative for chills and fever.   HENT: Negative for hoarse voice and sore throat.    Eyes: Negative for double vision and photophobia.   Cardiovascular: Negative for chest pain and palpitations.   Respiratory: Negative for cough and shortness of breath.    Musculoskeletal: Positive for stiffness. Negative for falls.   Gastrointestinal: Negative for nausea and vomiting.   Genitourinary: Negative for dysuria and flank pain.   Neurological: Negative for headaches and seizures.   Psychiatric/Behavioral: Negative for altered mental status. The patient is not nervous/anxious.          Objective   Objective      Vital Signs  Temp:  [97.9 °F (36.6 °C)-98.4 °F (36.9 °C)] 98.4 °F (36.9 °C)  Heart Rate:  [68-70] 70  Resp:  [16-18] 18  BP: (131-138)/(69-75) 137/74  Oxygen Therapy  SpO2: 97 %  Pulse Oximetry Type: Intermittent  Device (Oxygen Therapy): nasal cannula  Flow (L/min): 2  Flowsheet  "Rows      First Filed Value   Admission Height  182.9 cm (72\") Documented at 06/11/2021 1233   Admission Weight  91.2 kg (201 lb) Documented at 06/11/2021 1233        Intake & Output (last 3 days)       06/10 0701 - 06/11 0700 06/11 0701 - 06/12 0700 06/12 0701 - 06/13 0700 06/13 0701 - 06/14 0700    I.V. (mL/kg)  729 (7.9)      Total Intake(mL/kg)  729 (7.9)      Net  +729              Urine Unmeasured Occurrence   3 x     Stool Unmeasured Occurrence   4 x 1 x        Lines, Drains & Airways    Active LDAs     Name:   Placement date:   Placement time:   Site:   Days:    Peripheral IV 06/11/21 1310 Right Antecubital   06/11/21    1310    Antecubital   1                  Physical Exam:    Physical Exam  General: Elderly male sitting up in bed breathing comfortably on 2 L via nasal cannula no acute distress  HEENT: NC/AT, EOMI, mucosa moist  Heart: Regular, rate controlled  Chest: Normal work of breathing, moving air well no wheezing  Abdominal: Soft. NT/ND.   Musculoskeletal: Normal ROM.  No cyanosis. No calf tenderness.  Neurological: Awake and alert, no focal deficits  Skin: Skin is warm and dry. No rash  Psychiatric: Cheerful and conversational.       Wounds (last 24 hours)      LDA Wound     Row Name 06/13/21 0752             Wound 06/12/20 1901 scrotum MASD (Moisture associated skin damage)    Wound - Properties Group Placement Date: 06/12/20 -KK Placement Time: 1901 -KK Location: scrotum  -KK Primary Wound Type: MASD  -KK    Dressing Appearance  open to air  -KI      Closure  Open to air  -KI      Base  red;non-blanchable  -KI      Retired Wound - Properties Group Date first assessed: 06/12/20 -KK Time first assessed: 1901 -KK Location: scrotum  -KK Primary Wound Type: MASD  -KK      User Key  (r) = Recorded By, (t) = Taken By, (c) = Cosigned By    Initials Name Provider Type    Miya Huber RN Registered Nurse    Janis Botello RN Registered Nurse          Procedures:              Results " Review:     I reviewed the patient's new clinical results.      Lab Results (last 24 hours)     Procedure Component Value Units Date/Time    POC Glucose Once [[id number]]  (Normal) Collected: 06/13/21 1107    Specimen: Blood Updated: 06/13/21 1109     Glucose 84 mg/dL      Comment: Serial Number: [id number]:  [id number]       Comprehensive Metabolic Panel [[id number]]  (Abnormal) Collected: 06/13/21 0535    Specimen: Blood Updated: 06/13/21 0704     Glucose 77 mg/dL      BUN 25 mg/dL      Creatinine 0.94 mg/dL      Sodium 143 mmol/L      Potassium 3.5 mmol/L      Chloride 106 mmol/L      CO2 25.0 mmol/L      Calcium 8.8 mg/dL      Total Protein 5.8 g/dL      Albumin 3.30 g/dL      ALT (SGPT) <5 U/L      AST (SGOT) 24 U/L      Alkaline Phosphatase 136 U/L      Total Bilirubin 1.3 mg/dL      eGFR Non African Amer 76 mL/min/1.73      Globulin 2.5 gm/dL      A/G Ratio 1.3 g/dL      BUN/Creatinine Ratio 26.6     Anion Gap 12.0 mmol/L     Narrative:      GFR Normal >60  Chronic Kidney Disease <60  Kidney Failure <15      Phosphorus [[id number]]  (Normal) Collected: 06/13/21 0535    Specimen: Blood Updated: 06/13/21 0651     Phosphorus 3.6 mg/dL     Magnesium [[id number]]  (Normal) Collected: 06/13/21 0535    Specimen: Blood Updated: 06/13/21 0651     Magnesium 1.8 mg/dL     Protime-INR [[id number]]  (Normal) Collected: 06/13/21 0535    Specimen: Blood Updated: 06/13/21 0632     Protime 27.4 Seconds      INR 2.61    CBC & Differential [[id number]]  (Abnormal) Collected: 06/13/21 0535    Specimen: Blood Updated: 06/13/21 0621    Narrative:      The following orders were created for panel order CBC & Differential.  Procedure                               Abnormality         Status                     ---------                               -----------         ------                     CBC Auto Differential[[id number]]        Abnormal            Final result                 Please view results for these tests on the  individual orders.    CBC Auto Differential [[id number]]  (Abnormal) Collected: 06/13/21 0535    Specimen: Blood Updated: 06/13/21 0621     WBC 4.90 10*3/mm3      RBC 3.20 10*6/mm3      Hemoglobin 9.0 g/dL      Hematocrit 27.3 %      MCV 85.2 fL      MCH 28.0 pg      MCHC 32.8 g/dL      RDW 16.5 %      RDW-SD 49.0 fl      MPV 6.5 fL      Platelets 234 10*3/mm3      Neutrophil % 69.7 %      Lymphocyte % 16.3 %      Monocyte % 10.3 %      Eosinophil % 2.3 %      Basophil % 1.4 %      Neutrophils, Absolute 3.40 10*3/mm3      Lymphocytes, Absolute 0.80 10*3/mm3      Monocytes, Absolute 0.50 10*3/mm3      Eosinophils, Absolute 0.10 10*3/mm3      Basophils, Absolute 0.10 10*3/mm3      nRBC 0.1 /100 WBC         No results found for: HGBA1C  Results from last 7 days   Lab Units 06/13/21  0535 06/12/21  0642 06/12/21  0126   INR  2.61 2.34 2.49       Results from last 7 days   Lab Units 06/11/21  1611   PH, ARTERIAL pH units 7.435   PO2 ART mm Hg 91.7   PCO2, ARTERIAL mm Hg 40.4   HCO3 ART mmol/L 27.1     No results found for: LIPASE  Lab Results   Component Value Date    CHOL 103 06/12/2021    TRIG 50 06/12/2021    HDL 42 06/12/2021    LDL 49 06/12/2021       Lab Results   Lab Value Date/Time    FINALDX  08/11/2020 1622     Lung, pleural fluid, smears and cytospin preparation:    Benign mesothelial cells, degenerating mesothelial cells, histiocytes and mild acute inflammation    Abundant blood present    No evidence of malignancy      JPR/cec        FINALDX  05/21/2020 1443     Ulcer, rectum, biopsy:    Scant fragments of colonic mucosa with mild chronic inflammation and focal hyperplastic changes    No violette ulceration is identified    No chronic features or malignant changes identified    See comment    MICHAEL/sms       COMDX  05/21/2020 1443     Deeper levels were examined through the block but were not further contributory.     MICHAEL/sms          Microbiology Results (last 10 days)     ** No results found for the last 240  hours. **          ECG/EMG Results (most recent)     Procedure Component Value Units Date/Time    ECG 12 Lead [[id number]] Collected: 06/11/21 1303     Updated: 06/11/21 1304     QT Interval 473 ms     Narrative:      HEART RATE= 70  bpm  RR Interval= 852  ms  TX Interval= 230  ms  P Horizontal Axis= 188  deg  P Front Axis= 0  deg  QRSD Interval= 177  ms  QT Interval= 473  ms  QRS Axis= -85  deg  T Wave Axis= 82  deg  - ABNORMAL ECG -  Sinus rhythm  Prolonged TX interval  IVCD, consider RBBB  ST elevation secondary to IVCD  When compared with ECG of 11-Aug-2020 1:08:56,  Significant change in rhythm: previously atrial fibrillation  New conduction abnormality  Significant repolarization change  Significant axis, voltage or hypertrophy change  Electronically Signed By:   Date and Time of Study: 2021-06-11 13:03:05    ECG 12 Lead [[id number]] Collected: 06/11/21 1500     Updated: 06/11/21 1516     QT Interval 469 ms     Narrative:      HEART RATE= 76  bpm  RR Interval= 788  ms  TX Interval= 230  ms  P Horizontal Axis= 187  deg  P Front Axis= 54  deg  QRSD Interval= 176  ms  QT Interval= 469  ms  QRS Axis= -77  deg  T Wave Axis= 78  deg  - ABNORMAL ECG -  Sinus rhythm  Atrial premature complex  Prolonged TX interval  IVCD, consider RBBB  ST elevation secondary to IVCD  When compared with ECG of 11-Jun-2021 13:03:05,  No significant change  Electronically Signed By:   Date and Time of Study: 2021-06-11 15:00:10    Adult Transthoracic Echo Complete W/ Cont if Necessary Per Protocol [[id number]] Collected: 06/12/21 1407     Updated: 06/12/21 2002     BSA 2.1 m^2      RVIDd 3.8 cm      IVSd 0.83 cm      LVIDd 5.0 cm      LVIDs 3.5 cm      LVPWd 1.2 cm      IVS/LVPW 0.72     FS 30.9 %      EDV(Teich) 118.4 ml      ESV(Teich) 49.4 ml      EF(Teich) 58.3 %      EDV(cubed) 125.2 ml      ESV(cubed) 41.3 ml      EF(cubed) 67.0 %      LV mass(C)d 180.0 grams      LV mass(C)dI 84.0 grams/m^2      SV(Teich) 69.0 ml      SI(Teich) 32.2  ml/m^2      SV(cubed) 83.9 ml      SI(cubed) 39.1 ml/m^2      Ao root diam 3.5 cm      Ao root area 9.9 cm^2      asc Aorta Diam 3.7 cm      LVOT diam 2.5 cm      LVOT area 4.7 cm^2      RVOT diam 4.5 cm      RVOT area 16.0 cm^2      EDV(MOD-sp4) 130.9 ml      ESV(MOD-sp4) 33.8 ml      EF(MOD-sp4) 74.2 %      SV(MOD-sp4) 97.1 ml      SI(MOD-sp4) 45.3 ml/m^2      Ao root area (BSA corrected) 1.7     LV Dowling Vol (BSA corrected) 61.1 ml/m^2      LV Sys Vol (BSA corrected) 15.8 ml/m^2      MV E max ric 131.5 cm/sec      MV A max ric 51.5 cm/sec      MV E/A 2.6     MV V2 max 147.3 cm/sec      MV max PG 8.7 mmHg      MV V2 mean 75.3 cm/sec      MV mean PG 2.8 mmHg      MV V2 VTI 33.3 cm      MVA(VTI) 2.4 cm^2      MV dec slope 616.0 cm/sec^2      MV dec time 0.21 sec      Ao pk ric 263.8 cm/sec      Ao max PG 27.8 mmHg      Ao max PG (full) 25.6 mmHg      Ao V2 mean 193.0 cm/sec      Ao mean PG 16.9 mmHg      Ao mean PG (full) 15.6 mmHg      Ao V2 VTI 57.3 cm      JUSTIN(I,A) 1.4 cm^2      JUSTIN(I,D) 1.4 cm^2      JUSTIN(V,A) 1.3 cm^2      JUSTIN(V,D) 1.3 cm^2      LV V1 max PG 2.3 mmHg      LV V1 mean PG 1.3 mmHg      LV V1 max 75.3 cm/sec      LV V1 mean 52.0 cm/sec      LV V1 VTI 17.0 cm      MR max ric 551.7 cm/sec      MR max .8 mmHg      SV(Ao) 566.2 ml      SI(Ao) 264.1 ml/m^2      SV(LVOT) 80.4 ml      SV(RVOT) 205.6 ml      SI(LVOT) 37.5 ml/m^2      PA V2 max 80.4 cm/sec      PA max PG 2.6 mmHg      PA max PG (full) 0.23 mmHg      PA V2 mean 53.2 cm/sec      PA mean PG 1.3 mmHg      PA mean PG (full) 0.62 mmHg      PA V2 VTI 15.0 cm      PVA(I,A) 13.7 cm^2      BH CV ECHO YADIRA - PVA(I,D) 13.7 cm^2      BH CV ECHO YADIRA - PVA(V,A) 15.2 cm^2      BH CV ECHO YADIRA - PVA(V,D) 15.2 cm^2      PA acc time 0.11 sec      RV V1 max PG 2.3 mmHg      RV V1 mean PG 0.73 mmHg      RV V1 max 76.6 cm/sec      RV V1 mean 36.0 cm/sec      RV V1 VTI 12.9 cm      TR max ric 316.6 cm/sec      RVSP(TR) 43.1 mmHg      RAP systole 3.0 mmHg       PA pr(Accel) 29.3 mmHg      Pulm Sys Roman 29.2 cm/sec      Pulm Dowling Roman 52.0 cm/sec      Pulm S/D 0.56     Qp/Qs 2.6     BH CV ECHO YADIRA - BZI_BMI 27.5 kilograms/m^2      BH CV ECHO YADIRA - BSA(HAYCOCK) 2.2 m^2      BH CV ECHO YADIRA - BZI_METRIC_WEIGHT 92.1 kg      BH CV ECHO YADIRA - BZI_METRIC_HEIGHT 182.9 cm      EF(MOD-bp) 70 %      LA dimension(2D) 5.8 cm      Echo EF Estimated 65 %     Narrative:      · Left ventricular wall thickness is consistent with mild concentric   hypertrophy.  · Estimated left ventricular EF = 65% Estimated left ventricular EF was in   agreement with the calculated left ventricular EF. Left ventricular   systolic function is normal.  · Moderate mitral valve regurgitation is present.  · There is mainly affecting the non-coronary and right coronary cusp(s).  · Severe tricuspid valve regurgitation is present.  · Estimated right ventricular systolic pressure from tricuspid   regurgitation is moderately elevated (45-55 mmHg).  · Moderate pulmonary hypertension is present.  · The right ventricular cavity is moderately dilated.  · Moderately reduced right ventricular systolic function noted.  · Left ventricular diastolic function is consistent with (grade III w/high   LAP) reversible restrictive pattern.  · Mild dilation of the aortic root is present.  · The right atrial cavity is moderate to severely dilated.  · Moderate aortic valve stenosis is present.  · Likely underestimation of the severity of the aortic stenosis secondary   to poor quality of the images and the technical difficulties consider a   PRESTON if there is a clinical concern for significant aortic stenosis based   on physical exam             Results for orders placed during the hospital encounter of 06/12/20    Duplex Venous Lower Extremity - Bilateral CAR    Interpretation Summary  · Normal bilateral lower extremity venous duplex scan.  · Left popliteal fossa heterogenous fluid collection.      Results for orders placed during the  hospital encounter of 06/11/21    Adult Transthoracic Echo Complete W/ Cont if Necessary Per Protocol    Interpretation Summary  · Left ventricular wall thickness is consistent with mild concentric hypertrophy.  · Estimated left ventricular EF = 65% Estimated left ventricular EF was in agreement with the calculated left ventricular EF. Left ventricular systolic function is normal.  · Moderate mitral valve regurgitation is present.  · There is mainly affecting the non-coronary and right coronary cusp(s).  · Severe tricuspid valve regurgitation is present.  · Estimated right ventricular systolic pressure from tricuspid regurgitation is moderately elevated (45-55 mmHg).  · Moderate pulmonary hypertension is present.  · The right ventricular cavity is moderately dilated.  · Moderately reduced right ventricular systolic function noted.  · Left ventricular diastolic function is consistent with (grade III w/high LAP) reversible restrictive pattern.  · Mild dilation of the aortic root is present.  · The right atrial cavity is moderate to severely dilated.  · Moderate aortic valve stenosis is present.  · Likely underestimation of the severity of the aortic stenosis secondary to poor quality of the images and the technical difficulties consider a PRESTON if there is a clinical concern for significant aortic stenosis based on physical exam      CT Head Without Contrast    Result Date: 6/11/2021  1. No acute intracranial abnormality. 2. Generalized cerebral atrophy.  Electronically Signed By-Jason Dan MD On:6/11/2021 3:28 PM This report was finalized on [id number] by  Jason Dan MD.          Xrays, labs reviewed personally by physician.    Medication Review:   I have reviewed the patient's current medication list      Scheduled Meds  carbidopa-levodopa, 1 tablet, Oral, TID  escitalopram, 10 mg, Oral, Daily  ferrous sulfate, 324 mg, Oral, Daily With Breakfast  furosemide, 40 mg, Oral, Daily  multivitamin with minerals, 1  tablet, Oral, Daily  oxybutynin, 5 mg, Oral, Nightly  pantoprazole, 40 mg, Oral, QAM  potassium chloride, 20 mEq, Oral, TID  rosuvastatin, 10 mg, Oral, Nightly  sodium chloride, 10 mL, Intravenous, Q12H  warfarin, 2 mg, Oral, Once per day on Mon Tue Wed Thu Fri Sat        Meds Infusions  Pharmacy to dose warfarin,   sodium chloride, 75 mL/hr, Last Rate: 75 mL/hr (06/11/21 8856)        Meds PRN  •  acetaminophen **OR** acetaminophen  •  aluminum-magnesium hydroxide-simethicone  •  Diclofenac Sodium  •  ipratropium-albuterol  •  magnesium sulfate **OR** magnesium sulfate in D5W 1g/100mL (PREMIX)  •  nitroglycerin  •  ondansetron **OR** ondansetron  •  Pharmacy to dose warfarin  •  potassium chloride **OR** potassium chloride **OR** potassium chloride  •  potassium phosphate infusion greater than 15 mMoles **OR** potassium phosphate infusion greater than 15 mMoles **OR** potassium phosphate **OR** sodium phosphate IVPB **OR** sodium phosphate IVPB **OR** sodium phosphate IVPB  •  sodium chloride        Assessment/Plan   Assessment/Plan     Active Hospital Problems    Diagnosis  POA   • **Baclofen overdose [T42.8X1A]  Yes   • Ventricular tachycardia [I47.2]  Yes   • Acute encephalopathy, related to baclofen overdose [G93.40]  Yes   • Depression [F32.9]  Yes   • Chronic diastolic CHF (congestive heart failure) (CMS/HCC) [I50.32]  Yes   • GERD (gastroesophageal reflux disease) [K21.9]  Yes   • Sleep apnea [G47.30]  Yes   • RLS (restless legs syndrome) [G25.81]  Yes   • Parkinson's disease (CMS/HCC) [G20]  Yes   • Sick sinus syndrome (CMS/HCC) [I49.5]  Yes   • Pacemaker [Z95.0]  Yes   • Pulmonary hypertension (CMS/HCC) [I27.20]  Yes      Resolved Hospital Problems   No resolved problems to display.       MEDICAL DECISION MAKING COMPLEXITY BY PROBLEM:     Baclofen overdose-accidental due to mixing up medication administration at home  -Urine drug screen appropriate  -Tylenol and aspirin levels unremarkable  -Holding  baclofen until metabolically cleared  -Poison control to be called  -Family at bedside patient appearing essentially back to baseline mentally  -Speech therapy evaluating patient     Altered mental status-due to medication overdose, patient appearing back to baseline  -As above  -No signs of superimposed infection  -CT head unremarkable  -Patient work with physical therapy and speech therapy     Ventricular tachycardia-noted in the emergency department lasting for a minute with initiation of chest compressions as patient unresponsive, resolved  -Cardiology consulted, recommending discontinuing baclofen any QT prolonging medications  -Cardiac monitoring  -Troponins being monitored  -Patient with pacemaker     Anemia-patient with underlying chronic component  -Monitor daily     Acute respiratory failure-patient with component likely due to accidental overdose patient now improving  -Intensivist consulted  -Wean oxygen as tolerated  -Speech therapy consulted to monitor for signs of aspiration     Atrial fibrillation-patient with history of pacemaker and sick sinus syndrome and is on warfarin anticoagulated  -Continue anticoagulation pharmacy to dose  -Cardiology consulted  -Cardiac monitoring     Heart failure-chronic diastolic no signs of volume overload at this time  -Monitor volume status  -Check weights     Parkinson's disease-patient on medication for symptom control  -Resume Sinemet     RLS/GERD/depression/OAB-chronic in nature  -Resume home medication as appropriate     FOUZIA-monitor for nocturnal hypoxia       VTE Prophylaxis -   Mechanical Order History:      Ordered        06/11/21 1603  Place Sequential Compression Device  Once         06/11/21 1603  Maintain Sequential Compression Device  Continuous                 Pharmalogical Order History:      Ordered     Dose Route Frequency Stop    06/12/21 0106  warfarin (COUMADIN) tablet 2 mg     Question:  Target INR  Answer:  2 - 3    2 mg PO Once per day on Mon  Tue Wed Thu Fri Sat --    06/12/21 0056  Pharmacy to dose warfarin     Question:  Target INR  Answer:  2 - 3    -- XX Continuous PRN --    06/11/21 1603  heparin (porcine) 5000 UNIT/ML injection 5,000 Units  Status:  Discontinued      5,000 Units SC Every 8 Hours Scheduled 06/12/21 0114                  Code Status -   Code Status and Medical Interventions:   Ordered at: 06/11/21 1605     Code Status:    CPR     Medical Interventions (Level of Support Prior to Arrest):    Full       This patient has been examined wearing appropriate Personal Protective Equipment and discussed with hospital infection control department. 06/13/21        Discharge Planning  Rehab        Electronically signed by Kaden Funk MD, 06/13/21, 12:28 EDT.  [facility] Hospitalist Team

## 2021-06-13 NOTE — PLAN OF CARE
Goal Outcome Evaluation:  Plan of Care Reviewed With: patient           Outcome Summary: 84 yo male admitted with MS changes, found to have accidental Baclofen overdose.  Pt is from home with spouse, has caregivers and uses a rollator he uses in the home, has A for dressing and bathing as needed.  Pt is feeling better today.  Required mod A to transfer OOB to commode, few steps to chair with mod A 1-2.  Pt is not back to baseline but making progress.  Will follow 5x/week  with recommendation of IP rehab at d/c.  Pt hopeful he will improve enough to return home with caregivers.

## 2021-06-13 NOTE — PLAN OF CARE
Goal Outcome Evaluation:              Outcome Summary: Pt resting well throughout shift with no complaints. Speech is to see again today about advancing diet. Will continue to monitor.

## 2021-06-14 LAB
ALBUMIN SERPL-MCNC: 3.7 G/DL (ref 3.5–5.2)
ALBUMIN/GLOB SERPL: 1.2 G/DL
ALP SERPL-CCNC: 149 U/L (ref 39–117)
ALT SERPL W P-5'-P-CCNC: 7 U/L (ref 1–41)
ANION GAP SERPL CALCULATED.3IONS-SCNC: 12 MMOL/L (ref 5–15)
AST SERPL-CCNC: 27 U/L (ref 1–40)
BASOPHILS # BLD AUTO: 0.1 10*3/MM3 (ref 0–0.2)
BASOPHILS NFR BLD AUTO: 1.4 % (ref 0–1.5)
BILIRUB SERPL-MCNC: 1.4 MG/DL (ref 0–1.2)
BUN SERPL-MCNC: 24 MG/DL (ref 8–23)
BUN/CREAT SERPL: 25 (ref 7–25)
CALCIUM SPEC-SCNC: 9.3 MG/DL (ref 8.6–10.5)
CHLORIDE SERPL-SCNC: 104 MMOL/L (ref 98–107)
CO2 SERPL-SCNC: 28 MMOL/L (ref 22–29)
CREAT SERPL-MCNC: 0.96 MG/DL (ref 0.76–1.27)
DEPRECATED RDW RBC AUTO: 49.4 FL (ref 37–54)
EOSINOPHIL # BLD AUTO: 0.2 10*3/MM3 (ref 0–0.4)
EOSINOPHIL NFR BLD AUTO: 4.7 % (ref 0.3–6.2)
ERYTHROCYTE [DISTWIDTH] IN BLOOD BY AUTOMATED COUNT: 16.6 % (ref 12.3–15.4)
GFR SERPL CREATININE-BSD FRML MDRD: 74 ML/MIN/1.73
GLOBULIN UR ELPH-MCNC: 3 GM/DL
GLUCOSE SERPL-MCNC: 119 MG/DL (ref 65–99)
HCT VFR BLD AUTO: 30 % (ref 37.5–51)
HGB BLD-MCNC: 9.9 G/DL (ref 13–17.7)
INR PPP: 2.29 (ref 2–3)
LYMPHOCYTES # BLD AUTO: 0.8 10*3/MM3 (ref 0.7–3.1)
LYMPHOCYTES NFR BLD AUTO: 16.2 % (ref 19.6–45.3)
MAGNESIUM SERPL-MCNC: 1.9 MG/DL (ref 1.6–2.4)
MCH RBC QN AUTO: 28 PG (ref 26.6–33)
MCHC RBC AUTO-ENTMCNC: 33.1 G/DL (ref 31.5–35.7)
MCV RBC AUTO: 84.7 FL (ref 79–97)
MONOCYTES # BLD AUTO: 0.5 10*3/MM3 (ref 0.1–0.9)
MONOCYTES NFR BLD AUTO: 10.7 % (ref 5–12)
NEUTROPHILS NFR BLD AUTO: 3.1 10*3/MM3 (ref 1.7–7)
NEUTROPHILS NFR BLD AUTO: 67 % (ref 42.7–76)
NRBC BLD AUTO-RTO: 0.2 /100 WBC (ref 0–0.2)
PHOSPHATE SERPL-MCNC: 2.9 MG/DL (ref 2.5–4.5)
PLATELET # BLD AUTO: 279 10*3/MM3 (ref 140–450)
PMV BLD AUTO: 6.4 FL (ref 6–12)
POTASSIUM SERPL-SCNC: 3.6 MMOL/L (ref 3.5–5.2)
PROCALCITONIN SERPL-MCNC: 0.06 NG/ML (ref 0–0.25)
PROT SERPL-MCNC: 6.7 G/DL (ref 6–8.5)
PROTHROMBIN TIME: 24.2 SECONDS (ref 19.4–28.5)
RBC # BLD AUTO: 3.54 10*6/MM3 (ref 4.14–5.8)
SODIUM SERPL-SCNC: 144 MMOL/L (ref 136–145)
WBC # BLD AUTO: 4.7 10*3/MM3 (ref 3.4–10.8)

## 2021-06-14 PROCEDURE — 80053 COMPREHEN METABOLIC PANEL: CPT | Performed by: NURSE PRACTITIONER

## 2021-06-14 PROCEDURE — 97530 THERAPEUTIC ACTIVITIES: CPT

## 2021-06-14 PROCEDURE — 36415 COLL VENOUS BLD VENIPUNCTURE: CPT | Performed by: NURSE PRACTITIONER

## 2021-06-14 PROCEDURE — 83735 ASSAY OF MAGNESIUM: CPT | Performed by: NURSE PRACTITIONER

## 2021-06-14 PROCEDURE — U0004 COV-19 TEST NON-CDC HGH THRU: HCPCS | Performed by: INTERNAL MEDICINE

## 2021-06-14 PROCEDURE — 85025 COMPLETE CBC W/AUTO DIFF WBC: CPT | Performed by: NURSE PRACTITIONER

## 2021-06-14 PROCEDURE — U0005 INFEC AGEN DETEC AMPLI PROBE: HCPCS | Performed by: INTERNAL MEDICINE

## 2021-06-14 PROCEDURE — 84100 ASSAY OF PHOSPHORUS: CPT | Performed by: NURSE PRACTITIONER

## 2021-06-14 PROCEDURE — 92526 ORAL FUNCTION THERAPY: CPT

## 2021-06-14 PROCEDURE — 84145 PROCALCITONIN (PCT): CPT | Performed by: INTERNAL MEDICINE

## 2021-06-14 PROCEDURE — C9803 HOPD COVID-19 SPEC COLLECT: HCPCS | Performed by: INTERNAL MEDICINE

## 2021-06-14 PROCEDURE — 99232 SBSQ HOSP IP/OBS MODERATE 35: CPT | Performed by: INTERNAL MEDICINE

## 2021-06-14 PROCEDURE — 85610 PROTHROMBIN TIME: CPT | Performed by: NURSE PRACTITIONER

## 2021-06-14 RX ADMIN — PANTOPRAZOLE SODIUM 40 MG: 40 TABLET, DELAYED RELEASE ORAL at 08:21

## 2021-06-14 RX ADMIN — FUROSEMIDE 40 MG: 40 TABLET ORAL at 08:21

## 2021-06-14 RX ADMIN — POTASSIUM CHLORIDE 20 MEQ: 1500 TABLET, EXTENDED RELEASE ORAL at 21:50

## 2021-06-14 RX ADMIN — FERROUS SULFATE TAB EC 324 MG (65 MG FE EQUIVALENT) 324 MG: 324 (65 FE) TABLET DELAYED RESPONSE at 08:21

## 2021-06-14 RX ADMIN — POTASSIUM CHLORIDE 20 MEQ: 1500 TABLET, EXTENDED RELEASE ORAL at 15:46

## 2021-06-14 RX ADMIN — OXYBUTYNIN CHLORIDE 5 MG: 5 TABLET ORAL at 21:50

## 2021-06-14 RX ADMIN — CARBIDOPA AND LEVODOPA 1 TABLET: 25; 100 TABLET ORAL at 15:46

## 2021-06-14 RX ADMIN — WARFARIN 2 MG: 2 TABLET ORAL at 17:17

## 2021-06-14 RX ADMIN — ESCITALOPRAM OXALATE 10 MG: 10 TABLET ORAL at 08:21

## 2021-06-14 RX ADMIN — Medication 10 ML: at 08:20

## 2021-06-14 RX ADMIN — CARBIDOPA AND LEVODOPA 1 TABLET: 25; 100 TABLET ORAL at 08:21

## 2021-06-14 RX ADMIN — POTASSIUM CHLORIDE 20 MEQ: 1500 TABLET, EXTENDED RELEASE ORAL at 08:21

## 2021-06-14 RX ADMIN — Medication 10 ML: at 21:49

## 2021-06-14 RX ADMIN — ROSUVASTATIN CALCIUM 10 MG: 10 TABLET, FILM COATED ORAL at 21:50

## 2021-06-14 RX ADMIN — CARBIDOPA AND LEVODOPA 1 TABLET: 25; 100 TABLET ORAL at 21:50

## 2021-06-14 RX ADMIN — MULTIPLE VITAMINS W/ MINERALS TAB 1 TABLET: TAB at 08:20

## 2021-06-14 NOTE — PROGRESS NOTES
Daily Progress Note    Baclofen overdose    Sick sinus syndrome (CMS/HCC)    Pacemaker    Parkinson's disease (CMS/HCC)    GERD (gastroesophageal reflux disease)    Pulmonary hypertension (CMS/HCC)    Sleep apnea    Chronic diastolic CHF (congestive heart failure) (CMS/HCC)    RLS (restless legs syndrome)    Depression    Ventricular tachycardia    Acute encephalopathy, related to baclofen overdose    Assessment    Cardiac arrest  While in the emergency department patient was observed to have an episode of ventricular tachycardia that lasted approximately 1 minute.  He became responsive, received 1 round of CPR and returned back to a paced rhythm before any further intervention could be initiated  He was sent to icu for observation    Baclofen overdose-accidental due to mixing up medication administration at home  Altered mental status improved  Ventricular tachycardia-resolved cardiology following    Plan    Anticoagulation coumadin  Diuresis lasix         LOS: 3 days       Subjective         Objective     Vital signs for last 24 hours:  Vitals:    06/13/21 1915 06/14/21 0302 06/14/21 0730 06/14/21 1158   BP: 129/69 146/74 145/76 152/83   BP Location: Left arm Left arm  Left arm   Patient Position: Sitting Sitting  Lying   Pulse: 70 69 68 69   Resp: 18 16  18   Temp: 97.6 °F (36.4 °C) 98 °F (36.7 °C)  97.6 °F (36.4 °C)   TempSrc: Oral Oral  Oral   SpO2: 96% 99% 95% 95%   Weight:       Height:           Intake/Output last 3 shifts:  I/O last 3 completed shifts:  In: 240 [P.O.:240]  Out: -   Intake/Output this shift:  I/O this shift:  In: -   Out: 800 [Urine:800]      Radiology  Imaging Results (Last 24 Hours)     Procedure Component Value Units Date/Time    XR Chest 1 View [[id number]] Collected: 06/14/21 0811     Updated: 06/14/21 0816    Narrative:      DATE OF EXAM:  6/13/2021 8:52 PM     PROCEDURE:  XR CHEST 1 VW-     INDICATIONS:  hypoxia; T42.8X1A-Poisoning by antiparkinsonism drugs and other  central  muscle-tone depressants, accidental (unintentional), initial encounter;  I47.2-Ventricular tachycardia     COMPARISON:  AP chest x-ray at 16 2020, CT chest 08/12/2020.     TECHNIQUE:   Single radiographic AP view of the chest was obtained.     FINDINGS:  Cardiac silhouette remains enlarged. Pacemaker lead is stable. There are  increased bilateral perihilar and bibasilar airspace opacities with  suspected moderate-sized right pleural effusion. No pneumothorax is  seen.        Impression:      1.Increased bilateral perihilar and bibasilar airspace opacities, likely  due to pulmonary edema with suspected moderate-sized right pleural  effusion.  2.Enlarged cardiac silhouette.     Electronically Signed By-Manisha Malone MD On:6/14/2021 8:14 AM  This report was finalized on [id number] by  Manisha Malone MD.          Labs:  Results from last 7 days   Lab Units 06/14/21  0858   WBC 10*3/mm3 4.70   HEMOGLOBIN g/dL 9.9*   HEMATOCRIT % 30.0*   PLATELETS 10*3/mm3 279     Results from last 7 days   Lab Units 06/14/21  0858   SODIUM mmol/L 144   POTASSIUM mmol/L 3.6   CHLORIDE mmol/L 104   CO2 mmol/L 28.0   BUN mg/dL 24*   CREATININE mg/dL 0.96   CALCIUM mg/dL 9.3   BILIRUBIN mg/dL 1.4*   ALK PHOS U/L 149*   ALT (SGPT) U/L 7   AST (SGOT) U/L 27   GLUCOSE mg/dL 119*     Results from last 7 days   Lab Units 06/11/21  1611   PH, ARTERIAL pH units 7.435   PO2 ART mm Hg 91.7   PCO2, ARTERIAL mm Hg 40.4   HCO3 ART mmol/L 27.1     Results from last 7 days   Lab Units 06/14/21  0858 06/13/21  0535 06/11/21  1607   ALBUMIN g/dL 3.70 3.30* 3.70     Results from last 7 days   Lab Units 06/12/21  1158 06/12/21  0642 06/12/21  0126 06/11/21  1607 06/11/21  1309   CK TOTAL U/L  --   --   --  73 76   TROPONIN T ng/mL 0.038* 0.034* 0.036*  --  0.017         Results from last 7 days   Lab Units 06/14/21  0858   MAGNESIUM mg/dL 1.9     Results from last 7 days   Lab Units 06/14/21  0858 06/13/21  0535 06/12/21  0642   INR  2.29 2.61  2.34     Results from last 7 days   Lab Units 06/11/21  1607   TSH uIU/mL 2.690           Meds:   SCHEDULE  carbidopa-levodopa, 1 tablet, Oral, TID  escitalopram, 10 mg, Oral, Daily  ferrous sulfate, 324 mg, Oral, Daily With Breakfast  furosemide, 40 mg, Oral, Daily  multivitamin with minerals, 1 tablet, Oral, Daily  oxybutynin, 5 mg, Oral, Nightly  pantoprazole, 40 mg, Oral, QAM  potassium chloride, 20 mEq, Oral, TID  rosuvastatin, 10 mg, Oral, Nightly  sodium chloride, 10 mL, Intravenous, Q12H  warfarin, 2 mg, Oral, Once per day on Mon Tue Wed Thu Fri Sat      Infusions  Pharmacy to dose warfarin,       PRNs  •  acetaminophen **OR** acetaminophen  •  aluminum-magnesium hydroxide-simethicone  •  Diclofenac Sodium  •  ipratropium-albuterol  •  magnesium sulfate **OR** magnesium sulfate in D5W 1g/100mL (PREMIX)  •  nitroglycerin  •  ondansetron **OR** ondansetron  •  Pharmacy to dose warfarin  •  potassium chloride **OR** potassium chloride **OR** potassium chloride  •  potassium phosphate infusion greater than 15 mMoles **OR** potassium phosphate infusion greater than 15 mMoles **OR** potassium phosphate **OR** sodium phosphate IVPB **OR** sodium phosphate IVPB **OR** sodium phosphate IVPB  •  sodium chloride    Physical Exam:  Physical Exam  Vitals reviewed.   Cardiovascular:      Heart sounds: Murmur heard.     Pulmonary:      Breath sounds: Examination of the right-middle field reveals decreased breath sounds. Examination of the left-middle field reveals decreased breath sounds. Examination of the right-lower field reveals decreased breath sounds. Examination of the left-lower field reveals decreased breath sounds. Decreased breath sounds, rhonchi and rales present.   Neurological:      Mental Status: He is alert.         ROS  Review of Systems   Unable to perform ROS: Other   Respiratory: Positive for cough and shortness of breath.    Cardiovascular: Positive for palpitations.       I reviewed the patient's new  clinical results.      Electronically signed by STEPHANY Mello, 06/14/21 at 14:59 EDT.

## 2021-06-14 NOTE — PROGRESS NOTES
"Pharmacy dosing service  Anticoagulant  Warfarin     Subjective:    Rafael Ivey is a 85 y.o.male being continued on warfarin for atrial fibrillation and sick sinus syndrome .    INR Goal: 2 - 3  Home medication?:  warfarin 2 mg nightly except Sundays   Bridge Therapy Present?:  No  Interacting Medications Evaluation (New/Present/Discontinued): n/a  Additional Contributing Factors: n/a      Assessment/Plan:    INR continues to be therapeutic. Will continue home regimen.     Continue to monitor and adjust based on INR.         Date 6/12 6/13 6/14         INR 2.34 2.61 2.29         Dose 2 mg Held on sundays 2 mg             Objective:  [Ht: 182.9 cm (72\"); Wt: 93.8 kg (206 lb 12.7 oz); BMI: Body mass index is 28.05 kg/m².]    Lab Results   Component Value Date    ALBUMIN 3.70 06/14/2021     Lab Results   Component Value Date    INR 2.29 06/14/2021    INR 2.61 06/13/2021    INR 2.34 06/12/2021    PROTIME 24.2 06/14/2021    PROTIME 27.4 06/13/2021    PROTIME 24.7 06/12/2021     Lab Results   Component Value Date    HGB 9.9 (L) 06/14/2021    HGB 9.0 (L) 06/13/2021    HGB 9.3 (L) 06/12/2021     Lab Results   Component Value Date    HCT 30.0 (L) 06/14/2021    HCT 27.3 (L) 06/13/2021    HCT 28.6 (L) 06/12/2021       Lou Carey, PharmSTEPHEN  06/14/21 11:35 EDT         "

## 2021-06-14 NOTE — DISCHARGE PLACEMENT REQUEST
"Rafael Shankar (85 y.o. Male)     Date of Birth Social Security Number Address Home Phone MRN    1935  [id number] [facility] [place] [facility] [phone] [id number]    Anabaptist Marital Status          Orthodoxy        Admission Date Admission Type Admitting Provider Attending Provider Department, Room/Bed    6/11/21 Emergency Kaden Funk MD Salcedo, Federico N, MD [facility] 3A MEDICAL INPATIENT, 301/1    Discharge Date Discharge Disposition Discharge Destination                       Attending Provider: Elian Templeton MD    Allergies: Amoxicillin, Cephalexin, Penicillins, Amoxicillin-pot Clavulanate, Clarithromycin    Isolation: None   Infection: COVID Screen (preop/placement) (06/16/20)   Code Status: CPR    Ht: 182.9 cm (72\")   Wt: 93.8 kg (206 lb 12.7 oz)    Admission Cmt: None   Principal Problem: Baclofen overdose [T42.8X1A]                 Active Insurance as of 6/11/2021     Primary Coverage     Payor Plan Insurance Group Employer/Plan Group    MEDICARE MEDICARE A & B      Payor Plan Address Payor Plan Phone Number Payor Plan Fax Number Effective Dates    [address] [phone]  11/1/2000 - None Entered    [facility] [id number]       Subscriber Name Subscriber Birth Date Member ID       RAFAEL SHANKAR 1935 [id number]           Secondary Coverage     Payor Plan Insurance Group Employer/Plan Group    ANTHEM BLUE CROSS ANTHEM BLUE CROSS BLUE SHIELD PPO [id number]     Payor Plan Address Payor Plan Phone Number Payor Plan Fax Number Effective Dates    [address] [phone]  1/1/2017 - None Entered    [facility] [id number]       Subscriber Name Subscriber Birth Date Member ID       RAFAEL SHANKAR 1935 [id number]                 Emergency Contacts      (Rel.) Home Phone Work Phone Mobile Phone    ShankarJosefina bobby (Spouse) -- -- [phone]    AletaRegina (Daughter) [phone] -- --               History & Physical      Kutmah, " MD Sal at 21 1605          PULMONARY/CRITICAL CARE HISTORY & PHYSICAL       PATIENT NAME:     Rafael Ivey  :     1935    MRN:     [id number]       ROOM:     [facility] ED      PRIMARY CARE PHYSICIAN:  Blake Ivey MD    SUBJECTIVE     CHIEF COMPLAINT:   Altered mental status    HISTORY OF PRESENT ILLNESS:  Rafael Ivey is a 85 y.o. male  has a past medical history of Anemia, Arthritis, Atrial fibrillation (CMS/HCC), Atrial fibrillation (CMS/HCC), Cardiomegaly, Chronic diastolic heart failure (CMS/HCC), GERD (gastroesophageal reflux disease), Hyperlipidemia, Hypertension, Hypotension, Lower extremity edema, Mitral regurgitation, Obesity, Pacemaker, Parkinson disease (CMS/HCC), Recurrent falls, Sick sinus syndrome (CMS/HCC), and Valvular heart disease.   Patient presented to [facility] ED on 2021 with complaint by patient's family that patient has been increasingly less responsive over the prior several days.  Patient is confused and unable to contribute information, and in the ED he was noted as being obtunded, but seemingly able to protect his airway.  It was then discovered that patient's wife, who manages patient's medications, had been accidentally giving this patient 3 times his normal dose of daily baclofen.  Patient, per family is prescribed 15 mg daily, but patient had been receiving 45 mg daily for the past week.  Patient was also given last of his Sinemet, as patient's wife had reversed the dosing administrations between the medications.  Subsequently, patient also had an episode of ventricular tachycardia that lasted approximately 1 minute, and at that time patient became unresponsive, in which he received per nursing 1 round of CPR, and patient returned back to a paced rhythm before any further intervention.  Patient's neuro status was further obtunded than his initial presentation following the ventricular tachycardia.    In the ED, labs were obtained with  abnormalities as follows: Alkaline phosphatase 136, troponin 0.017, magnesium 1.8, hemoglobin 9.6, hematocrit 29.6.  UA was negative, acetaminophen <5.0, ethanol <0.010, salicylate <0.3, and urine drug screen negative.  CT of the head was obtained and revealed no acute intracranial abnormality, generalized cerebral atrophy.  EKG was obtained revealing a paced rhythm.  Due to patient's altered mental status, ED contacted the intensivist service to monitor patient overnight, in case if his neuro status continued to decline.  It is suspected that patient's presentation is consistent with an unintentional baclofen overdose.  Upon admission to ICU, patient is now more awake, confused, yelling at times.  It is unknown as to what patient's baseline is.  Patient denies general complaints, does not remember how he came to be at this facility, and at times just refuses to participate in conversation, making this a very difficult information discovery.    Pulmonary/Intensivist service was contacted for admission to ICU and further evaluation and treatment.      REVIEW OF SYSTEMS:  Review of systems could not be obtained due to   patient confusion.      ASSESSMENT & PLAN     Principal Problem:    Baclofen overdose  Active Problems:    Ventricular tachycardia    Acute encephalopathy, related to baclofen overdose    Sick sinus syndrome (CMS/HCC)    Pacemaker    Pulmonary hypertension (CMS/HCC)    Sleep apnea    Chronic diastolic CHF (congestive heart failure) (CMS/HCC)    Parkinson's disease (CMS/HCC)    GERD (gastroesophageal reflux disease)    RLS (restless legs syndrome)    Depression     PLAN:  Accidental Baclofen overdose  -UDS, EtOH, acetaminophen, salicylate--all negative  -Patient received 3 times his dose of baclofen, which he takes nightly; and received 1 dose of his Sinemet which he takes 3 times a day  -Holding baclofen until medication has been able to clear.  -Nursing instructed to call poison control for  recommendations.  -Closely monitor patient's neurologic status, currently protecting airway, but neuro status seems to wax and wane.  -We will monitor overnight in ICU.    Acute encephalopathy, related to baclofen overdose  -Secondary to above  -Hold all LOC altering medications, until medications have had more time to clear.  -CT of the head negative for acute intracranial abnormalities.  -Further work-up as indicated.    Episodic ventricular tachycardia, spontaneously resolved, questionable cardiopulmonary arrest with ROSC  -In the ED, patient was in V. tach for approximately 1 minute, in which compressions were initiated, patient was unresponsive  -EKG reviewed, in a paced rhythm  -Troponin negative, monitor serial troponins  -Cardiology consulted in a.m.    Hyperlipidemia  -On Crestor, continue  -Check lipid panel in a.m.    PAF  Sick sinus syndrome, S/P pacemaker in situ  Chronic anticoagulation with warfarin  -Continue warfarin, pharmacy to dose  -No rate altering or antihypertensive medications on home medication profile.  -Cardiology consulted.    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 (4/30/20)  -Cardiology consulted.    Sleep apnea  -Unable to assess due to confusion.  -CPAP if needed    Iron deficiency anemia, stable  -Continue ferrous sulfate per home regimen    Parkinson's disease  -Resume patient's home medication of Sinemet    GERD (gastroesophageal reflux disease)  -Omeprazole substituted with pantoprazole per hospital formulary    RLS (restless legs syndrome)  -Home regimen of baclofen on hold, as patient  "received accidental overdose, as noted above  -Home regimen of gabapentin on hold    OAB  -Continue oxybutynin per home regimen    Depression  -Continue Lexapro per home regimen.  -Home regimen of Klonopin on hold, due to acute encephalopathy.    Monitor in ICU overnight, if patient remains neurologically improved, consider downgrade to medical monitor, but patient should be evaluated by cardiology prior to discharge.  If downgrade, will consult hospitalist.    Code Status: CPR, full interventions  VTE Prophylaxis: Coumadin  PUD Prophylaxis: PPI      HOSPITAL MEDICATIONS     SCHEDULED MEDICATIONS:  carbidopa-levodopa, 1 tablet, Oral, TID  escitalopram, 10 mg, Oral, Daily  ferrous sulfate, 324 mg, Oral, Daily With Breakfast  furosemide, 40 mg, Oral, Daily  multivitamin with minerals, 1 tablet, Oral, Daily  [START ON 6/13/2021] oxybutynin, 5 mg, Oral, Nightly  pantoprazole, 40 mg, Oral, QAM  potassium chloride, 20 mEq, Oral, TID  rosuvastatin, 10 mg, Oral, Nightly  sodium chloride, 10 mL, Intravenous, Q12H  warfarin, 2 mg, Oral, Once per day on Mon Tue Wed Thu Fri Sat         CONTINUOUS INFUSIONS:    sodium chloride, 75 mL/hr         PRN MEDICATIONS:   •  acetaminophen **OR** acetaminophen  •  aluminum-magnesium hydroxide-simethicone  •  ipratropium-albuterol  •  magnesium sulfate **OR** magnesium sulfate in D5W 1g/100mL (PREMIX)  •  nitroglycerin  •  ondansetron **OR** ondansetron  •  potassium chloride **OR** potassium chloride **OR** potassium chloride  •  potassium phosphate infusion greater than 15 mMoles **OR** potassium phosphate infusion greater than 15 mMoles **OR** potassium phosphate **OR** sodium phosphate IVPB **OR** sodium phosphate IVPB **OR** sodium phosphate IVPB  •  sodium chloride       OBJECTIVE     VITAL SIGNS:  /83 (BP Location: Left arm, Patient Position: Lying)   Pulse 70   Temp 98.3 °F (36.8 °C) (Oral)   Resp 16   Ht 182.9 cm (72\")   Wt 91.2 kg (201 lb)   SpO2 99%   BMI 27.26 " kg/m²     Wt Readings from Last 3 Encounters:   06/11/21 91.2 kg (201 lb)   05/19/21 92.5 kg (204 lb)   11/04/20 83 kg (183 lb)       INTAKE/OUTPUT:  No intake or output data in the 24 hours ending 06/11/21 1605    PHYSICAL EXAM:   Constitutional:  Well developed, well nourished, no acute distress, non-toxic appearance, chronically ill-appearing  Eyes:  PERRL, conjunctiva normal, EOMI   HENT:  Atraumatic, external ears normal, nose normal, oropharynx moist, no pharyngeal exudates. Neck-normal range of motion, no tenderness, supple, trachea midline  Respiratory: Diminished bibasilar breath sounds, without wheezes, rhonchi, or rales, non-labored respirations without accessory muscle use  Cardiovascular: Paced rhythm, no murmurs, no gallops, no rubs   GI:  Soft, nondistended, normal bowel sounds, nontender, no organomegaly, no mass, no rebound, no guarding   :  No costovertebral angle tenderness   Musculoskeletal:  No edema, no tenderness, no deformities  Integument:  Well hydrated, no rash   Lymphatic:  No lymphadenopathy noted   Neurologic: Confused, oriented x1-to person, drowsy at times, protecting airway, no focal deficits noted   Psychiatric: Confused, restless, agitated      HISTORY     HISTORY:  Past Medical History:   Diagnosis Date   • Anemia    • Arthritis     left hip    • Atrial fibrillation (CMS/HCC)    • Atrial fibrillation (CMS/HCC)     chronic   • Cardiomegaly     mild   • Chronic diastolic heart failure (CMS/HCC)    • GERD (gastroesophageal reflux disease)    • Hyperlipidemia    • Hypertension    • Hypotension    • Lower extremity edema    • Mitral regurgitation    • Obesity    • Pacemaker    • Parkinson disease (CMS/HCC)    • Recurrent falls     with injury    • Sick sinus syndrome (CMS/HCC)     s/p pacemaker implant    • Valvular heart disease     MR     Past Surgical History:   Procedure Laterality Date   • APPENDECTOMY     • ATRIAL APPENDAGE EXCLUSION LEFT WITH TRANSESOPHAGEAL ECHOCARDIOGRAM N/A  5/28/2020    Procedure: Atrial Appendage Occlusion;  Surgeon: Jim Walker MD;  Location: [facility] CATH INVASIVE LOCATION;  Service: Cardiovascular;  Laterality: N/A;   • ATRIAL APPENDAGE EXCLUSION LEFT WITH TRANSESOPHAGEAL ECHOCARDIOGRAM N/A 5/28/2020    Procedure: Atrial Appendage Occlusion;  Surgeon: Lashaun So MD;  Location: [facility] CATH INVASIVE LOCATION;  Service: Cardiovascular;  Laterality: N/A;   • CATARACT EXTRACTION     • CHOLECYSTECTOMY     • COLONOSCOPY N/A 5/21/2020    Procedure: COLONOSCOPY WITH BIOPSY X1 AREA;  Surgeon: Sim Collins MD;  Location: [facility] ENDOSCOPY;  Service: Gastroenterology;  Laterality: N/A;  Post: poor prep, impacted stool in rectum, and internal hemorrhoids, ascending colon arteriovenous malformation   • ENDOSCOPY N/A 5/21/2020    Procedure: ESOPHAGOGASTRODUODENOSCOPY with clipping x 1 of bleeding gastric polyp;  Surgeon: Sim Collins MD;  Location: [facility] ENDOSCOPY;  Service: Gastroenterology;  Laterality: N/A;  Post: bleeding gastric polyp   • HIP SURGERY Right 08/2017   • OTHER SURGICAL HISTORY      skin mole excisions    • PACEMAKER IMPLANTATION  06/22/2017    [employer] ([employer])   • TONSILLECTOMY     • TOTAL HIP ARTHROPLASTY Left 05/20/2014     Family History   Problem Relation Age of Onset   • Heart failure Mother    • Stroke Maternal Grandfather      Social History     Socioeconomic History   • Marital status:      Spouse name: Not on file   • Number of children: Not on file   • Years of education: Not on file   • Highest education level: Not on file   Tobacco Use   • Smoking status: Former Smoker     Packs/day: 1.00     Years: 40.00     Pack years: 40.00     Types: Cigarettes   • Smokeless tobacco: Never Used   • Tobacco comment: quit 1970's    Vaping Use   • Vaping Use: Never used   Substance and Sexual Activity   • Alcohol use: Yes   • Drug use: Never   • Sexual activity: Defer        HOME MEDICATIONS:   Prior to Admission medications     Medication Sig Start Date End Date Taking? Authorizing Provider   furosemide (LASIX) 40 MG tablet Take 1 tablet by mouth 2 (Two) Times a Day. 6/16/20 6/11/21 Yes Kim Childs MD   acetaminophen (TYLENOL) 325 MG tablet Take 650 mg by mouth Every 6 (Six) Hours As Needed for Mild Pain .    Lisa Gunter MD   baclofen (LIORESAL) 10 MG tablet Take 10 mg by mouth Daily.    Lisa Gunter MD   carbidopa-levodopa (SINEMET)  MG per tablet Take 1.5 tablets by mouth Every 8 (Eight) Hours. 1/4/19   Lisa Gunter MD   escitalopram (LEXAPRO) 10 MG tablet Take 10 mg by mouth Daily. Take one & one-half tablets daily    Lisa Gunter MD   ferrous sulfate 325 (65 FE) MG tablet Take 1 tablet by mouth Every 12 (Twelve) Hours. 1/4/19   Lisa Gunter MD   gabapentin (NEURONTIN) 300 MG capsule Take 300 mg by mouth every night at bedtime. 6/26/19   Lisa Gunter MD   multivitamin-minerals (CENTRUM) tablet Take 1 tablet by mouth Daily.    Lisa Gunter MD   nitroglycerin (NITROSTAT) 0.4 MG SL tablet Place 1 tablet under the tongue Every 5 (Five) Minutes As Needed for Chest Pain (Only if SBP Greater Than 100). Take no more than 3 doses in 15 minutes. 6/16/20   Kim Childs MD   Omega-3 Fatty Acids (FISH OIL PO) Take 2 capsules by mouth Daily.    Lisa Gunter MD   omeprazole (PRILOSEC) 20 MG capsule Take 1 capsule by mouth Daily.    Lisa Gunter MD   ondansetron (ZOFRAN) 4 MG tablet Take 1 tablet by mouth Every 6 (Six) Hours As Needed for Nausea or Vomiting. 6/16/20   Kim Childs MD   oxybutynin (DITROPAN) 5 MG tablet Take 5 mg by mouth Daily.    Lisa Gunter MD   rosuvastatin (CRESTOR) 10 MG tablet Take 1 tablet by mouth every night at bedtime. 6/27/19   Lisa Gunter MD   warfarin (COUMADIN) 2 MG tablet Take 1 tablet by mouth Every Night. Taking every day but Sunday 8/17/20   Sylvia Rowan MD   nystatin (MYCOSTATIN) 141575 UNIT/GM  powder Apply  topically to the appropriate area as directed Every 12 (Twelve) Hours. 6/16/20 6/11/21  Kim Childs MD   potassium chloride (K-DUR,KLOR-CON) 20 MEQ CR tablet Take 1 tablet by mouth 3 (Three) Times a Day.  Patient taking differently: Take 20 mEq by mouth 3 (Three) Times a Day. Pt takes this 5 times a day 3/9/20 6/11/21  Jose Gonzalez DO   pramipexole (MIRAPEX) 0.25 MG tablet Take 1 tablet by mouth Daily. 1/4/19 6/11/21  Provider, MD Lisa   Warfarin Sodium (PHARMACY TO DOSE WARFARIN) Continuous As Needed (Atrial fibrillation). Indications: Atrial Fibrillation 8/17/20 6/11/21  Sylvia Rowan MD   Home medications reconciled, as noted above in plan.  Most LOC altering medications were held due to baclofen overdose.    ALLERGIES:  Amoxicillin, Cephalexin, Penicillins, Amoxicillin-pot clavulanate, and Clarithromycin      RESULTS     LABS:  Lab Results (last 24 hours)       Procedure Component Value Units Date/Time    CBC & Differential [[id number]]  (Abnormal) Collected: 06/11/21 1309    Specimen: Blood Updated: 06/11/21 1324    Narrative:      The following orders were created for panel order CBC & Differential.  Procedure                               Abnormality         Status                     ---------                               -----------         ------                     CBC Auto Differential[[id number]]        Abnormal            Final result                 Please view results for these tests on the individual orders.    Basic Metabolic Panel [[id number]]  (Abnormal) Collected: 06/11/21 1309    Specimen: Blood Updated: 06/11/21 1340     Glucose 105 mg/dL      BUN 23 mg/dL      Creatinine 0.99 mg/dL      Sodium 142 mmol/L      Potassium 3.7 mmol/L      Chloride 104 mmol/L      CO2 27.0 mmol/L      Calcium 9.4 mg/dL      eGFR Non African Amer 72 mL/min/1.73      BUN/Creatinine Ratio 23.2     Anion Gap 11.0 mmol/L     Narrative:      GFR Normal >60  Chronic Kidney Disease  <60  Kidney Failure <15      Protime-INR [[id number]]  (Normal) Collected: 06/11/21 1309    Specimen: Blood Updated: 06/11/21 1337     Protime 23.5 Seconds      INR 2.22    CBC Auto Differential [[id number]]  (Abnormal) Collected: 06/11/21 1309    Specimen: Blood Updated: 06/11/21 1324     WBC 4.90 10*3/mm3      RBC 3.46 10*6/mm3      Hemoglobin 9.6 g/dL      Hematocrit 29.6 %      MCV 85.5 fL      MCH 27.7 pg      MCHC 32.4 g/dL      RDW 16.9 %      RDW-SD 51.2 fl      MPV 5.8 fL      Platelets 279 10*3/mm3      Neutrophil % 77.4 %      Lymphocyte % 12.7 %      Monocyte % 8.2 %      Eosinophil % 0.6 %      Basophil % 1.1 %      Neutrophils, Absolute 3.80 10*3/mm3      Lymphocytes, Absolute 0.60 10*3/mm3      Monocytes, Absolute 0.40 10*3/mm3      Eosinophils, Absolute 0.00 10*3/mm3      Basophils, Absolute 0.10 10*3/mm3      nRBC 0.1 /100 WBC     Salicylate Level [[id number]] Collected: 06/11/21 1309    Specimen: Blood Updated: 06/11/21 1553    Acetaminophen Level [[id number]] Collected: 06/11/21 1309    Specimen: Blood Updated: 06/11/21 1553    Troponin [[id number]] Collected: 06/11/21 1309    Specimen: Blood Updated: 06/11/21 1553    CK [[id number]] Collected: 06/11/21 1309    Specimen: Blood Updated: 06/11/21 1553    Urinalysis With Microscopic If Indicated (No Culture) - Urine, Catheter [[id number]]  (Normal) Collected: 06/11/21 1315    Specimen: Urine, Catheter Updated: 06/11/21 1324     Color, UA Yellow     Appearance, UA Clear     pH, UA <=5.0     Specific Gravity, UA 1.010     Glucose, UA Negative     Ketones, UA Negative     Bilirubin, UA Negative     Blood, UA Negative     Protein, UA Negative     Leuk Esterase, UA Negative     Nitrite, UA Negative     Urobilinogen, UA 1.0 E.U./dL    Narrative:      Urine microscopic not indicated.    Urine Drug Screen - Urine, Catheter [[id number]] Collected: 06/11/21 1315    Specimen: Urine, Catheter Updated: 06/11/21 8446              MICRO:  Microbiology Results (last 10  days)       ** No results found for the last 240 hours. **              RADIOLOGY STUDIES:  Imaging Results (Last 72 Hours)       Procedure Component Value Units Date/Time    CT Head Without Contrast [[id number]] Collected: 06/11/21 1522     Updated: 06/11/21 1531    Narrative:         DATE OF EXAM:  6/11/2021 3:10 PM     PROCEDURE:   CT HEAD WO CONTRAST-     INDICATIONS:   Mental status change, unknown cause     COMPARISON:  CT head without contrast 11/23/2018     TECHNIQUE:   Routine transaxial cuts were obtained through the head without the  administration of contrast. Automated exposure control and iterative  reconstruction methods were used.      FINDINGS:  There is no acute intracranial hemorrhage. No mass effect or midline  shift. Prominence of the ventricles and cortical sulci similar to prior  study most consistent with generalized cerebral atrophy. Posterior fossa  is without acute abnormality. Globes are symmetric. There is thinning of  the lens bilaterally. Posterior fossa without acute abnormality. The  mastoid air cells are well-aerated. There is leftward deviation of the  nasal septum. Retention cysts noted in the left maxillary sinus  measuring 11 mm. Calvarium intact. Asymmetric right temporomandibular  osteoarthritis.       Impression:      1. No acute intracranial abnormality.  2. Generalized cerebral atrophy.     Electronically Signed By-Jason Dan MD On:6/11/2021 3:28 PM  This report was finalized on [id number] by  Jason Dan MD.            Results for orders placed during the hospital encounter of 06/12/20    Duplex Venous Lower Extremity - Bilateral CAR    Interpretation Summary  · Normal bilateral lower extremity venous duplex scan.  · Left popliteal fossa heterogenous fluid collection.          ECHOCARDIOGRAM:  Results for orders placed during the hospital encounter of 05/20/20    Adult Transesophageal Echo (PRESTON) W/ Cont if Necessary Per Protocol    Interpretation Summary  · Left  ventricular systolic function is normal.  · Left atrial cavity size is mild-to-moderately dilated.  · Moderate mitral valve regurgitation is present  · Left atrial appendage diameter was 33 mm at 135 degree which was very large and not conducive to implantation of watchman device. Depth was 28 mm. After discussion, it was decided that anatomy of left atrial appendage is not conducive and appropriate for watchman device implantation. Procedure was aborted         I reviewed the patient's new clinical results.    I discussed the patient's findings and my recommendations with nursing staff and consulting provider.  I have discussed plan with on-call attending physician, who agrees with this plan of care.    Appropriate PPE worn during assessment of patient per established guidelines.      Electronically signed by STEPHANY Rodriguez, 06/11/21, 4:05 PM EDT.     I personally have examined  and interviewed the patient. I have reviewed the history, data, problems, assessment and plan with our NP.  Critical care time in direct medical management (   ) minutes  Electronically signed by Sal Post MD, D,ABSM, 06/11/21,      Electronically signed by Sal Post MD at 06/13/21 0714       Referral Orders (last 24 hours) (24h ago, onward)     Start     Ordered    06/14/21 0000  Ambulatory Referral to Home Health     Question Answer Comment   Face to Face Visit Date: 6/14/2021    Follow-up provider for Plan of Care? I treated the patient in an acute care facility and will not continue treatment after discharge.    Follow-up provider: CHEYENNE SHANKAR    Reason/Clinical Findings Post-hospital eval    Describe mobility limitations that make leaving home difficult: Tires easily    Nursing/Therapeutic Services Requested Other HH to evaluate   Frequency: 1 Week 1        06/14/21 1632                   Physical Therapy Notes (last 24 hours) (Notes from 06/13/21 1632 through 06/14/21 1632)      Rolando Palomo, PT at  21 1137  Version 1 of 1       Subjective: Pt agreeable to therapeutic plan of care. Daughter present in room throughout session.      Objective:     Bed mobility - Min-A and Mod-A  Mod A to scoot towards EOB  Transfers - 1st 2 attempts: mod/max with rollator secondary to hard posterior lean.  3rd attempt- donned pt's shoes min/mod A with rollator  Ambulation - 4 steps with rollator Min-A    Pain: 0 VAS  Education: Provided education on importance of mobility and skilled verbal / tactile cueing throughout intervention.     Assessment: Pt presents with functional mobility impairments which indicate the need for skilled intervention. Tolerating session today without incident. Daughter reports plan is to go home with HHPT secondary to pt's confusion and not being able to tolerate a new environment such as inpt rehab.  Daughter states pt has an adjustable bed, 2 caregivers, a rollator and a w/c.  Due to pt's confusion and lack of sleep in the hospital setting recommend HHPT Will continue to follow and progress as tolerated.     Plan/Recommendations:   Pt would benefit from Home with Home Health at discharge from facility    Pt desires Home with Home Health at discharge. Pt cooperative; agreeable to therapeutic recommendations and plan of care.     Basic Mobility 6-click:  Rollin = Total, A lot = 2, A little = 3; 4 = None  Supine>Sit:   1 = Total, A lot = 2, A little = 3; 4 = None   Sit>Stand with arms:  1 = Total, A lot = 2, A little = 3; 4 = None  Bed>Chair:   1 = Total, A lot = 2, A little = 3; 4 = None  Ambulate in room:  1 = Total, A lot = 2, A little = 3; 4 = None  3-5 Steps with railin = Total, A lot = 2, A little = 3; 4 = None  Score: 12    Modified Dali: 4 = Moderately severe disability (Unable to attend to own bodily needs without assistance, and unable to walk unassisted)     Post-Tx Position: Supine with HOB Elevated, Alarms activated and Call light and personal items within  reach  PPE: gloves, surgical mask, eyewear protection    Electronically signed by Rolando Palomo PT at 06/14/21 1142     Rolando Palomo PT at 06/14/21 1142  Version 1 of 1       Goal Outcome Evaluation:       Pt presents with functional mobility impairments which indicate the need for skilled intervention. Tolerating session today without incident. Daughter reports plan is to go home with HHPT secondary to pt's confusion and not being able to tolerate a new environment such as inpt rehab.  Daughter states pt has an adjustable bed, 2 caregivers, a rollator and a w/c.  Due to pt's confusion and lack of sleep in the hospital setting recommend HHPT Will continue to follow and progress as tolerated.                 Electronically signed by Rolando Palomo PT at 06/14/21 1142       Occupational Therapy Notes (last 24 hours) (Notes from 06/13/21 1632 through 06/14/21 1632)    No notes exist for this encounter.

## 2021-06-14 NOTE — PROGRESS NOTES
[facility] Medicine Services Daily Progress Note      Hospitalist Team  LOS 3 days      Patient Care Team:  Blake Ivey MD as PCP - General (Family Medicine)    Patient Location: 301/1      Subjective   Subjective     Chief Complaint / Subjective  Chief Complaint   Patient presents with   • given to much Baclofen over the last week         Brief Synopsis of Hospital Course/HPI  HPI taken from pulmonary note and edited as patient cannot provide any history due to confusion  Mr. Ivey is a 85 y.o. male with past medical history of afib, pacemaker, chronic diastolic heart failure, valvular heart disease, hypotension, hyperlipidemia, parkinson's, anemia, arthritis, GERD, and obesity who presented to [facility] ED 6/11/21 for decreased responsiveness over the past several days. It was then discovered that patient's wife, who manages patient's medications, had been accidentally giving this patient 3 times his normal dose of daily baclofen.  Patient, per family is prescribed 15 mg daily, but patient had been receiving 45 mg daily for the past week.  Patient was also given less of his Sinemet, as patient's wife had reversed the dosing administrations between the medications.  Subsequently, patient had an episode of ventricular tachycardia in the ED that lasted approximately 1 minute, and at that time patient became unresponsive, in which he received per nursing 1 round of CPR, and patient returned back to a paced rhythm before any further intervention.  Patient's neuro status was further obtunded than his initial presentation following the ventricular tachycardia.     In the ED, labs were obtained with abnormalities as follows: Alkaline phosphatase 136, troponin 0.017, magnesium 1.8, hemoglobin 9.6, hematocrit 29.6.  UA was negative, acetaminophen <5.0, ethanol <0.010, salicylate <0.3, and urine drug screen negative.  CT of the head was obtained and revealed no acute intracranial abnormality,  generalized cerebral atrophy.  EKG was obtained revealing a paced rhythm.  Due to patient's altered mental status, the patient was admitted to the ICU overnight for suspected altered mental status secondary to unintentional baclofen overdose.       Date:  6/12/2021 patient's mentation improved and vital signs stable.  Patient downgraded out of ICU. Hospitalist group was consulted for medical management  Upon exam patient is alert and oriented x3. Still remains weak. Daughter at bedside. Pt lives at home with wife and has lift chair. He also has caregivers. Does not want to go to rehab at discharge.   PT/ST ordered   6/13/21  Patient much more awake and alert today.  Family at bedside reporting patient appearing near baseline.  Awaiting reevaluation with speech therapy to advance diet.  No chest pain or shortness of breath.  Neck pain improving.  Cardiology evaluated patient regarding transient VT do not feel ischemic work-up warranted at this time.  Recommending discontinuing baclofen.    6/14/21 patient seen and examined in bed no acute distress, family at bedside, patient still confused.  Will await physical therapy evaluation.      Review of Systems   Constitutional: Negative for chills and fever.   HENT: Negative for hoarse voice and sore throat.    Eyes: Negative for double vision and photophobia.   Cardiovascular: Negative for chest pain and palpitations.   Respiratory: Negative for cough and shortness of breath.    Musculoskeletal: Positive for stiffness. Negative for falls.   Gastrointestinal: Negative for nausea and vomiting.   Genitourinary: Negative for dysuria and flank pain.   Neurological: Negative for headaches and seizures.   Psychiatric/Behavioral: Negative for altered mental status. The patient is not nervous/anxious.          Objective   Objective      Vital Signs  Temp:  [97.6 °F (36.4 °C)-98.4 °F (36.9 °C)] 98 °F (36.7 °C)  Heart Rate:  [68-70] 68  Resp:  [16-18] 16  BP: (129-146)/(69-76)  "145/76  Oxygen Therapy  SpO2: 95 %  Pulse Oximetry Type: Intermittent  Device (Oxygen Therapy): room air  Flow (L/min): 2  Flowsheet Rows      First Filed Value   Admission Height  182.9 cm (72\") Documented at 06/11/2021 1233   Admission Weight  91.2 kg (201 lb) Documented at 06/11/2021 1233        Intake & Output (last 3 days)       06/11 0701 - 06/12 0700 06/12 0701 - 06/13 0700 06/13 0701 - 06/14 0700 06/14 0701 - 06/15 0700    P.O.   240     I.V. (mL/kg) 729 (7.9)       Total Intake(mL/kg) 729 (7.9)  240 (2.6)     Net +729  +240             Urine Unmeasured Occurrence  3 x 6 x     Stool Unmeasured Occurrence  4 x 2 x         Lines, Drains & Airways    Active LDAs     Name:   Placement date:   Placement time:   Site:   Days:    Peripheral IV 06/11/21 1310 Right Antecubital   06/11/21    1310    Antecubital   1                Physical Exam  General: Elderly male sitting up in bed breathing comfortably on 2 L via nasal cannula no acute distress  HEENT: NC/AT, EOMI, mucosa moist  Heart: Regular, rate controlled  Chest: Normal work of breathing, moving air well no wheezing  Abdominal: Soft. NT/ND.   Musculoskeletal: Normal ROM.  No cyanosis. No calf tenderness.  Neurological: Awake and alert, no focal deficits  Skin: Skin is warm and dry. No rash  Psychiatric: Cheerful and conversational.       Wounds (last 24 hours)      LDA Wound     Row Name 06/14/21 0735 06/13/21 1901          Wound 06/12/20 1901 scrotum MASD (Moisture associated skin damage)    Wound - Properties Group Placement Date: 06/12/20  -KK Placement Time: 1901 -KK Location: scrotum  -KK Primary Wound Type: MASD  -KK    Dressing Appearance  open to air  -MARBIN  open to air  -TS     Closure  Open to air  -MARBIN  Open to air  -TS     Base  red;non-blanchable  -MARBIN  red;non-blanchable  -TS     Periwound  intact;pink  -MARBIN  intact;pink  -TS     Periwound Temperature  warm  -MARBIN  warm  -TS     Periwound Skin Turgor  soft  -MARBIN  soft  -TS     Drainage Amount  none  -MARBIN "  none  -TS     Dressing Care  open to air  -MARBIN  --     Periwound Care  barrier ointment applied  -MARBIN  --     Retired Wound - Properties Group Date first assessed: 06/12/20  -KK Time first assessed: 1901 -KK Location: scrotum  -KK Primary Wound Type: MASD  -KK      User Key  (r) = Recorded By, (t) = Taken By, (c) = Cosigned By    Initials Name Provider Type    Janis Botello, RN Registered Nurse    Rica Gilman RN Registered Nurse    Terri Crews RN Registered Nurse          Procedures:      Results Review:     I reviewed the patient's new clinical results.      Lab Results (last 24 hours)     ** No results found for the last 24 hours. **        No results found for: HGBA1C  Results from last 7 days   Lab Units 06/13/21  0535 06/12/21  0642 06/12/21  0126   INR  2.61 2.34 2.49       Results from last 7 days   Lab Units 06/11/21  1611   PH, ARTERIAL pH units 7.435   PO2 ART mm Hg 91.7   PCO2, ARTERIAL mm Hg 40.4   HCO3 ART mmol/L 27.1     No results found for: LIPASE  Lab Results   Component Value Date    CHOL 103 06/12/2021    TRIG 50 06/12/2021    HDL 42 06/12/2021    LDL 49 06/12/2021       Lab Results   Lab Value Date/Time    FINALDX  08/11/2020 1622     Lung, pleural fluid, smears and cytospin preparation:    Benign mesothelial cells, degenerating mesothelial cells, histiocytes and mild acute inflammation    Abundant blood present    No evidence of malignancy      JPR/cec        FINALDX  05/21/2020 1443     Ulcer, rectum, biopsy:    Scant fragments of colonic mucosa with mild chronic inflammation and focal hyperplastic changes    No violette ulceration is identified    No chronic features or malignant changes identified    See comment    MICHAEL/sms       COMDX  05/21/2020 1443     Deeper levels were examined through the block but were not further contributory.     MICHAEL/sms          Microbiology Results (last 10 days)     ** No results found for the last 240 hours. **          ECG/EMG Results  (most recent)     Procedure Component Value Units Date/Time    ECG 12 Lead [[id number]] Collected: 06/11/21 1303     Updated: 06/11/21 1304     QT Interval 473 ms     Narrative:      HEART RATE= 70  bpm  RR Interval= 852  ms  PA Interval= 230  ms  P Horizontal Axis= 188  deg  P Front Axis= 0  deg  QRSD Interval= 177  ms  QT Interval= 473  ms  QRS Axis= -85  deg  T Wave Axis= 82  deg  - ABNORMAL ECG -  Sinus rhythm  Prolonged PA interval  IVCD, consider RBBB  ST elevation secondary to IVCD  When compared with ECG of 11-Aug-2020 1:08:56,  Significant change in rhythm: previously atrial fibrillation  New conduction abnormality  Significant repolarization change  Significant axis, voltage or hypertrophy change  Electronically Signed By:   Date and Time of Study: 2021-06-11 13:03:05    ECG 12 Lead [[id number]] Collected: 06/11/21 1500     Updated: 06/11/21 1516     QT Interval 469 ms     Narrative:      HEART RATE= 76  bpm  RR Interval= 788  ms  PA Interval= 230  ms  P Horizontal Axis= 187  deg  P Front Axis= 54  deg  QRSD Interval= 176  ms  QT Interval= 469  ms  QRS Axis= -77  deg  T Wave Axis= 78  deg  - ABNORMAL ECG -  Sinus rhythm  Atrial premature complex  Prolonged PA interval  IVCD, consider RBBB  ST elevation secondary to IVCD  When compared with ECG of 11-Jun-2021 13:03:05,  No significant change  Electronically Signed By:   Date and Time of Study: 2021-06-11 15:00:10    Adult Transthoracic Echo Complete W/ Cont if Necessary Per Protocol [[id number]] Collected: 06/12/21 1407     Updated: 06/12/21 2002     BSA 2.1 m^2      RVIDd 3.8 cm      IVSd 0.83 cm      LVIDd 5.0 cm      LVIDs 3.5 cm      LVPWd 1.2 cm      IVS/LVPW 0.72     FS 30.9 %      EDV(Teich) 118.4 ml      ESV(Teich) 49.4 ml      EF(Teich) 58.3 %      EDV(cubed) 125.2 ml      ESV(cubed) 41.3 ml      EF(cubed) 67.0 %      LV mass(C)d 180.0 grams      LV mass(C)dI 84.0 grams/m^2      SV(Teich) 69.0 ml      SI(Teich) 32.2 ml/m^2      SV(cubed) 83.9 ml       SI(cubed) 39.1 ml/m^2      Ao root diam 3.5 cm      Ao root area 9.9 cm^2      asc Aorta Diam 3.7 cm      LVOT diam 2.5 cm      LVOT area 4.7 cm^2      RVOT diam 4.5 cm      RVOT area 16.0 cm^2      EDV(MOD-sp4) 130.9 ml      ESV(MOD-sp4) 33.8 ml      EF(MOD-sp4) 74.2 %      SV(MOD-sp4) 97.1 ml      SI(MOD-sp4) 45.3 ml/m^2      Ao root area (BSA corrected) 1.7     LV Dowling Vol (BSA corrected) 61.1 ml/m^2      LV Sys Vol (BSA corrected) 15.8 ml/m^2      MV E max ric 131.5 cm/sec      MV A max ric 51.5 cm/sec      MV E/A 2.6     MV V2 max 147.3 cm/sec      MV max PG 8.7 mmHg      MV V2 mean 75.3 cm/sec      MV mean PG 2.8 mmHg      MV V2 VTI 33.3 cm      MVA(VTI) 2.4 cm^2      MV dec slope 616.0 cm/sec^2      MV dec time 0.21 sec      Ao pk ric 263.8 cm/sec      Ao max PG 27.8 mmHg      Ao max PG (full) 25.6 mmHg      Ao V2 mean 193.0 cm/sec      Ao mean PG 16.9 mmHg      Ao mean PG (full) 15.6 mmHg      Ao V2 VTI 57.3 cm      JUSTIN(I,A) 1.4 cm^2      JUSTIN(I,D) 1.4 cm^2      JUSTIN(V,A) 1.3 cm^2      JUSTIN(V,D) 1.3 cm^2      LV V1 max PG 2.3 mmHg      LV V1 mean PG 1.3 mmHg      LV V1 max 75.3 cm/sec      LV V1 mean 52.0 cm/sec      LV V1 VTI 17.0 cm      MR max ric 551.7 cm/sec      MR max .8 mmHg      SV(Ao) 566.2 ml      SI(Ao) 264.1 ml/m^2      SV(LVOT) 80.4 ml      SV(RVOT) 205.6 ml      SI(LVOT) 37.5 ml/m^2      PA V2 max 80.4 cm/sec      PA max PG 2.6 mmHg      PA max PG (full) 0.23 mmHg      PA V2 mean 53.2 cm/sec      PA mean PG 1.3 mmHg      PA mean PG (full) 0.62 mmHg      PA V2 VTI 15.0 cm      PVA(I,A) 13.7 cm^2      BH CV ECHO YADIRA - PVA(I,D) 13.7 cm^2      BH CV ECHO YADIRA - PVA(V,A) 15.2 cm^2      BH CV ECHO YADIRA - PVA(V,D) 15.2 cm^2      PA acc time 0.11 sec      RV V1 max PG 2.3 mmHg      RV V1 mean PG 0.73 mmHg      RV V1 max 76.6 cm/sec      RV V1 mean 36.0 cm/sec      RV V1 VTI 12.9 cm      TR max ric 316.6 cm/sec      RVSP(TR) 43.1 mmHg      RAP systole 3.0 mmHg      PA pr(Accel) 29.3 mmHg      Pulm  Sys Roman 29.2 cm/sec      Pulm Dowling Roman 52.0 cm/sec      Pulm S/D 0.56     Qp/Qs 2.6     BH CV ECHO YADIRA - BZI_BMI 27.5 kilograms/m^2      BH CV ECHO YADIRA - BSA(HAYCOCK) 2.2 m^2      BH CV ECHO YADIRA - BZI_METRIC_WEIGHT 92.1 kg      BH CV ECHO YADIRA - BZI_METRIC_HEIGHT 182.9 cm      EF(MOD-bp) 70 %      LA dimension(2D) 5.8 cm      Echo EF Estimated 65 %     Narrative:      · Left ventricular wall thickness is consistent with mild concentric   hypertrophy.  · Estimated left ventricular EF = 65% Estimated left ventricular EF was in   agreement with the calculated left ventricular EF. Left ventricular   systolic function is normal.  · Moderate mitral valve regurgitation is present.  · There is mainly affecting the non-coronary and right coronary cusp(s).  · Severe tricuspid valve regurgitation is present.  · Estimated right ventricular systolic pressure from tricuspid   regurgitation is moderately elevated (45-55 mmHg).  · Moderate pulmonary hypertension is present.  · The right ventricular cavity is moderately dilated.  · Moderately reduced right ventricular systolic function noted.  · Left ventricular diastolic function is consistent with (grade III w/high   LAP) reversible restrictive pattern.  · Mild dilation of the aortic root is present.  · The right atrial cavity is moderate to severely dilated.  · Moderate aortic valve stenosis is present.  · Likely underestimation of the severity of the aortic stenosis secondary   to poor quality of the images and the technical difficulties consider a   PRESTON if there is a clinical concern for significant aortic stenosis based   on physical exam             Results for orders placed during the hospital encounter of 06/12/20    Duplex Venous Lower Extremity - Bilateral CAR    Interpretation Summary  · Normal bilateral lower extremity venous duplex scan.  · Left popliteal fossa heterogenous fluid collection.      Results for orders placed during the hospital encounter of  06/11/21    Adult Transthoracic Echo Complete W/ Cont if Necessary Per Protocol    Interpretation Summary  · Left ventricular wall thickness is consistent with mild concentric hypertrophy.  · Estimated left ventricular EF = 65% Estimated left ventricular EF was in agreement with the calculated left ventricular EF. Left ventricular systolic function is normal.  · Moderate mitral valve regurgitation is present.  · There is mainly affecting the non-coronary and right coronary cusp(s).  · Severe tricuspid valve regurgitation is present.  · Estimated right ventricular systolic pressure from tricuspid regurgitation is moderately elevated (45-55 mmHg).  · Moderate pulmonary hypertension is present.  · The right ventricular cavity is moderately dilated.  · Moderately reduced right ventricular systolic function noted.  · Left ventricular diastolic function is consistent with (grade III w/high LAP) reversible restrictive pattern.  · Mild dilation of the aortic root is present.  · The right atrial cavity is moderate to severely dilated.  · Moderate aortic valve stenosis is present.  · Likely underestimation of the severity of the aortic stenosis secondary to poor quality of the images and the technical difficulties consider a PRESTON if there is a clinical concern for significant aortic stenosis based on physical exam      CT Head Without Contrast    Result Date: 6/11/2021  1. No acute intracranial abnormality. 2. Generalized cerebral atrophy.  Electronically Signed By-Jason Dan MD On:6/11/2021 3:28 PM This report was finalized on [id number] by  Jason Dan MD.    XR Chest 1 View    Result Date: 6/14/2021  1.Increased bilateral perihilar and bibasilar airspace opacities, likely due to pulmonary edema with suspected moderate-sized right pleural effusion. 2.Enlarged cardiac silhouette.  Electronically Signed By-Manisha Malone MD On:6/14/2021 8:14 AM This report was finalized on [id number] by  Manisha Malone  MD.          Levi, labs reviewed personally by physician.    Medication Review:   I have reviewed the patient's current medication list      Scheduled Meds  carbidopa-levodopa, 1 tablet, Oral, TID  escitalopram, 10 mg, Oral, Daily  ferrous sulfate, 324 mg, Oral, Daily With Breakfast  furosemide, 40 mg, Oral, Daily  multivitamin with minerals, 1 tablet, Oral, Daily  oxybutynin, 5 mg, Oral, Nightly  pantoprazole, 40 mg, Oral, QAM  potassium chloride, 20 mEq, Oral, TID  rosuvastatin, 10 mg, Oral, Nightly  sodium chloride, 10 mL, Intravenous, Q12H  warfarin, 2 mg, Oral, Once per day on Mon Tue Wed Thu Fri Sat        Meds Infusions  Pharmacy to dose warfarin,         Meds PRN  •  acetaminophen **OR** acetaminophen  •  aluminum-magnesium hydroxide-simethicone  •  Diclofenac Sodium  •  ipratropium-albuterol  •  magnesium sulfate **OR** magnesium sulfate in D5W 1g/100mL (PREMIX)  •  nitroglycerin  •  ondansetron **OR** ondansetron  •  Pharmacy to dose warfarin  •  potassium chloride **OR** potassium chloride **OR** potassium chloride  •  potassium phosphate infusion greater than 15 mMoles **OR** potassium phosphate infusion greater than 15 mMoles **OR** potassium phosphate **OR** sodium phosphate IVPB **OR** sodium phosphate IVPB **OR** sodium phosphate IVPB  •  sodium chloride        Assessment/Plan   Assessment/Plan     Active Hospital Problems    Diagnosis  POA   • **Baclofen overdose [T42.8X1A]  Yes   • Ventricular tachycardia [I47.2]  Yes   • Acute encephalopathy, related to baclofen overdose [G93.40]  Yes   • Depression [F32.9]  Yes   • Chronic diastolic CHF (congestive heart failure) (CMS/HCC) [I50.32]  Yes   • GERD (gastroesophageal reflux disease) [K21.9]  Yes   • Sleep apnea [G47.30]  Yes   • RLS (restless legs syndrome) [G25.81]  Yes   • Parkinson's disease (CMS/[facility]) [G20]  Yes   • Sick sinus syndrome (CMS/HCC) [I49.5]  Yes   • Pacemaker [Z95.0]  Yes   • Pulmonary hypertension (CMS/[facility]) [I27.20]  Yes       Resolved Hospital Problems   No resolved problems to display.       MEDICAL DECISION MAKING COMPLEXITY BY PROBLEM:     Baclofen overdose-accidental due to mixing up medication administration at home  -Urine drug screen appropriate  -Tylenol and aspirin levels unremarkable  -Holding baclofen until metabolically cleared  -Poison control to be called  -Family at bedside patient appearing essentially back to baseline mentally  -Speech therapy evaluating patient     Altered mental status-due to medication overdose, patient appearing back to baseline  -As above  -No signs of superimposed infection  -CT head unremarkable  -Patient work with physical therapy and speech therapy     Ventricular tachycardia-noted in the emergency department lasting for a minute with initiation of chest compressions as patient unresponsive, resolved  -Cardiology consulted, recommending discontinuing baclofen any QT prolonging medications  -Cardiac monitoring  -Troponins being monitored  -Patient with pacemaker     Anemia-patient with underlying chronic component  -Monitor daily     Acute respiratory failure-patient with component likely due to accidental overdose patient now improving  -Intensivist consulted  -Wean oxygen as tolerated  -Speech therapy consulted to monitor for signs of aspiration     Atrial fibrillation-patient with history of pacemaker and sick sinus syndrome and is on warfarin anticoagulated  -Continue anticoagulation pharmacy to dose  -Cardiology consulted  -Cardiac monitoring     Heart failure-chronic diastolic no signs of volume overload at this time  -Monitor volume status  -Check weights     Parkinson's disease-patient on medication for symptom control  -Resume Sinemet     RLS/GERD/depression/OAB-chronic in nature  -Resume home medication as appropriate     FOUZIA-monitor for nocturnal hypoxia       VTE Prophylaxis -   Mechanical Order History:      Ordered        06/11/21 1603  Place Sequential Compression Device  Once          06/11/21 1603  Maintain Sequential Compression Device  Continuous                 Pharmalogical Order History:      Ordered     Dose Route Frequency Stop    06/12/21 0106  warfarin (COUMADIN) tablet 2 mg     Question:  Target INR  Answer:  2 - 3    2 mg PO Once per day on Mon Tue Wed Thu Fri Sat --    06/12/21 0056  Pharmacy to dose warfarin     Question:  Target INR  Answer:  2 - 3    -- XX Continuous PRN --    06/11/21 1603  heparin (porcine) 5000 UNIT/ML injection 5,000 Units  Status:  Discontinued      5,000 Units SC Every 8 Hours Scheduled 06/12/21 0114                  Code Status -   Code Status and Medical Interventions:   Ordered at: 06/11/21 1605     Code Status:    CPR     Medical Interventions (Level of Support Prior to Arrest):    Full       This patient has been examined wearing appropriate Personal Protective Equipment and discussed with hospital infection control department. 06/14/21        Discharge Planning  Rehab        Electronically signed by Elian Templeton MD, 06/14/21, 09:31 EDT.  [facility] Hospitalist Team

## 2021-06-14 NOTE — PROGRESS NOTES
Cardiology Consult Note      Consulting physician Miguelangel Dial MD, PhD  New patient encounter        REQUESTING PHYSICIAN    Kaden Funk,*    PATIENT IDENTIFICATION  Name: Rafael Ivey  Age: 85 y.o.  Sex: male  :  1935  MRN: [id number]             REASON FOR CONSULTATION: Agree with assessment discussed with nurse practitioner after my face-to-face encounter with the patient  85-year-old gentleman with no known history of coronary occlusive disease.  Patient does have known history of persistent atrial fibrillation, chronic diastolic heart failure, dyslipidemia, hypertension, mitral valve insufficiency, sick sinus syndrome with permanent pacemaker in situ, anticoagulation with warfarin and history of frequent falls.  Moderate aortic valve stenosis by recent transesophageal echo in January.  RV enlargement with moderate to severe TR, moderate MR, Patient was evaluated for left atrial appendage device implant, however based on the measurements both on left atrial appendage venography and PRESTON 2020, left atrial appendage neck was felt to be too large for current maximal size device with watchman.  The procedure was aborted.     EF is been 60% historical      CC:  Ventricular tachycardia    HISTORY OF PRESENT ILLNESS: As above  Patient presented to [facility] 2021 for symptoms of altered mental status.  Patient is very difficult historian, confused, very hard of hearing and therefore the history of present illness is gleaned from admission note and attending nurse.  Patient presented to ER upon family request for decreased responsiveness over the past several days.  He arrived confused and was unable to contribute information.  Upon discussion with family it was learned that wife has inadvertently been giving the patient 3 times his normal dose of baclofen.  His prescribed dose is 15 mg, however he was receiving 45 mg over the past week.  It was also determined that  she had accidentally switched the dosing of his Sinemet and baclofen-receiving Sinemet once daily and baclofen 3 times daily.  While in the emergency department patient was observed to have an episode of ventricular tachycardia that lasted approximately 1 minute.  He became responsive, received 1 round of CPR and returned back to a paced rhythm before any further intervention could be initiated.  He was initially admitted to the intensive care unit.  He was transferred out this morning to medical floor on telemetry.  Patient's attending nurse reports no further ectopy.  He is remained in a paced rhythm.  Upon my evaluation, patient is sitting up in bed asleep upon entry with normal respiratory effort.  He is pleasantly confused and alert.  He has failed his swallow eval.  ==========================================================    Seen and examined today, less confused, does not offer much input with respect to how he feels, he is on CPAP therapy with oxygen saturations 87% while sleeping with supplemental oxygen via the CPAP.  He denies any chest pain, he has diminished breath sounds bilaterally with some mild rhonchi suspicious for aspiration, he has some very mild crackles on inspiration bilaterally, he had mild poor respiratory effort with deep breathing.  Denies any fevers chills sweats nausea vomiting or diarrhea.  No chest pain.    Unable to obtain review of systems secondary to mental status  Historical data copied forward from previous encounters and EMR is unchanged    I reviewed and interpreted his most recent 2D echo as well as transesophageal echo with interpretation with preserved LV systolic function, moderate aortic valve stenosis, moderate RV enlargement with moderate severe TR, moderate MR, atrial enlargement    REVIEW OF SYSTEMS:  Review of systems could not be obtained due to   patient confusion.    OBJECTIVE   Troponin 0.036, 0.034 flat  CK 73  Hemoglobin 9.3      ASSESSMENT  Accidental  "baclofen overdose  Sustained ventricular tachycardia  Abnormal cardiac enzymes  Dysphagia  Chronic and persistent atrial fibrillation  Chronic diastolic heart failure  Sick sinus syndrome  Single-chamber pacemaker in situ  Anticoagulation secondary to warfarin  History of frequent falls  Valvular heart disease     Mitral valve insufficiency  Moderate aortic valve stenosis  Chronic diastolic congestive heart failure acute on chronic    PLAN  Patient's daughter has been involved in discussions with nursing to determine best avenue for managing patient's home medications.  Reportedly wife has some vision loss.  Patient has had no further ectopy.  Patient suspicion has some degree of possible aspiration  Flat troponins no evidence of acute coronary syndrome, evidence of VT and CPR likely with some degree of troponin elevation as well  Avoid QT prolonging medicines  Diuresis will continue, correct electrolytes for potassium greater than 4 magnesium greater than 2  Aspiration precautions  Supplemental oxygen  Acute exacerbation of diastolic congestive heart failure,, diuretics  Stable valvular heart disease    Will be followed by primary cardiology tomorrow    Further recommendation follow findings and clinical course        Vital Signs  Visit Vitals  /69 (BP Location: Left arm, Patient Position: Sitting)   Pulse 70   Temp 97.6 °F (36.4 °C) (Oral)   Resp 18   Ht 182.9 cm (72\")   Wt 93.8 kg (206 lb 12.7 oz)   SpO2 96%   BMI 28.05 kg/m²     Oxygen Therapy  SpO2: 96 %  Pulse Oximetry Type: Intermittent  Device (Oxygen Therapy): room air  Flow (L/min): 2  Flowsheet Rows        First Filed Value   Admission Height  182.9 cm (72\") Documented at 06/11/2021 1233   Admission Weight  91.2 kg (201 lb) Documented at 06/11/2021 1233          Intake & Output (last 3 days)       06/11 0701 - 06/12 0700 06/12 0701 - 06/13 0700 06/13 0701 - 06/14 0700    P.O.   240    I.V. (mL/kg) 729 (7.9)      Total Intake(mL/kg) 729 (7.9)  240 " (2.6)    Net +729  +240           Urine Unmeasured Occurrence  3 x 1 x    Stool Unmeasured Occurrence  4 x 1 x        Lines, Drains & Airways      Active LDAs       Name:   Placement date:   Placement time:   Site:   Days:    Peripheral IV 06/11/21 1310 Right Antecubital   06/11/21    1310    Antecubital   less than 1                    MEDICAL HISTORY    Past Medical History:   Diagnosis Date   • Anemia    • Arthritis     left hip    • Atrial fibrillation (CMS/HCC)    • Atrial fibrillation (CMS/HCC)     chronic   • Cardiomegaly     mild   • Chronic diastolic heart failure (CMS/HCC)    • GERD (gastroesophageal reflux disease)    • Hyperlipidemia    • Hypertension    • Hypotension    • Lower extremity edema    • Mitral regurgitation    • Obesity    • Pacemaker    • Parkinson disease (CMS/[facility])    • Recurrent falls     with injury    • Sick sinus syndrome (CMS/[facility])     s/p pacemaker implant    • Valvular heart disease     MR        SURGICAL HISTORY    Past Surgical History:   Procedure Laterality Date   • APPENDECTOMY     • ATRIAL APPENDAGE EXCLUSION LEFT WITH TRANSESOPHAGEAL ECHOCARDIOGRAM N/A 5/28/2020    Procedure: Atrial Appendage Occlusion;  Surgeon: Jim Walker MD;  Location: [facility] CATH INVASIVE LOCATION;  Service: Cardiovascular;  Laterality: N/A;   • ATRIAL APPENDAGE EXCLUSION LEFT WITH TRANSESOPHAGEAL ECHOCARDIOGRAM N/A 5/28/2020    Procedure: Atrial Appendage Occlusion;  Surgeon: Lashaun So MD;  Location: [facility] CATH INVASIVE LOCATION;  Service: Cardiovascular;  Laterality: N/A;   • CATARACT EXTRACTION     • CHOLECYSTECTOMY     • COLONOSCOPY N/A 5/21/2020    Procedure: COLONOSCOPY WITH BIOPSY X1 AREA;  Surgeon: Sim Collins MD;  Location: [facility] ENDOSCOPY;  Service: Gastroenterology;  Laterality: N/A;  Post: poor prep, impacted stool in rectum, and internal hemorrhoids, ascending colon arteriovenous malformation   • ENDOSCOPY N/A 5/21/2020    Procedure: ESOPHAGOGASTRODUODENOSCOPY  "with clipping x 1 of bleeding gastric polyp;  Surgeon: Sim Collins MD;  Location: [facility] ENDOSCOPY;  Service: Gastroenterology;  Laterality: N/A;  Post: bleeding gastric polyp   • HIP SURGERY Right 08/2017   • OTHER SURGICAL HISTORY      skin mole excisions    • PACEMAKER IMPLANTATION  06/22/2017    Levin's ([employer])   • TONSILLECTOMY     • TOTAL HIP ARTHROPLASTY Left 05/20/2014        FAMILY HISTORY    Family History   Problem Relation Age of Onset   • Heart failure Mother    • Stroke Maternal Grandfather        SOCIAL HISTORY    Social History     Tobacco Use   • Smoking status: Former Smoker     Packs/day: 1.00     Years: 40.00     Pack years: 40.00     Types: Cigarettes   • Smokeless tobacco: Never Used   • Tobacco comment: quit 1970's    Substance Use Topics   • Alcohol use: Yes        ALLERGIES    Allergies   Allergen Reactions   • Amoxicillin Diarrhea   • Cephalexin Diarrhea and Nausea And Vomiting   • Penicillins Other (See Comments)   • Amoxicillin-Pot Clavulanate Nausea And Vomiting   • Clarithromycin Nausea And Vomiting              /69 (BP Location: Left arm, Patient Position: Sitting)   Pulse 70   Temp 97.6 °F (36.4 °C) (Oral)   Resp 18   Ht 182.9 cm (72\")   Wt 93.8 kg (206 lb 12.7 oz)   SpO2 96%   BMI 28.05 kg/m²   Intake/Output last 3 shifts:  I/O last 3 completed shifts:  In: 240 [P.O.:240]  Out: -   Intake/Output this shift:  No intake/output data recorded.    PHYSICAL EXAM:    General: Well-developed, well-nourished and frail 85-year-old male who is alert, cooperative, no distress, appears stated age  Head:  Normocephalic, atraumatic, mucous membranes moist  Eyes:  Conjunctiva/corneas clear, EOM's intact     Neck:  Supple,  no adenopathy; no JVD or bruit  Lungs: Clear to auscultation bilaterally, no wheezes rhonchi rales are noted  Chest wall: No tenderness  Heart::  Regular rate and rhythm, S1 and S2 normal, 2/6 holosystolic murmur  Abdomen: Soft, non-tender, nondistended " bowel sounds active  Extremities: No cyanosis, clubbing, or edema   Pulses: 2+ and symmetric all extremities  Skin:  No rashes or lesions  Neuro/psych: Awake and alert, pleasantly confused, cooperative, [place]  Agree with exam as discussed with nurse practitioner after my face-to-face encounter    Scheduled Meds:      carbidopa-levodopa, 1 tablet, Oral, TID  escitalopram, 10 mg, Oral, Daily  ferrous sulfate, 324 mg, Oral, Daily With Breakfast  furosemide, 40 mg, Oral, Daily  multivitamin with minerals, 1 tablet, Oral, Daily  oxybutynin, 5 mg, Oral, Nightly  pantoprazole, 40 mg, Oral, QAM  potassium chloride, 20 mEq, Oral, TID  rosuvastatin, 10 mg, Oral, Nightly  sodium chloride, 10 mL, Intravenous, Q12H  warfarin, 2 mg, Oral, Once per day on Mon Tue Wed Thu Fri Sat        Continuous Infusions:    Pharmacy to dose warfarin,   sodium chloride, 75 mL/hr, Last Rate: 75 mL/hr (06/11/21 3830)        PRN Meds:    •  acetaminophen **OR** acetaminophen  •  aluminum-magnesium hydroxide-simethicone  •  Diclofenac Sodium  •  ipratropium-albuterol  •  magnesium sulfate **OR** magnesium sulfate in D5W 1g/100mL (PREMIX)  •  nitroglycerin  •  ondansetron **OR** ondansetron  •  Pharmacy to dose warfarin  •  potassium chloride **OR** potassium chloride **OR** potassium chloride  •  potassium phosphate infusion greater than 15 mMoles **OR** potassium phosphate infusion greater than 15 mMoles **OR** potassium phosphate **OR** sodium phosphate IVPB **OR** sodium phosphate IVPB **OR** sodium phosphate IVPB  •  sodium chloride        Results Review:     I reviewed the patient's new clinical results.    CBC    Results from last 7 days   Lab Units 06/13/21  0535 06/12/21  0126 06/11/21  1309   WBC 10*3/mm3 4.90 5.10 4.90   HEMOGLOBIN g/dL 9.0* 9.3* 9.6*   PLATELETS 10*3/mm3 234 250 279     Cr Clearance Estimated Creatinine Clearance: 68.3 mL/min (by C-G formula based on SCr of 0.94 mg/dL).  Coag   Results from last 7 days   Lab Units  06/13/21  0535 06/12/21  0642 06/12/21  0126 06/11/21  1309   INR  2.61 2.34 2.49 2.22     HbA1C No results found for: HGBA1C  Blood Glucose   Glucose   Date/Time Value Ref Range Status   06/13/2021 1107 84 70 - 105 mg/dL Final     Comment:     Serial Number: [id number]:  [id number]   06/12/2021 0515 91 70 - 105 mg/dL Final     Comment:     Serial Number: [id number]:  [id number]     Infection     CMP   Results from last 7 days   Lab Units 06/13/21  0535 06/11/21  1607 06/11/21  1309   SODIUM mmol/L 143 145 142   POTASSIUM mmol/L 3.5 3.7 3.7   CHLORIDE mmol/L 106 106 104   CO2 mmol/L 25.0 26.0 27.0   BUN mg/dL 25* 22 23   CREATININE mg/dL 0.94 0.99 0.99   GLUCOSE mg/dL 77 100* 105*   ALBUMIN g/dL 3.30* 3.70  --    BILIRUBIN mg/dL 1.3* 1.0  --    ALK PHOS U/L 136* 136*  --    AST (SGOT) U/L 24 26  --    ALT (SGPT) U/L <5 21  --      ABG    Results from last 7 days   Lab Units 06/11/21  1611   PH, ARTERIAL pH units 7.435   PCO2, ARTERIAL mm Hg 40.4   PO2 ART mm Hg 91.7   O2 SATURATION ART % 97.4   BASE EXCESS ART mmol/L 2.6     UA    Results from last 7 days   Lab Units 06/11/21  1315   NITRITE UA  Negative     HUMPHREY  No results found for: POCMETH, POCAMPHET, POCBARBITUR, POCBENZO, POCCOCAINE, POCOPIATES, POCOXYCODO, POCPHENCYC, POCPROPOXY, POCTHC, POCTRICYC  Lysis Labs   Results from last 7 days   Lab Units 06/13/21  0535 06/12/21  0642 06/12/21  0126 06/11/21  1607 06/11/21  1309   INR  2.61 2.34 2.49  --  2.22   HEMOGLOBIN g/dL 9.0*  --  9.3*  --  9.6*   PLATELETS 10*3/mm3 234  --  250  --  279   CREATININE mg/dL 0.94  --   --  0.99 0.99     Radiology(recent) No radiology results for the last day      Results from last 7 days   Lab Units 06/12/21  1158 06/11/21  1607   CK TOTAL U/L  --  73   TROPONIN T ng/mL 0.038*  --        Xrays, labs reviewed personally by physician.    ECG/EMG Results (most recent)     Procedure Component Value Units Date/Time    ECG 12 Lead [[id number]] Collected: 06/11/21 1303      Updated: 06/11/21 1304     QT Interval 473 ms     Narrative:      HEART RATE= 70  bpm  RR Interval= 852  ms  ME Interval= 230  ms  P Horizontal Axis= 188  deg  P Front Axis= 0  deg  QRSD Interval= 177  ms  QT Interval= 473  ms  QRS Axis= -85  deg  T Wave Axis= 82  deg  - ABNORMAL ECG -  Sinus rhythm  Prolonged ME interval  IVCD, consider RBBB  ST elevation secondary to IVCD  When compared with ECG of 11-Aug-2020 1:08:56,  Significant change in rhythm: previously atrial fibrillation  New conduction abnormality  Significant repolarization change  Significant axis, voltage or hypertrophy change  Electronically Signed By:   Date and Time of Study: 2021-06-11 13:03:05    ECG 12 Lead [[id number]] Collected: 06/11/21 1500     Updated: 06/11/21 1516     QT Interval 469 ms     Narrative:      HEART RATE= 76  bpm  RR Interval= 788  ms  ME Interval= 230  ms  P Horizontal Axis= 187  deg  P Front Axis= 54  deg  QRSD Interval= 176  ms  QT Interval= 469  ms  QRS Axis= -77  deg  T Wave Axis= 78  deg  - ABNORMAL ECG -  Sinus rhythm  Atrial premature complex  Prolonged ME interval  IVCD, consider RBBB  ST elevation secondary to IVCD  When compared with ECG of 11-Jun-2021 13:03:05,  No significant change  Electronically Signed By:   Date and Time of Study: 2021-06-11 15:00:10    Adult Transthoracic Echo Complete W/ Cont if Necessary Per Protocol [[id number]] Collected: 06/12/21 1407     Updated: 06/12/21 2002     BSA 2.1 m^2      RVIDd 3.8 cm      IVSd 0.83 cm      LVIDd 5.0 cm      LVIDs 3.5 cm      LVPWd 1.2 cm      IVS/LVPW 0.72     FS 30.9 %      EDV(Teich) 118.4 ml      ESV(Teich) 49.4 ml      EF(Teich) 58.3 %      EDV(cubed) 125.2 ml      ESV(cubed) 41.3 ml      EF(cubed) 67.0 %      LV mass(C)d 180.0 grams      LV mass(C)dI 84.0 grams/m^2      SV(Teich) 69.0 ml      SI(Teich) 32.2 ml/m^2      SV(cubed) 83.9 ml      SI(cubed) 39.1 ml/m^2      Ao root diam 3.5 cm      Ao root area 9.9 cm^2      asc Aorta Diam 3.7 cm      LVOT diam  2.5 cm      LVOT area 4.7 cm^2      RVOT diam 4.5 cm      RVOT area 16.0 cm^2      EDV(MOD-sp4) 130.9 ml      ESV(MOD-sp4) 33.8 ml      EF(MOD-sp4) 74.2 %      SV(MOD-sp4) 97.1 ml      SI(MOD-sp4) 45.3 ml/m^2      Ao root area (BSA corrected) 1.7     LV Dowling Vol (BSA corrected) 61.1 ml/m^2      LV Sys Vol (BSA corrected) 15.8 ml/m^2      MV E max roman 131.5 cm/sec      MV A max roman 51.5 cm/sec      MV E/A 2.6     MV V2 max 147.3 cm/sec      MV max PG 8.7 mmHg      MV V2 mean 75.3 cm/sec      MV mean PG 2.8 mmHg      MV V2 VTI 33.3 cm      MVA(VTI) 2.4 cm^2      MV dec slope 616.0 cm/sec^2      MV dec time 0.21 sec      Ao pk roman 263.8 cm/sec      Ao max PG 27.8 mmHg      Ao max PG (full) 25.6 mmHg      Ao V2 mean 193.0 cm/sec      Ao mean PG 16.9 mmHg      Ao mean PG (full) 15.6 mmHg      Ao V2 VTI 57.3 cm      JUSTIN(I,A) 1.4 cm^2      JUSTIN(I,D) 1.4 cm^2      JUSTIN(V,A) 1.3 cm^2      JUSTIN(V,D) 1.3 cm^2      LV V1 max PG 2.3 mmHg      LV V1 mean PG 1.3 mmHg      LV V1 max 75.3 cm/sec      LV V1 mean 52.0 cm/sec      LV V1 VTI 17.0 cm      MR max roman 551.7 cm/sec      MR max .8 mmHg      SV(Ao) 566.2 ml      SI(Ao) 264.1 ml/m^2      SV(LVOT) 80.4 ml      SV(RVOT) 205.6 ml      SI(LVOT) 37.5 ml/m^2      PA V2 max 80.4 cm/sec      PA max PG 2.6 mmHg      PA max PG (full) 0.23 mmHg      PA V2 mean 53.2 cm/sec      PA mean PG 1.3 mmHg      PA mean PG (full) 0.62 mmHg      PA V2 VTI 15.0 cm      PVA(I,A) 13.7 cm^2      BH CV ECHO YADIRA - PVA(I,D) 13.7 cm^2      BH CV ECHO YADIRA - PVA(V,A) 15.2 cm^2      BH CV ECHO YADIRA - PVA(V,D) 15.2 cm^2      PA acc time 0.11 sec      RV V1 max PG 2.3 mmHg      RV V1 mean PG 0.73 mmHg      RV V1 max 76.6 cm/sec      RV V1 mean 36.0 cm/sec      RV V1 VTI 12.9 cm      TR max roman 316.6 cm/sec      RVSP(TR) 43.1 mmHg      RAP systole 3.0 mmHg      PA pr(Accel) 29.3 mmHg      Pulm Sys Roman 29.2 cm/sec      Pulm Dowling Roman 52.0 cm/sec      Pulm S/D 0.56     Qp/Qs 2.6     BH CV ECHO YADIRA - BZI_BMI  27.5 kilograms/m^2       CV ECHO YADIRA - BSA([facility]) 2.2 m^2       CV ECHO YADIRA - BZI_METRIC_WEIGHT 92.1 kg       CV ECHO YADIRA - BZI_METRIC_HEIGHT 182.9 cm      EF(MOD-bp) 70 %      LA dimension(2D) 5.8 cm      Echo EF Estimated 65 %     Narrative:      · Left ventricular wall thickness is consistent with mild concentric   hypertrophy.  · Estimated left ventricular EF = 65% Estimated left ventricular EF was in   agreement with the calculated left ventricular EF. Left ventricular   systolic function is normal.  · Moderate mitral valve regurgitation is present.  · There is mainly affecting the non-coronary and right coronary cusp(s).  · Severe tricuspid valve regurgitation is present.  · Estimated right ventricular systolic pressure from tricuspid   regurgitation is moderately elevated (45-55 mmHg).  · Moderate pulmonary hypertension is present.  · The right ventricular cavity is moderately dilated.  · Moderately reduced right ventricular systolic function noted.  · Left ventricular diastolic function is consistent with (grade III w/high   LAP) reversible restrictive pattern.  · Mild dilation of the aortic root is present.  · The right atrial cavity is moderate to severely dilated.  · Moderate aortic valve stenosis is present.  · Likely underestimation of the severity of the aortic stenosis secondary   to poor quality of the images and the technical difficulties consider a   PRESTON if there is a clinical concern for significant aortic stenosis based   on physical exam               Medication Review:   I have reviewed the patient's current medication list  Scheduled Meds:carbidopa-levodopa, 1 tablet, Oral, TID  escitalopram, 10 mg, Oral, Daily  ferrous sulfate, 324 mg, Oral, Daily With Breakfast  furosemide, 40 mg, Oral, Daily  multivitamin with minerals, 1 tablet, Oral, Daily  oxybutynin, 5 mg, Oral, Nightly  pantoprazole, 40 mg, Oral, QAM  potassium chloride, 20 mEq, Oral, TID  rosuvastatin, 10 mg, Oral,  Nightly  sodium chloride, 10 mL, Intravenous, Q12H  warfarin, 2 mg, Oral, Once per day on Mon Tue Wed Thu Fri Sat      Continuous Infusions:Pharmacy to dose warfarin,   sodium chloride, 75 mL/hr, Last Rate: 75 mL/hr (06/11/21 1839)      PRN Meds:.•  acetaminophen **OR** acetaminophen  •  aluminum-magnesium hydroxide-simethicone  •  Diclofenac Sodium  •  ipratropium-albuterol  •  magnesium sulfate **OR** magnesium sulfate in D5W 1g/100mL (PREMIX)  •  nitroglycerin  •  ondansetron **OR** ondansetron  •  Pharmacy to dose warfarin  •  potassium chloride **OR** potassium chloride **OR** potassium chloride  •  potassium phosphate infusion greater than 15 mMoles **OR** potassium phosphate infusion greater than 15 mMoles **OR** potassium phosphate **OR** sodium phosphate IVPB **OR** sodium phosphate IVPB **OR** sodium phosphate IVPB  •  sodium chloride    Imaging:  Imaging Results (Last 72 Hours)     Procedure Component Value Units Date/Time    CT Head Without Contrast [[id number]] Collected: 06/11/21 1522     Updated: 06/11/21 1531    Narrative:         DATE OF EXAM:  6/11/2021 3:10 PM     PROCEDURE:   CT HEAD WO CONTRAST-     INDICATIONS:   Mental status change, unknown cause     COMPARISON:  CT head without contrast 11/23/2018     TECHNIQUE:   Routine transaxial cuts were obtained through the head without the  administration of contrast. Automated exposure control and iterative  reconstruction methods were used.      FINDINGS:  There is no acute intracranial hemorrhage. No mass effect or midline  shift. Prominence of the ventricles and cortical sulci similar to prior  study most consistent with generalized cerebral atrophy. Posterior fossa  is without acute abnormality. Globes are symmetric. There is thinning of  the lens bilaterally. Posterior fossa without acute abnormality. The  mastoid air cells are well-aerated. There is leftward deviation of the  nasal septum. Retention cysts noted in the left maxillary  "sinus  measuring 11 mm. Calvarium intact. Asymmetric right temporomandibular  osteoarthritis.       Impression:      1. No acute intracranial abnormality.  2. Generalized cerebral atrophy.     Electronically Signed By-Jason Dan MD On:6/11/2021 3:28 PM  This report was finalized on [id number] by  Jason Dan MD.            Miguelangel Dial MD  06/13/21  20:24 EDT       EMR Dragon/Transcription:   \"Dictated utilizing Dragon dictation\".       Plan documented above    New patient new new problems above    Miguelangel Dial MD, PhD    Please call with any question concerns           Electronically signed by STEPHANY Parmar, 06/12/21, 12:16 PM EDT.    "

## 2021-06-14 NOTE — CASE MANAGEMENT/SOCIAL WORK
Discharge Planning Assessment  [facility]     Patient Name: Rafael Ivey  MRN: [id number]  Today's Date: 6/14/2021    Admit Date: 6/11/2021    Discharge Needs Assessment     Row Name 06/14/21 1743       Living Environment    Lives With  spouse    Current Living Arrangements  home/apartment/condo    Primary Care Provided by  self Has paid caregiver    Provides Primary Care For  no one    Family Caregiver if Needed  spouse    Quality of Family Relationships  supportive;involved;helpful    Able to Return to Prior Arrangements  yes       Resource/Environmental Concerns    Resource/Environmental Concerns  none    Transportation Concerns  car, none       Transition Planning    Patient/Family Anticipates Transition to  home with help/services    Patient/Family Anticipated Services at Transition  home health care    Transportation Anticipated  family or friend will provide       Discharge Needs Assessment    Readmission Within the Last 30 Days  no previous admission in last 30 days    Equipment Currently Used at Home  other (see comments);wheelchair;rollator;hospital bed    Concerns to be Addressed  care coordination/care conferences;discharge planning    Anticipated Changes Related to Illness  none    Equipment Needed After Discharge  none    Discharge Facility/Level of Care Needs  home with home health    Provided Post Acute Provider List?  Yes    Post Acute Provider List  Home Health    Provided Post Acute Provider Quality & Resource List?  Yes    Post Acute Provider Quality and Resource List  Home Health    Delivered To  Support Person    Support Person  Silviano Tapia    Method of Delivery  Telephone        Discharge Plan     Row Name 06/14/21 1744       Plan    Plan  [facility] referral sent.    Patient/Family in Agreement with Plan  yes    Plan Comments  JESSIE discussed dc planning with patient’s daughterRegina ([phone]). She reported that patient’s spouse and caregiver were asking what the status of HH was. JESSIE  attempted to contact but no answer. Voicemail was left on patient’s cell ([phone]). Confirmed PCP (Blake Ivey), preferred pharmacy (currently enrolled in meds to bed). Per PT, patient has adjustable bed, 2 caregivers, rollator, and wheelchair.        Continued Care and Services - Admitted Since 6/11/2021     Home Medical Care     Service Provider Request Status Selected Services Address Phone Fax Patient Preferred    [facility]  Pending - [facility] N[facility] [address] [place] [address] [phone] --                Demographic Summary     Row Name 06/14/21 1742       General Information    Admission Type  inpatient    Required Notices Provided  Important Message from Medicare IMM 6/12/21    Reason for Consult  discharge planning    Preferred Language  English     Used During This Interaction  no       Contact Information    Permission Granted to Share Info With          Functional Status     Row Name 06/14/21 1742       Functional Status    Usual Activity Tolerance  fair    Current Activity Tolerance  fair       Functional Status, IADL    Medications  independent    Meal Preparation  assistive equipment and person    Housekeeping  assistive equipment and person    Laundry  assistive equipment and person    Shopping  assistive equipment and person        LACE: 10    Phone communication or documentation only - no physical contact with patient or family.    Lisa Schroeder RN     Office Phone: [phone]  Office Cell: [phone]

## 2021-06-14 NOTE — PLAN OF CARE
Problem: Fall Injury Risk  Goal: Absence of Fall and Fall-Related Injury  Intervention: Identify and Manage Contributors to Fall Injury Risk  Recent Flowsheet Documentation  Taken 6/13/2021 2117 by Terri Keen RN  Medication Review/Management: medications reviewed  Taken 6/13/2021 1901 by Terri Keen RN  Medication Review/Management: medications reviewed  Intervention: Promote Injury-Free Environment  Recent Flowsheet Documentation  Taken 6/14/2021 0103 by Terri Keen RN  Safety Promotion/Fall Prevention:   assistive device/personal items within reach   clutter free environment maintained   fall prevention program maintained   lighting adjusted   nonskid shoes/slippers when out of bed   room organization consistent   safety round/check completed  Taken 6/13/2021 2300 by Terri Keen RN  Safety Promotion/Fall Prevention:   assistive device/personal items within reach   clutter free environment maintained   fall prevention program maintained   lighting adjusted   nonskid shoes/slippers when out of bed   room organization consistent   safety round/check completed  Taken 6/13/2021 2117 by Terri Keen RN  Safety Promotion/Fall Prevention:   assistive device/personal items within reach   clutter free environment maintained   fall prevention program maintained   lighting adjusted   nonskid shoes/slippers when out of bed   room organization consistent   safety round/check completed  Taken 6/13/2021 1901 by Terri Keen RN  Safety Promotion/Fall Prevention:   assistive device/personal items within reach   clutter free environment maintained   fall prevention program maintained   lighting adjusted   nonskid shoes/slippers when out of bed   room organization consistent   safety round/check completed     Problem: Skin Injury Risk Increased  Goal: Skin Health and Integrity  Intervention: Optimize Skin Protection  Recent Flowsheet Documentation  Taken 6/14/2021 0103 by Terri Keen  RN  Head of Bed (HOB): HOB elevated  Taken 6/13/2021 2300 by Terri Keen RN  Head of Bed (HOB): HOB elevated  Taken 6/13/2021 2157 by Terri Keen RN  Head of Bed (HOB): HOB elevated  Taken 6/13/2021 2117 by Terri Keen RN  Head of Bed (HOB): HOB elevated  Taken 6/13/2021 1901 by Terri Keen RN  Pressure Reduction Techniques: weight shift assistance provided  Head of Bed (HOB): HOB elevated  Pressure Reduction Devices: pressure-redistributing mattress utilized     Problem: Adult Inpatient Plan of Care  Goal: Absence of Hospital-Acquired Illness or Injury  Intervention: Identify and Manage Fall Risk  Recent Flowsheet Documentation  Taken 6/14/2021 0103 by Terri Keen RN  Safety Promotion/Fall Prevention:   assistive device/personal items within reach   clutter free environment maintained   fall prevention program maintained   lighting adjusted   nonskid shoes/slippers when out of bed   room organization consistent   safety round/check completed  Taken 6/13/2021 2300 by Terri Keen RN  Safety Promotion/Fall Prevention:   assistive device/personal items within reach   clutter free environment maintained   fall prevention program maintained   lighting adjusted   nonskid shoes/slippers when out of bed   room organization consistent   safety round/check completed  Taken 6/13/2021 2117 by Terri Keen RN  Safety Promotion/Fall Prevention:   assistive device/personal items within reach   clutter free environment maintained   fall prevention program maintained   lighting adjusted   nonskid shoes/slippers when out of bed   room organization consistent   safety round/check completed  Taken 6/13/2021 1901 by Terri Keen RN  Safety Promotion/Fall Prevention:   assistive device/personal items within reach   clutter free environment maintained   fall prevention program maintained   lighting adjusted   nonskid shoes/slippers when out of bed   room organization consistent    safety round/check completed  Intervention: Prevent Skin Injury  Recent Flowsheet Documentation  Taken 6/14/2021 0103 by Terri Keen RN  Body Position: turned  Taken 6/13/2021 2300 by Terri Keen RN  Body Position: turned  Taken 6/13/2021 2157 by Terri Keen RN  Body Position: turned  Taken 6/13/2021 2117 by Terri Keen RN  Body Position: turned  Taken 6/13/2021 1901 by Terri Keen RN  Body Position: turned  Intervention: Prevent Infection  Recent Flowsheet Documentation  Taken 6/14/2021 0103 by Terri Keen RN  Infection Prevention:   personal protective equipment utilized   rest/sleep promoted  Taken 6/13/2021 2300 by Terri Keen RN  Infection Prevention:   hand hygiene promoted   personal protective equipment utilized  Taken 6/13/2021 2117 by Terri Keen RN  Infection Prevention:   personal protective equipment utilized   rest/sleep promoted  Taken 6/13/2021 1901 by Terri Keen RN  Infection Prevention:   personal protective equipment utilized   rest/sleep promoted  Goal: Optimal Comfort and Wellbeing  Intervention: Provide Person-Centered Care  Recent Flowsheet Documentation  Taken 6/13/2021 1901 by Terri Keen RN  Trust Relationship/Rapport: care explained   Goal Outcome Evaluation:

## 2021-06-14 NOTE — THERAPY TREATMENT NOTE
Acute Care - Speech Language Pathology   Swallow Treatment Note   MARK Springer     Patient Name: Rafael Ivey  : 1935  MRN: [id number]  Today's Date: 2021               Admit Date: 2021    Visit Dx:      ICD-10-CM ICD-9-CM   1. Baclofen overdose, accidental or unintentional, initial encounter  T42.8X1A 968.0     E855.1   2. Ventricular tachycardia (CMS/HCC)  I47.2 427.1     Patient Active Problem List   Diagnosis   • Sick sinus syndrome (CMS/HCC)   • Atrial fibrillation, chronic   • Pacemaker   • Bilateral leg edema   • Iron deficiency anemia due to chronic blood loss   • Warfarin-induced coagulopathy (CMS/HCC)   • Parkinson's disease (CMS/HCC)   • Aortic stenosis   • GERD (gastroesophageal reflux disease)   • Hyperlipidemia   • Essential hypertension   • Mitral regurgitation   • Nonrheumatic tricuspid valve disorder   • Pulmonary hypertension (CMS/[facility])   • Sleep apnea   • Valvular heart disease   • Hypotension   • Chronic diastolic CHF (congestive heart failure) (CMS/[facility])   • Anxiety disorder   • Overactive bladder   • Chronic pain   • RLS (restless legs syndrome)   • Obesity (BMI 30-39.9)   • Acute UTI (urinary tract infection)   • Hypokalemia   • Anasarca   • Acute on chronic combined systolic and diastolic CHF (congestive heart failure) (CMS/[facility])   • Depression   • Anemia   • Rectal ulceration   • Acute on chronic heart failure (CMS/HCC)   • Acute respiratory distress   • Pneumonia of right lung due to infectious organism   • Weakness   • Pleural effusion, right   • Baclofen overdose   • Ventricular tachycardia   • Acute encephalopathy, related to baclofen overdose       Therapy Treatment      SLP GOALS     Row Name 21 1400       Oral Nutrition/Hydration Goal 1 (SLP)    Oral Nutrition/Hydration Goal 1, SLP  Pt will initiate a PO diet and tolerate diet with no complications from aspiration  -LF    Time Frame (Oral Nutrition/Hydration Goal 1, SLP)  by discharge  -LF    Barriers (Oral  Nutrition/Hydration Goal 1, SLP)  see below note  -LF    Progress/Outcomes (Oral Nutrition/Hydration Goal 1, SLP)  goal ongoing  -LF       Oral Nutrition/Hydration Goal 2 (SLP)    Oral Nutrition/Hydration Goal 2, SLP  Pt will participate in a swallow re-eval to assess swallow funcation and determine safest and least restrictive diet  -LF    Time Frame (Oral Nutrition/Hydration Goal 2, SLP)  1 day  -LF    Barriers (Oral Nutrition/Hydration Goal 2, SLP)  --    Progress/Outcomes (Oral Nutrition/Hydration Goal 2, SLP)  goal met  -LF       Oral Nutrition/Hydration Goal (SLP)    Oral Nutrition/Hydration Goal, SLP  Pt will have full meal assessment within 48 hours.   -LF    Time Frame (Oral Nutrition/Hydration Goal, SLP)  2 days  -LF    Barriers (Oral Nutrition/Hydration Goal, SLP) Attempted to see pt for meal assessment, however, assessment limited d/t pt only being agreeable to trials of ice cream and water by straw. Pt pleasantly confused and sitting upright in bed. He self fed all trials and independently demonstrated safe swallow strategies. Adequate labial seal with no anterior loss. Timely oral transit. No s/s of aspiration observed at any time. Recommend pt continue current diet. ST will continue to follow.  -LF    Progress/Outcomes (Oral Nutrition/Hydration Goal, SLP)  goal ongoing  -LF      User Key  (r) = Recorded By, (t) = Taken By, (c) = Cosigned By    Initials Name Provider Type    Zo Andrade, SLP Speech and Language Pathologist    LF Liliana Bright, SLP Speech and Language Pathologist          EDUCATION  The patient has been educated in the following areas:   safe swallow strategies.    Patient was not wearing a face mask during this therapy encounter. Therapist used appropriate personal protective equipment including mask, eye protection and gloves.  Mask used was standard procedure mask. Appropriate PPE was worn during the entire therapy session. Hand hygiene was completed before and after therapy  session. Patient is not in enhanced droplet precautions.          Time Calculation:                     Liliana Bright, SLP  6/14/2021

## 2021-06-14 NOTE — PLAN OF CARE
Problem: Fall Injury Risk  Goal: Absence of Fall and Fall-Related Injury  Outcome: Ongoing, Progressing  Intervention: Identify and Manage Contributors to Fall Injury Risk  Recent Flowsheet Documentation  Taken 6/14/2021 1318 by Rica Anderson RN  Medication Review/Management: medications reviewed  Taken 6/14/2021 1106 by Rica Anderson RN  Medication Review/Management: medications reviewed  Taken 6/14/2021 0923 by Rica Anderson RN  Medication Review/Management: medications reviewed  Taken 6/14/2021 0735 by Rica Anderson RN  Medication Review/Management: medications reviewed  Intervention: Promote Injury-Free Environment  Recent Flowsheet Documentation  Taken 6/14/2021 1318 by Rica Anderson RN  Safety Promotion/Fall Prevention:   safety round/check completed   nonskid shoes/slippers when out of bed   fall prevention program maintained  Taken 6/14/2021 1106 by Rica Anderson RN  Safety Promotion/Fall Prevention:   safety round/check completed   nonskid shoes/slippers when out of bed   fall prevention program maintained  Taken 6/14/2021 0923 by Rica Anderson RN  Safety Promotion/Fall Prevention:   safety round/check completed   nonskid shoes/slippers when out of bed   fall prevention program maintained  Taken 6/14/2021 0735 by Rica Anderson RN  Safety Promotion/Fall Prevention:   safety round/check completed   nonskid shoes/slippers when out of bed   fall prevention program maintained     Problem: Skin Injury Risk Increased  Goal: Skin Health and Integrity  Outcome: Ongoing, Progressing  Intervention: Optimize Skin Protection  Recent Flowsheet Documentation  Taken 6/14/2021 0735 by Rica Anderson RN  Pressure Reduction Techniques: frequent weight shift encouraged  Head of Bed (HOB): HOB elevated  Pressure Reduction Devices: pressure-redistributing mattress utilized  Skin Protection: tubing/devices free from skin contact     Problem: Adult Inpatient Plan of  Care  Goal: Plan of Care Review  Outcome: Ongoing, Progressing  Flowsheets (Taken 6/14/2021 1432)  Plan of Care Reviewed With: patient  Goal: Patient-Specific Goal (Individualized)  Outcome: Ongoing, Progressing  Goal: Absence of Hospital-Acquired Illness or Injury  Outcome: Ongoing, Progressing  Intervention: Identify and Manage Fall Risk  Recent Flowsheet Documentation  Taken 6/14/2021 1318 by Rica Anderson RN  Safety Promotion/Fall Prevention:   safety round/check completed   nonskid shoes/slippers when out of bed   fall prevention program maintained  Taken 6/14/2021 1106 by Rica Anderson RN  Safety Promotion/Fall Prevention:   safety round/check completed   nonskid shoes/slippers when out of bed   fall prevention program maintained  Taken 6/14/2021 0923 by Rica Anderson RN  Safety Promotion/Fall Prevention:   safety round/check completed   nonskid shoes/slippers when out of bed   fall prevention program maintained  Taken 6/14/2021 0735 by Rica Anderson RN  Safety Promotion/Fall Prevention:   safety round/check completed   nonskid shoes/slippers when out of bed   fall prevention program maintained  Intervention: Prevent Skin Injury  Recent Flowsheet Documentation  Taken 6/14/2021 0735 by Rica Anderson RN  Body Position: position maintained  Skin Protection: tubing/devices free from skin contact  Intervention: Prevent Infection  Recent Flowsheet Documentation  Taken 6/14/2021 1318 by Rica Anderson RN  Infection Prevention:   rest/sleep promoted   single patient room provided   hand hygiene promoted  Taken 6/14/2021 1106 by Rica Anderson RN  Infection Prevention:   single patient room provided   rest/sleep promoted   hand hygiene promoted  Taken 6/14/2021 0923 by Rica Anderson RN  Infection Prevention:   single patient room provided   rest/sleep promoted   hand hygiene promoted  Taken 6/14/2021 0735 by Rica Anderson RN  Infection Prevention:   single patient  room provided   rest/sleep promoted   hand hygiene promoted  Goal: Optimal Comfort and Wellbeing  Outcome: Ongoing, Progressing  Intervention: Provide Person-Centered Care  Recent Flowsheet Documentation  Taken 6/14/2021 2525 by Rica Anderson RN  Trust Relationship/Rapport:   care explained   thoughts/feelings acknowledged  Goal: Readiness for Transition of Care  Outcome: Ongoing, Progressing   Goal Outcome Evaluation:  Plan of Care Reviewed With: patient         Patient rested throughout the shift. Patient had complaints of confusion. PT worked with patient and stated that home health would be a good option, MD agreed. Awaiting discharge for home health set up. Will continue to observe.

## 2021-06-14 NOTE — PLAN OF CARE
Goal Outcome Evaluation:       Pt presents with functional mobility impairments which indicate the need for skilled intervention. Tolerating session today without incident. Daughter reports plan is to go home with HHPT secondary to pt's confusion and not being able to tolerate a new environment such as inpt rehab.  Daughter states pt has an adjustable bed, 2 caregivers, a rollator and a w/c.  Due to pt's confusion and lack of sleep in the hospital setting recommend HHPT Will continue to follow and progress as tolerated.

## 2021-06-14 NOTE — THERAPY TREATMENT NOTE
Subjective: Pt agreeable to therapeutic plan of care. Daughter present in room throughout session.      Objective:     Bed mobility - Min-A and Mod-A  Mod A to scoot towards EOB  Transfers - 1st 2 attempts: mod/max with rollator secondary to hard posterior lean.  3rd attempt- donned pt's shoes min/mod A with rollator  Ambulation - 4 steps with rollator Min-A    Pain: 0 VAS  Education: Provided education on importance of mobility and skilled verbal / tactile cueing throughout intervention.     Assessment: Pt presents with functional mobility impairments which indicate the need for skilled intervention. Tolerating session today without incident. Daughter reports plan is to go home with HHPT secondary to pt's confusion and not being able to tolerate a new environment such as inpt rehab.  Daughter states pt has an adjustable bed, 2 caregivers, a rollator and a w/c.  Due to pt's confusion and lack of sleep in the hospital setting recommend HHPT Will continue to follow and progress as tolerated.     Plan/Recommendations:   Pt would benefit from Home with Home Health at discharge from facility    Pt desires Home with Home Health at discharge. Pt cooperative; agreeable to therapeutic recommendations and plan of care.     Basic Mobility 6-click:  Rollin = Total, A lot = 2, A little = 3; 4 = None  Supine>Sit:   1 = Total, A lot = 2, A little = 3; 4 = None   Sit>Stand with arms:  1 = Total, A lot = 2, A little = 3; 4 = None  Bed>Chair:   1 = Total, A lot = 2, A little = 3; 4 = None  Ambulate in room:  1 = Total, A lot = 2, A little = 3; 4 = None  3-5 Steps with railin = Total, A lot = 2, A little = 3; 4 = None  Score: 12    Modified [place]: 4 = Moderately severe disability (Unable to attend to own bodily needs without assistance, and unable to walk unassisted)     Post-Tx Position: Supine with HOB Elevated, Alarms activated and Call light and personal items within reach  PPE: gloves, surgical mask, eyewear  protection

## 2021-06-15 ENCOUNTER — HOME HEALTH ADMISSION (OUTPATIENT)
Dept: HOME HEALTH SERVICES | Facility: HOME HEALTHCARE | Age: 86
End: 2021-06-15

## 2021-06-15 VITALS
RESPIRATION RATE: 16 BRPM | SYSTOLIC BLOOD PRESSURE: 134 MMHG | BODY MASS INDEX: 27.35 KG/M2 | HEIGHT: 72 IN | HEART RATE: 70 BPM | OXYGEN SATURATION: 94 % | DIASTOLIC BLOOD PRESSURE: 79 MMHG | TEMPERATURE: 98.2 F | WEIGHT: 201.94 LBS

## 2021-06-15 LAB
ALBUMIN SERPL-MCNC: 3.8 G/DL (ref 3.5–5.2)
ALBUMIN/GLOB SERPL: 1.3 G/DL
ALP SERPL-CCNC: 149 U/L (ref 39–117)
ALT SERPL W P-5'-P-CCNC: <5 U/L (ref 1–41)
ANION GAP SERPL CALCULATED.3IONS-SCNC: 10 MMOL/L (ref 5–15)
AST SERPL-CCNC: 27 U/L (ref 1–40)
BASOPHILS # BLD AUTO: 0.1 10*3/MM3 (ref 0–0.2)
BASOPHILS NFR BLD AUTO: 1.4 % (ref 0–1.5)
BILIRUB SERPL-MCNC: 1.1 MG/DL (ref 0–1.2)
BUN SERPL-MCNC: 24 MG/DL (ref 8–23)
BUN/CREAT SERPL: 23.3 (ref 7–25)
CALCIUM SPEC-SCNC: 9.3 MG/DL (ref 8.6–10.5)
CHLORIDE SERPL-SCNC: 104 MMOL/L (ref 98–107)
CO2 SERPL-SCNC: 29 MMOL/L (ref 22–29)
CREAT SERPL-MCNC: 1.03 MG/DL (ref 0.76–1.27)
DEPRECATED RDW RBC AUTO: 49.9 FL (ref 37–54)
EOSINOPHIL # BLD AUTO: 0.2 10*3/MM3 (ref 0–0.4)
EOSINOPHIL NFR BLD AUTO: 4.1 % (ref 0.3–6.2)
ERYTHROCYTE [DISTWIDTH] IN BLOOD BY AUTOMATED COUNT: 16.7 % (ref 12.3–15.4)
GFR SERPL CREATININE-BSD FRML MDRD: 69 ML/MIN/1.73
GLOBULIN UR ELPH-MCNC: 3 GM/DL
GLUCOSE SERPL-MCNC: 95 MG/DL (ref 65–99)
HCT VFR BLD AUTO: 30.8 % (ref 37.5–51)
HGB BLD-MCNC: 10 G/DL (ref 13–17.7)
INR PPP: 2.02 (ref 2–3)
LYMPHOCYTES # BLD AUTO: 1.1 10*3/MM3 (ref 0.7–3.1)
LYMPHOCYTES NFR BLD AUTO: 20 % (ref 19.6–45.3)
MAGNESIUM SERPL-MCNC: 1.9 MG/DL (ref 1.6–2.4)
MCH RBC QN AUTO: 27.7 PG (ref 26.6–33)
MCHC RBC AUTO-ENTMCNC: 32.6 G/DL (ref 31.5–35.7)
MCV RBC AUTO: 84.9 FL (ref 79–97)
MONOCYTES # BLD AUTO: 0.7 10*3/MM3 (ref 0.1–0.9)
MONOCYTES NFR BLD AUTO: 13.5 % (ref 5–12)
NEUTROPHILS NFR BLD AUTO: 3.4 10*3/MM3 (ref 1.7–7)
NEUTROPHILS NFR BLD AUTO: 61 % (ref 42.7–76)
NRBC BLD AUTO-RTO: 0.1 /100 WBC (ref 0–0.2)
PHOSPHATE SERPL-MCNC: 3.1 MG/DL (ref 2.5–4.5)
PLATELET # BLD AUTO: 260 10*3/MM3 (ref 140–450)
PMV BLD AUTO: 6.5 FL (ref 6–12)
POTASSIUM SERPL-SCNC: 4 MMOL/L (ref 3.5–5.2)
PROT SERPL-MCNC: 6.8 G/DL (ref 6–8.5)
PROTHROMBIN TIME: 21.5 SECONDS (ref 19.4–28.5)
RBC # BLD AUTO: 3.62 10*6/MM3 (ref 4.14–5.8)
SARS-COV-2 ORF1AB RESP QL NAA+PROBE: NOT DETECTED
SODIUM SERPL-SCNC: 143 MMOL/L (ref 136–145)
WBC # BLD AUTO: 5.5 10*3/MM3 (ref 3.4–10.8)

## 2021-06-15 PROCEDURE — 99239 HOSP IP/OBS DSCHRG MGMT >30: CPT | Performed by: INTERNAL MEDICINE

## 2021-06-15 PROCEDURE — 85025 COMPLETE CBC W/AUTO DIFF WBC: CPT | Performed by: NURSE PRACTITIONER

## 2021-06-15 PROCEDURE — 92526 ORAL FUNCTION THERAPY: CPT

## 2021-06-15 PROCEDURE — 84100 ASSAY OF PHOSPHORUS: CPT | Performed by: NURSE PRACTITIONER

## 2021-06-15 PROCEDURE — 85610 PROTHROMBIN TIME: CPT | Performed by: NURSE PRACTITIONER

## 2021-06-15 PROCEDURE — 83735 ASSAY OF MAGNESIUM: CPT | Performed by: NURSE PRACTITIONER

## 2021-06-15 PROCEDURE — 97116 GAIT TRAINING THERAPY: CPT

## 2021-06-15 PROCEDURE — 97530 THERAPEUTIC ACTIVITIES: CPT

## 2021-06-15 PROCEDURE — 80053 COMPREHEN METABOLIC PANEL: CPT | Performed by: NURSE PRACTITIONER

## 2021-06-15 RX ORDER — CARVEDILOL 3.12 MG/1
3.12 TABLET ORAL DAILY
Qty: 30 TABLET | Refills: 0 | Status: SHIPPED | OUTPATIENT
Start: 2021-06-15 | End: 2021-06-15 | Stop reason: HOSPADM

## 2021-06-15 RX ORDER — CANDESARTAN 4 MG/1
2 TABLET ORAL DAILY
Qty: 30 TABLET | Refills: 0 | Status: SHIPPED | OUTPATIENT
Start: 2021-06-15 | End: 2021-06-15 | Stop reason: SDUPTHER

## 2021-06-15 RX ORDER — CANDESARTAN 4 MG/1
2 TABLET ORAL DAILY
Qty: 30 TABLET | Refills: 0 | Status: SHIPPED | OUTPATIENT
Start: 2021-06-15 | End: 2022-01-01 | Stop reason: SINTOL

## 2021-06-15 RX ADMIN — ESCITALOPRAM OXALATE 10 MG: 10 TABLET ORAL at 08:24

## 2021-06-15 RX ADMIN — FERROUS SULFATE TAB EC 324 MG (65 MG FE EQUIVALENT) 324 MG: 324 (65 FE) TABLET DELAYED RESPONSE at 08:24

## 2021-06-15 RX ADMIN — MULTIPLE VITAMINS W/ MINERALS TAB 1 TABLET: TAB at 08:24

## 2021-06-15 RX ADMIN — CARBIDOPA AND LEVODOPA 1 TABLET: 25; 100 TABLET ORAL at 08:23

## 2021-06-15 RX ADMIN — PANTOPRAZOLE SODIUM 40 MG: 40 TABLET, DELAYED RELEASE ORAL at 08:24

## 2021-06-15 RX ADMIN — POTASSIUM CHLORIDE 20 MEQ: 1500 TABLET, EXTENDED RELEASE ORAL at 08:23

## 2021-06-15 RX ADMIN — Medication 10 ML: at 08:23

## 2021-06-15 RX ADMIN — FUROSEMIDE 40 MG: 40 TABLET ORAL at 08:24

## 2021-06-15 NOTE — PLAN OF CARE
Goal Outcome Evaluation:      Pt seen for skilled dysphagia tx during meal assessment. Pt sitting up in chair feeding himself various trials of thins via straw, puree, soft and regular. Mildly slow but functional mastication with soft and regular consistencies. Pt able to clear oral cavity independently. No overt s/s of aspiration or changes in vocal quality observed at any time. Pt tolerating regular and thins at this time. Continue current diet. No further skilled ST services indicated at this time. Will sign off.

## 2021-06-15 NOTE — PLAN OF CARE
Problem: Fall Injury Risk  Goal: Absence of Fall and Fall-Related Injury  Outcome: Ongoing, Progressing  Intervention: Identify and Manage Contributors to Fall Injury Risk  Recent Flowsheet Documentation  Taken 6/15/2021 0928 by Rica Anderson RN  Medication Review/Management: medications reviewed  Taken 6/15/2021 0710 by Rica Anderson RN  Medication Review/Management: medications reviewed  Intervention: Promote Injury-Free Environment  Recent Flowsheet Documentation  Taken 6/15/2021 0928 by Rica Anderson RN  Safety Promotion/Fall Prevention:   safety round/check completed   nonskid shoes/slippers when out of bed   fall prevention program maintained  Taken 6/15/2021 0710 by Rica Anderson RN  Safety Promotion/Fall Prevention:   safety round/check completed   nonskid shoes/slippers when out of bed   fall prevention program maintained     Problem: Skin Injury Risk Increased  Goal: Skin Health and Integrity  Outcome: Ongoing, Progressing  Intervention: Optimize Skin Protection  Recent Flowsheet Documentation  Taken 6/15/2021 0710 by Rica Anderson RN  Pressure Reduction Techniques: frequent weight shift encouraged  Head of Bed (HOB): HOB elevated  Pressure Reduction Devices: pressure-redistributing mattress utilized  Skin Protection: tubing/devices free from skin contact     Problem: Adult Inpatient Plan of Care  Goal: Plan of Care Review  Outcome: Ongoing, Progressing  Flowsheets (Taken 6/15/2021 1049)  Plan of Care Reviewed With: patient  Goal: Patient-Specific Goal (Individualized)  Outcome: Ongoing, Progressing  Goal: Absence of Hospital-Acquired Illness or Injury  Outcome: Ongoing, Progressing  Intervention: Identify and Manage Fall Risk  Recent Flowsheet Documentation  Taken 6/15/2021 0928 by Rica Anderson RN  Safety Promotion/Fall Prevention:   safety round/check completed   nonskid shoes/slippers when out of bed   fall prevention program maintained  Taken 6/15/2021 0710 by  Monica, Rica P, RN  Safety Promotion/Fall Prevention:   safety round/check completed   nonskid shoes/slippers when out of bed   fall prevention program maintained  Intervention: Prevent Skin Injury  Recent Flowsheet Documentation  Taken 6/15/2021 0710 by Rica Anderson RN  Body Position: position maintained  Skin Protection: tubing/devices free from skin contact  Intervention: Prevent Infection  Recent Flowsheet Documentation  Taken 6/15/2021 0928 by Rica Anderson RN  Infection Prevention:   single patient room provided   rest/sleep promoted   hand hygiene promoted  Taken 6/15/2021 0710 by Rica Anderson RN  Infection Prevention:   single patient room provided   rest/sleep promoted   hand hygiene promoted  Goal: Optimal Comfort and Wellbeing  Outcome: Ongoing, Progressing  Intervention: Provide Person-Centered Care  Recent Flowsheet Documentation  Taken 6/15/2021 0710 by Rica Anderson RN  Trust Relationship/Rapport:   care explained   thoughts/feelings acknowledged  Goal: Readiness for Transition of Care  Outcome: Ongoing, Progressing   Goal Outcome Evaluation:  Plan of Care Reviewed With: patient         Patient rested throughout the shift. Case manger talked to patient about home health. The plan is to discharge home today. Daughter has been at bedside. Will continue to observe.

## 2021-06-15 NOTE — THERAPY TREATMENT NOTE
.Subjective: Pt agreeable to therapeutic plan of care.    Objective:     Bed mobility - Mod-A  Supine to sit and to scoot to edge of the bed.  Donned pt's shoes socks and shoes at eob  Transfers - Mod-A, Max-A and with rolling walker  With pt scooting out to edge of the chair, assist to position feet and cues to push up from sitting surface pt able to bring head forward and then requiring mod assist to come to full standing.  Pt still with posterior lean initially.    Ambulation - 14 feet x's 2 Min-A and Mod-A, narrow david, rigid posture.  First walk pt needed moderate assist.  2nd walk pt needed min assist.  (pt uses his rollator)    Pain: 0 VAS  Education: Provided education on importance of mobility and skilled verbal / tactile cueing throughout intervention.     Assessment: Rafael Ivey presents with functional mobility impairments which indicate the need for skilled intervention. Tolerating session today without incident.  Pt performed a little better today.  Pt still required a lot of assist with sit to stand transfers and gait is still unsteady. Pt and family has declined IP rehab. Will continue to follow and progress as tolerated.     Plan/Recommendations:   Pt would benefit from Inpatient Rehabilitation placement at discharge from facility   Pt and family desires Home with family assist and and Home Health at discharge.  Pt has 2 caregivers at home.   Pt cooperative; agreeable to therapeutic recommendations and plan of care.     Basic Mobility 6-click:  Rollin = Total, A lot = 2, A little = 3; 4 = None  Supine>Sit:   1 = Total, A lot = 2, A little = 3; 4 = None   Sit>Stand with arms:  1 = Total, A lot = 2, A little = 3; 4 = None  Bed>Chair:   1 = Total, A lot = 2, A little = 3; 4 = None  Ambulate in room:  1 = Total, A lot = 2, A little = 3; 4 = None  3-5 Steps with railin = Total, A lot = 2, A little = 3; 4 = None  Score: 11    Modified [place]: 4 = Moderately severe disability (Unable to  attend to own bodily needs without assistance, and unable to walk unassisted)     Post-Tx Position: Up in Chair, Alarms activated and Call light and personal items within reach.  Pt's daughters present.

## 2021-06-15 NOTE — THERAPY DISCHARGE NOTE
Acute Care - Speech Language Pathology   Swallow Treatment Note/Discharge  Gian     Patient Name: Rafael Ivey  : 1935  MRN: [id number]  Today's Date: 6/15/2021               Admit Date: 2021    Visit Dx:    ICD-10-CM ICD-9-CM   1. Baclofen overdose, accidental or unintentional, initial encounter  T42.8X1A 968.0     E855.1   2. Ventricular tachycardia (CMS/HCC)  I47.2 427.1     Patient Active Problem List   Diagnosis   • Sick sinus syndrome (CMS/HCC)   • Atrial fibrillation, chronic   • Pacemaker   • Bilateral leg edema   • Iron deficiency anemia due to chronic blood loss   • Warfarin-induced coagulopathy (CMS/HCC)   • Parkinson's disease (CMS/HCC)   • Aortic stenosis   • GERD (gastroesophageal reflux disease)   • Hyperlipidemia   • Essential hypertension   • Mitral regurgitation   • Nonrheumatic tricuspid valve disorder   • Pulmonary hypertension (CMS/HCC)   • Sleep apnea   • Valvular heart disease   • Hypotension   • Chronic diastolic CHF (congestive heart failure) (CMS/[facility])   • Anxiety disorder   • Overactive bladder   • Chronic pain   • RLS (restless legs syndrome)   • Obesity (BMI 30-39.9)   • Acute UTI (urinary tract infection)   • Hypokalemia   • Anasarca   • Acute on chronic combined systolic and diastolic CHF (congestive heart failure) (CMS/HCC)   • Depression   • Anemia   • Rectal ulceration   • Acute on chronic heart failure (CMS/HCC)   • Acute respiratory distress   • Pneumonia of right lung due to infectious organism   • Weakness   • Pleural effusion, right   • Baclofen overdose   • Ventricular tachycardia   • Acute encephalopathy, related to baclofen overdose     Past Medical History:   Diagnosis Date   • Anemia    • Arthritis     left hip    • Atrial fibrillation (CMS/HCC)    • Atrial fibrillation (CMS/HCC)     chronic   • Cardiomegaly     mild   • Chronic diastolic heart failure (CMS/HCC)    • GERD (gastroesophageal reflux disease)    • Hyperlipidemia    • Hypertension    •  Hypotension    • Lower extremity edema    • Mitral regurgitation    • Obesity    • Pacemaker    • Parkinson disease (CMS/HCC)    • Recurrent falls     with injury    • Sick sinus syndrome (CMS/HCC)     s/p pacemaker implant    • Valvular heart disease     MR     Past Surgical History:   Procedure Laterality Date   • APPENDECTOMY     • ATRIAL APPENDAGE EXCLUSION LEFT WITH TRANSESOPHAGEAL ECHOCARDIOGRAM N/A 5/28/2020    Procedure: Atrial Appendage Occlusion;  Surgeon: Jim Walker MD;  Location: [facility] CATH INVASIVE LOCATION;  Service: Cardiovascular;  Laterality: N/A;   • ATRIAL APPENDAGE EXCLUSION LEFT WITH TRANSESOPHAGEAL ECHOCARDIOGRAM N/A 5/28/2020    Procedure: Atrial Appendage Occlusion;  Surgeon: Lashaun So MD;  Location: [facility] CATH INVASIVE LOCATION;  Service: Cardiovascular;  Laterality: N/A;   • CATARACT EXTRACTION     • CHOLECYSTECTOMY     • COLONOSCOPY N/A 5/21/2020    Procedure: COLONOSCOPY WITH BIOPSY X1 AREA;  Surgeon: Sim Collins MD;  Location: [facility] ENDOSCOPY;  Service: Gastroenterology;  Laterality: N/A;  Post: poor prep, impacted stool in rectum, and internal hemorrhoids, ascending colon arteriovenous malformation   • ENDOSCOPY N/A 5/21/2020    Procedure: ESOPHAGOGASTRODUODENOSCOPY with clipping x 1 of bleeding gastric polyp;  Surgeon: Sim Collins MD;  Location: [facility] ENDOSCOPY;  Service: Gastroenterology;  Laterality: N/A;  Post: bleeding gastric polyp   • HIP SURGERY Right 08/2017   • OTHER SURGICAL HISTORY      skin mole excisions    • PACEMAKER IMPLANTATION  06/22/2017    [employer] ([employer])   • TONSILLECTOMY     • TOTAL HIP ARTHROPLASTY Left 05/20/2014          SWALLOW EVALUATION (last 72 hours)      SLP Adult Swallow Evaluation     Row Name 06/15/21 1200          Document Type  discharge treatment  -MF    Subjective Information  no complaints  -MF    Patient Observations  alert;cooperative;agree to therapy  -MF    Patient/Family/Caregiver  Comments/Observations  Family member at bedside.  -MF    Patient Effort  good  -    Comment  --          Additional Documentation  Pain Scale: FACES Pre/Post-Treatment (Group)  -          Pain: FACES Scale, Pretreatment  0-->no hurt  -    Posttreatment Pain Rating  0-->no hurt  -          Daily Summary of Progress (SLP)  progress toward functional goals is good  -    SLP Swallowing Diagnosis  swallow WFL  -    Swallow Criteria for Skilled Therapeutic Interventions Met  no problems identified which require skilled intervention  -          SLP Diet Recommendation  regular textures;thin liquids  -    Recommended Precautions and Strategies  upright posture during/after eating;small bites of food and sips of liquid  -    Oral Care Recommendations  Oral Care BID/PRN  -    SLP Rec. for Method of Medication Administration  as tolerated  -          Oral Nutrition/Hydration Goal Selection (SLP)  oral nutrition/hydration, SLP goal 1;oral nutrition/hydration, SLP goal 2;oral nutrition/hydration, SLP goal (free text)  -          Oral Nutrition/Hydration Goal 1, SLP  Pt will initiate a PO diet and tolerate diet with no complications from aspiration  -    Time Frame (Oral Nutrition/Hydration Goal 1, SLP)  by discharge  -    Barriers (Oral Nutrition/Hydration Goal 1, SLP)  Pt tolerating regular and thins at this time. Continue current diet. No further skilled ST services indicated at this time. Will sign off.  -    Progress/Outcomes (Oral Nutrition/Hydration Goal 1, SLP)  goal met  -          Oral Nutrition/Hydration Goal 2, SLP  Pt will participate in a swallow re-eval to assess swallow funcation and determine safest and least restrictive diet  -    Time Frame (Oral Nutrition/Hydration Goal 2, SLP)  1 day  -    Progress/Outcomes (Oral Nutrition/Hydration Goal 2, SLP)  goal met  -          Oral Nutrition/Hydration Goal, SLP  Pt will have full meal assessment within 48 hours.   -    Time Frame  (Oral Nutrition/Hydration Goal, SLP)  2 days  -MF    Barriers (Oral Nutrition/Hydration Goal, SLP)  Pt seen for skilled dysphagia tx during meal assessment. Pt sitting up in chair feeding himself various trials of thins via straw, puree, soft and regular. Mildly slow but functional mastication with soft and regular consistencies. Pt able to clear oral cavity independently. No overt s/s of aspiration or changes in vocal quality observed at any time.  -MF      User Key  (r) = Recorded By, (t) = Taken By, (c) = Cosigned By    Initials Name Effective Dates    CP Zo Monique, RONDA 03/01/19 -     Janice Romeo SLP 06/17/19 -           EDUCATION  The patient has been educated in the following areas:   diet recs and safe swallow strategies.    SLP Recommendation and Plan  SLP Swallowing Diagnosis: swallow WFL  SLP Diet Recommendation: regular textures, thin liquids           Swallow Criteria for Skilled Therapeutic Interventions Met: no problems identified which require skilled intervention                 Daily Summary of Progress (SLP): progress toward functional goals is good                        SLP GOALS     Row Name 06/15/21 1200 06/14/21 1400 06/13/21 1800       Oral Nutrition/Hydration Goal 1 (SLP)    Oral Nutrition/Hydration Goal 1, SLP  Pt will initiate a PO diet and tolerate diet with no complications from aspiration  -MF  Pt will initiate a PO diet and tolerate diet with no complications from aspiration  -LF  Pt will initiate a PO diet and tolerate diet with no complications from aspiration  -CP    Time Frame (Oral Nutrition/Hydration Goal 1, SLP)  by discharge  -MF  by discharge  -LF  by discharge  -CP    Barriers (Oral Nutrition/Hydration Goal 1, SLP)  Pt tolerating regular and thins at this time. Continue current diet. No further skilled ST services indicated at this time. Will sign off.  -MF  see below note  -LF  See above note. Pt put on a regular diet.   -CP    Progress/Outcomes (Oral  Nutrition/Hydration Goal 1, SLP)  goal met  -MF  goal ongoing  -LF  goal ongoing  -CP       Oral Nutrition/Hydration Goal 2 (SLP)    Oral Nutrition/Hydration Goal 2, SLP  Pt will participate in a swallow re-eval to assess swallow funcation and determine safest and least restrictive diet  -MF  Pt will participate in a swallow re-eval to assess swallow funcation and determine safest and least restrictive diet  -LF  Pt will participate in a swallow re-eval to assess swallow funcation and determine safest and least restrictive diet  -CP    Time Frame (Oral Nutrition/Hydration Goal 2, SLP)  1 day  -MF  1 day  -LF  1 day  -CP    Barriers (Oral Nutrition/Hydration Goal 2, SLP)  --  --  See above re-eval.   -CP    Progress/Outcomes (Oral Nutrition/Hydration Goal 2, SLP)  goal met  -MF  goal met  -LF  goal met  -CP       Oral Nutrition/Hydration Goal (SLP)    Oral Nutrition/Hydration Goal, SLP  Pt will have full meal assessment within 48 hours.   -MF  Pt will have full meal assessment within 48 hours.   -LF  Pt will have full meal assessment within 48 hours.   -CP    Time Frame (Oral Nutrition/Hydration Goal, SLP)  2 days  -MF  2 days  -LF  2 days  -CP    Barriers (Oral Nutrition/Hydration Goal, SLP)  Pt seen for skilled dysphagia tx during meal assessment. Pt sitting up in chair feeding himself various trials of thins via straw, puree, soft and regular. Mildly slow but functional mastication with soft and regular consistencies. Pt able to clear oral cavity independently. No overt s/s of aspiration or changes in vocal quality observed at any time.  -  Attempted to see pt for meal assessment, however, assessment limited d/t pt only being agreeable to trials of ice cream and water by straw. Pt pleasantly confused and sitting upright in bed. He self fed all trials. Adequate labial seal with no anterior loss. Timely oral transit. No s/s of aspiration observed at any time. Recommend pt continue current diet. ST will continue to  follow.  -LF  --    Progress/Outcomes (Oral Nutrition/Hydration Goal, SLP)  --  goal ongoing  -LF  --      User Key  (r) = Recorded By, (t) = Taken By, (c) = Cosigned By    Initials Name Provider Type    CP Zo Monique, SLP Speech and Language Pathologist    Janice Romeo, RONDA Speech and Language Pathologist    Liliana Nava, SLP Speech and Language Pathologist        Patient was not wearing a face mask during this therapy encounter. Therapist used appropriate personal protective equipment including mask, eye protection and gloves.  Mask used was standard procedure mask. Appropriate PPE was worn during the entire therapy session. Hand hygiene was completed before and after therapy session. Patient is not in enhanced droplet precautions.                    Time Calculation:       Therapy Charges for Today     Code Description Service Date Service Provider Modifiers Qty    [id number]  ST TREATMENT SWALLOW 3 6/15/2021 Janice Daily SLP GN 1                    RONDA Gastelum  6/15/2021

## 2021-06-15 NOTE — CASE MANAGEMENT/SOCIAL WORK
Continued Stay Note  MARK Springer     Patient Name: Rafael Ivey  MRN: [id number]  Today's Date: 6/15/2021    Admit Date: 6/11/2021    Discharge Plan     Row Name 06/15/21 1400       Plan    Plan  Declined IP rehab. Routine home with spouse and [facility], ordered and accepted.    Plan Comments  Confirmed with [facility] patient is accepted and order placed. Updated patient and daughter at bedside and questions answered. Updated patients spouse via phone call. Patient also has home caregivers. Dischaging today.    Final Discharge Disposition Code  06 - home with home health care    Final Note  Home with [facility] POLL[facility]        Met with patient in room wearing PPE: mask    Maintained distance greater than six feet and spent less than 15 minutes in the room.            Lnyne Thomas RN

## 2021-06-15 NOTE — PROGRESS NOTES
"Daily Progress Note    Baclofen overdose    Sick sinus syndrome (CMS/HCC)    Pacemaker    Parkinson's disease (CMS/HCC)    GERD (gastroesophageal reflux disease)    Pulmonary hypertension (CMS/HCC)    Sleep apnea    Chronic diastolic CHF (congestive heart failure) (CMS/HCC)    RLS (restless legs syndrome)    Depression    Ventricular tachycardia    Acute encephalopathy, related to baclofen overdose    Assessment    Cardiac arrest  While in the emergency department patient was observed to have an episode of ventricular tachycardia that lasted approximately 1 minute.  He became responsive, received 1 round of CPR and returned back to a paced rhythm before any further intervention could be initiated  He was sent to icu for observation    Baclofen overdose-accidental due to mixing up medication administration at home  Altered mental status improved  Ventricular tachycardia-resolved cardiology following    Plan    Anticoagulation coumadin  Diuresis lasix         LOS: 4 days       Subjective         Objective     Vital signs for last 24 hours:  Vitals:    06/14/21 1900 06/15/21 0258 06/15/21 0449 06/15/21 0720   BP: 105/62 144/77  127/70   BP Location: Left arm Left arm     Patient Position: Sitting Lying     Pulse: 74 70  68   Resp: 16 15     Temp: 98.6 °F (37 °C) 97.9 °F (36.6 °C)     TempSrc: Oral Axillary     SpO2: 95% 95%  94%   Weight:  91.6 kg (201 lb 15.1 oz) 91.6 kg (201 lb 15.1 oz)    Height: 182.9 cm (72\")          Intake/Output last 3 shifts:  I/O last 3 completed shifts:  In: -   Out: 1850 [Urine:1850]  Intake/Output this shift:  I/O this shift:  In: 230 [P.O.:230]  Out: -       Radiology  Imaging Results (Last 24 Hours)     ** No results found for the last 24 hours. **          Labs:  Results from last 7 days   Lab Units 06/15/21  0346   WBC 10*3/mm3 5.50   HEMOGLOBIN g/dL 10.0*   HEMATOCRIT % 30.8*   PLATELETS 10*3/mm3 260     Results from last 7 days   Lab Units 06/15/21  0346   SODIUM mmol/L 143 "   POTASSIUM mmol/L 4.0   CHLORIDE mmol/L 104   CO2 mmol/L 29.0   BUN mg/dL 24*   CREATININE mg/dL 1.03   CALCIUM mg/dL 9.3   BILIRUBIN mg/dL 1.1   ALK PHOS U/L 149*   ALT (SGPT) U/L <5   AST (SGOT) U/L 27   GLUCOSE mg/dL 95     Results from last 7 days   Lab Units 06/11/21  1611   PH, ARTERIAL pH units 7.435   PO2 ART mm Hg 91.7   PCO2, ARTERIAL mm Hg 40.4   HCO3 ART mmol/L 27.1     Results from last 7 days   Lab Units 06/15/21  0346 06/14/21  0858 06/13/21  0535   ALBUMIN g/dL 3.80 3.70 3.30*     Results from last 7 days   Lab Units 06/12/21  1158 06/12/21  0642 06/12/21  0126 06/11/21  1607 06/11/21  1309   CK TOTAL U/L  --   --   --  73 76   TROPONIN T ng/mL 0.038* 0.034* 0.036*  --  0.017         Results from last 7 days   Lab Units 06/15/21  0346   MAGNESIUM mg/dL 1.9     Results from last 7 days   Lab Units 06/15/21  0346 06/14/21  0858 06/13/21  0535   INR  2.02 2.29 2.61     Results from last 7 days   Lab Units 06/11/21  1607   TSH uIU/mL 2.690           Meds:   SCHEDULE  carbidopa-levodopa, 1 tablet, Oral, TID  escitalopram, 10 mg, Oral, Daily  ferrous sulfate, 324 mg, Oral, Daily With Breakfast  furosemide, 40 mg, Oral, Daily  multivitamin with minerals, 1 tablet, Oral, Daily  oxybutynin, 5 mg, Oral, Nightly  pantoprazole, 40 mg, Oral, QAM  potassium chloride, 20 mEq, Oral, TID  rosuvastatin, 10 mg, Oral, Nightly  sodium chloride, 10 mL, Intravenous, Q12H  warfarin, 2 mg, Oral, Once per day on Mon Tue Wed Thu Fri Sat      Infusions  Pharmacy to dose warfarin,       PRNs  •  acetaminophen **OR** acetaminophen  •  aluminum-magnesium hydroxide-simethicone  •  Diclofenac Sodium  •  ipratropium-albuterol  •  magnesium sulfate **OR** magnesium sulfate in D5W 1g/100mL (PREMIX)  •  nitroglycerin  •  ondansetron **OR** ondansetron  •  Pharmacy to dose warfarin  •  potassium chloride **OR** potassium chloride **OR** potassium chloride  •  potassium phosphate infusion greater than 15 mMoles **OR** potassium phosphate  infusion greater than 15 mMoles **OR** potassium phosphate **OR** sodium phosphate IVPB **OR** sodium phosphate IVPB **OR** sodium phosphate IVPB  •  sodium chloride    Physical Exam:  Physical Exam  Vitals reviewed.   Cardiovascular:      Heart sounds: Murmur heard.     Pulmonary:      Breath sounds: Examination of the right-middle field reveals decreased breath sounds. Examination of the left-middle field reveals decreased breath sounds. Examination of the right-lower field reveals decreased breath sounds. Examination of the left-lower field reveals decreased breath sounds. Decreased breath sounds and rhonchi present.   Skin:     General: Skin is warm and dry.   Neurological:      Mental Status: He is alert.         ROS  Review of Systems   Unable to perform ROS: Other   Respiratory: Positive for cough and shortness of breath.    Cardiovascular: Positive for palpitations.       I reviewed the patient's new clinical results.      Electronically signed by STEPHANY Mello, 06/15/21 at 11:33 EDT.

## 2021-06-15 NOTE — DISCHARGE SUMMARY
[facility] Medicine Services  DISCHARGE SUMMARY        Prepared For PCP:  Blake Ivey MD    Patient Name: Rafael Ivey  : 1935  MRN: [id number]      Date of Admission:   2021    Date of Discharge:  6/15/2021    Length of stay:  LOS: 4 days     Hospital Course     Presenting Problem:   Ventricular tachycardia (CMS/HCC) [I47.2]  Baclofen overdose, accidental or unintentional, initial encounter [T42.8X1A]      Active Hospital Problems    Diagnosis  POA   • **Baclofen overdose [T42.8X1A]  Yes   • Ventricular tachycardia [I47.2]  Yes   • Acute encephalopathy, related to baclofen overdose [G93.40]  Yes   • Depression [F32.9]  Yes   • Chronic diastolic CHF (congestive heart failure) (CMS/HCC) [I50.32]  Yes   • GERD (gastroesophageal reflux disease) [K21.9]  Yes   • Sleep apnea [G47.30]  Yes   • RLS (restless legs syndrome) [G25.81]  Yes   • Parkinson's disease (CMS/HCC) [G20]  Yes   • Sick sinus syndrome (CMS/HCC) [I49.5]  Yes   • Pacemaker [Z95.0]  Yes   • Pulmonary hypertension (CMS/HCC) [I27.20]  Yes      Resolved Hospital Problems   No resolved problems to display.     Baclofen overdose-accidental due to mixing up medication administration at home  -Urine drug screen appropriate  -Tylenol and aspirin levels unremarkable  -dc baclofen   -Speech therapy evaluating patient     Altered mental status-due to medication overdose, patient appearing back to baseline  -No signs of superimposed infection  -CT head unremarkable  -Patient work with physical therapy and speech therapy     Ventricular tachycardia-noted in the emergency department lasting for a minute with initiation of chest compressions as patient unresponsive, resolved  -Cardiology consulted, recommending discontinuing baclofen any QT prolonging medications  -Cardiac monitoring  -Troponins being monitored  -Patient with pacemaker     Anemia-patient with underlying chronic component  -Monitor daily     Acute  respiratory failure-patient with component likely due to accidental overdose patient now improving  -Intensivist consulted  -Wean oxygen as tolerated  -Speech therapy consulted to monitor for signs of aspiration     Atrial fibrillation-patient with history of pacemaker and sick sinus syndrome and is on warfarin anticoagulated  -Continue anticoagulation pharmacy to dose  -Cardiology consulted  -Cardiac monitoring     Heart failure-chronic diastolic no signs of volume overload at this time  -Monitor volume status  -Check weights     Parkinson's disease-patient on medication for symptom control  -Resume Sinemet     RLS/GERD/depression/OAB-chronic in nature  -Resume home medication as appropriate     FOUZIA-monitor for nocturnal hypoxia     Hospital Course:  Rafael Ivey is a 85 y.o. male with past medical history of afib, pacemaker, chronic diastolic heart failure, valvular heart disease, hypotension, hyperlipidemia, parkinson's, anemia, arthritis, GERD, and obesity who presented to [facility] ED [facility]/11/21 for decreased responsiveness over the past several days. It was then discovered that patient's wife, who manages patient's medications, had been accidentally giving this patient 3 times his normal dose of daily baclofen.  Patient, per family is prescribed 15 mg daily, but patient had been receiving 45 mg daily for the past week.  Patient was also given less of his Sinemet, as patient's wife had reversed the dosing administrations between the medications.  Subsequently, patient had an episode of ventricular tachycardia in the ED that lasted approximately 1 minute, and at that time patient became unresponsive, in which he received per nursing 1 round of CPR, and patient returned back to a paced rhythm before any further intervention.  Patient's neuro status was further obtunded than his initial presentation following the ventricular tachycardia.     In the ED, labs were obtained with abnormalities as follows: Alkaline phosphatase  136, troponin 0.017, magnesium 1.8, hemoglobin 9.6, hematocrit 29.6.  UA was negative, acetaminophen <5.0, ethanol <0.010, salicylate <0.3, and urine drug screen negative.  CT of the head was obtained and revealed no acute intracranial abnormality, generalized cerebral atrophy.  EKG was obtained revealing a paced rhythm.  Due to patient's altered mental status, the patient was admitted to the ICU overnight for suspected altered mental status secondary to unintentional baclofen overdose.       Date:  6/12/2021 patient's mentation improved and vital signs stable.  Patient downgraded out of ICU. Hospitalist group was consulted for medical management  Upon exam patient is alert and oriented x3. Still remains weak. Daughter at bedside. Pt lives at home with wife and has lift chair. He also has caregivers. Does not want to go to rehab at discharge.   PT/ST ordered   6/13/21  Patient much more awake and alert today.  Family at bedside reporting patient appearing near baseline.  Awaiting reevaluation with speech therapy to advance diet.  No chest pain or shortness of breath.  Neck pain improving.  Cardiology evaluated patient regarding transient VT do not feel ischemic work-up warranted at this time.  Recommending discontinuing baclofen.    6/14/21 patient seen and examined in bed no acute distress, family at bedside, patient still confused.  Will await physical therapy evaluation.  6/15/21 better today, will discharge back home.  Discussed with RN.  Discussed with family members.    Recommendation for Outpatient Providers:   Discontinue baclofen      Reasons For Change In Medications and Indications for New Medications:  Atacand, added for CHF.      Day of Discharge     HPI:       Vital Signs:   Temp:  [97.9 °F (36.6 °C)-98.6 °F (37 °C)] 98.2 °F (36.8 °C)  Heart Rate:  [68-74] 70  Resp:  [15-16] 16  BP: (105-144)/(62-79) 134/79       Physical Exam  Vitals and nursing note reviewed.   Constitutional:       General: He is  not in acute distress.     Appearance: He is well-developed. He is not ill-appearing.   HENT:      Head: Normocephalic and atraumatic.      Nose: Nose normal. No congestion or rhinorrhea.      Mouth/Throat:      Mouth: Mucous membranes are moist.      Pharynx: No oropharyngeal exudate.   Eyes:      General:         Right eye: No discharge.      Extraocular Movements: Extraocular movements intact.      Conjunctiva/sclera: Conjunctivae normal.      Pupils: Pupils are equal, round, and reactive to light.   Neck:      Thyroid: No thyromegaly.      Vascular: No carotid bruit or JVD.      Trachea: No tracheal deviation.   Cardiovascular:      Rate and Rhythm: Normal rate and regular rhythm.      Pulses: Normal pulses.      Heart sounds: Normal heart sounds. No murmur heard.   No friction rub.   Pulmonary:      Effort: Pulmonary effort is normal. No respiratory distress.      Breath sounds: Normal breath sounds. No stridor.   Abdominal:      General: Bowel sounds are normal. There is no distension.      Palpations: Abdomen is soft. There is no mass.      Tenderness: There is no abdominal tenderness.   Musculoskeletal:         General: No swelling, tenderness or deformity. Normal range of motion.      Cervical back: Normal range of motion and neck supple. No rigidity. No muscular tenderness.   Lymphadenopathy:      Cervical: No cervical adenopathy.   Skin:     General: Skin is warm and dry.      Coloration: Skin is not jaundiced or pale.      Findings: Bruising present. No erythema or rash.   Neurological:      General: No focal deficit present.      Mental Status: He is alert and oriented to person, place, and time.      Cranial Nerves: No cranial nerve deficit.      Sensory: No sensory deficit.      Motor: Weakness present. No abnormal muscle tone.      Coordination: Coordination normal.   Psychiatric:         Mood and Affect: Mood normal.         Behavior: Behavior normal.         Thought Content: Thought content normal.          Pertinent  and/or Most Recent Results     Results from last 7 days   Lab Units 06/15/21  0346 06/14/21  0858 06/13/21  0535 06/12/21  0126 06/11/21  1607 06/11/21  1309   WBC 10*3/mm3 5.50 4.70 4.90 5.10  --  4.90   HEMOGLOBIN g/dL 10.0* 9.9* 9.0* 9.3*  --  9.6*   HEMATOCRIT % 30.8* 30.0* 27.3* 28.6*  --  29.6*   PLATELETS 10*3/mm3 260 279 234 250  --  279   SODIUM mmol/L 143 144 143  --  145 142   POTASSIUM mmol/L 4.0 3.6 3.5  --  3.7 3.7   CHLORIDE mmol/L 104 104 106  --  106 104   CO2 mmol/L 29.0 28.0 25.0  --  26.0 27.0   BUN mg/dL 24* 24* 25*  --  22 23   CREATININE mg/dL 1.03 0.96 0.94  --  0.99 0.99   GLUCOSE mg/dL 95 119* 77  --  100* 105*   CALCIUM mg/dL 9.3 9.3 8.8  --  9.2 9.4     Results from last 7 days   Lab Units 06/15/21  0346 06/14/21  0858 06/13/21  0535 06/12/21  0642 06/12/21  0126 06/11/21  1607 06/11/21  1309   BILIRUBIN mg/dL 1.1 1.4* 1.3*  --   --  1.0  --    ALK PHOS U/L 149* 149* 136*  --   --  136*  --    ALT (SGPT) U/L <5 7 <5  --   --  21  --    AST (SGOT) U/L 27 27 24  --   --  26  --    PROTIME Seconds 21.5 24.2 27.4 24.7 26.2  --  23.5   INR  2.02 2.29 2.61 2.34 2.49  --  2.22     Results from last 7 days   Lab Units 06/12/21  0126   CHOLESTEROL mg/dL 103   TRIGLYCERIDES mg/dL 50   HDL CHOL mg/dL 42     Results from last 7 days   Lab Units 06/14/21  0858 06/12/21  1158 06/12/21  0642 06/12/21  0126 06/11/21  1607 06/11/21  1309   TSH uIU/mL  --   --   --   --  2.690  --    CORTISOL mcg/dL  --   --   --   --  14.16  --    TROPONIN T ng/mL  --  0.038* 0.034* 0.036*  --  0.017   PROCALCITONIN ng/mL 0.06  --   --   --   --   --        Brief Urine Lab Results  (Last result in the past 365 days)      Color   Clarity   Blood   Leuk Est   Nitrite   Protein   CREAT   Urine HCG        06/11/21 1315 Yellow Clear Negative Negative Negative Negative               Microbiology Results Abnormal     None          CT Head Without Contrast    Result Date: 6/11/2021  Impression: 1. No acute  intracranial abnormality. 2. Generalized cerebral atrophy.  Electronically Signed By-Jason Dan MD On:6/11/2021 3:28 PM This report was finalized on [id number] by  Jason Dan MD.    XR Chest 1 View    Result Date: 6/14/2021  Impression: 1.Increased bilateral perihilar and bibasilar airspace opacities, likely due to pulmonary edema with suspected moderate-sized right pleural effusion. 2.Enlarged cardiac silhouette.  Electronically Signed By-Manisha Malone MD On:6/14/2021 8:14 AM This report was finalized on [id number] by  Manisha Malone MD.      Results for orders placed during the hospital encounter of 06/12/20    Duplex Venous Lower Extremity - Bilateral CAR    Interpretation Summary  · Normal bilateral lower extremity venous duplex scan.  · Left popliteal fossa heterogenous fluid collection.      Results for orders placed during the hospital encounter of 06/12/20    Duplex Venous Lower Extremity - Bilateral CAR    Interpretation Summary  · Normal bilateral lower extremity venous duplex scan.  · Left popliteal fossa heterogenous fluid collection.      Results for orders placed during the hospital encounter of 06/11/21    Adult Transthoracic Echo Complete W/ Cont if Necessary Per Protocol    Interpretation Summary  · Left ventricular wall thickness is consistent with mild concentric hypertrophy.  · Estimated left ventricular EF = 65% Estimated left ventricular EF was in agreement with the calculated left ventricular EF. Left ventricular systolic function is normal.  · Moderate mitral valve regurgitation is present.  · There is mainly affecting the non-coronary and right coronary cusp(s).  · Severe tricuspid valve regurgitation is present.  · Estimated right ventricular systolic pressure from tricuspid regurgitation is moderately elevated (45-55 mmHg).  · Moderate pulmonary hypertension is present.  · The right ventricular cavity is moderately dilated.  · Moderately reduced right ventricular systolic  function noted.  · Left ventricular diastolic function is consistent with (grade III w/high LAP) reversible restrictive pattern.  · Mild dilation of the aortic root is present.  · The right atrial cavity is moderate to severely dilated.  · Moderate aortic valve stenosis is present.  · Likely underestimation of the severity of the aortic stenosis secondary to poor quality of the images and the technical difficulties consider a PRESTON if there is a clinical concern for significant aortic stenosis based on physical exam              Test Results Pending at Discharge  Pending Labs     Order Current Status    COVID PRE-OP / PRE-PROCEDURE SCREENING ORDER (NO ISOLATION) - Swab, Nasopharynx In process    COVID-19,APTIMA PANTHER,[facility] IN-HOUSE, NP/OP SWAB IN UTM/VTM/SALINE TRANSPORT MEDIA,24 HR TAT - Swab, Nasopharynx In process            Procedures Performed           Consults:   Consults     Date and Time Order Name Status Description    6/12/2021  7:31 AM Inpatient Hospitalist Consult      6/12/2021 12:50 AM Inpatient Cardiology Consult Completed     6/11/2021  3:32 PM Intensivist (on-call MD unless specified) Completed         Assessment     Cardiac arrest  While in the emergency department patient was observed to have an episode of ventricular tachycardia that lasted approximately 1 minute.  He became responsive, received 1 round of CPR and returned back to a paced rhythm before any further intervention could be initiated  He was sent to icu for observation     Baclofen overdose-accidental due to mixing up medication administration at home  Altered mental status improved  Ventricular tachycardia-resolved cardiology following     Plan     Anticoagulation coumadin  Diuresis lasix  ===================================================    ASSESSMENT  Accidental baclofen overdose  Sustained ventricular tachycardia  Abnormal cardiac enzymes  Dysphagia  Chronic and persistent atrial fibrillation  Chronic diastolic heart  failure  Sick sinus syndrome  Single-chamber pacemaker in situ  Anticoagulation secondary to warfarin  History of frequent falls  Valvular heart disease     Mitral valve insufficiency  Moderate aortic valve stenosis  Chronic diastolic congestive heart failure acute on chronic     PLAN  Patient's daughter has been involved in discussions with nursing to determine best avenue for managing patient's home medications.  Reportedly wife has some vision loss.  Patient has had no further ectopy.  Patient suspicion has some degree of possible aspiration  Flat troponins no evidence of acute coronary syndrome, evidence of VT and CPR likely with some degree of troponin elevation as well  Avoid QT prolonging medicines  Diuresis will continue, correct electrolytes for potassium greater than 4 magnesium greater than 2  Aspiration precautions  Supplemental oxygen  Acute exacerbation of diastolic congestive heart failure,, diuretics  Stable valvular heart disease     Will be followed by primary cardiology tomorrow     Further recommendation follow findings and clinical course      Discharge Details        Discharge Medications      New Medications      Instructions Start Date   candesartan 4 MG tablet  Commonly known as: Atacand   2 mg, Oral, Daily      carvedilol 3.125 MG tablet  Commonly known as: Coreg   3.125 mg, Oral, Daily         Continue These Medications      Instructions Start Date   carbidopa-levodopa  MG per tablet  Commonly known as: SINEMET   1.5 tablets, Oral, 3 Times Daily      escitalopram 10 MG tablet  Commonly known as: LEXAPRO   10 mg, Oral, Daily      ferrous sulfate 325 (65 FE) MG tablet   1 tablet, Oral, Daily With Breakfast      FISH OIL PO   2 capsules, Oral, Daily      furosemide 40 MG tablet  Commonly known as: LASIX   40 mg, Oral, Daily      metOLazone 2.5 MG tablet  Commonly known as: ZAROXOLYN   2.5 mg, Oral, Daily PRN      multivitamin-minerals tablet tablet  Generic drug: multivitamin with  minerals   1 tablet, Oral, Daily      nitroglycerin 0.4 MG SL tablet  Commonly known as: NITROSTAT   0.4 mg, Sublingual, Every 5 Minutes PRN, Take no more than 3 doses in 15 minutes.      ondansetron 4 MG tablet  Commonly known as: ZOFRAN   4 mg, Oral, Every 6 Hours PRN      oxybutynin 5 MG tablet  Commonly known as: DITROPAN   5 mg, Oral, Nightly      potassium chloride 20 MEQ CR tablet  Commonly known as: K-DUR,KLOR-CON   20 mEq, Oral, 3 Times Daily      PrilOSEC 20 MG capsule  Generic drug: omeprazole   1 capsule, Oral, Daily      rosuvastatin 10 MG tablet  Commonly known as: CRESTOR   1 tablet, Oral, Every Night at Bedtime      warfarin 2 MG tablet  Commonly known as: COUMADIN   2 mg, Oral, Nightly, Taking every day but Sunday         Stop These Medications    acetaminophen 325 MG tablet  Commonly known as: TYLENOL     baclofen 10 MG tablet  Commonly known as: LIORESAL     clonazePAM 0.5 MG tablet  Commonly known as: KlonoPIN     gabapentin 300 MG capsule  Commonly known as: NEURONTIN            Allergies   Allergen Reactions   • Amoxicillin Diarrhea   • Cephalexin Diarrhea and Nausea And Vomiting   • Penicillins Other (See Comments)   • Amoxicillin-Pot Clavulanate Nausea And Vomiting   • Clarithromycin Nausea And Vomiting         Discharge Disposition:  Home-Health Care c    Diet:  Hospital:  Diet Order   Procedures   • Diet Regular         Discharge Activity:   Activity Instructions     Gradually Increase Activity Until at Pre-Hospitalization Level              CODE STATUS:    Code Status and Medical Interventions:   Ordered at: 06/11/21 1605     Code Status:    CPR     Medical Interventions (Level of Support Prior to Arrest):    Full         Follow-up Appointments  Future Appointments   Date Time Provider Department Center   9/10/2021  1:30 PM Jim Walker MD [facility]   11/10/2021  3:00 PM Marnie De La Paz APRN [facility]       Additional Instructions for the Follow-ups that You  Need to Schedule     Ambulatory Referral to Home Health   As directed      Face to Face Visit Date: 6/15/2021    Follow-up provider for Plan of Care?: I treated the patient in an acute care facility and will not continue treatment after discharge.    Follow-up provider: CHEYENNE IVEY [[id number]]    Reason/Clinical Findings: post hospital evaluation    Describe mobility limitations that make leaving home difficult: weakness    Nursing/Therapeutic Services Requested: Skilled Nursing    Skilled nursing orders: Other (nursing med teaching, med setup instructions, disease process teaching)    Frequency: 1 Week 1         Discharge Follow-up with PCP   As directed       Currently Documented PCP:    Cheyenne Ivey MD    PCP Phone Number:    [phone]     Follow Up Details: 1 week                 Condition on Discharge:      Stable      This patient has been examined wearing appropriate Personal Protective Equipment and discussed with rn. 06/15/21      Electronically signed by Elian Templeton MD, 06/15/21, 1:11 PM EDT.      Time: I spent  34 minutes on this discharge activity which included face-to-face encounter with the patient/reviewing the data in the system/coordination of the care with the nursing staff as well as consultants/documentation/entering orders.

## 2021-06-15 NOTE — PROGRESS NOTES
"Pharmacy dosing service  Anticoagulant  Warfarin     Subjective:    Rafael Ivey is a 85 y.o.male being continued on warfarin for atrial fibrillation and sick sinus syndrome .    INR Goal: 2 - 3  Home medication?:  warfarin 2 mg nightly except Sundays   Bridge Therapy Present?:  No  Interacting Medications Evaluation (New/Present/Discontinued): n/a  Additional Contributing Factors: n/a      Assessment/Plan:    INR continues to be therapeutic. Will continue home regimen.     Continue to monitor and adjust based on INR.         Date 6/12 6/13 6/14 6/15        INR 2.34 2.61 2.29 2.02        Dose 2 mg Held on sundays 2 mg 2 mg            Objective:  [Ht: 182.9 cm (72\"); Wt: 91.6 kg (201 lb 15.1 oz); BMI: Body mass index is 27.39 kg/m².]    Lab Results   Component Value Date    ALBUMIN 3.80 06/15/2021     Lab Results   Component Value Date    INR 2.02 06/15/2021    INR 2.29 06/14/2021    INR 2.61 06/13/2021    PROTIME 21.5 06/15/2021    PROTIME 24.2 06/14/2021    PROTIME 27.4 06/13/2021     Lab Results   Component Value Date    HGB 10.0 (L) 06/15/2021    HGB 9.9 (L) 06/14/2021    HGB 9.0 (L) 06/13/2021     Lab Results   Component Value Date    HCT 30.8 (L) 06/15/2021    HCT 30.0 (L) 06/14/2021    HCT 27.3 (L) 06/13/2021       Lou Carey, Grant  06/15/21 10:54 EDT           "

## 2021-06-15 NOTE — PLAN OF CARE
Goal Outcome Evaluation:     Bed mobility - Mod-A  Supine to sit and to scoot to edge of the bed.  Donned pt's shoes socks and shoes at eob  Transfers - Mod-A, Max-A and with rolling walker  With pt scooting out to edge of the chair, assist to position feet and cues to push up from sitting surface pt able to bring head forward and then requiring mod assist to come to full standing.  Pt still with posterior lean initially.    Ambulation - 14 feet x's 2 Min-A and Mod-A, narrow david, rigid posture.  First walk pt needed moderate assist.  2nd walk pt needed min assist.  (pt uses his rollator)    Pt would benefit from Inpatient Rehabilitation placement at discharge from facility   Pt and family desires Home with family assist and and Home Health at discharge.  Pt has 2 caregivers at home.   Pt cooperative; agreeable to therapeutic recommendations and plan of care.

## 2021-06-16 ENCOUNTER — HOME CARE VISIT (OUTPATIENT)
Dept: HOME HEALTH SERVICES | Facility: HOME HEALTHCARE | Age: 86
End: 2021-06-16

## 2021-06-16 ENCOUNTER — READMISSION MANAGEMENT (OUTPATIENT)
Dept: CALL CENTER | Facility: HOSPITAL | Age: 86
End: 2021-06-16

## 2021-06-16 PROCEDURE — G0299 HHS/HOSPICE OF RN EA 15 MIN: HCPCS

## 2021-06-16 NOTE — OUTREACH NOTE
Prep Survey      Responses   [facility] discharged from?  [facility]   Is LACE score < 7 ?  No   Emergency Room discharge w/ pulse ox?  No   Eligibility  Readm Mgmt   Discharge diagnosis  unintentional baclofen overdose.     Does the patient have one of the following disease processes/diagnoses(primary or secondary)?  Other   Does the patient have Home health ordered?  Yes   What is the Home health agency?   [facility]   Is there a DME ordered?  No   Prep survey completed?  Yes          Sandra Wiggins RN

## 2021-06-17 ENCOUNTER — HOME CARE VISIT (OUTPATIENT)
Dept: HOME HEALTH SERVICES | Facility: HOME HEALTHCARE | Age: 86
End: 2021-06-17

## 2021-06-17 PROCEDURE — G0151 HHCP-SERV OF PT,EA 15 MIN: HCPCS

## 2021-06-17 NOTE — CASE COMMUNICATION
[facility] Summary POD 3 - 6/17/2021  Admitted by SN 6/16/2021.  Recently hospitalized due to improperly taking medications and bout of Afib. Has returned home but is very weak per SN.  PT/INR was sub-therapeutic.  Returns to [facility] on 6/21/2021.    Participating: Gualberto Rey, PT, Azalia Harvey, YAEL; Via electronic communication: Lou Lancaster RN, Clinical Manager.

## 2021-06-17 NOTE — CASE COMMUNICATION
60 Day Summary SOC pt discharged from [facility] following stay due to accidental overdose of antiparkinson medication wife gave to much accidentally set up medi planner this way. Pt became very weak. Taken to ER while there episode of V fib pacemaker converted him before had to be shocked.     Home Health need for: Medication teaching PT INR as ordered to monitor Warfarin Cardiopulmonary assessment . Assess for falls injuries assess s.s. bleeding     Primary diagnoses/co-morbidities/recent procedures in past 60 days that impact current episode: accidental overdose. Parkinsons A fib long term anticoagulant therapy    Current level of functional ability: ambulating only short distance also using wheelchair due to weakness.     Homebound status and living arrangements: Lives with wife also CG that helps intermittently Unsteady gait fall risk limited endurance fatigues easily currently needs close stand by assist to leave home.     Focus of care for next 60 days for each discipline ordered:SN Compliance with medications knowledgeable of meds.       Skin integrity/wound status: Intact at SOC    Code status: Living Will     Most recent fall risk: 10     Estimated date when home care services will end 60 daus     Plan of Care confirmed with Debbie representative for Dr. Ivey on 6.16.2021     Planned visits SN 2wk1 1wk 6  PT eval 1wk1

## 2021-06-18 ENCOUNTER — HOME CARE VISIT (OUTPATIENT)
Dept: HOME HEALTH SERVICES | Facility: HOME HEALTHCARE | Age: 86
End: 2021-06-18

## 2021-06-18 VITALS
RESPIRATION RATE: 20 BRPM | DIASTOLIC BLOOD PRESSURE: 70 MMHG | HEART RATE: 72 BPM | OXYGEN SATURATION: 99 % | TEMPERATURE: 98.7 F | SYSTOLIC BLOOD PRESSURE: 120 MMHG

## 2021-06-18 VITALS
TEMPERATURE: 97.6 F | OXYGEN SATURATION: 99 % | HEART RATE: 69 BPM | SYSTOLIC BLOOD PRESSURE: 122 MMHG | DIASTOLIC BLOOD PRESSURE: 64 MMHG

## 2021-06-18 PROCEDURE — G0299 HHS/HOSPICE OF RN EA 15 MIN: HCPCS

## 2021-06-18 NOTE — HOME HEALTH
Mr. Rafael Ivey is an 85-year old patient who lives in one-story home with spouse and was recently hospitalized due to accidental overdose on baclofen for about a week as wife was inadvertently giving more than 3 times the prescribed dose. He was discharged home on 6/17/21 following about 3-day Hospital stay. They have a caregiver who lives in their basement and works third shift but assisting as needed when home. Daughter is also staying for a little while to assist as needed until patient gets stronger. Mr. Ivye was able to ambulate safely indoor with rollator walker but needed assisted of caregiver for outdoor ambulation.    Presently, patient is weaknes and short of breath upon minimal exertions. Functionally, he needs min assist with bed mobility, sit to stand transfer and for short distances indoor with rollator walker, requiring constant verbal/visual cues for increase steps length to facilitate feet clearance and continous steps. He would therefore benefit from further skilled PT services for therapeutic/home exercise program, bed mobility, transfer teaching and gait training.    PMH: Parkinson's disease. A-fib. Chronic heart failure. Hyperlipidemia. Valvular heart diseae. GERD.

## 2021-06-21 ENCOUNTER — READMISSION MANAGEMENT (OUTPATIENT)
Dept: CALL CENTER | Facility: HOSPITAL | Age: 86
End: 2021-06-21

## 2021-06-21 NOTE — OUTREACH NOTE
Medical Week 1 Survey      Responses   [facility], [place], operated by [facility] patient discharged from?  [facility]   Does the patient have one of the following disease processes/diagnoses(primary or secondary)?  Other   Week 1 attempt successful?  Yes   Call start time  1900   Call end time  1905   Discharge diagnosis  unintentional baclofen overdose.     Person spoke with today (if not patient) and relationship  patient and wife   Meds reviewed with patient/caregiver?  Yes   Is the patient having any side effects they believe may be caused by any medication additions or changes?  No   Does the patient have all medications ordered at discharge?  Yes   Is the patient taking all medications as directed (includes completed medication regime)?  Yes   Medication comments  States they are now getting medications in packets to better to track medications.  PCP has restarted stopped meds   Does the patient have a primary care provider?   Yes   Does the patient have an appointment with their PCP within 7 days of discharge?  Yes   Has the patient kept scheduled appointments due by today?  Yes   What is the Home health agency?   [facility]   Has home health visited the patient within 72 hours of discharge?  No   Home health comments  Phone # verified and wife states she will try to contact them again   Psychosocial issues?  No   Did the patient receive a copy of their discharge instructions?  Yes   Nursing interventions  Reviewed instructions with patient   What is the patient's perception of their health status since discharge?  Improving   Is the patient/caregiver able to teach back signs and symptoms related to disease process for when to call PCP?  Yes   Is the patient/caregiver able to teach back signs and symptoms related to disease process for when to call 911?  Yes   Is the patient/caregiver able to teach back the hierarchy of who to call/visit for symptoms/problems? PCP, Specialist, Home health nurse, Urgent Care, ED, 911  Yes   If the  patient is a current smoker, are they able to teach back resources for cessation?  Not a smoker   Additional teach back comments  States he is doing well and has followed up with his PCP.   Week 1 call completed?  Yes   Wrap up additional comments  Wife to contact home health.          Radha Haynes LPN

## 2021-06-22 ENCOUNTER — HOME CARE VISIT (OUTPATIENT)
Dept: HOME HEALTH SERVICES | Facility: HOME HEALTHCARE | Age: 86
End: 2021-06-22

## 2021-06-22 VITALS
HEIGHT: 72 IN | RESPIRATION RATE: 20 BRPM | BODY MASS INDEX: 26.95 KG/M2 | OXYGEN SATURATION: 97 % | WEIGHT: 199 LBS | SYSTOLIC BLOOD PRESSURE: 110 MMHG | DIASTOLIC BLOOD PRESSURE: 60 MMHG | TEMPERATURE: 98.1 F | HEART RATE: 76 BPM

## 2021-06-22 VITALS
OXYGEN SATURATION: 98 % | TEMPERATURE: 98 F | DIASTOLIC BLOOD PRESSURE: 74 MMHG | RESPIRATION RATE: 20 BRPM | HEART RATE: 64 BPM | SYSTOLIC BLOOD PRESSURE: 118 MMHG

## 2021-06-22 PROCEDURE — G0299 HHS/HOSPICE OF RN EA 15 MIN: HCPCS

## 2021-06-23 ENCOUNTER — HOME CARE VISIT (OUTPATIENT)
Dept: HOME HEALTH SERVICES | Facility: HOME HEALTHCARE | Age: 86
End: 2021-06-23

## 2021-06-23 PROCEDURE — G0157 HHC PT ASSISTANT EA 15: HCPCS

## 2021-06-23 NOTE — HOME HEALTH
PT INR completed PT 24.3 INR 2.1  Per Dr. Ivey pt to take Warfarin 2mg daily   SN to recheck PT INR Friday 6.25.2021 with results to Dr. Ivey.

## 2021-06-24 VITALS
HEART RATE: 70 BPM | TEMPERATURE: 97.7 F | OXYGEN SATURATION: 99 % | SYSTOLIC BLOOD PRESSURE: 130 MMHG | DIASTOLIC BLOOD PRESSURE: 78 MMHG

## 2021-06-24 NOTE — HOME HEALTH
Patient was in the living room and his wife answered the door.   Patient rated his pain as 0/10.  Therapist reviewed and the patient perfomed his HEP including ankle pumps, knee extension, hip abduction and hip flexion all x 10 reps.  Therapist instructed patient in proper sit to stand technique followed by patient doing sit to stand x 5 reps from his lift chair with the chair elevated 25%.  Patient ambulated 80 feet while exhibiting adequate step length and foot clearance with his rollator walker and SBA.

## 2021-06-25 ENCOUNTER — HOME CARE VISIT (OUTPATIENT)
Dept: HOME HEALTH SERVICES | Facility: HOME HEALTHCARE | Age: 86
End: 2021-06-25

## 2021-06-25 VITALS
TEMPERATURE: 98.2 F | HEART RATE: 76 BPM | OXYGEN SATURATION: 99 % | DIASTOLIC BLOOD PRESSURE: 60 MMHG | SYSTOLIC BLOOD PRESSURE: 110 MMHG | RESPIRATION RATE: 20 BRPM

## 2021-06-25 PROCEDURE — G0299 HHS/HOSPICE OF RN EA 15 MIN: HCPCS

## 2021-06-26 NOTE — HOME HEALTH
PT INR completed this visit. Protime 28.4 INR 2.5 current dose 2mg daily  called to Dr. Ivey no change in doseage. To recheck PT INR in 1 week due 7.2.2021 Wife notified of same and voiced understanding

## 2021-07-01 ENCOUNTER — HOME CARE VISIT (OUTPATIENT)
Dept: HOME HEALTH SERVICES | Facility: HOME HEALTHCARE | Age: 86
End: 2021-07-01

## 2021-07-01 PROCEDURE — G0157 HHC PT ASSISTANT EA 15: HCPCS

## 2021-07-02 ENCOUNTER — HOME CARE VISIT (OUTPATIENT)
Dept: HOME HEALTH SERVICES | Facility: HOME HEALTHCARE | Age: 86
End: 2021-07-02

## 2021-07-02 VITALS
OXYGEN SATURATION: 99 % | DIASTOLIC BLOOD PRESSURE: 76 MMHG | TEMPERATURE: 98.2 F | HEART RATE: 75 BPM | SYSTOLIC BLOOD PRESSURE: 126 MMHG

## 2021-07-02 VITALS
SYSTOLIC BLOOD PRESSURE: 100 MMHG | RESPIRATION RATE: 16 BRPM | DIASTOLIC BLOOD PRESSURE: 56 MMHG | OXYGEN SATURATION: 95 % | HEART RATE: 72 BPM | TEMPERATURE: 98 F

## 2021-07-02 PROCEDURE — G0299 HHS/HOSPICE OF RN EA 15 MIN: HCPCS

## 2021-07-02 NOTE — HOME HEALTH
Patient was in the living room and his wife answered the door.    Therapist reviewed and the patient perfomed his HEP including ankle pumps, knee extension, hip abduction and hip flexion all x 10 reps.  Therapist instructed patient in proper sit to stand technique followed by patient doing sit to stand x 5 reps x 2 from a living room chair.  Patient ambulated 100 feet while exhibiting adequate step length and foot clearance with his 2 wheeled walker and SBA.

## 2021-07-03 NOTE — HOME HEALTH
Date: 7.2  Time: 1043  Initials of Simón: BASIL  Any warnings on machine  No:  How pt tolerated: tolerated well   Results     PT   18.8         INR 1.6  Current dosage 2 mg daily except Sunday   Product number:[id number]  Expiration date:2022.12  MD notified:Date:Talkedt with Kalpana with resuts of todays labs.     Spoke with wife pt to take Warfarin 4mg Friday then 2mg daily will see Dr. Ivey Tuesday will have checked there.

## 2021-07-07 ENCOUNTER — HOME CARE VISIT (OUTPATIENT)
Dept: HOME HEALTH SERVICES | Facility: HOME HEALTHCARE | Age: 86
End: 2021-07-07

## 2021-07-07 VITALS
HEIGHT: 72 IN | OXYGEN SATURATION: 100 % | HEART RATE: 57 BPM | DIASTOLIC BLOOD PRESSURE: 78 MMHG | RESPIRATION RATE: 18 BRPM | BODY MASS INDEX: 27.22 KG/M2 | SYSTOLIC BLOOD PRESSURE: 124 MMHG | TEMPERATURE: 97.5 F | WEIGHT: 201 LBS

## 2021-07-07 PROCEDURE — G0151 HHCP-SERV OF PT,EA 15 MIN: HCPCS

## 2021-07-08 ENCOUNTER — HOME CARE VISIT (OUTPATIENT)
Dept: HOME HEALTH SERVICES | Facility: HOME HEALTHCARE | Age: 86
End: 2021-07-08

## 2021-07-08 NOTE — CASE COMMUNICATION
Patient discharged from home health PT following last scheduled PT visit on 7/7/2021 due to goals met and patient will be able to continue with home exercise program. At time of PT discipline discharge the patient remained under the care of home health skilled nursing.

## 2021-07-08 NOTE — HOME HEALTH
Session Notes:   Patient reports that he had follow up with PCP yesterday (7/6/2021). No changes, evidently pleased with condition per pt as next visit will be in 6 weeks.    The patient agrees with PT assessment that he could be safely discharged from home health PT after session this day and consents to this. Patient asserts regular HEP performance and intention to continue regularly (at least 5 times weekly) with assist of paid caregiver.    Patient discharged from PT after session this day.

## 2021-07-08 NOTE — CASE COMMUNICATION
Huddle Summary POD 3 - 7/08/2021  21-25 day review  PT discharged 7/7/2021 due to goals met/patient at prior functional level.  LUPA Thresholds 5/3 - next 30 day period starts 7/16  There is no risk of a LUPA.    Participating: Gualberto Rey, PT, Azalia Harvey, YAEL; Via electronic communication: Lou Lancaster RN, Clinical Manager.

## 2021-07-09 ENCOUNTER — HOME CARE VISIT (OUTPATIENT)
Dept: HOME HEALTH SERVICES | Facility: HOME HEALTHCARE | Age: 86
End: 2021-07-09

## 2021-07-11 ENCOUNTER — HOME CARE VISIT (OUTPATIENT)
Dept: HOME HEALTH SERVICES | Facility: HOME HEALTHCARE | Age: 86
End: 2021-07-11

## 2021-07-11 NOTE — CASE COMMUNICATION
Please send to JEISON Ivey MD 7.9.2021 pts visit cancelled due to no INR due until Tuesday 7.13.2021 per pts request.

## 2021-07-13 ENCOUNTER — HOME CARE VISIT (OUTPATIENT)
Dept: HOME HEALTH SERVICES | Facility: HOME HEALTHCARE | Age: 86
End: 2021-07-13

## 2021-07-13 VITALS
RESPIRATION RATE: 16 BRPM | TEMPERATURE: 98.3 F | SYSTOLIC BLOOD PRESSURE: 100 MMHG | DIASTOLIC BLOOD PRESSURE: 60 MMHG | HEART RATE: 76 BPM | OXYGEN SATURATION: 95 %

## 2021-07-13 LAB
HH POC INTERNATIONAL NORMALIZATION RATIO: 2.2
HH POC PROTIME: 25.4 SECONDS

## 2021-07-13 PROCEDURE — G0299 HHS/HOSPICE OF RN EA 15 MIN: HCPCS

## 2021-07-14 ENCOUNTER — HOME CARE VISIT (OUTPATIENT)
Dept: HOME HEALTH SERVICES | Facility: HOME HEALTHCARE | Age: 86
End: 2021-07-14

## 2021-07-14 NOTE — HOME HEALTH
Plan for next visit     Cardiopulmonary assessment     assess for falls    assess weight     assess s.s. bleeding     PT INR call to Dr. Ivey leave results with person not on machine.

## 2021-07-20 ENCOUNTER — HOME CARE VISIT (OUTPATIENT)
Dept: HOME HEALTH SERVICES | Facility: HOME HEALTHCARE | Age: 86
End: 2021-07-20

## 2021-07-20 VITALS
RESPIRATION RATE: 20 BRPM | TEMPERATURE: 98.1 F | HEART RATE: 72 BPM | DIASTOLIC BLOOD PRESSURE: 54 MMHG | OXYGEN SATURATION: 97 % | SYSTOLIC BLOOD PRESSURE: 100 MMHG

## 2021-07-20 LAB
HH POC INTERNATIONAL NORMALIZATION RATIO: 2.2
HH POC PROTIME: 25.3 SECONDS

## 2021-07-20 PROCEDURE — G0299 HHS/HOSPICE OF RN EA 15 MIN: HCPCS

## 2021-07-20 NOTE — HOME HEALTH
PT 25.3 INR 2.2 current dose 4mg on Friday 2mg other 6 days. Reports has been doing ok. No s.s. bleeding no falls Called to Debbie at Dr. Ivey will return call with orders changes. Ret. call rec. pt to continue same dose and recheck in 2 weeks notified wife. Voiced understanding.

## 2021-07-30 ENCOUNTER — HOME CARE VISIT (OUTPATIENT)
Dept: HOME HEALTH SERVICES | Facility: HOME HEALTHCARE | Age: 86
End: 2021-07-30

## 2021-07-30 VITALS
RESPIRATION RATE: 20 BRPM | TEMPERATURE: 98 F | DIASTOLIC BLOOD PRESSURE: 64 MMHG | OXYGEN SATURATION: 95 % | HEART RATE: 72 BPM | SYSTOLIC BLOOD PRESSURE: 110 MMHG | BODY MASS INDEX: 27.14 KG/M2 | WEIGHT: 200.13 LBS

## 2021-07-30 PROCEDURE — G0299 HHS/HOSPICE OF RN EA 15 MIN: HCPCS

## 2021-08-02 ENCOUNTER — HOME CARE VISIT (OUTPATIENT)
Dept: HOME HEALTH SERVICES | Facility: HOME HEALTHCARE | Age: 86
End: 2021-08-02

## 2021-08-03 ENCOUNTER — HOME CARE VISIT (OUTPATIENT)
Dept: HOME HEALTH SERVICES | Facility: HOME HEALTHCARE | Age: 86
End: 2021-08-03

## 2021-08-09 ENCOUNTER — HOME CARE VISIT (OUTPATIENT)
Dept: HOME HEALTH SERVICES | Facility: HOME HEALTHCARE | Age: 86
End: 2021-08-09

## 2021-08-09 NOTE — CASE COMMUNICATION
Huddle Summary POD 3 - 8/09/2021  51-55 Day Review  Possible SN discharge Friday (8/13/2021) unless PT/INR out of range.  End of Cert 8/14/2021.    Participating: Gualberto Rey, PT, Azalia Harvey, YAEL; Via electronic communication: Lou Lancaster RN, Clinical Manager.

## 2021-08-13 ENCOUNTER — HOME CARE VISIT (OUTPATIENT)
Dept: HOME HEALTH SERVICES | Facility: HOME HEALTHCARE | Age: 86
End: 2021-08-13

## 2021-08-13 VITALS
BODY MASS INDEX: 27.16 KG/M2 | RESPIRATION RATE: 20 BRPM | TEMPERATURE: 97.9 F | OXYGEN SATURATION: 99 % | SYSTOLIC BLOOD PRESSURE: 110 MMHG | HEART RATE: 80 BPM | WEIGHT: 200.25 LBS | DIASTOLIC BLOOD PRESSURE: 66 MMHG

## 2021-08-13 PROCEDURE — G0299 HHS/HOSPICE OF RN EA 15 MIN: HCPCS

## 2021-08-14 NOTE — HOME HEALTH
Granddaughter putting pts socks and shoes on him upon SN arrival reports going to get haircut today and go to grocery. Sitting in lift chair.No falls or injuries since last SN visit. Discussed that will discharge today if INR stable voiced understanding. Inst. will need to have checked at MD office or hospital. Reports has done both.Continues to do HEP daily.

## 2021-08-16 ENCOUNTER — HOME CARE VISIT (OUTPATIENT)
Dept: HOME HEALTH SERVICES | Facility: HOME HEALTHCARE | Age: 86
End: 2021-08-16

## 2021-08-16 NOTE — CASE COMMUNICATION
Huddle Summary POD 3 - 8/16/2021  Discharged from home health by SN on 8/13/2021 due to all goals met.    Participating: Gualberto Rey PT, Azalia Harvey RN; Via electronic communication: Lou Lancaster RN, Clinical Manager.

## 2021-12-22 ENCOUNTER — OFFICE VISIT (OUTPATIENT)
Dept: CARDIOLOGY | Facility: CLINIC | Age: 86
End: 2021-12-22

## 2021-12-22 VITALS
BODY MASS INDEX: 26.85 KG/M2 | DIASTOLIC BLOOD PRESSURE: 67 MMHG | HEART RATE: 70 BPM | SYSTOLIC BLOOD PRESSURE: 117 MMHG | WEIGHT: 198 LBS

## 2021-12-22 DIAGNOSIS — I27.20 PULMONARY HYPERTENSION (HCC): ICD-10-CM

## 2021-12-22 DIAGNOSIS — I35.0 NONRHEUMATIC AORTIC VALVE STENOSIS: ICD-10-CM

## 2021-12-22 DIAGNOSIS — I38 VALVULAR HEART DISEASE: ICD-10-CM

## 2021-12-22 DIAGNOSIS — I48.20 ATRIAL FIBRILLATION, CHRONIC (HCC): Chronic | ICD-10-CM

## 2021-12-22 DIAGNOSIS — I10 ESSENTIAL HYPERTENSION: ICD-10-CM

## 2021-12-22 DIAGNOSIS — I36.9 NONRHEUMATIC TRICUSPID VALVE DISORDER: ICD-10-CM

## 2021-12-22 DIAGNOSIS — I49.5 SICK SINUS SYNDROME (HCC): Primary | Chronic | ICD-10-CM

## 2021-12-22 DIAGNOSIS — T45.515A WARFARIN-INDUCED COAGULOPATHY (HCC): Chronic | ICD-10-CM

## 2021-12-22 DIAGNOSIS — E78.2 MIXED HYPERLIPIDEMIA: Chronic | ICD-10-CM

## 2021-12-22 DIAGNOSIS — Z95.0 PACEMAKER: Chronic | ICD-10-CM

## 2021-12-22 DIAGNOSIS — G47.33 OBSTRUCTIVE SLEEP APNEA SYNDROME: ICD-10-CM

## 2021-12-22 DIAGNOSIS — I50.43 ACUTE ON CHRONIC COMBINED SYSTOLIC AND DIASTOLIC CHF (CONGESTIVE HEART FAILURE) (HCC): ICD-10-CM

## 2021-12-22 DIAGNOSIS — D68.32 WARFARIN-INDUCED COAGULOPATHY (HCC): Chronic | ICD-10-CM

## 2021-12-22 PROCEDURE — 93000 ELECTROCARDIOGRAM COMPLETE: CPT | Performed by: NURSE PRACTITIONER

## 2021-12-22 PROCEDURE — 99214 OFFICE O/P EST MOD 30 MIN: CPT | Performed by: NURSE PRACTITIONER

## 2021-12-22 NOTE — PROGRESS NOTES
[facility] CARDIOLOGY      REASON FOR FOLLOW-UP:  Persistent atrial fibrillation  Single-chamber permanent pacemaker in situ  Hypertension  Dyslipidemia  Anticoagulation therapy             Chief Complaint   Patient presents with   • Atrial Fibrillation     6 month f/u   • Hypertension         Dear [place], Blake Doherty MD        History of Present Illness     It was my pleasure to see Rafael Ivey in follow-up today.  As you are aware, he is a very pleasant 86-year-old gentleman with no known history of coronary occlusive disease.  Patient does have known history of persistent atrial fibrillation, chronic diastolic heart failure, dyslipidemia, hypertension, mitral valve insufficiency, sick sinus syndrome with permanent pacemaker in situ, anticoagulation with warfarin and history of frequent falls.  Patient was evaluated for left atrial appendage device implant, however based on the measurements both on left atrial appendage venography and PRESTON 5/28/2020, left atrial appendage neck was felt to be too large for current maximal size device with watchman.  The procedure was aborted.    PRESTON showed normal LV systolic function, moderate mitral valve insufficiency.  The patient presents today in follow-up for the above-mentioned diagnoses.    Today, Mr. Ivey reports that he feels well from a cardiovascular standpoint.  He denies any complaints of chest pain, pressure, tightness or palpitations.  He denies any shortness of breath at rest.  His mobility is quite limited, but he denies any shortness of breath with activity.  He has had no lower extremity edema, orthopnea or PND.  The patient does suffer from balance issues related to his Parkinson's disease and has sustained a few falls at home-primarily falling backwards with loss of balance.  He apparently has had no known head injuries.  He does have an abrasion to anterior aspect of his right lower extremity from a recent fall.  There is no  drainage or erythema.  He is receiving in-home physical therapy.  He denies any near syncopal or syncopal episodes.  EKG in the office today is ventricular paced rhythm.  Patient forgot his visit for device check.          ASSESSMENT:  Chronic and permanent atrial fibrillation  History of severe and profound iron deficiency anemia  Sick sinus syndrome  Single-chamber permanent pacemaker in situ  Aortic/tricuspid valve disease  History of hypertension  Dyslipidemia  Long-term anticoagulation use      PLAN:  We will reschedule patient's device check appointment  Continue current CV plan of care  Continue home therapy for balance  Surveillance labs at your discretion  Recommend antibiotic ointment to right lower extremity wound  Follow-up our office in 6 months or sooner if needed        The following portions of the patient's history were reviewed and updated as appropriate: allergies, current medications, past family history, past medical history, past social history, past surgical history and problem list.    REVIEW OF SYSTEMS:    Review of Systems   Musculoskeletal: Positive for falls.   All other systems reviewed and are negative.      Vitals:    12/22/21 1307   BP: 117/67   Pulse: 70         PHYSICAL EXAM:    General: Alert, cooperative, no distress, appears stated age  Head:  Normocephalic, atraumatic, mucous membranes moist  Eyes:  Conjunctiva/corneas clear, EOM's intact     Neck:  Supple,  no JVD or bruit     Lungs: Clear to auscultation bilaterally, no wheezes rhonchi rales are noted  Chest wall: No tenderness  Musculoskeletal:   Ambulates with slow shuffling gait and assistance of a walker  Heart::  Regular rate and rhythm, S1 and S2 normal, no murmur, rub or gallop  Abdomen: Soft, non-tender, nondistended, bowel sounds active, no abdominal bruit  Extremities: No cyanosis, clubbing, or edema   Pulses: 2+ and symmetric all extremities  Skin:  No rashes, contusion/abrasion to anterior right lower  extremity  Neuro/psych: A&O x3. CN II through XII are grossly intact with appropriate affect        Past Medical History:   Diagnosis Date   • Anemia    • Arthritis     left hip    • Atrial fibrillation (HCC)    • Atrial fibrillation (HCC)     chronic   • Cardiomegaly     mild   • Chronic diastolic heart failure (HCC)    • GERD (gastroesophageal reflux disease)    • Hyperlipidemia    • Hypertension    • Hypotension    • Lower extremity edema    • Mitral regurgitation    • Obesity    • Pacemaker    • Parkinson disease (HCC)    • Recurrent falls     with injury    • Sick sinus syndrome (HCC)     s/p pacemaker implant    • Valvular heart disease     MR       Past Surgical History:   Procedure Laterality Date   • APPENDECTOMY     • ATRIAL APPENDAGE EXCLUSION LEFT WITH TRANSESOPHAGEAL ECHOCARDIOGRAM N/A 5/28/2020    Procedure: Atrial Appendage Occlusion;  Surgeon: Jim Walker MD;  Location: [facility] CATH INVASIVE LOCATION;  Service: Cardiovascular;  Laterality: N/A;   • ATRIAL APPENDAGE EXCLUSION LEFT WITH TRANSESOPHAGEAL ECHOCARDIOGRAM N/A 5/28/2020    Procedure: Atrial Appendage Occlusion;  Surgeon: Lashaun So MD;  Location: [facility] CATH INVASIVE LOCATION;  Service: Cardiovascular;  Laterality: N/A;   • CATARACT EXTRACTION     • CHOLECYSTECTOMY     • COLONOSCOPY N/A 5/21/2020    Procedure: COLONOSCOPY WITH BIOPSY X1 AREA;  Surgeon: Sim Collins MD;  Location: [facility] ENDOSCOPY;  Service: Gastroenterology;  Laterality: N/A;  Post: poor prep, impacted stool in rectum, and internal hemorrhoids, ascending colon arteriovenous malformation   • ENDOSCOPY N/A 5/21/2020    Procedure: ESOPHAGOGASTRODUODENOSCOPY with clipping x 1 of bleeding gastric polyp;  Surgeon: Sim Collins MD;  Location: [facility] ENDOSCOPY;  Service: Gastroenterology;  Laterality: N/A;  Post: bleeding gastric polyp   • HIP SURGERY Right 08/2017   • OTHER SURGICAL HISTORY      skin mole excisions    • PACEMAKER IMPLANTATION  06/22/2017     O[employer]'s ([employer])   • TONSILLECTOMY     • TOTAL HIP ARTHROPLASTY Left 05/20/2014         Current Outpatient Medications:   •  candesartan (Atacand) 4 MG tablet, Take 0.5 tablets by mouth Daily., Disp: 30 tablet, Rfl: 0  •  carbidopa-levodopa (SINEMET)  MG per tablet, Take 1.5 tablets by mouth 3 (Three) Times a Day., Disp: , Rfl:   •  cetirizine (zyrTEC) 10 MG tablet, Take 10 mg by mouth Daily., Disp: , Rfl:   •  escitalopram (LEXAPRO) 10 MG tablet, Take 10 mg by mouth Daily., Disp: , Rfl:   •  ferrous sulfate 325 (65 FE) MG tablet, Take 1 tablet by mouth Daily With Breakfast., Disp: , Rfl:   •  furosemide (LASIX) 40 MG tablet, Take 40 mg by mouth Daily., Disp: , Rfl:   •  multivitamin-minerals (CENTRUM) tablet, Take 1 tablet by mouth Daily., Disp: , Rfl:   •  nitroglycerin (NITROSTAT) 0.4 MG SL tablet, Place 1 tablet under the tongue Every 5 (Five) Minutes As Needed for Chest Pain (Only if SBP Greater Than 100). Take no more than 3 doses in 15 minutes., Disp: 25 tablet, Rfl: 0  •  Omega-3 Fatty Acids (FISH OIL PO), Take 1 capsule by mouth Daily. 1200mg capsule, Disp: , Rfl:   •  omeprazole (PRILOSEC) 20 MG capsule, Take 1 capsule by mouth Daily., Disp: , Rfl:   •  oxybutynin (DITROPAN) 5 MG tablet, Take 5 mg by mouth Every Night., Disp: , Rfl:   •  potassium chloride (K-DUR,KLOR-CON) 20 MEQ CR tablet, Take 20 mEq by mouth 3 (Three) Times a Day., Disp: , Rfl:   •  rosuvastatin (CRESTOR) 10 MG tablet, Take 1 tablet by mouth every night at bedtime., Disp: , Rfl:   •  warfarin (COUMADIN) 2 MG tablet, Take 1 tablet by mouth Every Night. Taking every day but Sunday (Patient taking differently: Take 2 mg by mouth Every Night. Take 4mg on Friday 2mg all other days), Disp: , Rfl:   •  baclofen (LIORESAL) 10 MG tablet, Take 15 mg by mouth Every Night. to take at HS ordered by   Indications: Muscle Spasticity, Disp: , Rfl:   •  metOLazone (ZAROXOLYN) 2.5 MG tablet, Take 2.5 mg by mouth Daily As Needed., Disp:  ", Rfl:   •  ondansetron (ZOFRAN) 4 MG tablet, Take 1 tablet by mouth Every 6 (Six) Hours As Needed for Nausea or Vomiting., Disp: 30 tablet, Rfl: 0    Allergies   Allergen Reactions   • Amoxicillin Diarrhea   • Cephalexin Diarrhea and Nausea And Vomiting   • Penicillins Other (See Comments)   • Amoxicillin-Pot Clavulanate Nausea And Vomiting   • Clarithromycin Nausea And Vomiting       Family History   Problem Relation Age of Onset   • Heart failure Mother    • Stroke Maternal Grandfather        Social History     Tobacco Use   • Smoking status: Former Smoker     Packs/day: 1.00     Years: 40.00     Pack years: 40.00     Types: Cigarettes   • Smokeless tobacco: Never Used   • Tobacco comment: quit 1970's    Substance Use Topics   • Alcohol use: Yes           Current Electrocardiogram:    ECG 12 Lead    Date/Time: 12/22/2021 2:28 PM  Performed by: Marnie De La Paz APRN  Authorized by: Marnie De La Paz APRN   Comparison: not compared with previous ECG   Rhythm: paced                EMR Dragon/Transcription:   \"Dictated utilizing Dragon dictation\".       "

## 2022-01-01 ENCOUNTER — OFFICE VISIT (OUTPATIENT)
Dept: CARDIOLOGY | Facility: CLINIC | Age: 87
End: 2022-01-01

## 2022-01-01 VITALS
DIASTOLIC BLOOD PRESSURE: 54 MMHG | WEIGHT: 201 LBS | BODY MASS INDEX: 27.22 KG/M2 | SYSTOLIC BLOOD PRESSURE: 95 MMHG | HEIGHT: 72 IN

## 2022-01-01 VITALS
RESPIRATION RATE: 18 BRPM | OXYGEN SATURATION: 98 % | WEIGHT: 207 LBS | SYSTOLIC BLOOD PRESSURE: 126 MMHG | HEIGHT: 72 IN | BODY MASS INDEX: 28.04 KG/M2 | DIASTOLIC BLOOD PRESSURE: 72 MMHG

## 2022-01-01 DIAGNOSIS — I35.0 NONRHEUMATIC AORTIC VALVE STENOSIS: ICD-10-CM

## 2022-01-01 DIAGNOSIS — I50.30 DIASTOLIC CONGESTIVE HEART FAILURE, UNSPECIFIED HF CHRONICITY: ICD-10-CM

## 2022-01-01 DIAGNOSIS — I48.20 ATRIAL FIBRILLATION, CHRONIC: ICD-10-CM

## 2022-01-01 DIAGNOSIS — T45.515A WARFARIN-INDUCED COAGULOPATHY: ICD-10-CM

## 2022-01-01 DIAGNOSIS — I48.20 ATRIAL FIBRILLATION, CHRONIC: Primary | ICD-10-CM

## 2022-01-01 DIAGNOSIS — I36.9 NONRHEUMATIC TRICUSPID VALVE DISORDER: ICD-10-CM

## 2022-01-01 DIAGNOSIS — I35.0 NONRHEUMATIC AORTIC VALVE STENOSIS: Primary | ICD-10-CM

## 2022-01-01 DIAGNOSIS — I38 VALVULAR HEART DISEASE: ICD-10-CM

## 2022-01-01 DIAGNOSIS — I49.5 SICK SINUS SYNDROME: ICD-10-CM

## 2022-01-01 DIAGNOSIS — Z95.0 PACEMAKER: ICD-10-CM

## 2022-01-01 DIAGNOSIS — D68.32 WARFARIN-INDUCED COAGULOPATHY: ICD-10-CM

## 2022-01-01 PROCEDURE — 93000 ELECTROCARDIOGRAM COMPLETE: CPT | Performed by: INTERNAL MEDICINE

## 2022-01-01 PROCEDURE — 99214 OFFICE O/P EST MOD 30 MIN: CPT | Performed by: INTERNAL MEDICINE

## 2022-01-01 PROCEDURE — 93279 PRGRMG DEV EVAL PM/LDLS PM: CPT | Performed by: INTERNAL MEDICINE

## 2022-01-01 RX ORDER — CLONAZEPAM 0.5 MG/1
TABLET ORAL
COMMUNITY
Start: 2022-01-01

## 2022-01-01 RX ORDER — GABAPENTIN 300 MG/1
300 CAPSULE ORAL 2 TIMES DAILY
COMMUNITY
Start: 2022-01-01

## 2022-01-01 RX ORDER — GABAPENTIN 100 MG/1
100 CAPSULE ORAL DAILY
COMMUNITY
Start: 2022-01-01

## 2022-01-13 NOTE — PROGRESS NOTES
CC--leg edema, persistent atrial fibrillation, recurrent falls, iron deficiency anemia, single-chamber pacemaker in situ    Subject-- 86-year-old male patient of symptomatic bradycardia needing a single-chamber pacemaker manufactured by [employer] placed in [facility]    past medical history of  chronic atrial fibrillation, hypertension, dyslipidemia, and use of long-term anticoagulation to prevent cardioembolic episodes. His last echocardiogram showed normal LV systolic function,  mild mitral regurgitation,  moderate tricuspid regurgitation and pulmonary hypertension with RVSP of 58 mm.   chronic medical problems include persistent atrial fibrillation, hypertension, hyperlipidemia and prior history of symptomatic bradycardia needing pacemaker implantation   patient denies any stroke, or any active angina or dyspnea  Does have chronic edema of his feet--his edema is been treated with 2 diuretics including Lasix and metolazone in the past--he had multiple falls with injuries in the past   he also has worsening Parkinson's with recurrent falls --Recently also had profound anemia needing multiple transfusions and came for preliminary PRESTON prior to watchman implantation  to avoid chronic anticoagulation patient was markedly anemic and received blood transfusion and further underwent colonoscopy with cauterization of his AVM and attempted watchman unsuccessful because of large appendage unable to accommodate the largest size of watchman device and the procedure had to be aborted  Patient was seen several months ago with decompensated diastolic heart failure and was subsequently admitted with a diuresis with improvement of symptoms being followed by Swati De La Paz and came for evaluation nearly after a year and a half.  He is complaining of mild orthostatic symptoms and dizziness and low blood pressure at home.  He continues to have significant bleeding issues with current anticoagulation particularly  subcutaneous bleeding right now denies any active bleeding from GI tract  Is currently on iron supplementation for iron deficiency anemia      Past Medical History:   Diagnosis Date   • Anemia    • Arthritis     left hip    • Atrial fibrillation (HCC)    • Atrial fibrillation (HCC)     chronic   • Cardiomegaly     mild   • Chronic diastolic heart failure (HCC)    • GERD (gastroesophageal reflux disease)    • Hyperlipidemia    • Hypertension    • Hypotension    • Lower extremity edema    • Mitral regurgitation    • Obesity    • Pacemaker    • Parkinson disease (HCC)    • Recurrent falls     with injury    • Sick sinus syndrome (HCC)     s/p pacemaker implant    • Valvular heart disease     MR     Past Surgical History:   Procedure Laterality Date   • APPENDECTOMY     • ATRIAL APPENDAGE EXCLUSION LEFT WITH TRANSESOPHAGEAL ECHOCARDIOGRAM N/A 5/28/2020    Procedure: Atrial Appendage Occlusion;  Surgeon: Jim Walker MD;  Location: [facility] CATH INVASIVE LOCATION;  Service: Cardiovascular;  Laterality: N/A;   • ATRIAL APPENDAGE EXCLUSION LEFT WITH TRANSESOPHAGEAL ECHOCARDIOGRAM N/A 5/28/2020    Procedure: Atrial Appendage Occlusion;  Surgeon: Lashaun oS MD;  Location: [facility] CATH INVASIVE LOCATION;  Service: Cardiovascular;  Laterality: N/A;   • CATARACT EXTRACTION     • CHOLECYSTECTOMY     • COLONOSCOPY N/A 5/21/2020    Procedure: COLONOSCOPY WITH BIOPSY X1 AREA;  Surgeon: Sim Collins MD;  Location: [facility] ENDOSCOPY;  Service: Gastroenterology;  Laterality: N/A;  Post: poor prep, impacted stool in rectum, and internal hemorrhoids, ascending colon arteriovenous malformation   • ENDOSCOPY N/A 5/21/2020    Procedure: ESOPHAGOGASTRODUODENOSCOPY with clipping x 1 of bleeding gastric polyp;  Surgeon: Sim Collins MD;  Location: [facility] ENDOSCOPY;  Service: Gastroenterology;  Laterality: N/A;  Post: bleeding gastric polyp   • HIP SURGERY Right 08/2017   • OTHER SURGICAL HISTORY      skin mole  excisions    • PACEMAKER IMPLANTATION  06/22/2017    [employer] ([employer])   • TONSILLECTOMY     • TOTAL HIP ARTHROPLASTY Left 05/20/2014       Review of Systems   General:  positive for fatigue and tiredness  Eyes: No redness  Cardiovascular: No chest pain, no palpitations  Respiratory:   positive for class 2 shortness of breath  Gastrointestinal: No nausea or vomiting, bleeding  Genitourinary: no hematuria or dysuria  Musculoskeletal: No arthralgia or myalgia  Skin: No rash  Neurologic: No numbness, tingling  Hematologic/Lymphatic: No abnormal bleeding      Physical Exam  VITALS REVIEWED    General:      well developed, well nourished, in no acute distress.  Pallor positive  Head:      normocephalic and atraumatic.    Eyes:      PERRL/EOM intact, conjunctiva and sclera clear with out nystagmus.    Neck:      no masses, thyromegaly,  trachea central with normal respiratory effort   Lungs:      Reduced breath sounds in bases     Heart:       underlying paced rhythm without gallops, loud ejection systolic murmur at the base of the heart and carotid upstroke mildly reduced    Extremities:      Minimal chronic edema noted   neurologic:      no focal deficits.   alert oriented x3  Skin:      intact without lesions or rashes.    Psych:      alert and cooperative; normal mood and affect; normal attention span and concentration.        Assessment and plan    Orthostatic symptoms with low blood pressure--stop candesartan  Physical exam suggestive of aortic stenosis to be evaluated by echocardiography   Chronic diastolic heart failure class III stable with no flareup of CHF in last 1 year   single -chamber pacemaker in situ which was interrogated and pacemaker appropriately functioning and interrogation attached to the chart  severe and profound iron deficiency anemia --post endoscopy and colonoscopy with cauterization of AVM --on iron supplementation with no recent GI bleed  Remote history of  transfusion and iron infusions    multiple falls with Parkinson's disease  Chronic and permanent atrial fibrillation  Moderate aortic stenosis and moderate to severe tricuspid regurgitation  [id number] score--4      ECG 12 Lead    Date/Time: 1/13/2022 4:52 PM  Performed by: Jim Walker MD  Authorized by: Jim Walker MD   Comparison: compared with previous ECG   Similar to previous ECG  Rhythm: atrial fibrillation and paced  Rate: normal  Pacing: ventricular paced rhythm          Electronically signed by Jim Walker MD, 01/13/22, 4:52 PM EST.

## 2022-05-13 PROBLEM — T42.8X1A BACLOFEN OVERDOSE: Status: RESOLVED | Noted: 2021-06-11 | Resolved: 2022-01-01

## 2022-05-13 NOTE — PROGRESS NOTES
CC--leg edema, persistent atrial fibrillation, recurrent falls, iron deficiency anemia, single-chamber pacemaker in situ    Subject-- 86-year-old male patient of symptomatic bradycardia needing a single-chamber pacemaker manufactured by [employer] placed in [facility]    past medical history of  chronic atrial fibrillation, hypertension, dyslipidemia, and use of long-term anticoagulation to prevent cardioembolic episodes. His last echocardiogram showed normal LV systolic function,  mild mitral regurgitation,  moderate tricuspid regurgitation and pulmonary hypertension with RVSP of 58 mm.   chronic medical problems include persistent atrial fibrillation, hypertension, hyperlipidemia and prior history of symptomatic bradycardia needing pacemaker implantation   patient denies any stroke, or any active angina or dyspnea  Does have chronic edema of his feet--his edema is been treated with 2 diuretics including Lasix and metolazone in the past--he had multiple falls with injuries in the past   he also has worsening Parkinson's with recurrent falls --Recently also had profound anemia needing multiple transfusions and came for preliminary PRESTON prior to watchman implantation  to avoid chronic anticoagulation patient was markedly anemic and received blood transfusion and further underwent colonoscopy with cauterization of his AVM and attempted watchman unsuccessful because of large appendage unable to accommodate the largest size of watchman device and the procedure had to be aborted  Patient was seen several months ago with decompensated diastolic heart failure and was subsequently admitted with a diuresis with improvement of symptoms being followed by Swati De La Paz and came for evaluation nearly after a year and a half.  He is complaining of mild orthostatic symptoms and dizziness and low blood pressure at home.  He continues to have significant bleeding issues with current anticoagulation particularly  subcutaneous bleeding right now denies any active bleeding from GI tract  Is currently on iron supplementation for iron deficiency anemia  Patient again needed blood transfusion 2 months ago  Currently stable denies any new symptoms  No further symptoms of orthostatic hypotension after candesartan has been stopped      Past Medical History:   Diagnosis Date   • Anemia    • Arthritis     left hip    • Atrial fibrillation (HCC)    • Atrial fibrillation (HCC)     chronic   • Cardiomegaly     mild   • Chronic diastolic heart failure (HCC)    • GERD (gastroesophageal reflux disease)    • Hyperlipidemia    • Hypertension    • Hypotension    • Lower extremity edema    • Mitral regurgitation    • Obesity    • Pacemaker    • Parkinson disease (HCC)    • Recurrent falls     with injury    • Sick sinus syndrome (HCC)     s/p pacemaker implant    • Valvular heart disease     MR     Past Surgical History:   Procedure Laterality Date   • APPENDECTOMY     • ATRIAL APPENDAGE EXCLUSION LEFT WITH TRANSESOPHAGEAL ECHOCARDIOGRAM N/A 5/28/2020    Procedure: Atrial Appendage Occlusion;  Surgeon: Jim Walker MD;  Location: [facility] CATH INVASIVE LOCATION;  Service: Cardiovascular;  Laterality: N/A;   • ATRIAL APPENDAGE EXCLUSION LEFT WITH TRANSESOPHAGEAL ECHOCARDIOGRAM N/A 5/28/2020    Procedure: Atrial Appendage Occlusion;  Surgeon: Lashaun So MD;  Location: [facility] CATH INVASIVE LOCATION;  Service: Cardiovascular;  Laterality: N/A;   • CATARACT EXTRACTION     • CHOLECYSTECTOMY     • COLONOSCOPY N/A 5/21/2020    Procedure: COLONOSCOPY WITH BIOPSY X1 AREA;  Surgeon: Sim Collins MD;  Location: [facility] ENDOSCOPY;  Service: Gastroenterology;  Laterality: N/A;  Post: poor prep, impacted stool in rectum, and internal hemorrhoids, ascending colon arteriovenous malformation   • ENDOSCOPY N/A 5/21/2020    Procedure: ESOPHAGOGASTRODUODENOSCOPY with clipping x 1 of bleeding gastric polyp;  Surgeon: Sim Collins MD;   Location: [facility] ENDOSCOPY;  Service: Gastroenterology;  Laterality: N/A;  Post: bleeding gastric polyp   • HIP SURGERY Right 08/2017   • OTHER SURGICAL HISTORY      skin mole excisions    • PACEMAKER IMPLANTATION  06/22/2017    [employer] ([employer])   • TONSILLECTOMY     • TOTAL HIP ARTHROPLASTY Left 05/20/2014       Review of Systems   General:  positive for fatigue and tiredness  Eyes: No redness  Cardiovascular: No chest pain, no palpitations  Respiratory:   positive for class 2 shortness of breath  Gastrointestinal: No nausea or vomiting, bleeding  Genitourinary: no hematuria or dysuria  Musculoskeletal: No arthralgia or myalgia  Skin: No rash  Neurologic: No numbness, tingling  Hematologic/Lymphatic: No abnormal bleeding      Physical Exam  VITALS REVIEWED    General:      well developed, well nourished, in no acute distress.  Pallor positive  Head:      normocephalic and atraumatic.    Eyes:      PERRL/EOM intact, conjunctiva and sclera clear with out nystagmus.    Neck:      no masses, thyromegaly,  trachea central with normal respiratory effort   Lungs:      Reduced breath sounds in bases     Heart:       underlying paced rhythm without gallops, loud ejection systolic murmur at the base of the heart and carotid upstroke mildly reduced    Extremities:      Minimal chronic edema noted   neurologic:      no focal deficits.   alert oriented x3  Skin:      intact without lesions or rashes.    Psych:      alert and cooperative; normal mood and affect; normal attention span and concentration.        Assessment and plan    Orthostatic symptoms with low blood pressure--off candesartan with resolution with no further falls  Physical exam suggestive of aortic stenosis to be evaluated by echocardiography --aortic valve area of 1.0 with a mean gradient of 23 and patient has moderate to severe TR--echo findings discussed with the patient and family  Chronic diastolic heart failure class III stable with no flareup of CHF  in last 1 year   single -chamber pacemaker in situ which was interrogated and pacemaker appropriately functioning and interrogation attached to the chart  severe and profound iron deficiency anemia --post endoscopy and colonoscopy with cauterization of AVM --on iron supplementation with no recent GI bleed--post transfusion 2 months ago  Remote history of  transfusion and iron infusions   multiple falls with Parkinson's disease--no recent falls  Chronic and permanent atrial fibrillation  Moderate aortic stenosis and moderate to severe tricuspid regurgitation  [id number] score--4    Watchman device could not be done because of the size of the appendage being very large.  Options can be placement of amulet which was discussed with the patient and family  Follow-up in 3 months and medications reviewed        ECG 12 Lead    Date/Time: 5/13/2022 12:07 PM  Performed by: Jim Walker MD  Authorized by: Jim Walker MD   Comparison: compared with previous ECG   Similar to previous ECG  Rhythm: atrial fibrillation and paced  Rate: normal          Electronically signed by Jim Walker MD, 05/13/22, 12:07 PM EDT.

## (undated) DEVICE — ELECTRD DEFIB M/FUNC PROPADZ RADIOL 2PK

## (undated) DEVICE — PREF.GUIDING SHEATH W/MULT.CRV: Brand: PREFACE

## (undated) DEVICE — CATH DIAG IMPULSE PIG 5F 100CM

## (undated) DEVICE — PAPR PRNT PK SONY W RIBN UPC55

## (undated) DEVICE — SKIN PREP TRAY W/CHG: Brand: MEDLINE INDUSTRIES, INC.

## (undated) DEVICE — INTRO PERFORMER CHECKFLO/LG RAD/BND NO/GW 14F .038IN 30CM

## (undated) DEVICE — Device: Brand: NRG TRANSSEPTAL NEEDLE

## (undated) DEVICE — Device: Brand: RFP-100A CONNECTOR CABLE

## (undated) DEVICE — PK ENDO GI 50

## (undated) DEVICE — PK TRY HEART CATH 50

## (undated) DEVICE — BITEBLOCK ENDO W/STRAP 60F A/ LF DISP

## (undated) DEVICE — 3M™ PATIENT PLATE, CORDED, SPLIT, LARGE, 40 PER CASE, 1179: Brand: 3M™